# Patient Record
Sex: MALE | Race: WHITE | NOT HISPANIC OR LATINO | URBAN - METROPOLITAN AREA
[De-identification: names, ages, dates, MRNs, and addresses within clinical notes are randomized per-mention and may not be internally consistent; named-entity substitution may affect disease eponyms.]

---

## 2017-02-17 ENCOUNTER — OUTPATIENT (OUTPATIENT)
Dept: OUTPATIENT SERVICES | Facility: HOSPITAL | Age: 62
LOS: 1 days | End: 2017-02-17
Payer: MEDICARE

## 2017-02-17 VITALS
RESPIRATION RATE: 20 BRPM | SYSTOLIC BLOOD PRESSURE: 124 MMHG | TEMPERATURE: 98 F | HEART RATE: 98 BPM | WEIGHT: 218.26 LBS | DIASTOLIC BLOOD PRESSURE: 80 MMHG | HEIGHT: 66 IN

## 2017-02-17 DIAGNOSIS — Z95.810 PRESENCE OF AUTOMATIC (IMPLANTABLE) CARDIAC DEFIBRILLATOR: Chronic | ICD-10-CM

## 2017-02-17 DIAGNOSIS — Z01.818 ENCOUNTER FOR OTHER PREPROCEDURAL EXAMINATION: ICD-10-CM

## 2017-02-17 DIAGNOSIS — E11.9 TYPE 2 DIABETES MELLITUS WITHOUT COMPLICATIONS: ICD-10-CM

## 2017-02-17 DIAGNOSIS — I25.10 ATHEROSCLEROTIC HEART DISEASE OF NATIVE CORONARY ARTERY WITHOUT ANGINA PECTORIS: ICD-10-CM

## 2017-02-17 DIAGNOSIS — Z98.89 OTHER SPECIFIED POSTPROCEDURAL STATES: Chronic | ICD-10-CM

## 2017-02-17 DIAGNOSIS — Z12.11 ENCOUNTER FOR SCREENING FOR MALIGNANT NEOPLASM OF COLON: ICD-10-CM

## 2017-02-17 DIAGNOSIS — Z95.1 PRESENCE OF AORTOCORONARY BYPASS GRAFT: Chronic | ICD-10-CM

## 2017-02-17 LAB
ANION GAP SERPL CALC-SCNC: 11 MMOL/L — SIGNIFICANT CHANGE UP (ref 5–17)
BUN SERPL-MCNC: 31 MG/DL — HIGH (ref 8–20)
CALCIUM SERPL-MCNC: 9.3 MG/DL — SIGNIFICANT CHANGE UP (ref 8.6–10.2)
CHLORIDE SERPL-SCNC: 102 MMOL/L — SIGNIFICANT CHANGE UP (ref 98–107)
CO2 SERPL-SCNC: 31 MMOL/L — HIGH (ref 22–29)
CREAT SERPL-MCNC: 1.86 MG/DL — HIGH (ref 0.5–1.3)
GLUCOSE SERPL-MCNC: 85 MG/DL — SIGNIFICANT CHANGE UP (ref 70–115)
HCT VFR BLD CALC: 43.2 % — SIGNIFICANT CHANGE UP (ref 42–52)
HGB BLD-MCNC: 14 G/DL — SIGNIFICANT CHANGE UP (ref 14–18)
MCHC RBC-ENTMCNC: 28.7 PG — SIGNIFICANT CHANGE UP (ref 27–31)
MCHC RBC-ENTMCNC: 32.4 G/DL — SIGNIFICANT CHANGE UP (ref 32–36)
MCV RBC AUTO: 88.7 FL — SIGNIFICANT CHANGE UP (ref 80–94)
PLATELET # BLD AUTO: 270 K/UL — SIGNIFICANT CHANGE UP (ref 150–400)
POTASSIUM SERPL-MCNC: 4.7 MMOL/L — SIGNIFICANT CHANGE UP (ref 3.5–5.3)
POTASSIUM SERPL-SCNC: 4.7 MMOL/L — SIGNIFICANT CHANGE UP (ref 3.5–5.3)
RBC # BLD: 4.87 M/UL — SIGNIFICANT CHANGE UP (ref 4.6–6.2)
RBC # FLD: 15.1 % — SIGNIFICANT CHANGE UP (ref 11–15.6)
SODIUM SERPL-SCNC: 144 MMOL/L — SIGNIFICANT CHANGE UP (ref 135–145)
WBC # BLD: 9.2 K/UL — SIGNIFICANT CHANGE UP (ref 4.8–10.8)
WBC # FLD AUTO: 9.2 K/UL — SIGNIFICANT CHANGE UP (ref 4.8–10.8)

## 2017-02-17 PROCEDURE — 85027 COMPLETE CBC AUTOMATED: CPT

## 2017-02-17 PROCEDURE — G0463: CPT

## 2017-02-17 PROCEDURE — 80048 BASIC METABOLIC PNL TOTAL CA: CPT

## 2017-02-17 NOTE — H&P PST ADULT - PMH
CAD (coronary artery disease)    Diabetes  Peripheral vascular disease  CHF- EF-19% AICD ([employer])  MI  December 2013   HTN  Pulmonary edema  High Cholesterol  Chronic renal insufficency  DVT of leg (deep venous thrombosis), right    Dyslipidemia    Hypertension    Renal insufficiency

## 2017-02-17 NOTE — ASU PATIENT PROFILE, ADULT - PMH
CAD (coronary artery disease)    Diabetes  Peripheral vascular disease  CHF- EF-19% AICD ([employer])  MI  December 2013   HTN  Pulmonary edema  High Cholesterol  Chronic renal insufficency  DVT of leg (deep venous thrombosis), right    Dyslipidemia    Hypertension    Renal insufficiency

## 2017-02-17 NOTE — H&P PST ADULT - HISTORY OF PRESENT ILLNESS
62 yo male planning screening colonoscopy.  Pt currently denies change in bowels, abdominal pain or bleeding.

## 2017-02-17 NOTE — H&P PST ADULT - PSH
AICD (automatic cardioverter/defibrillator) present    S/P angioplasty with stent  right leg 08/14, L femoral 7/2014  S/P coronary artery bypass graft x 3  2014    AICD ([employer]) 2014

## 2017-02-17 NOTE — H&P PST ADULT - NSANTHOSAYNRD_GEN_A_CORE
No. IKE screening performed.  STOP BANG Legend: 0-2 = LOW Risk; 3-4 = INTERMEDIATE Risk; 5-8 = HIGH Risk

## 2017-02-20 DIAGNOSIS — Z12.11 ENCOUNTER FOR SCREENING FOR MALIGNANT NEOPLASM OF COLON: ICD-10-CM

## 2017-02-20 DIAGNOSIS — Z01.818 ENCOUNTER FOR OTHER PREPROCEDURAL EXAMINATION: ICD-10-CM

## 2017-02-22 ENCOUNTER — RESULT REVIEW (OUTPATIENT)
Age: 62
End: 2017-02-22

## 2017-02-23 ENCOUNTER — OUTPATIENT (OUTPATIENT)
Dept: OUTPATIENT SERVICES | Facility: HOSPITAL | Age: 62
LOS: 1 days | End: 2017-02-23
Payer: MEDICARE

## 2017-02-23 DIAGNOSIS — Z95.1 PRESENCE OF AORTOCORONARY BYPASS GRAFT: Chronic | ICD-10-CM

## 2017-02-23 DIAGNOSIS — Z98.89 OTHER SPECIFIED POSTPROCEDURAL STATES: Chronic | ICD-10-CM

## 2017-02-23 DIAGNOSIS — Z12.11 ENCOUNTER FOR SCREENING FOR MALIGNANT NEOPLASM OF COLON: ICD-10-CM

## 2017-02-23 DIAGNOSIS — Z95.810 PRESENCE OF AUTOMATIC (IMPLANTABLE) CARDIAC DEFIBRILLATOR: Chronic | ICD-10-CM

## 2017-02-23 PROCEDURE — 88305 TISSUE EXAM BY PATHOLOGIST: CPT | Mod: 26

## 2017-02-23 PROCEDURE — 45385 COLONOSCOPY W/LESION REMOVAL: CPT | Mod: PT

## 2017-02-23 PROCEDURE — 88305 TISSUE EXAM BY PATHOLOGIST: CPT

## 2017-02-23 PROCEDURE — 45380 COLONOSCOPY AND BIOPSY: CPT | Mod: PT,XS

## 2017-02-24 ENCOUNTER — TRANSCRIPTION ENCOUNTER (OUTPATIENT)
Age: 62
End: 2017-02-24

## 2017-02-27 LAB — SURGICAL PATHOLOGY FINAL REPORT - CH: SIGNIFICANT CHANGE UP

## 2017-03-16 ENCOUNTER — RESULT REVIEW (OUTPATIENT)
Age: 62
End: 2017-03-16

## 2017-03-28 ENCOUNTER — APPOINTMENT (OUTPATIENT)
Dept: PULMONOLOGY | Facility: CLINIC | Age: 62
End: 2017-03-28

## 2017-03-28 VITALS
DIASTOLIC BLOOD PRESSURE: 70 MMHG | HEIGHT: 67 IN | WEIGHT: 220 LBS | BODY MASS INDEX: 34.53 KG/M2 | SYSTOLIC BLOOD PRESSURE: 130 MMHG | HEART RATE: 78 BPM | OXYGEN SATURATION: 98 %

## 2017-03-28 DIAGNOSIS — E66.01 MORBID (SEVERE) OBESITY DUE TO EXCESS CALORIES: ICD-10-CM

## 2017-03-28 DIAGNOSIS — R06.83 SNORING: ICD-10-CM

## 2017-03-28 DIAGNOSIS — R91.8 OTHER NONSPECIFIC ABNORMAL FINDING OF LUNG FIELD: ICD-10-CM

## 2017-03-28 DIAGNOSIS — Z87.891 PERSONAL HISTORY OF NICOTINE DEPENDENCE: ICD-10-CM

## 2017-03-28 RX ORDER — LIRAGLUTIDE 6 MG/ML
18 INJECTION SUBCUTANEOUS
Refills: 0 | Status: ACTIVE | COMMUNITY

## 2017-03-28 RX ORDER — INSULIN DEGLUDEC INJECTION 100 U/ML
100 INJECTION, SOLUTION SUBCUTANEOUS
Refills: 0 | Status: ACTIVE | COMMUNITY

## 2017-03-28 RX ORDER — SACUBITRIL AND VALSARTAN 49; 51 MG/1; MG/1
49-51 TABLET, FILM COATED ORAL
Refills: 0 | Status: ACTIVE | COMMUNITY

## 2017-04-07 ENCOUNTER — OUTPATIENT (OUTPATIENT)
Dept: OUTPATIENT SERVICES | Facility: HOSPITAL | Age: 62
LOS: 1 days | End: 2017-04-07
Payer: MEDICARE

## 2017-04-07 DIAGNOSIS — Z95.810 PRESENCE OF AUTOMATIC (IMPLANTABLE) CARDIAC DEFIBRILLATOR: Chronic | ICD-10-CM

## 2017-04-07 DIAGNOSIS — Z95.1 PRESENCE OF AORTOCORONARY BYPASS GRAFT: Chronic | ICD-10-CM

## 2017-04-07 DIAGNOSIS — G47.33 OBSTRUCTIVE SLEEP APNEA (ADULT) (PEDIATRIC): ICD-10-CM

## 2017-04-07 DIAGNOSIS — Z98.89 OTHER SPECIFIED POSTPROCEDURAL STATES: Chronic | ICD-10-CM

## 2017-04-07 PROCEDURE — 95806 SLEEP STUDY UNATT&RESP EFFT: CPT

## 2017-04-07 PROCEDURE — 95806 SLEEP STUDY UNATT&RESP EFFT: CPT | Mod: 26

## 2017-05-11 ENCOUNTER — APPOINTMENT (OUTPATIENT)
Dept: PULMONOLOGY | Facility: CLINIC | Age: 62
End: 2017-05-11

## 2017-05-11 VITALS
OXYGEN SATURATION: 95 % | SYSTOLIC BLOOD PRESSURE: 110 MMHG | HEART RATE: 104 BPM | BODY MASS INDEX: 34.14 KG/M2 | WEIGHT: 218 LBS | DIASTOLIC BLOOD PRESSURE: 66 MMHG

## 2017-05-11 DIAGNOSIS — Z87.891 PERSONAL HISTORY OF NICOTINE DEPENDENCE: ICD-10-CM

## 2017-05-11 DIAGNOSIS — E66.01 MORBID (SEVERE) OBESITY DUE TO EXCESS CALORIES: ICD-10-CM

## 2017-05-11 DIAGNOSIS — G47.33 OBSTRUCTIVE SLEEP APNEA (ADULT) (PEDIATRIC): ICD-10-CM

## 2017-05-29 ENCOUNTER — OUTPATIENT (OUTPATIENT)
Dept: OUTPATIENT SERVICES | Facility: HOSPITAL | Age: 62
LOS: 1 days | End: 2017-05-29
Payer: MEDICARE

## 2017-05-29 DIAGNOSIS — Z98.89 OTHER SPECIFIED POSTPROCEDURAL STATES: Chronic | ICD-10-CM

## 2017-05-29 DIAGNOSIS — Z95.810 PRESENCE OF AUTOMATIC (IMPLANTABLE) CARDIAC DEFIBRILLATOR: Chronic | ICD-10-CM

## 2017-05-29 DIAGNOSIS — G47.33 OBSTRUCTIVE SLEEP APNEA (ADULT) (PEDIATRIC): ICD-10-CM

## 2017-05-29 DIAGNOSIS — Z95.1 PRESENCE OF AORTOCORONARY BYPASS GRAFT: Chronic | ICD-10-CM

## 2017-05-29 PROCEDURE — 95811 POLYSOM 6/>YRS CPAP 4/> PARM: CPT | Mod: 26

## 2017-05-29 PROCEDURE — 95811 POLYSOM 6/>YRS CPAP 4/> PARM: CPT

## 2017-09-12 ENCOUNTER — RX RENEWAL (OUTPATIENT)
Age: 62
End: 2017-09-12

## 2017-12-04 ENCOUNTER — RX RENEWAL (OUTPATIENT)
Age: 62
End: 2017-12-04

## 2018-02-09 ENCOUNTER — RX RENEWAL (OUTPATIENT)
Age: 63
End: 2018-02-09

## 2018-10-08 ENCOUNTER — RX RENEWAL (OUTPATIENT)
Age: 63
End: 2018-10-08

## 2018-10-11 ENCOUNTER — RESULT REVIEW (OUTPATIENT)
Age: 63
End: 2018-10-11

## 2018-12-13 PROBLEM — I10 ESSENTIAL (PRIMARY) HYPERTENSION: Chronic | Status: ACTIVE | Noted: 2017-02-17

## 2018-12-13 PROBLEM — E78.5 HYPERLIPIDEMIA, UNSPECIFIED: Chronic | Status: ACTIVE | Noted: 2017-02-17

## 2019-03-08 ENCOUNTER — OUTPATIENT (OUTPATIENT)
Dept: OUTPATIENT SERVICES | Facility: HOSPITAL | Age: 64
LOS: 1 days | End: 2019-03-08
Payer: MEDICARE

## 2019-03-08 VITALS
RESPIRATION RATE: 16 BRPM | HEART RATE: 99 BPM | SYSTOLIC BLOOD PRESSURE: 143 MMHG | WEIGHT: 196.21 LBS | DIASTOLIC BLOOD PRESSURE: 83 MMHG | HEIGHT: 67 IN | TEMPERATURE: 98 F

## 2019-03-08 DIAGNOSIS — N18.9 CHRONIC KIDNEY DISEASE, UNSPECIFIED: ICD-10-CM

## 2019-03-08 DIAGNOSIS — Z98.890 OTHER SPECIFIED POSTPROCEDURAL STATES: Chronic | ICD-10-CM

## 2019-03-08 DIAGNOSIS — Z98.89 OTHER SPECIFIED POSTPROCEDURAL STATES: Chronic | ICD-10-CM

## 2019-03-08 DIAGNOSIS — Z95.810 PRESENCE OF AUTOMATIC (IMPLANTABLE) CARDIAC DEFIBRILLATOR: Chronic | ICD-10-CM

## 2019-03-08 DIAGNOSIS — I10 ESSENTIAL (PRIMARY) HYPERTENSION: ICD-10-CM

## 2019-03-08 DIAGNOSIS — E11.9 TYPE 2 DIABETES MELLITUS WITHOUT COMPLICATIONS: ICD-10-CM

## 2019-03-08 DIAGNOSIS — K80.50 CALCULUS OF BILE DUCT WITHOUT CHOLANGITIS OR CHOLECYSTITIS WITHOUT OBSTRUCTION: ICD-10-CM

## 2019-03-08 DIAGNOSIS — Z01.818 ENCOUNTER FOR OTHER PREPROCEDURAL EXAMINATION: ICD-10-CM

## 2019-03-08 DIAGNOSIS — Z95.1 PRESENCE OF AORTOCORONARY BYPASS GRAFT: Chronic | ICD-10-CM

## 2019-03-08 DIAGNOSIS — Z29.9 ENCOUNTER FOR PROPHYLACTIC MEASURES, UNSPECIFIED: ICD-10-CM

## 2019-03-08 LAB
AMYLASE P1 CFR SERPL: 285 U/L — HIGH (ref 36–128)
ANION GAP SERPL CALC-SCNC: 13 MMOL/L — SIGNIFICANT CHANGE UP (ref 5–17)
APTT BLD: 32.8 SEC — SIGNIFICANT CHANGE UP (ref 27.5–36.3)
BASOPHILS # BLD AUTO: 0 K/UL — SIGNIFICANT CHANGE UP (ref 0–0.2)
BASOPHILS NFR BLD AUTO: 0.2 % — SIGNIFICANT CHANGE UP (ref 0–2)
BUN SERPL-MCNC: 30 MG/DL — HIGH (ref 8–20)
CALCIUM SERPL-MCNC: 9.6 MG/DL — SIGNIFICANT CHANGE UP (ref 8.6–10.2)
CHLORIDE SERPL-SCNC: 104 MMOL/L — SIGNIFICANT CHANGE UP (ref 98–107)
CO2 SERPL-SCNC: 23 MMOL/L — SIGNIFICANT CHANGE UP (ref 22–29)
CREAT SERPL-MCNC: 1.38 MG/DL — HIGH (ref 0.5–1.3)
EOSINOPHIL # BLD AUTO: 0.2 K/UL — SIGNIFICANT CHANGE UP (ref 0–0.5)
EOSINOPHIL NFR BLD AUTO: 2.4 % — SIGNIFICANT CHANGE UP (ref 0–5)
GLUCOSE SERPL-MCNC: 229 MG/DL — HIGH (ref 70–115)
HBA1C BLD-MCNC: 10.1 % — HIGH (ref 4–5.6)
HCT VFR BLD CALC: 47.1 % — SIGNIFICANT CHANGE UP (ref 42–52)
HGB BLD-MCNC: 16 G/DL — SIGNIFICANT CHANGE UP (ref 14–18)
INR BLD: 1.08 RATIO — SIGNIFICANT CHANGE UP (ref 0.88–1.16)
LIDOCAIN IGE QN: 117 U/L — HIGH (ref 22–51)
LYMPHOCYTES # BLD AUTO: 1.8 K/UL — SIGNIFICANT CHANGE UP (ref 1–4.8)
LYMPHOCYTES # BLD AUTO: 20.6 % — SIGNIFICANT CHANGE UP (ref 20–55)
MCHC RBC-ENTMCNC: 30.3 PG — SIGNIFICANT CHANGE UP (ref 27–31)
MCHC RBC-ENTMCNC: 34 G/DL — SIGNIFICANT CHANGE UP (ref 32–36)
MCV RBC AUTO: 89.2 FL — SIGNIFICANT CHANGE UP (ref 80–94)
MONOCYTES # BLD AUTO: 0.5 K/UL — SIGNIFICANT CHANGE UP (ref 0–0.8)
MONOCYTES NFR BLD AUTO: 6.3 % — SIGNIFICANT CHANGE UP (ref 3–10)
NEUTROPHILS # BLD AUTO: 6.1 K/UL — SIGNIFICANT CHANGE UP (ref 1.8–8)
NEUTROPHILS NFR BLD AUTO: 70.3 % — SIGNIFICANT CHANGE UP (ref 37–73)
PLATELET # BLD AUTO: 253 K/UL — SIGNIFICANT CHANGE UP (ref 150–400)
POTASSIUM SERPL-MCNC: 4.8 MMOL/L — SIGNIFICANT CHANGE UP (ref 3.5–5.3)
POTASSIUM SERPL-SCNC: 4.8 MMOL/L — SIGNIFICANT CHANGE UP (ref 3.5–5.3)
PROTHROM AB SERPL-ACNC: 12.5 SEC — SIGNIFICANT CHANGE UP (ref 10–12.9)
RBC # BLD: 5.28 M/UL — SIGNIFICANT CHANGE UP (ref 4.6–6.2)
RBC # FLD: 14.9 % — SIGNIFICANT CHANGE UP (ref 11–15.6)
SODIUM SERPL-SCNC: 140 MMOL/L — SIGNIFICANT CHANGE UP (ref 135–145)
WBC # BLD: 8.6 K/UL — SIGNIFICANT CHANGE UP (ref 4.8–10.8)
WBC # FLD AUTO: 8.6 K/UL — SIGNIFICANT CHANGE UP (ref 4.8–10.8)

## 2019-03-08 PROCEDURE — 85027 COMPLETE CBC AUTOMATED: CPT

## 2019-03-08 PROCEDURE — 83036 HEMOGLOBIN GLYCOSYLATED A1C: CPT

## 2019-03-08 PROCEDURE — 36415 COLL VENOUS BLD VENIPUNCTURE: CPT

## 2019-03-08 PROCEDURE — 82150 ASSAY OF AMYLASE: CPT

## 2019-03-08 PROCEDURE — 80048 BASIC METABOLIC PNL TOTAL CA: CPT

## 2019-03-08 PROCEDURE — 85610 PROTHROMBIN TIME: CPT

## 2019-03-08 PROCEDURE — 83690 ASSAY OF LIPASE: CPT

## 2019-03-08 PROCEDURE — 85730 THROMBOPLASTIN TIME PARTIAL: CPT

## 2019-03-08 NOTE — ASU PATIENT PROFILE, ADULT - PMH
CAD (coronary artery disease)    Diabetes  Peripheral vascular disease  CHF- EF-19% AICD ([employer])  MI  December 2013   HTN  Pulmonary edema  High Cholesterol  Chronic renal insufficency  DVT of leg (deep venous thrombosis), right    Dyslipidemia    Hypertension    Renal insufficiency

## 2019-03-08 NOTE — H&P PST ADULT - NSICDXPASTMEDICALHX_GEN_ALL_CORE_FT
PAST MEDICAL HISTORY:  BPH (benign prostatic hyperplasia)     CAD (coronary artery disease)     CKD (chronic kidney disease)     Diabetes Peripheral vascular disease  CHF- EF-19% AICD ([employer])  MI  December 2013   HTN  Pulmonary edema  High Cholesterol  Chronic renal insufficency    DVT of leg (deep venous thrombosis), right     Dyslipidemia     Fatty liver     Hypertension

## 2019-03-08 NOTE — H&P PST ADULT - NSICDXPASTSURGICALHX_GEN_ALL_CORE_FT
PAST SURGICAL HISTORY:  AICD (automatic cardioverter/defibrillator) present     S/P angioplasty with stent right leg 08/14, L femoral 7/2014    S/P colonoscopy with polypectomy     S/P coronary artery bypass graft x 3 2014    AICD ([employer]) 2014    S/P thyroid biopsy

## 2019-03-08 NOTE — H&P PST ADULT - PSH
AICD (automatic cardioverter/defibrillator) present    S/P angioplasty with stent  right leg 08/14, L femoral 7/2014  S/P colonoscopy with polypectomy    S/P coronary artery bypass graft x 3  2014    AICD ([employer]) 2014  S/P thyroid biopsy

## 2019-03-08 NOTE — H&P PST ADULT - NSICDXFAMILYHX_GEN_ALL_CORE_FT
FAMILY HISTORY:  Father  Still living? Yes, Estimated age: Age Unknown  Family history of heart disease, Age at diagnosis: Age Unknown

## 2019-03-08 NOTE — H&P PST ADULT - HISTORY OF PRESENT ILLNESS
63 year old male with h/o multiple comorbidities present with epigastric pain, worse after eating.  Work up revealed gall stones and H pylori. Patient state he has completed his treat me for H. Pylori and is going this week for a repeat breath test.  He is also schedule for lap Cholecystectomy possible open.

## 2019-03-08 NOTE — H&P PST ADULT - ASSESSMENT
63 year old male with h/o multiple comorbidities present with epigastric pain, worse after eating.  Work up revealed gall stones and H pylori. P. He is also schedule for lap Cholecystectomy  possible open.     FARIDEHI VTE 2.0 SCORE [CLOT updated 2019]    AGE RELATED RISK FACTORS                                                       MOBILITY RELATED FACTORS  [ ] Age 41-60 years                                            (1 Point)                    [ ] Bed rest                                                        (1 Point)  [ x] Age: 61-74 years                                           (2 Points)                  [ ] Plaster cast                                                   (2 Points)  [ ] Age= 75 years                                              (3 Points)                    [ ] Bed bound for more than 72 hours                 (2 Points)    DISEASE RELATED RISK FACTORS                                               GENDER SPECIFIC FACTORS  [ x] Edema in the lower extremities                       (1 Point)              [ ] Pregnancy                                                     (1 Point)  [x ] Varicose veins                                               (1 Point)                     [ ] Post-partum < 6 weeks                                   (1 Point)             [x ] BMI > 25 Kg/m2                                            (1 Point)                     [ ] Hormonal therapy  or oral contraception          (1 Point)                 [ ] Sepsis (in the previous month)                        (1 Point)               [ ] History of pregnancy complications                 (1 point)  [ ] Pneumonia or serious lung disease                                               [ ] Unexplained or recurrent                     (1 Point)           (in the previous month)                               (1 Point)  [ ] Abnormal pulmonary function test                     (1 Point)                 SURGERY RELATED RISK FACTORS  [ ] Acute myocardial infarction                              (1 Point)               [ ]  Section                                             (1 Point)  [ ] Congestive heart failure (in the previous month)  (1 Point)      [ ] Minor surgery                                                  (1 Point)   [ ] Inflammatory bowel disease                             (1 Point)               [ ] Arthroscopic surgery                                        (2 Points)  [ ] Central venous access                                      (2 Points)                [x ] General surgery lasting more than 45 minutes (2 points)  [ ] Malignancy- Present or previous                   (2 Points)                [ ] Elective arthroplasty                                         (5 points)    [ ] Stroke (in the previous month)                          (5 Points)                                                                                                                                                           HEMATOLOGY RELATED FACTORS                                                 TRAUMA RELATED RISK FACTORS  [x ] Prior episodes of VTE                                     (3 Points)                [ ] Fracture of the hip, pelvis, or leg                       (5 Points)  [ ] Positive family history for VTE                         (3 Points)             [ ] Acute spinal cord injury (in the previous month)  (5 Points)  [ ] Prothrombin 44928 A                                     (3 Points)               [ ] Paralysis  (less than 1 month)                             (5 Points)  [ ] Factor V Leiden                                             (3 Points)                  [ ] Multiple Trauma within 1 month                        (5 Points)  [ ] Lupus anticoagulants                                     (3 Points)                                                           [ ] Anticardiolipin antibodies                               (3 Points)                                                       [ ] High homocysteine in the blood                      (3 Points)                                             [ ] Other congenital or acquired thrombophilia      (3 Points)                                                [ ] Heparin induced thrombocytopenia                  (3 Points)                                     Total Score [     10  ]  OPIOID RISK TOOL    CELESTINE EACH BOX THAT APPLIES AND ADD TOTALS AT THE END    FAMILY HISTORY OF SUBSTANCE ABUSE                 FEMALE         MALE                                                Alcohol                             [  ]1 pt          [  ]3pts                                               Illegal Durgs                     [  ]2 pts        [  ]3pts                                               Rx Drugs                           [  ]4 pts        [  ]4 pts    PERSONAL HISTORY OF SUBSTANCE ABUSE                                                                                          Alcohol                             [  ]3 pts       [  ]3 pts                                               Illegal Drugs                     [  ]4 pts        [  ]4 pts                                               Rx Drugs                           [  ]5 pts        [  ]5 pts    AGE BETWEEN 16-45 YEARS                                      [  ]1 pt         [  ]1 pt    HISTORY OF PREADOLESCENT   SEXUAL ABUSE                                                             [  ]3 pts        [  ]0pts    PSYCHOLOGICAL DISEASE                     ADD, OCD, Bipolar, Schizophrenia        [  ]2 pts         [  ]2 pts                      Depression                                               [  ]1 pt           [  ]1 pt           SCORING TOTAL   (add numbers and type here)              (***)                                     A score of 3 or lower indicated LOW risk for future opioid abuse  A score of 4 to 7 indicated moderate risk for future opioid abuse  A score of 8 or higher indicates a high risk for opioid abuse

## 2019-03-08 NOTE — H&P PST ADULT - PMH
CAD (coronary artery disease)    Diabetes  Peripheral vascular disease  CHF- EF-19% AICD ([employer])  MI  December 2013   HTN  Pulmonary edema  High Cholesterol  Chronic renal insufficency  DVT of leg (deep venous thrombosis), right    Dyslipidemia    Hypertension    Renal insufficiency

## 2019-03-09 PROBLEM — N28.9 DISORDER OF KIDNEY AND URETER, UNSPECIFIED: Chronic | Status: INACTIVE | Noted: 2017-02-17 | Resolved: 2019-03-08

## 2019-03-14 ENCOUNTER — OUTPATIENT (OUTPATIENT)
Dept: OUTPATIENT SERVICES | Facility: HOSPITAL | Age: 64
LOS: 1 days | End: 2019-03-14
Payer: MEDICARE

## 2019-03-14 DIAGNOSIS — Z98.890 OTHER SPECIFIED POSTPROCEDURAL STATES: Chronic | ICD-10-CM

## 2019-03-14 DIAGNOSIS — Z95.810 PRESENCE OF AUTOMATIC (IMPLANTABLE) CARDIAC DEFIBRILLATOR: Chronic | ICD-10-CM

## 2019-03-14 DIAGNOSIS — Z01.812 ENCOUNTER FOR PREPROCEDURAL LABORATORY EXAMINATION: ICD-10-CM

## 2019-03-14 DIAGNOSIS — Z95.1 PRESENCE OF AORTOCORONARY BYPASS GRAFT: Chronic | ICD-10-CM

## 2019-03-14 DIAGNOSIS — Z98.89 OTHER SPECIFIED POSTPROCEDURAL STATES: Chronic | ICD-10-CM

## 2019-03-14 PROBLEM — N18.9 CHRONIC KIDNEY DISEASE, UNSPECIFIED: Chronic | Status: ACTIVE | Noted: 2019-03-08

## 2019-03-14 PROBLEM — N40.0 BENIGN PROSTATIC HYPERPLASIA WITHOUT LOWER URINARY TRACT SYMPTOMS: Chronic | Status: ACTIVE | Noted: 2019-03-08

## 2019-03-14 LAB
ALBUMIN SERPL ELPH-MCNC: 3.9 G/DL — SIGNIFICANT CHANGE UP (ref 3.3–5.2)
ALP SERPL-CCNC: 111 U/L — SIGNIFICANT CHANGE UP (ref 40–120)
ALT FLD-CCNC: 27 U/L — SIGNIFICANT CHANGE UP
AMYLASE P1 CFR SERPL: 193 U/L — HIGH (ref 36–128)
AST SERPL-CCNC: 21 U/L — SIGNIFICANT CHANGE UP
BILIRUB DIRECT SERPL-MCNC: 0.2 MG/DL — SIGNIFICANT CHANGE UP (ref 0–0.3)
BILIRUB INDIRECT FLD-MCNC: 0.6 MG/DL — SIGNIFICANT CHANGE UP (ref 0.2–1)
BILIRUB SERPL-MCNC: 0.8 MG/DL — SIGNIFICANT CHANGE UP (ref 0.4–2)
LIDOCAIN IGE QN: 103 U/L — HIGH (ref 22–51)
PROT SERPL-MCNC: 6.9 G/DL — SIGNIFICANT CHANGE UP (ref 6.6–8.7)

## 2019-03-14 PROCEDURE — 83690 ASSAY OF LIPASE: CPT

## 2019-03-14 PROCEDURE — 80076 HEPATIC FUNCTION PANEL: CPT

## 2019-03-14 PROCEDURE — 36415 COLL VENOUS BLD VENIPUNCTURE: CPT

## 2019-03-14 PROCEDURE — 82150 ASSAY OF AMYLASE: CPT

## 2019-03-22 PROBLEM — K76.0 FATTY (CHANGE OF) LIVER, NOT ELSEWHERE CLASSIFIED: Chronic | Status: ACTIVE | Noted: 2019-03-14

## 2019-03-26 ENCOUNTER — APPOINTMENT (OUTPATIENT)
Dept: CT IMAGING | Facility: CLINIC | Age: 64
End: 2019-03-26
Payer: MEDICARE

## 2019-03-26 ENCOUNTER — OUTPATIENT (OUTPATIENT)
Dept: OUTPATIENT SERVICES | Facility: HOSPITAL | Age: 64
LOS: 1 days | End: 2019-03-26
Payer: MEDICARE

## 2019-03-26 DIAGNOSIS — Z98.890 OTHER SPECIFIED POSTPROCEDURAL STATES: Chronic | ICD-10-CM

## 2019-03-26 DIAGNOSIS — Z98.89 OTHER SPECIFIED POSTPROCEDURAL STATES: Chronic | ICD-10-CM

## 2019-03-26 DIAGNOSIS — Z95.1 PRESENCE OF AORTOCORONARY BYPASS GRAFT: Chronic | ICD-10-CM

## 2019-03-26 DIAGNOSIS — Z95.810 PRESENCE OF AUTOMATIC (IMPLANTABLE) CARDIAC DEFIBRILLATOR: Chronic | ICD-10-CM

## 2019-03-26 DIAGNOSIS — Z00.8 ENCOUNTER FOR OTHER GENERAL EXAMINATION: ICD-10-CM

## 2019-03-26 PROCEDURE — 74160 CT ABDOMEN W/CONTRAST: CPT | Mod: 26

## 2019-03-26 PROCEDURE — 74160 CT ABDOMEN W/CONTRAST: CPT

## 2019-04-15 ENCOUNTER — RX RENEWAL (OUTPATIENT)
Age: 64
End: 2019-04-15

## 2019-11-05 ENCOUNTER — RX RENEWAL (OUTPATIENT)
Age: 64
End: 2019-11-05

## 2020-03-05 ENCOUNTER — OUTPATIENT (OUTPATIENT)
Dept: OUTPATIENT SERVICES | Facility: HOSPITAL | Age: 65
LOS: 1 days | End: 2020-03-05
Payer: MEDICARE

## 2020-03-05 VITALS
WEIGHT: 201.94 LBS | HEIGHT: 67 IN | OXYGEN SATURATION: 98 % | DIASTOLIC BLOOD PRESSURE: 78 MMHG | TEMPERATURE: 98 F | SYSTOLIC BLOOD PRESSURE: 155 MMHG | RESPIRATION RATE: 20 BRPM | HEART RATE: 100 BPM

## 2020-03-05 VITALS
OXYGEN SATURATION: 98 % | SYSTOLIC BLOOD PRESSURE: 155 MMHG | TEMPERATURE: 98 F | RESPIRATION RATE: 18 BRPM | WEIGHT: 201.94 LBS | DIASTOLIC BLOOD PRESSURE: 78 MMHG | HEIGHT: 67 IN | HEART RATE: 100 BPM

## 2020-03-05 DIAGNOSIS — Z95.1 PRESENCE OF AORTOCORONARY BYPASS GRAFT: Chronic | ICD-10-CM

## 2020-03-05 DIAGNOSIS — Z01.818 ENCOUNTER FOR OTHER PREPROCEDURAL EXAMINATION: ICD-10-CM

## 2020-03-05 DIAGNOSIS — Z95.810 PRESENCE OF AUTOMATIC (IMPLANTABLE) CARDIAC DEFIBRILLATOR: Chronic | ICD-10-CM

## 2020-03-05 DIAGNOSIS — Z98.890 OTHER SPECIFIED POSTPROCEDURAL STATES: Chronic | ICD-10-CM

## 2020-03-05 DIAGNOSIS — Z98.89 OTHER SPECIFIED POSTPROCEDURAL STATES: Chronic | ICD-10-CM

## 2020-03-05 LAB
ANION GAP SERPL CALC-SCNC: 17 MMOL/L — SIGNIFICANT CHANGE UP (ref 5–17)
APTT BLD: 32.2 SEC — SIGNIFICANT CHANGE UP (ref 27.5–36.3)
BLD GP AB SCN SERPL QL: SIGNIFICANT CHANGE UP
BUN SERPL-MCNC: 39 MG/DL — HIGH (ref 8–20)
CALCIUM SERPL-MCNC: 10 MG/DL — SIGNIFICANT CHANGE UP (ref 8.6–10.2)
CHLORIDE SERPL-SCNC: 104 MMOL/L — SIGNIFICANT CHANGE UP (ref 98–107)
CHOLEST SERPL-MCNC: 164 MG/DL — SIGNIFICANT CHANGE UP (ref 110–199)
CO2 SERPL-SCNC: 22 MMOL/L — SIGNIFICANT CHANGE UP (ref 22–29)
CREAT SERPL-MCNC: 1.76 MG/DL — HIGH (ref 0.5–1.3)
GLUCOSE SERPL-MCNC: 66 MG/DL — LOW (ref 70–99)
HCT VFR BLD CALC: 47.5 % — SIGNIFICANT CHANGE UP (ref 39–50)
HDLC SERPL-MCNC: 32 MG/DL — LOW
HGB BLD-MCNC: 15.8 G/DL — SIGNIFICANT CHANGE UP (ref 13–17)
HIV 1 & 2 AB SERPL IA.RAPID: SIGNIFICANT CHANGE UP
INR BLD: 1.05 RATIO — SIGNIFICANT CHANGE UP (ref 0.88–1.16)
LIPID PNL WITH DIRECT LDL SERPL: 84 MG/DL — SIGNIFICANT CHANGE UP
MAGNESIUM SERPL-MCNC: 1.7 MG/DL — SIGNIFICANT CHANGE UP (ref 1.6–2.6)
MCHC RBC-ENTMCNC: 29.8 PG — SIGNIFICANT CHANGE UP (ref 27–34)
MCHC RBC-ENTMCNC: 33.3 GM/DL — SIGNIFICANT CHANGE UP (ref 32–36)
MCV RBC AUTO: 89.6 FL — SIGNIFICANT CHANGE UP (ref 80–100)
PLATELET # BLD AUTO: 306 K/UL — SIGNIFICANT CHANGE UP (ref 150–400)
POTASSIUM SERPL-MCNC: 4.7 MMOL/L — SIGNIFICANT CHANGE UP (ref 3.5–5.3)
POTASSIUM SERPL-SCNC: 4.7 MMOL/L — SIGNIFICANT CHANGE UP (ref 3.5–5.3)
PROTHROM AB SERPL-ACNC: 11.9 SEC — SIGNIFICANT CHANGE UP (ref 10–12.9)
RBC # BLD: 5.3 M/UL — SIGNIFICANT CHANGE UP (ref 4.2–5.8)
RBC # FLD: 14.3 % — SIGNIFICANT CHANGE UP (ref 10.3–14.5)
SODIUM SERPL-SCNC: 143 MMOL/L — SIGNIFICANT CHANGE UP (ref 135–145)
TOTAL CHOLESTEROL/HDL RATIO MEASUREMENT: 5 RATIO — SIGNIFICANT CHANGE UP (ref 3.4–9.6)
TRIGL SERPL-MCNC: 238 MG/DL — HIGH (ref 10–200)
WBC # BLD: 10.19 K/UL — SIGNIFICANT CHANGE UP (ref 3.8–10.5)
WBC # FLD AUTO: 10.19 K/UL — SIGNIFICANT CHANGE UP (ref 3.8–10.5)

## 2020-03-05 PROCEDURE — 93010 ELECTROCARDIOGRAM REPORT: CPT

## 2020-03-05 PROCEDURE — 93005 ELECTROCARDIOGRAM TRACING: CPT

## 2020-03-05 RX ORDER — INSULIN DEGLUDEC 100 U/ML
120 INJECTION, SOLUTION SUBCUTANEOUS
Qty: 0 | Refills: 0 | DISCHARGE

## 2020-03-05 RX ORDER — INSULIN ASPART 100 [IU]/ML
0 INJECTION, SOLUTION SUBCUTANEOUS
Qty: 0 | Refills: 0 | DISCHARGE

## 2020-03-05 NOTE — H&P PST ADULT - ASSESSMENT
This is a 64 year old male with a PMHx of HTN, HLD, DM with insulin resistance, CRI, severe ICM, sp CABG 3V in 2014, AICD ([employer]) in 2014, PVD sp right iliac artery and left SFA stent in the past, moderate L carotid stenosis, sleep apnea, and VT. He has be c/o dyspnea and bilateral claudication.  He was sent for a PET/CT sestamibi 2/26/2020 which showed a moderate area of moderate ischemia involving the anterior and anterolateral wall.  Patient for cardiac catheterization 3/10/2020.    LHC on Tuesday 3/10/2020  NPO 6 hours prior to procedure  Hold Victoza 3 hours prior to arrival  [employer]: if arrival time before 11 am take 1/2 normal dose  Humalog: No changes

## 2020-03-05 NOTE — H&P PST ADULT - HISTORY OF PRESENT ILLNESS
This is a 64 year old male with a PMHx of HTN, HLD, DM with insulin resistance, CRI, severe ICM, sp CABG 3V in 2014, AICD ([employer]) in 2014, PVD sp right iliac artery and left SFA stent in the past, moderate L carotid stenosis, sleep apnea, and VT. He has be c/o dyspnea and bilateral claudication.  He was sent for a PET/CT sestamibi 2/26/2020 which showed a moderate area of moderate ischemia involving the anterior and anterolateral wall.  Patient for cardiac catheterization 3/10/2020.    ASA 2  Mallampati II  GFR 40  Creat  1.76  Bleeding Risk 1.6%

## 2020-03-05 NOTE — H&P PST ADULT - NSICDXPASTSURGICALHX_GEN_ALL_CORE_FT
PAST SURGICAL HISTORY:  AICD (automatic cardioverter/defibrillator) present     S/P angioplasty with stent right leg 08/14, L femoral 7/2014    S/P colonoscopy with polypectomy     S/P coronary artery bypass graft x 3 2014    AICD ([employer]) 2014    S/P thyroid biopsy

## 2020-03-05 NOTE — ASU PATIENT PROFILE, ADULT - PMH
CAD (coronary artery disease)    Diabetes  Peripheral vascular disease  CHF- EF-19% AICD ([employer])  MI  December 2013   HTN  Pulmonary edema  High Cholesterol  Chronic renal insufficency  DVT of leg (deep venous thrombosis), right    Dyslipidemia    Hypertension    Renal insufficiency BPH (benign prostatic hyperplasia)    CAD (coronary artery disease)    CKD (chronic kidney disease)    Diabetes  Peripheral vascular disease  CHF- EF-19% AICD ([employer])  MI  December 2013   HTN  Pulmonary edema  High Cholesterol  Chronic renal insufficency  DVT of leg (deep venous thrombosis), right    Dyslipidemia    Fatty liver    Hypertension    Insulin resistance

## 2020-03-05 NOTE — H&P PST ADULT - NSICDXPASTMEDICALHX_GEN_ALL_CORE_FT
PAST MEDICAL HISTORY:  BPH (benign prostatic hyperplasia)     CAD (coronary artery disease)     CKD (chronic kidney disease)     Diabetes Peripheral vascular disease  CHF- EF-19% AICD ([employer])  MI  December 2013   HTN  Pulmonary edema  High Cholesterol  Chronic renal insufficency    DVT of leg (deep venous thrombosis), right     Dyslipidemia     Fatty liver     Hypertension     Insulin resistance

## 2020-03-06 LAB
HCV AB S/CO SERPL IA: 0.16 S/CO — SIGNIFICANT CHANGE UP (ref 0–0.99)
HCV AB SERPL-IMP: SIGNIFICANT CHANGE UP

## 2020-03-10 ENCOUNTER — INPATIENT (INPATIENT)
Facility: HOSPITAL | Age: 65
LOS: 0 days | Discharge: HOME | DRG: 247 | End: 2020-03-11
Attending: INTERNAL MEDICINE | Admitting: INTERNAL MEDICINE
Payer: MEDICARE

## 2020-03-10 ENCOUNTER — TRANSCRIPTION ENCOUNTER (OUTPATIENT)
Age: 65
End: 2020-03-10

## 2020-03-10 VITALS
SYSTOLIC BLOOD PRESSURE: 176 MMHG | TEMPERATURE: 98 F | DIASTOLIC BLOOD PRESSURE: 81 MMHG | RESPIRATION RATE: 13 BRPM | HEART RATE: 97 BPM | OXYGEN SATURATION: 98 %

## 2020-03-10 DIAGNOSIS — Z95.1 PRESENCE OF AORTOCORONARY BYPASS GRAFT: Chronic | ICD-10-CM

## 2020-03-10 DIAGNOSIS — Z95.810 PRESENCE OF AUTOMATIC (IMPLANTABLE) CARDIAC DEFIBRILLATOR: Chronic | ICD-10-CM

## 2020-03-10 DIAGNOSIS — Z98.890 OTHER SPECIFIED POSTPROCEDURAL STATES: Chronic | ICD-10-CM

## 2020-03-10 DIAGNOSIS — Z98.89 OTHER SPECIFIED POSTPROCEDURAL STATES: Chronic | ICD-10-CM

## 2020-03-10 DIAGNOSIS — I25.10 ATHEROSCLEROTIC HEART DISEASE OF NATIVE CORONARY ARTERY WITHOUT ANGINA PECTORIS: ICD-10-CM

## 2020-03-10 LAB
GLUCOSE BLDC GLUCOMTR-MCNC: 181 MG/DL — HIGH (ref 70–99)
GLUCOSE BLDC GLUCOMTR-MCNC: 200 MG/DL — HIGH (ref 70–99)

## 2020-03-10 RX ORDER — DIGOXIN 250 MCG
0.12 TABLET ORAL DAILY
Refills: 0 | Status: DISCONTINUED | OUTPATIENT
Start: 2020-03-10 | End: 2020-03-11

## 2020-03-10 RX ORDER — FINASTERIDE 5 MG/1
5 TABLET, FILM COATED ORAL DAILY
Refills: 0 | Status: DISCONTINUED | OUTPATIENT
Start: 2020-03-10 | End: 2020-03-11

## 2020-03-10 RX ORDER — ALPRAZOLAM 0.25 MG
0.25 TABLET ORAL EVERY 6 HOURS
Refills: 0 | Status: DISCONTINUED | OUTPATIENT
Start: 2020-03-10 | End: 2020-03-11

## 2020-03-10 RX ORDER — SACUBITRIL AND VALSARTAN 24; 26 MG/1; MG/1
1 TABLET, FILM COATED ORAL
Refills: 0 | Status: DISCONTINUED | OUTPATIENT
Start: 2020-03-10 | End: 2020-03-11

## 2020-03-10 RX ORDER — ACETAMINOPHEN 500 MG
650 TABLET ORAL EVERY 6 HOURS
Refills: 0 | Status: DISCONTINUED | OUTPATIENT
Start: 2020-03-10 | End: 2020-03-11

## 2020-03-10 RX ORDER — GLUCAGON INJECTION, SOLUTION 0.5 MG/.1ML
1 INJECTION, SOLUTION SUBCUTANEOUS ONCE
Refills: 0 | Status: DISCONTINUED | OUTPATIENT
Start: 2020-03-10 | End: 2020-03-11

## 2020-03-10 RX ORDER — INSULIN LISPRO 100/ML
VIAL (ML) SUBCUTANEOUS
Refills: 0 | Status: DISCONTINUED | OUTPATIENT
Start: 2020-03-10 | End: 2020-03-11

## 2020-03-10 RX ORDER — CLOPIDOGREL BISULFATE 75 MG/1
75 TABLET, FILM COATED ORAL DAILY
Refills: 0 | Status: DISCONTINUED | OUTPATIENT
Start: 2020-03-11 | End: 2020-03-11

## 2020-03-10 RX ORDER — CARVEDILOL PHOSPHATE 80 MG/1
25 CAPSULE, EXTENDED RELEASE ORAL EVERY 12 HOURS
Refills: 0 | Status: DISCONTINUED | OUTPATIENT
Start: 2020-03-10 | End: 2020-03-11

## 2020-03-10 RX ORDER — DEXTROSE 50 % IN WATER 50 %
12.5 SYRINGE (ML) INTRAVENOUS ONCE
Refills: 0 | Status: DISCONTINUED | OUTPATIENT
Start: 2020-03-10 | End: 2020-03-11

## 2020-03-10 RX ORDER — CARVEDILOL PHOSPHATE 80 MG/1
3.12 CAPSULE, EXTENDED RELEASE ORAL EVERY 12 HOURS
Refills: 0 | Status: DISCONTINUED | OUTPATIENT
Start: 2020-03-10 | End: 2020-03-11

## 2020-03-10 RX ORDER — ATORVASTATIN CALCIUM 80 MG/1
80 TABLET, FILM COATED ORAL AT BEDTIME
Refills: 0 | Status: DISCONTINUED | OUTPATIENT
Start: 2020-03-10 | End: 2020-03-11

## 2020-03-10 RX ORDER — DEXTROSE 50 % IN WATER 50 %
25 SYRINGE (ML) INTRAVENOUS ONCE
Refills: 0 | Status: DISCONTINUED | OUTPATIENT
Start: 2020-03-10 | End: 2020-03-11

## 2020-03-10 RX ORDER — SODIUM CHLORIDE 9 MG/ML
1000 INJECTION, SOLUTION INTRAVENOUS
Refills: 0 | Status: DISCONTINUED | OUTPATIENT
Start: 2020-03-10 | End: 2020-03-11

## 2020-03-10 RX ORDER — ONDANSETRON 8 MG/1
4 TABLET, FILM COATED ORAL EVERY 6 HOURS
Refills: 0 | Status: DISCONTINUED | OUTPATIENT
Start: 2020-03-10 | End: 2020-03-11

## 2020-03-10 RX ORDER — SODIUM CHLORIDE 9 MG/ML
100 INJECTION INTRAMUSCULAR; INTRAVENOUS; SUBCUTANEOUS
Refills: 0 | Status: DISCONTINUED | OUTPATIENT
Start: 2020-03-10 | End: 2020-03-10

## 2020-03-10 RX ORDER — DEXTROSE 50 % IN WATER 50 %
15 SYRINGE (ML) INTRAVENOUS ONCE
Refills: 0 | Status: DISCONTINUED | OUTPATIENT
Start: 2020-03-10 | End: 2020-03-11

## 2020-03-10 RX ORDER — FUROSEMIDE 40 MG
20 TABLET ORAL DAILY
Refills: 0 | Status: DISCONTINUED | OUTPATIENT
Start: 2020-03-10 | End: 2020-03-11

## 2020-03-10 RX ORDER — ACETYLCYSTEINE 200 MG/ML
1200 VIAL (ML) MISCELLANEOUS
Refills: 0 | Status: DISCONTINUED | OUTPATIENT
Start: 2020-03-10 | End: 2020-03-11

## 2020-03-10 RX ORDER — HYDRALAZINE HCL 50 MG
5 TABLET ORAL ONCE
Refills: 0 | Status: COMPLETED | OUTPATIENT
Start: 2020-03-10 | End: 2020-03-10

## 2020-03-10 RX ORDER — ASPIRIN/CALCIUM CARB/MAGNESIUM 324 MG
81 TABLET ORAL DAILY
Refills: 0 | Status: DISCONTINUED | OUTPATIENT
Start: 2020-03-10 | End: 2020-03-11

## 2020-03-10 RX ADMIN — Medication 5 MILLIGRAM(S): at 20:37

## 2020-03-10 RX ADMIN — ATORVASTATIN CALCIUM 80 MILLIGRAM(S): 80 TABLET, FILM COATED ORAL at 21:59

## 2020-03-10 RX ADMIN — SACUBITRIL AND VALSARTAN 1 TABLET(S): 24; 26 TABLET, FILM COATED ORAL at 20:38

## 2020-03-10 RX ADMIN — Medication 1200 MILLIGRAM(S): at 21:59

## 2020-03-10 RX ADMIN — CARVEDILOL PHOSPHATE 25 MILLIGRAM(S): 80 CAPSULE, EXTENDED RELEASE ORAL at 20:51

## 2020-03-10 RX ADMIN — SODIUM CHLORIDE 100 MILLILITER(S): 9 INJECTION INTRAMUSCULAR; INTRAVENOUS; SUBCUTANEOUS at 14:59

## 2020-03-10 RX ADMIN — FINASTERIDE 5 MILLIGRAM(S): 5 TABLET, FILM COATED ORAL at 22:01

## 2020-03-10 RX ADMIN — Medication 30 MILLILITER(S): at 18:08

## 2020-03-10 RX ADMIN — CARVEDILOL PHOSPHATE 3.12 MILLIGRAM(S): 80 CAPSULE, EXTENDED RELEASE ORAL at 20:37

## 2020-03-10 NOTE — DISCHARGE NOTE PROVIDER - NSDCMRMEDTOKEN_GEN_ALL_CORE_FT
aspirin 81 mg oral tablet: 1 tab(s) orally once a day  atorvastatin 80 mg oral tablet: 1 tab(s) orally once a day  carvedilol 25 mg oral tablet: 1 tab(s) orally 2 times a day  carvedilol 3.125 mg oral tablet: 1 tab(s) orally 2 times a day in addition to 25 mg bid  digoxin 125 mcg (0.125 mg) oral tablet: 1 tab(s) orally once a day  Entresto 49 mg-51 mg oral tablet: 1 tab(s) orally 2 times a day  ergocalciferol 50,000 intl units (1.25 mg) oral capsule: 1 cap(s) orally once a week  finasteride 5 mg oral tablet: 1 tab(s) orally once a day  furosemide 20 mg oral tablet: 1 tab(s) orally once a day  HumaLOG 100 units/mL injectable solution: 30 unit(s) injectable 3 times a day (with meals)  icosapent 1 g oral capsule: 2 cap(s) orally 2 times a day  Plavix 75 mg oral tablet: 1 tab(s) orally once a day  Tresiba FlexTouch 200 units/mL subcutaneous solution: 50 unit(s) subcutaneous 2 times a day  Victoza 18 mg/3 mL subcutaneous solution: 1.8 milliliter(s) subcutaneous once a day ALPRAZolam 0.25 mg oral tablet: 1 tab(s) orally every 6 hours, As needed, anxiety/sleep  aspirin 81 mg oral tablet: 1 tab(s) orally once a day  atorvastatin 80 mg oral tablet: 1 tab(s) orally once a day  carvedilol 25 mg oral tablet: 1 tab(s) orally 2 times a day  carvedilol 3.125 mg oral tablet: 1 tab(s) orally 2 times a day in addition to 25 mg bid  digoxin 125 mcg (0.125 mg) oral tablet: 1 tab(s) orally once a day  Entresto 49 mg-51 mg oral tablet: 1 tab(s) orally 2 times a day  ergocalciferol 50,000 intl units (1.25 mg) oral capsule: 1 cap(s) orally once a week  finasteride 5 mg oral tablet: 1 tab(s) orally once a day  furosemide 20 mg oral tablet: 1 tab(s) orally once a day  HumaLOG 100 units/mL injectable solution: 30 unit(s) injectable 3 times a day (with meals)  icosapent 1 g oral capsule: 2 cap(s) orally 2 times a day  Plavix 75 mg oral tablet: 1 tab(s) orally once a day  Tresiba FlexTouch 200 units/mL subcutaneous solution: 50 unit(s) subcutaneous 2 times a day  Victoza 18 mg/3 mL subcutaneous solution: 1.8 milliliter(s) subcutaneous once a day

## 2020-03-10 NOTE — DISCHARGE NOTE PROVIDER - NSDCACTIVITY_GEN_ALL_CORE
Do not drive or operate machinery/Showering allowed/Do not make important decisions/No heavy lifting/straining Stairs allowed/Walking - Indoors allowed/Do not drive or operate machinery/Do not make important decisions/No heavy lifting/straining/Walking - Outdoors allowed/Showering allowed

## 2020-03-10 NOTE — DISCHARGE NOTE PROVIDER - NSDCCPGOAL_GEN_ALL_CORE_FT
To get better and follow your care plan as instructed. To get better and follow your care plan as instructed.Optimize QOL and be symptom free

## 2020-03-10 NOTE — DISCHARGE NOTE PROVIDER - HOSPITAL COURSE
Patient admitted post [facility] via RFA # [id number]    tolerated procedure well/ VSS    patient ambulating well    ekg, tele and labs reviewed, VSS    patient seen and assessed and is cleared for discharge home    f/u with Dr Pedro and Dr Blanco in 1-2 weeks    continue all procedure medications including ASA/ Plavix (DAPT)    post procedure instructions and importance of DAPT reinforced        - Bruising at the groin, sometimes extending down the leg, and/or a small lump under the skin at the groin access site is normal and will resolve within 2 – 3 weeks.     - Occasional skipped beats or palpitations that last for a few beats are common and generally resolve within 1-2 months.     - You may walk and take stairs at a regular pace.     - Do not perform any exercise more strenuous than walking for 1 week.     - Do not strain or lift heavy objects for 1 week.    - You may shower the day after the procedure.    - Do not soak in water (such as tub baths, hot tubs, swimming, etc.) for 1 week.     - You may resume all other activities the day after the procedure.    Call your doctor if:     - you notice bleeding, redness, drainage, swelling, increased tenderness or a hot sensation around the catheter insertion site.     - your temperature is greater than 100 degrees F for more than 24 hours.    - your rapid heart rhythm returns.    - you have any questions or concerns regarding the procedure.    If significant bleeding and/or a large lump (the size of a golf ball or bigger) occurs:    - Lie flat and apply continuous direct pressure just above the puncture site for at least 10 minutes    - If the issue resolves, notify your physician immediately.      - If the bleeding cannot be controlled, please seek immediate medical attention.    If you experience increased difficulty breathing or chest pain, or if you faint or have dizzy spells, please seek immediate medical attention.

## 2020-03-10 NOTE — DISCHARGE NOTE PROVIDER - PROVIDER TOKENS
PROVIDER:[TOKEN:[[id number]]] PROVIDER:[TOKEN:[[id number]]],PROVIDER:[TOKEN:[[id number]]]

## 2020-03-10 NOTE — PROGRESS NOTE ADULT - SUBJECTIVE AND OBJECTIVE BOX
Admit for renal management  [facility] rescheduled to 3/11/2020  Needs IV hydration judiously due to CRI as well as mucomyst pre procedure  Renal consult called  D/W  Dr Blanco

## 2020-03-11 ENCOUNTER — TRANSCRIPTION ENCOUNTER (OUTPATIENT)
Age: 65
End: 2020-03-11

## 2020-03-11 VITALS
OXYGEN SATURATION: 98 % | HEART RATE: 99 BPM | RESPIRATION RATE: 17 BRPM | DIASTOLIC BLOOD PRESSURE: 68 MMHG | SYSTOLIC BLOOD PRESSURE: 153 MMHG

## 2020-03-11 LAB
ANION GAP SERPL CALC-SCNC: 14 MMOL/L — SIGNIFICANT CHANGE UP (ref 5–17)
BUN SERPL-MCNC: 27 MG/DL — HIGH (ref 8–20)
CALCIUM SERPL-MCNC: 8.5 MG/DL — LOW (ref 8.6–10.2)
CHLORIDE SERPL-SCNC: 103 MMOL/L — SIGNIFICANT CHANGE UP (ref 98–107)
CO2 SERPL-SCNC: 21 MMOL/L — LOW (ref 22–29)
CREAT SERPL-MCNC: 1.32 MG/DL — HIGH (ref 0.5–1.3)
GLUCOSE BLDC GLUCOMTR-MCNC: 289 MG/DL — HIGH (ref 70–99)
GLUCOSE BLDC GLUCOMTR-MCNC: 363 MG/DL — HIGH (ref 70–99)
GLUCOSE SERPL-MCNC: 250 MG/DL — HIGH (ref 70–99)
HBA1C BLD-MCNC: 10.6 % — HIGH (ref 4–5.6)
MAGNESIUM SERPL-MCNC: 1.5 MG/DL — LOW (ref 1.6–2.6)
POTASSIUM SERPL-MCNC: 4.4 MMOL/L — SIGNIFICANT CHANGE UP (ref 3.5–5.3)
POTASSIUM SERPL-SCNC: 4.4 MMOL/L — SIGNIFICANT CHANGE UP (ref 3.5–5.3)
SODIUM SERPL-SCNC: 138 MMOL/L — SIGNIFICANT CHANGE UP (ref 135–145)

## 2020-03-11 PROCEDURE — 99152 MOD SED SAME PHYS/QHP 5/>YRS: CPT

## 2020-03-11 PROCEDURE — C1894: CPT

## 2020-03-11 PROCEDURE — 83735 ASSAY OF MAGNESIUM: CPT

## 2020-03-11 PROCEDURE — 82962 GLUCOSE BLOOD TEST: CPT

## 2020-03-11 PROCEDURE — 93459 L HRT ART/GRFT ANGIO: CPT

## 2020-03-11 PROCEDURE — 80048 BASIC METABOLIC PNL TOTAL CA: CPT

## 2020-03-11 PROCEDURE — 36415 COLL VENOUS BLD VENIPUNCTURE: CPT

## 2020-03-11 PROCEDURE — 83036 HEMOGLOBIN GLYCOSYLATED A1C: CPT

## 2020-03-11 PROCEDURE — C1887: CPT

## 2020-03-11 PROCEDURE — C1769: CPT

## 2020-03-11 RX ORDER — SODIUM CHLORIDE 9 MG/ML
400 INJECTION INTRAMUSCULAR; INTRAVENOUS; SUBCUTANEOUS
Refills: 0 | Status: DISCONTINUED | OUTPATIENT
Start: 2020-03-11 | End: 2020-03-11

## 2020-03-11 RX ORDER — ALPRAZOLAM 0.25 MG
1 TABLET ORAL
Qty: 0 | Refills: 0 | DISCHARGE
Start: 2020-03-11

## 2020-03-11 RX ORDER — MAGNESIUM SULFATE 500 MG/ML
2 VIAL (ML) INJECTION ONCE
Refills: 0 | Status: COMPLETED | OUTPATIENT
Start: 2020-03-11 | End: 2020-03-11

## 2020-03-11 RX ORDER — INSULIN LISPRO 100/ML
10 VIAL (ML) SUBCUTANEOUS ONCE
Refills: 0 | Status: COMPLETED | OUTPATIENT
Start: 2020-03-11 | End: 2020-03-11

## 2020-03-11 RX ADMIN — SODIUM CHLORIDE 90 MILLILITER(S): 9 INJECTION INTRAMUSCULAR; INTRAVENOUS; SUBCUTANEOUS at 14:04

## 2020-03-11 RX ADMIN — CLOPIDOGREL BISULFATE 75 MILLIGRAM(S): 75 TABLET, FILM COATED ORAL at 10:46

## 2020-03-11 RX ADMIN — Medication 5: at 14:16

## 2020-03-11 RX ADMIN — SACUBITRIL AND VALSARTAN 1 TABLET(S): 24; 26 TABLET, FILM COATED ORAL at 06:11

## 2020-03-11 RX ADMIN — Medication 10 UNIT(S): at 14:23

## 2020-03-11 RX ADMIN — Medication 3: at 08:15

## 2020-03-11 RX ADMIN — Medication 20 MILLIGRAM(S): at 06:11

## 2020-03-11 RX ADMIN — Medication 81 MILLIGRAM(S): at 10:46

## 2020-03-11 RX ADMIN — CARVEDILOL PHOSPHATE 25 MILLIGRAM(S): 80 CAPSULE, EXTENDED RELEASE ORAL at 06:11

## 2020-03-11 RX ADMIN — Medication 1200 MILLIGRAM(S): at 09:28

## 2020-03-11 RX ADMIN — CARVEDILOL PHOSPHATE 3.12 MILLIGRAM(S): 80 CAPSULE, EXTENDED RELEASE ORAL at 06:11

## 2020-03-11 RX ADMIN — Medication 0.12 MILLIGRAM(S): at 06:11

## 2020-03-11 RX ADMIN — Medication 50 GRAM(S): at 08:15

## 2020-03-11 NOTE — DISCHARGE NOTE NURSING/CASE MANAGEMENT/SOCIAL WORK - PATIENT PORTAL LINK FT
You can access the FollowMyHealth Patient Portal offered by [facility] by registering at the following website: [email or web address]. By joining [facility]’s FollowMyHealth portal, you will also be able to view your health information using other applications (apps) compatible with our system.

## 2020-03-11 NOTE — PROGRESS NOTE ADULT - SUBJECTIVE AND OBJECTIVE BOX
This is a 64 year old male with a PMHx of HTN, HLD, DM with insulin resistance, CRI, severe ICM, sp CABG 3V in 2014, AICD ([employer]) in 2014, PVD sp right iliac artery and left SFA stent in the past, moderate L carotid stenosis, sleep apnea, and VT. He has be c/o dyspnea and bilateral claudication.  He was sent for a PET/CT sestamibi 2/26/2020 which showed a moderate area of moderate ischemia involving the anterior and anterolateral wall.  Patient for cardiac catheterization 3/11/2020.      PRE-PROCEDURE ASSESSMENT    -pt took his Baby ASA, entresto, lasix, and carvedilol this am  -Patient seen and examined-denies any c/o / VSS/ Tele-NSR   -Labs reviewed/ POCT glucose ordered/ Mg-1.5-Supplementation with 2 Gms IV ordered  -Pre-procedure teaching completed with patient   -Informed consent w/ Dr Blanco  -Questions answered  ASA 2  Mallampati II  eGFR this am 57  Creat  1.76  Bleeding Risk 1.6% This is a 64 year old male with a PMHx of HTN, HLD, DM with insulin resistance, CRI, severe ICM, sp CABG 3V in 2014, AICD ([employer]) in 2014, PVD sp right iliac artery and left SFA stent in the past, moderate L carotid stenosis, sleep apnea, and VT. He has be c/o dyspnea and bilateral claudication.  He was sent for a PET/CT sestamibi 2/26/2020 which showed a moderate area of moderate ischemia involving the anterior and anterolateral wall.  Patient for cardiac catheterization 3/11/2020.      PRE-PROCEDURE ASSESSMENT  -NPO after breakfast this am-ate a full breakfast @ 8:30 am  -pt took his Baby ASA, entresto, lasix, and carvedilol this am  -Patient seen and examined-denies any c/o / VSS/ Tele-NSR   -Labs reviewed/ POCT glucose ordered/ Mg-1.5-Supplementation with 2 Gms IV ordered  -Pre-procedure teaching completed with patient   -Informed consent w/ Dr Blanco  -Questions answered  ASA 2  Mallampati II  eGFR this am 57  Creat  1.76  Bleeding Risk 1.6% This is a 64 year old male with a PMHx of HTN, HLD, DM with insulin resistance, CRI, severe ICM, sp CABG 3V in 2014, AICD ([employer]) in 2014, PVD sp right iliac artery and left SFA stent in the past, moderate L carotid stenosis, sleep apnea, and VT. He has be c/o dyspnea and bilateral claudication.  He was sent for a PET/CT sestamibi 2/26/2020 which showed a moderate area of moderate ischemia involving the anterior and anterolateral wall.  Patient for cardiac catheterization 3/11/2020.      PRE-PROCEDURE ASSESSMENT  -NPO after breakfast this am-ate a full breakfast @ 8:30 am  -IV hydration with 100cc/hr of normal saline 1 hour prior to procedure  -pt took his Baby ASA, entresto, lasix, and carvedilol this am  -Patient seen and examined-denies any c/o / VSS/ Tele-NSR   -Labs reviewed/ POCT glucose ordered/ Mg-1.5-Supplementation with 2 Gms IV ordered  -Pre-procedure teaching completed with patient   -Informed consent w/ Dr Blanco  -Questions answered  ASA 2  Mallampati II  eGFR this am 57  Creat  1.76  Bleeding Risk 1.6% This is a 64 year old male with a PMHx of HTN, HLD, DM with insulin resistance, CRI, severe ICM, sp CABG 3V in 2014, AICD ([employer]) in 2014, PVD sp right iliac artery and left SFA stent in the past, moderate L carotid stenosis, sleep apnea, and VT. He has be c/o dyspnea and bilateral claudication.  He was sent for a PET/CT sestamibi 2/26/2020 which showed a moderate area of moderate ischemia involving the anterior and anterolateral wall.  Patient for cardiac catheterization 3/11/2020.      PRE-PROCEDURE ASSESSMENT  -NPO after breakfast this am-ate a full breakfast @ 8:30 am  -IV hydration with 100cc/hr of normal saline 1 hour prior to procedure  -pt took his Baby ASA, entresto, lasix, and carvedilol this am  -Patient seen and examined-denies any c/o / VSS/ Tele-NSR   -Labs reviewed/ POCT glucose -286 -3 units Humolog coverage given per sliding scale/ Mg-1.5-Supplementation with 2 Gms IV ordered  -Pre-procedure teaching completed with patient   -Informed consent w/ Dr Blanco  -Questions answered  ASA 2  Mallampati II  eGFR this am 57  Creat  1.76  Bleeding Risk 1.6%

## 2020-03-11 NOTE — PROGRESS NOTE ADULT - SUBJECTIVE AND OBJECTIVE BOX
Cardiology Nurse Practitioner Progress note:   s/p LHC via RFA (#5Fr) with Dr Blanco-(full report to follow) will need to fix Diag in 2 weeks  Received 40 ml. Visapaque  IV hydration @ 90cc/hr with Normal saline x 4 hours  Patient feels well.  Denies chest pain, shortness of breath, dizziness or palpitations at this time.    Right groin site with small amount  bleeding/ hematoma formation upon sheath removal-manual compression continued and  pressed out-groin now without bleeding or hematoma formation/site with tegaderm soft /nontender      TELE:no ectopy, NSR   GENERAL: appears  comfortable  CV: RRR, S1 S2, no murmur, rub, clicks or gallop noted  RESP: LCTA bilaterally, no wheeze, no rhonchi or crackles noted  GI/: soft, NT/ND  NEURO: AOx4, no focal deficits  EXTREMITIES: no edema, clubbing or cyanosis noted  PROCEDURE SITE:  Right groin dressing removed, site CDI with no bleeding, no hematoma, area soft, non tender, positive pedal pulses bilaterally-tegaderm drsg reapplied (see above)        T(C): 36.9 (03-11-20 @ 08:30), Max: 37.1 (03-10-20 @ 20:16)  HR: 96 (03-11-20 @ 11:40) (96 - 108)  BP: 163/80 (03-11-20 @ 11:40) (147/66 - 199/94)  RR: 16 (03-11-20 @ 11:40) (12 - 18)  SpO2: 98% (03-11-20 @ 11:40) (95% - 98%)  Wt(kg): --    03-11    138  |  103  |  27.0<H>  ----------------------------<  250<H>  4.4   |  21.0<L>  |  1.32<H>    Ca    8.5<L>      11 Mar 2020 05:23  Mg     1.5     03-11              MEDICATIONS  (STANDING):  acetylcysteine  Oral Solution 1200 milliGRAM(s) Oral <User Schedule>  aspirin  chewable 81 milliGRAM(s) Oral daily  atorvastatin 80 milliGRAM(s) Oral at bedtime  carvedilol 25 milliGRAM(s) Oral every 12 hours  carvedilol 3.125 milliGRAM(s) Oral every 12 hours  clopidogrel Tablet 75 milliGRAM(s) Oral daily  dextrose 5%. 1000 milliLiter(s) (50 mL/Hr) IV Continuous <Continuous>  dextrose 50% Injectable 12.5 Gram(s) IV Push once  dextrose 50% Injectable 25 Gram(s) IV Push once  dextrose 50% Injectable 25 Gram(s) IV Push once  digoxin     Tablet 0.125 milliGRAM(s) Oral daily  finasteride 5 milliGRAM(s) Oral daily  furosemide    Tablet 20 milliGRAM(s) Oral daily  Icosapent Ethyl (VASCEPA) 1 Gram(s) 2 Capsule(s) Oral two times a day with meals  insulin lispro (HumaLOG) corrective regimen sliding scale   SubCutaneous three times a day before meals  ondansetron Injectable 4 milliGRAM(s) IV Push every 6 hours  sacubitril 49 mG/valsartan 51 mG 1 Tablet(s) Oral two times a day    MEDICATIONS  (PRN):  acetaminophen   Tablet .. 650 milliGRAM(s) Oral every 6 hours PRN Mild Pain (1 - 3)  ALPRAZolam 0.25 milliGRAM(s) Oral every 6 hours PRN anxiety/sleep  aluminum hydroxide/magnesium hydroxide/simethicone Suspension 30 milliLiter(s) Oral every 4 hours PRN Dyspepsia  dextrose 40% Gel 15 Gram(s) Oral once PRN Blood Glucose LESS THAN 70 milliGRAM(s)/deciliter  glucagon  Injectable 1 milliGRAM(s) IntraMuscular once PRN Glucose LESS THAN 70 milligrams/deciliter          now s/p LHC via RFA # 5Fr  with need to return in 2 weeks to fix diag with Dr Julio Blanco    ASSESSMENT/PLAN:    Coronary artery disease    -Bedrest until 2:00PM with leg straight  -may elevate HOB 30 degrees if groin site remains stable  -check POCT glucose before lunch  -continue aspirin, plavix, preprocedure medications  -Encourage PO fluids  -Plan of care discussed with patient, Dr Blanco  -labs, EKG and procedure site assessed  -Follow-up with Dr Pedro next week  -Discharge home @ 4:00 PM today if groin site remains stable without bleeding or hematoma formation and VSS/ pt has escort to drive him home  Follow up with Dr Julio Blanco 1-2 weeks for above  -Discussed therapeutic lifestyle changes to reduce risk factors such as following a cardiac diet, weight loss, maintaining a healthy weight, exercise, medication compliance, and regular follow-up  with MD to know your numbers (BP, cholesterol, weight, and glucose)

## 2020-03-11 NOTE — CONSULT NOTE ADULT - ASSESSMENT
CKD(III): +DM +HTN  Pre renal component due to cardiomyopathy and diuretics  Pt at elevated risk for RCN  - hold Entresto and diuretics pre procedure  - Mucomyst administrated  - isotonic IVF, at modified rate, to avoid precipitating CHF given severe cardiomyopathy   - monitor labs

## 2020-03-11 NOTE — CONSULT NOTE ADULT - SUBJECTIVE AND OBJECTIVE BOX
HPI: 65 yo W male PMH +DM +HTN +CAD s/p CABG (2014) +CHF/CM (EF 19%) s/p AICD +CKD(III) (sees Dr Pedro in our office) who is now electively admitted for cardiac cath.  We are called regarding concerns for RCN post contrast administration.  The pt is currently comfortable w/o cp, sob, n/v/d.      PAST MEDICAL & SURGICAL HISTORY:  Insulin resistance  Fatty liver  CKD (chronic kidney disease)  BPH (benign prostatic hyperplasia)  Dyslipidemia  Hypertension  CAD (coronary artery disease)  DVT of leg (deep venous thrombosis), right  Diabetes: Peripheral vascular disease  CHF- EF-19% AICD ([employer])  MI  December 2013   HTN  Pulmonary edema  High Cholesterol  Chronic renal insufficency  S/P thyroid biopsy  S/P colonoscopy with polypectomy  AICD (automatic cardioverter/defibrillator) present  S/P angioplasty with stent: right leg 08/14, L femoral 7/2014  S/P coronary artery bypass graft x 3: 2014    AICD ([employer]) 2014      FAMILY HISTORY:  Family history of heart disease      Social History:  No current tobacco; no EtOH nor drugs noted    MEDICATIONS  (STANDING):  acetylcysteine  Oral Solution 1200 milliGRAM(s) Oral <User Schedule>  aspirin  chewable 81 milliGRAM(s) Oral daily  atorvastatin 80 milliGRAM(s) Oral at bedtime  carvedilol 25 milliGRAM(s) Oral every 12 hours  carvedilol 3.125 milliGRAM(s) Oral every 12 hours  clopidogrel Tablet 75 milliGRAM(s) Oral daily  dextrose 5%. 1000 milliLiter(s) (50 mL/Hr) IV Continuous <Continuous>  dextrose 50% Injectable 12.5 Gram(s) IV Push once  dextrose 50% Injectable 25 Gram(s) IV Push once  dextrose 50% Injectable 25 Gram(s) IV Push once  digoxin     Tablet 0.125 milliGRAM(s) Oral daily  finasteride 5 milliGRAM(s) Oral daily  furosemide    Tablet 20 milliGRAM(s) Oral daily  Icosapent Ethyl (VASCEPA) 1 Gram(s) 2 Capsule(s) Oral two times a day with meals  insulin lispro (HumaLOG) corrective regimen sliding scale   SubCutaneous three times a day before meals  ondansetron Injectable 4 milliGRAM(s) IV Push every 6 hours  sacubitril 49 mG/valsartan 51 mG 1 Tablet(s) Oral two times a day    MEDICATIONS  (PRN):  acetaminophen   Tablet .. 650 milliGRAM(s) Oral every 6 hours PRN Mild Pain (1 - 3)  ALPRAZolam 0.25 milliGRAM(s) Oral every 6 hours PRN anxiety/sleep  aluminum hydroxide/magnesium hydroxide/simethicone Suspension 30 milliLiter(s) Oral every 4 hours PRN Dyspepsia  dextrose 40% Gel 15 Gram(s) Oral once PRN Blood Glucose LESS THAN 70 milliGRAM(s)/deciliter  glucagon  Injectable 1 milliGRAM(s) IntraMuscular once PRN Glucose LESS THAN 70 milligrams/deciliter      Allergies    No Known Allergies    Intolerances        REVIEW OF SYSTEMS:    CONSTITUTIONAL: No fever, weight loss, + fatigue  EYES: No eye pain, visual disturbances, or discharge  ENMT:  No difficulty hearing, tinnitus, vertigo; No sinus or throat pain  NECK: No pain or stiffness  BREASTS: No pain, masses, or nipple discharge  RESPIRATORY: No cough, wheezing, chills or hemoptysis; No shortness of breath  CARDIOVASCULAR: No chest pain, palpitations, dizziness, or leg swelling  GASTROINTESTINAL: No abdominal or epigastric pain. No nausea, vomiting, or hematemesis; No diarrhea or constipation. No melena or hematochezia.  GENITOURINARY: No dysuria, frequency, hematuria, or incontinence  NEUROLOGICAL: No headaches, memory loss, loss of strength, numbness, or tremors  SKIN: No itching, burning, rashes, or lesions   LYMPH NODES: No enlarged glands  ENDOCRINE: No heat or cold intolerance; No hair loss  MUSCULOSKELETAL: No joint pain or swelling; No muscle, back, or extremity pain        Vital Signs Last 24 Hrs  T(C): 36.9 (11 Mar 2020 08:30), Max: 37.1 (10 Mar 2020 20:16)  T(F): 98.5 (11 Mar 2020 08:30), Max: 98.7 (10 Mar 2020 20:16)  HR: 99 (11 Mar 2020 08:30) (96 - 108)  BP: 147/66 (11 Mar 2020 08:30) (147/66 - 199/94)  BP(mean): --  RR: 12 (11 Mar 2020 08:30) (12 - 18)  SpO2: 96% (11 Mar 2020 08:30) (95% - 98%)    PHYSICAL EXAM:    GENERAL: NAD  HEAD:  Atraumatic, Normocephalic  EYES: EOMI, PERRLA, conjunctiva and sclera clear  ENMT: No tonsillar erythema, exudates, or enlargement; Moist mucous membranes, Good dentition, No lesions  NECK: Supple, No JVD, Normal thyroid  NERVOUS SYSTEM:  Alert & Oriented X3, intact and symmetric  CHEST/LUNG: Clear to percussion bilaterally  HEART: Regular rate and rhythm; No rub  ABDOMEN: Soft, Nontender, Nondistended; +BS  EXTREMITIES:  2+ Peripheral Pulses, No LE edema  SKIN: No rashes nor lesions      LABS:    03-11    138  |  103  |  27.0<H>  ----------------------------<  250<H>  4.4   |  21.0<L>  |  1.32<H>    Creatinine, Serum: 1.76 mg/dL (03.05.20 @ 16:35)        Ca    8.5<L>      11 Mar 2020 05:23  Mg     1.5     03-11          Magnesium, Serum: 1.5 mg/dL (03-11 @ 05:23)      RADIOLOGY & ADDITIONAL TESTS:

## 2020-03-17 PROBLEM — E88.81 METABOLIC SYNDROME AND OTHER INSULIN RESISTANCE: Chronic | Status: ACTIVE | Noted: 2020-03-05

## 2020-04-08 ENCOUNTER — INPATIENT (INPATIENT)
Facility: HOSPITAL | Age: 65
LOS: 18 days | Discharge: HOME | DRG: 856 | End: 2020-04-27
Attending: SURGERY | Admitting: INTERNAL MEDICINE
Payer: MEDICARE

## 2020-04-08 VITALS
RESPIRATION RATE: 22 BRPM | DIASTOLIC BLOOD PRESSURE: 54 MMHG | HEART RATE: 86 BPM | TEMPERATURE: 98 F | HEIGHT: 67 IN | WEIGHT: 195.11 LBS | OXYGEN SATURATION: 98 % | SYSTOLIC BLOOD PRESSURE: 87 MMHG

## 2020-04-08 DIAGNOSIS — Z98.89 OTHER SPECIFIED POSTPROCEDURAL STATES: Chronic | ICD-10-CM

## 2020-04-08 DIAGNOSIS — Z98.890 OTHER SPECIFIED POSTPROCEDURAL STATES: Chronic | ICD-10-CM

## 2020-04-08 DIAGNOSIS — Z95.1 PRESENCE OF AORTOCORONARY BYPASS GRAFT: Chronic | ICD-10-CM

## 2020-04-08 DIAGNOSIS — Z95.810 PRESENCE OF AUTOMATIC (IMPLANTABLE) CARDIAC DEFIBRILLATOR: Chronic | ICD-10-CM

## 2020-04-08 LAB
ALBUMIN SERPL ELPH-MCNC: 2.3 G/DL — LOW (ref 3.3–5.2)
ALP SERPL-CCNC: 143 U/L — HIGH (ref 40–120)
ALT FLD-CCNC: 17 U/L — SIGNIFICANT CHANGE UP
ANION GAP SERPL CALC-SCNC: 15 MMOL/L — SIGNIFICANT CHANGE UP (ref 5–17)
APTT BLD: 25.8 SEC — LOW (ref 27.5–36.3)
AST SERPL-CCNC: 30 U/L — SIGNIFICANT CHANGE UP
BASOPHILS # BLD AUTO: 0 K/UL — SIGNIFICANT CHANGE UP (ref 0–0.2)
BASOPHILS NFR BLD AUTO: 0 % — SIGNIFICANT CHANGE UP (ref 0–2)
BILIRUB SERPL-MCNC: 0.4 MG/DL — SIGNIFICANT CHANGE UP (ref 0.4–2)
BUN SERPL-MCNC: 83 MG/DL — HIGH (ref 8–20)
BURR CELLS BLD QL SMEAR: PRESENT — SIGNIFICANT CHANGE UP
CALCIUM SERPL-MCNC: 10.6 MG/DL — HIGH (ref 8.6–10.2)
CHLORIDE SERPL-SCNC: 95 MMOL/L — LOW (ref 98–107)
CO2 SERPL-SCNC: 23 MMOL/L — SIGNIFICANT CHANGE UP (ref 22–29)
CREAT SERPL-MCNC: 2.42 MG/DL — HIGH (ref 0.5–1.3)
EOSINOPHIL # BLD AUTO: 0 K/UL — SIGNIFICANT CHANGE UP (ref 0–0.5)
EOSINOPHIL NFR BLD AUTO: 0 % — SIGNIFICANT CHANGE UP (ref 0–6)
GIANT PLATELETS BLD QL SMEAR: PRESENT — SIGNIFICANT CHANGE UP
GLUCOSE SERPL-MCNC: 234 MG/DL — HIGH (ref 70–99)
HCT VFR BLD CALC: 41.8 % — SIGNIFICANT CHANGE UP (ref 39–50)
HGB BLD-MCNC: 13.7 G/DL — SIGNIFICANT CHANGE UP (ref 13–17)
INR BLD: 1.38 RATIO — HIGH (ref 0.88–1.16)
LACTATE BLDV-MCNC: 1.9 MMOL/L — SIGNIFICANT CHANGE UP (ref 0.5–2)
LACTATE BLDV-MCNC: 2.8 MMOL/L — HIGH (ref 0.5–2)
LYMPHOCYTES # BLD AUTO: 1.52 K/UL — SIGNIFICANT CHANGE UP (ref 1–3.3)
LYMPHOCYTES # BLD AUTO: 8.8 % — LOW (ref 13–44)
MANUAL SMEAR VERIFICATION: SIGNIFICANT CHANGE UP
MCHC RBC-ENTMCNC: 29.2 PG — SIGNIFICANT CHANGE UP (ref 27–34)
MCHC RBC-ENTMCNC: 32.8 GM/DL — SIGNIFICANT CHANGE UP (ref 32–36)
MCV RBC AUTO: 89.1 FL — SIGNIFICANT CHANGE UP (ref 80–100)
MONOCYTES # BLD AUTO: 1.36 K/UL — HIGH (ref 0–0.9)
MONOCYTES NFR BLD AUTO: 7.9 % — SIGNIFICANT CHANGE UP (ref 2–14)
NEUTROPHILS # BLD AUTO: 14.19 K/UL — HIGH (ref 1.8–7.4)
NEUTROPHILS NFR BLD AUTO: 82.4 % — HIGH (ref 43–77)
NT-PROBNP SERPL-SCNC: 1646 PG/ML — HIGH (ref 0–300)
OVALOCYTES BLD QL SMEAR: SLIGHT — SIGNIFICANT CHANGE UP
PLAT MORPH BLD: NORMAL — SIGNIFICANT CHANGE UP
PLATELET # BLD AUTO: 481 K/UL — HIGH (ref 150–400)
POIKILOCYTOSIS BLD QL AUTO: SLIGHT — SIGNIFICANT CHANGE UP
POLYCHROMASIA BLD QL SMEAR: SLIGHT — SIGNIFICANT CHANGE UP
POTASSIUM SERPL-MCNC: 4.7 MMOL/L — SIGNIFICANT CHANGE UP (ref 3.5–5.3)
POTASSIUM SERPL-SCNC: 4.7 MMOL/L — SIGNIFICANT CHANGE UP (ref 3.5–5.3)
PROT SERPL-MCNC: 7 G/DL — SIGNIFICANT CHANGE UP (ref 6.6–8.7)
PROTHROM AB SERPL-ACNC: 15.8 SEC — HIGH (ref 10–12.9)
RBC # BLD: 4.69 M/UL — SIGNIFICANT CHANGE UP (ref 4.2–5.8)
RBC # FLD: 15.1 % — HIGH (ref 10.3–14.5)
RBC BLD AUTO: SIGNIFICANT CHANGE UP
SODIUM SERPL-SCNC: 133 MMOL/L — LOW (ref 135–145)
TROPONIN T SERPL-MCNC: 0.03 NG/ML — SIGNIFICANT CHANGE UP (ref 0–0.06)
VARIANT LYMPHS # BLD: 0.9 % — SIGNIFICANT CHANGE UP (ref 0–6)
WBC # BLD: 17.22 K/UL — HIGH (ref 3.8–10.5)
WBC # FLD AUTO: 17.22 K/UL — HIGH (ref 3.8–10.5)

## 2020-04-08 PROCEDURE — 71045 X-RAY EXAM CHEST 1 VIEW: CPT | Mod: 26

## 2020-04-08 PROCEDURE — 72192 CT PELVIS W/O DYE: CPT | Mod: 26

## 2020-04-08 PROCEDURE — 99285 EMERGENCY DEPT VISIT HI MDM: CPT

## 2020-04-08 PROCEDURE — 93010 ELECTROCARDIOGRAM REPORT: CPT

## 2020-04-08 PROCEDURE — 93926 LOWER EXTREMITY STUDY: CPT | Mod: 26,LT

## 2020-04-08 RX ORDER — SODIUM CHLORIDE 9 MG/ML
500 INJECTION INTRAMUSCULAR; INTRAVENOUS; SUBCUTANEOUS ONCE
Refills: 0 | Status: COMPLETED | OUTPATIENT
Start: 2020-04-08 | End: 2020-04-08

## 2020-04-08 RX ORDER — METOCLOPRAMIDE HCL 10 MG
10 TABLET ORAL ONCE
Refills: 0 | Status: COMPLETED | OUTPATIENT
Start: 2020-04-08 | End: 2020-04-08

## 2020-04-08 RX ORDER — PIPERACILLIN AND TAZOBACTAM 4; .5 G/20ML; G/20ML
3.38 INJECTION, POWDER, LYOPHILIZED, FOR SOLUTION INTRAVENOUS ONCE
Refills: 0 | Status: COMPLETED | OUTPATIENT
Start: 2020-04-08 | End: 2020-04-08

## 2020-04-08 RX ORDER — VANCOMYCIN HCL 1 G
1000 VIAL (EA) INTRAVENOUS ONCE
Refills: 0 | Status: COMPLETED | OUTPATIENT
Start: 2020-04-08 | End: 2020-04-08

## 2020-04-08 RX ADMIN — SODIUM CHLORIDE 500 MILLILITER(S): 9 INJECTION INTRAMUSCULAR; INTRAVENOUS; SUBCUTANEOUS at 19:02

## 2020-04-08 RX ADMIN — Medication 250 MILLIGRAM(S): at 22:43

## 2020-04-08 RX ADMIN — Medication 10 MILLIGRAM(S): at 19:37

## 2020-04-08 RX ADMIN — PIPERACILLIN AND TAZOBACTAM 200 GRAM(S): 4; .5 INJECTION, POWDER, LYOPHILIZED, FOR SOLUTION INTRAVENOUS at 19:03

## 2020-04-08 NOTE — ED ADULT NURSE NOTE - OBJECTIVE STATEMENT
pt s/p cath to left groin/ swelling redness/ warm to touch/open area with skin sloughing and purulent drainage noted

## 2020-04-08 NOTE — ED PROVIDER NOTE - OBJECTIVE STATEMENT
63 yo M hx HTN, DM, HDL, CHF, CAD s/p CABG, PVD s/p sent in right leg,  AICD ([employer]), and left sided carotid stenosis sent in by his pmd and cardiologist for left groin infection. He had catherization via the left groin 2 weeks prior. He report cellulitis for 1 week not improving on Levaquin. He denies fever or sob. He report persistent hippus.

## 2020-04-08 NOTE — ED ADULT NURSE REASSESSMENT NOTE - NS ED NURSE REASSESS COMMENT FT1
Pt care assumed 2000, report received from critical care RN BG. Pt is resting in bed comfortably at this time, no apparent distress noted at this time. pt safety maintained. Pt denies any complaints at this time. pt educated on plan of care, plan of care taught back to RN. plan of care education deemed proficient through successful teach back. will continue to reeducate pt regarding plan of care.

## 2020-04-08 NOTE — ED PROVIDER NOTE - DISPOSITION TYPE
ADMIT Referred To Mid-Level For Closure Text (Leave Blank If You Do Not Want): After obtaining clear surgical margins the patient was sent to a mid-level provider for surgical repair.  The patient understands they will receive post-surgical care and follow-up from the mid-level provider.

## 2020-04-08 NOTE — ED ADULT TRIAGE NOTE - CHIEF COMPLAINT QUOTE
left groin pain discomfort had left groin cardiac catheterication by md euceda swelling tender oozing swelling. sugars elevated on levaquin

## 2020-04-08 NOTE — ED PROVIDER NOTE - PHYSICAL EXAMINATION
VITAL SIGNS: I have reviewed nursing notes and confirm.  CONSTITUTIONAL: Well-developed; well-nourished; in no acute distress.  SKIN: Skin exam is warm   HEAD: Normocephalic; atraumatic.  EYES: PERRL, EOM intact; conjunctiva and sclera clear.  ENT: No nasal discharge; airway clear. Throat clear.  NECK: Supple; non tender.    CARD: S1, S2 normal; no murmurs, gallops, or rubs. Regular rate and rhythm.  RESP: No wheezes,  no rales or rhonchi. (+) hiccups   ABD:  soft; non-distended; non-tender; (+) extensive erythema and swelling of left groin with sluthing of the skin. (-) no crepitus.   EXT: Normal ROM. No clubbing,    NEURO: Alert, oriented. Grossly unremarkable. No focal deficits. no facial droop, moves all extremities,    PSYCH: Cooperative, appropriate.

## 2020-04-08 NOTE — ED PROVIDER NOTE - PMH
BPH (benign prostatic hyperplasia)    CAD (coronary artery disease)    CKD (chronic kidney disease)    Diabetes  Peripheral vascular disease  CHF- EF-19% AICD ([employer])  MI  December 2013   HTN  Pulmonary edema  High Cholesterol  Chronic renal insufficency  DVT of leg (deep venous thrombosis), right    Dyslipidemia    Fatty liver    Hypertension    Insulin resistance

## 2020-04-08 NOTE — ED PROVIDER NOTE - NS ED ROS FT
Review of Systems  •	CONSTITUTIONAL - no  fever, no diaphoresis, no weight change  •	SKIN - no rash (+) erythema to left groin   •	HEMATOLOGIC - no bleeding, no bruising  •	EYES - no eye pain, no blurred vision  •	ENT - no change in hearing, no pain  •	RESPIRATORY - no shortness of breath, no cough  •	CARDIAC - no chest pain, no palpitations  •	GI - no abd pain, no nausea, no vomiting, no diarrhea, no constipation, no bleeding  •	GENITO-URINARY - no discharge, no dysuria; no hematuria,   •	ENDO - no polydipsia, no polyuria, no heat/no cold intolerance  •	MUSCULOSKELETAL - no joint pain   •	NEUROLOGIC - no weakness, no headache, no anesthesia, no paresthesias  •	PSYCH - no anxiety, non suicidal, non homicidal, no hallucination, no depression

## 2020-04-09 ENCOUNTER — TRANSCRIPTION ENCOUNTER (OUTPATIENT)
Age: 65
End: 2020-04-09

## 2020-04-09 DIAGNOSIS — I10 ESSENTIAL (PRIMARY) HYPERTENSION: ICD-10-CM

## 2020-04-09 DIAGNOSIS — Z86.79 PERSONAL HISTORY OF OTHER DISEASES OF THE CIRCULATORY SYSTEM: ICD-10-CM

## 2020-04-09 DIAGNOSIS — L03.90 CELLULITIS, UNSPECIFIED: ICD-10-CM

## 2020-04-09 DIAGNOSIS — N18.4 CHRONIC KIDNEY DISEASE, STAGE 4 (SEVERE): ICD-10-CM

## 2020-04-09 DIAGNOSIS — I25.810 ATHEROSCLEROSIS OF CORONARY ARTERY BYPASS GRAFT(S) WITHOUT ANGINA PECTORIS: ICD-10-CM

## 2020-04-09 DIAGNOSIS — E11.65 TYPE 2 DIABETES MELLITUS WITH HYPERGLYCEMIA: ICD-10-CM

## 2020-04-09 LAB
ANION GAP SERPL CALC-SCNC: 16 MMOL/L — SIGNIFICANT CHANGE UP (ref 5–17)
APPEARANCE UR: CLEAR — SIGNIFICANT CHANGE UP
BASOPHILS # BLD AUTO: 0.07 K/UL — SIGNIFICANT CHANGE UP (ref 0–0.2)
BASOPHILS NFR BLD AUTO: 0.4 % — SIGNIFICANT CHANGE UP (ref 0–2)
BILIRUB UR-MCNC: NEGATIVE — SIGNIFICANT CHANGE UP
BLD GP AB SCN SERPL QL: SIGNIFICANT CHANGE UP
BUN SERPL-MCNC: 79 MG/DL — HIGH (ref 8–20)
CALCIUM SERPL-MCNC: 10.1 MG/DL — SIGNIFICANT CHANGE UP (ref 8.6–10.2)
CHLORIDE SERPL-SCNC: 101 MMOL/L — SIGNIFICANT CHANGE UP (ref 98–107)
CO2 SERPL-SCNC: 23 MMOL/L — SIGNIFICANT CHANGE UP (ref 22–29)
COLOR SPEC: YELLOW — SIGNIFICANT CHANGE UP
CREAT SERPL-MCNC: 1.91 MG/DL — HIGH (ref 0.5–1.3)
DIFF PNL FLD: NEGATIVE — SIGNIFICANT CHANGE UP
EOSINOPHIL # BLD AUTO: 0.09 K/UL — SIGNIFICANT CHANGE UP (ref 0–0.5)
EOSINOPHIL NFR BLD AUTO: 0.6 % — SIGNIFICANT CHANGE UP (ref 0–6)
GLUCOSE BLDC GLUCOMTR-MCNC: 175 MG/DL — HIGH (ref 70–99)
GLUCOSE BLDC GLUCOMTR-MCNC: 178 MG/DL — HIGH (ref 70–99)
GLUCOSE BLDC GLUCOMTR-MCNC: 222 MG/DL — HIGH (ref 70–99)
GLUCOSE BLDC GLUCOMTR-MCNC: 318 MG/DL — HIGH (ref 70–99)
GLUCOSE BLDC GLUCOMTR-MCNC: 364 MG/DL — HIGH (ref 70–99)
GLUCOSE BLDC GLUCOMTR-MCNC: 395 MG/DL — HIGH (ref 70–99)
GLUCOSE BLDC GLUCOMTR-MCNC: 417 MG/DL — HIGH (ref 70–99)
GLUCOSE SERPL-MCNC: 120 MG/DL — HIGH (ref 70–99)
GLUCOSE UR QL: 250 MG/DL
HCT VFR BLD CALC: 42.3 % — SIGNIFICANT CHANGE UP (ref 39–50)
HGB BLD-MCNC: 13.7 G/DL — SIGNIFICANT CHANGE UP (ref 13–17)
IMM GRANULOCYTES NFR BLD AUTO: 2.7 % — HIGH (ref 0–1.5)
KETONES UR-MCNC: NEGATIVE — SIGNIFICANT CHANGE UP
LEUKOCYTE ESTERASE UR-ACNC: NEGATIVE — SIGNIFICANT CHANGE UP
LYMPHOCYTES # BLD AUTO: 1.75 K/UL — SIGNIFICANT CHANGE UP (ref 1–3.3)
LYMPHOCYTES # BLD AUTO: 10.9 % — LOW (ref 13–44)
MCHC RBC-ENTMCNC: 29.3 PG — SIGNIFICANT CHANGE UP (ref 27–34)
MCHC RBC-ENTMCNC: 32.4 GM/DL — SIGNIFICANT CHANGE UP (ref 32–36)
MCV RBC AUTO: 90.6 FL — SIGNIFICANT CHANGE UP (ref 80–100)
MONOCYTES # BLD AUTO: 1.18 K/UL — HIGH (ref 0–0.9)
MONOCYTES NFR BLD AUTO: 7.4 % — SIGNIFICANT CHANGE UP (ref 2–14)
NEUTROPHILS # BLD AUTO: 12.53 K/UL — HIGH (ref 1.8–7.4)
NEUTROPHILS NFR BLD AUTO: 78 % — HIGH (ref 43–77)
NITRITE UR-MCNC: NEGATIVE — SIGNIFICANT CHANGE UP
PH UR: 5 — SIGNIFICANT CHANGE UP (ref 5–8)
PLATELET # BLD AUTO: 459 K/UL — HIGH (ref 150–400)
POTASSIUM SERPL-MCNC: 4.1 MMOL/L — SIGNIFICANT CHANGE UP (ref 3.5–5.3)
POTASSIUM SERPL-SCNC: 4.1 MMOL/L — SIGNIFICANT CHANGE UP (ref 3.5–5.3)
PROT UR-MCNC: NEGATIVE MG/DL — SIGNIFICANT CHANGE UP
RBC # BLD: 4.67 M/UL — SIGNIFICANT CHANGE UP (ref 4.2–5.8)
RBC # FLD: 15.3 % — HIGH (ref 10.3–14.5)
SODIUM SERPL-SCNC: 140 MMOL/L — SIGNIFICANT CHANGE UP (ref 135–145)
SP GR SPEC: 1.01 — SIGNIFICANT CHANGE UP (ref 1.01–1.02)
UROBILINOGEN FLD QL: NEGATIVE MG/DL — SIGNIFICANT CHANGE UP
WBC # BLD: 16.05 K/UL — HIGH (ref 3.8–10.5)
WBC # FLD AUTO: 16.05 K/UL — HIGH (ref 3.8–10.5)

## 2020-04-09 PROCEDURE — 76775 US EXAM ABDO BACK WALL LIM: CPT | Mod: 26

## 2020-04-09 PROCEDURE — 93926 LOWER EXTREMITY STUDY: CPT | Mod: 26,LT

## 2020-04-09 PROCEDURE — 99221 1ST HOSP IP/OBS SF/LOW 40: CPT

## 2020-04-09 PROCEDURE — 99223 1ST HOSP IP/OBS HIGH 75: CPT

## 2020-04-09 PROCEDURE — 12345: CPT | Mod: NC

## 2020-04-09 PROCEDURE — 93010 ELECTROCARDIOGRAM REPORT: CPT

## 2020-04-09 RX ORDER — FINASTERIDE 5 MG/1
5 TABLET, FILM COATED ORAL DAILY
Refills: 0 | Status: DISCONTINUED | OUTPATIENT
Start: 2020-04-09 | End: 2020-04-10

## 2020-04-09 RX ORDER — SODIUM CHLORIDE 9 MG/ML
500 INJECTION INTRAMUSCULAR; INTRAVENOUS; SUBCUTANEOUS ONCE
Refills: 0 | Status: DISCONTINUED | OUTPATIENT
Start: 2020-04-09 | End: 2020-04-09

## 2020-04-09 RX ORDER — SODIUM CHLORIDE 9 MG/ML
1000 INJECTION INTRAMUSCULAR; INTRAVENOUS; SUBCUTANEOUS
Refills: 0 | Status: DISCONTINUED | OUTPATIENT
Start: 2020-04-09 | End: 2020-04-10

## 2020-04-09 RX ORDER — HEPARIN SODIUM 5000 [USP'U]/ML
5000 INJECTION INTRAVENOUS; SUBCUTANEOUS EVERY 8 HOURS
Refills: 0 | Status: DISCONTINUED | OUTPATIENT
Start: 2020-04-09 | End: 2020-04-09

## 2020-04-09 RX ORDER — FUROSEMIDE 40 MG
20 TABLET ORAL DAILY
Refills: 0 | Status: DISCONTINUED | OUTPATIENT
Start: 2020-04-09 | End: 2020-04-09

## 2020-04-09 RX ORDER — GLUCAGON INJECTION, SOLUTION 0.5 MG/.1ML
1 INJECTION, SOLUTION SUBCUTANEOUS ONCE
Refills: 0 | Status: DISCONTINUED | OUTPATIENT
Start: 2020-04-09 | End: 2020-04-10

## 2020-04-09 RX ORDER — ACETAMINOPHEN 500 MG
650 TABLET ORAL EVERY 6 HOURS
Refills: 0 | Status: DISCONTINUED | OUTPATIENT
Start: 2020-04-09 | End: 2020-04-10

## 2020-04-09 RX ORDER — LACTOBACILLUS ACIDOPHILUS 100MM CELL
1 CAPSULE ORAL
Refills: 0 | Status: DISCONTINUED | OUTPATIENT
Start: 2020-04-09 | End: 2020-04-10

## 2020-04-09 RX ORDER — PIPERACILLIN AND TAZOBACTAM 4; .5 G/20ML; G/20ML
3.38 INJECTION, POWDER, LYOPHILIZED, FOR SOLUTION INTRAVENOUS EVERY 12 HOURS
Refills: 0 | Status: DISCONTINUED | OUTPATIENT
Start: 2020-04-09 | End: 2020-04-09

## 2020-04-09 RX ORDER — DEXTROSE 50 % IN WATER 50 %
25 SYRINGE (ML) INTRAVENOUS ONCE
Refills: 0 | Status: DISCONTINUED | OUTPATIENT
Start: 2020-04-09 | End: 2020-04-10

## 2020-04-09 RX ORDER — INSULIN LISPRO 100/ML
VIAL (ML) SUBCUTANEOUS
Refills: 0 | Status: DISCONTINUED | OUTPATIENT
Start: 2020-04-09 | End: 2020-04-10

## 2020-04-09 RX ORDER — DIGOXIN 250 MCG
0.12 TABLET ORAL DAILY
Refills: 0 | Status: DISCONTINUED | OUTPATIENT
Start: 2020-04-09 | End: 2020-04-10

## 2020-04-09 RX ORDER — INSULIN GLARGINE 100 [IU]/ML
25 INJECTION, SOLUTION SUBCUTANEOUS AT BEDTIME
Refills: 0 | Status: DISCONTINUED | OUTPATIENT
Start: 2020-04-10 | End: 2020-04-10

## 2020-04-09 RX ORDER — INSULIN LISPRO 100/ML
VIAL (ML) SUBCUTANEOUS AT BEDTIME
Refills: 0 | Status: DISCONTINUED | OUTPATIENT
Start: 2020-04-09 | End: 2020-04-10

## 2020-04-09 RX ORDER — CARVEDILOL PHOSPHATE 80 MG/1
25 CAPSULE, EXTENDED RELEASE ORAL EVERY 12 HOURS
Refills: 0 | Status: DISCONTINUED | OUTPATIENT
Start: 2020-04-09 | End: 2020-04-10

## 2020-04-09 RX ORDER — ATORVASTATIN CALCIUM 80 MG/1
80 TABLET, FILM COATED ORAL AT BEDTIME
Refills: 0 | Status: DISCONTINUED | OUTPATIENT
Start: 2020-04-09 | End: 2020-04-10

## 2020-04-09 RX ORDER — SODIUM CHLORIDE 9 MG/ML
1000 INJECTION, SOLUTION INTRAVENOUS
Refills: 0 | Status: DISCONTINUED | OUTPATIENT
Start: 2020-04-09 | End: 2020-04-10

## 2020-04-09 RX ORDER — SACUBITRIL AND VALSARTAN 24; 26 MG/1; MG/1
1 TABLET, FILM COATED ORAL
Refills: 0 | Status: DISCONTINUED | OUTPATIENT
Start: 2020-04-09 | End: 2020-04-10

## 2020-04-09 RX ORDER — INSULIN LISPRO 100/ML
5 VIAL (ML) SUBCUTANEOUS
Refills: 0 | Status: DISCONTINUED | OUTPATIENT
Start: 2020-04-09 | End: 2020-04-10

## 2020-04-09 RX ORDER — CLOPIDOGREL BISULFATE 75 MG/1
75 TABLET, FILM COATED ORAL DAILY
Refills: 0 | Status: DISCONTINUED | OUTPATIENT
Start: 2020-04-09 | End: 2020-04-10

## 2020-04-09 RX ORDER — DEXTROSE 50 % IN WATER 50 %
12.5 SYRINGE (ML) INTRAVENOUS ONCE
Refills: 0 | Status: DISCONTINUED | OUTPATIENT
Start: 2020-04-09 | End: 2020-04-10

## 2020-04-09 RX ORDER — PIPERACILLIN AND TAZOBACTAM 4; .5 G/20ML; G/20ML
3.38 INJECTION, POWDER, LYOPHILIZED, FOR SOLUTION INTRAVENOUS EVERY 8 HOURS
Refills: 0 | Status: DISCONTINUED | OUTPATIENT
Start: 2020-04-09 | End: 2020-04-10

## 2020-04-09 RX ORDER — ASPIRIN/CALCIUM CARB/MAGNESIUM 324 MG
81 TABLET ORAL DAILY
Refills: 0 | Status: DISCONTINUED | OUTPATIENT
Start: 2020-04-09 | End: 2020-04-10

## 2020-04-09 RX ORDER — DEXTROSE 50 % IN WATER 50 %
15 SYRINGE (ML) INTRAVENOUS ONCE
Refills: 0 | Status: DISCONTINUED | OUTPATIENT
Start: 2020-04-09 | End: 2020-04-10

## 2020-04-09 RX ORDER — VANCOMYCIN HCL 1 G
1000 VIAL (EA) INTRAVENOUS DAILY
Refills: 0 | Status: DISCONTINUED | OUTPATIENT
Start: 2020-04-09 | End: 2020-04-10

## 2020-04-09 RX ORDER — INSULIN GLARGINE 100 [IU]/ML
25 INJECTION, SOLUTION SUBCUTANEOUS AT BEDTIME
Refills: 0 | Status: DISCONTINUED | OUTPATIENT
Start: 2020-04-09 | End: 2020-04-09

## 2020-04-09 RX ORDER — INSULIN GLARGINE 100 [IU]/ML
12.5 INJECTION, SOLUTION SUBCUTANEOUS AT BEDTIME
Refills: 0 | Status: COMPLETED | OUTPATIENT
Start: 2020-04-09 | End: 2020-04-09

## 2020-04-09 RX ADMIN — INSULIN GLARGINE 12.5 UNIT(S): 100 INJECTION, SOLUTION SUBCUTANEOUS at 22:42

## 2020-04-09 RX ADMIN — Medication 250 MILLIGRAM(S): at 11:36

## 2020-04-09 RX ADMIN — ATORVASTATIN CALCIUM 80 MILLIGRAM(S): 80 TABLET, FILM COATED ORAL at 21:49

## 2020-04-09 RX ADMIN — CARVEDILOL PHOSPHATE 25 MILLIGRAM(S): 80 CAPSULE, EXTENDED RELEASE ORAL at 05:18

## 2020-04-09 RX ADMIN — Medication 81 MILLIGRAM(S): at 11:35

## 2020-04-09 RX ADMIN — CLOPIDOGREL BISULFATE 75 MILLIGRAM(S): 75 TABLET, FILM COATED ORAL at 11:35

## 2020-04-09 RX ADMIN — FINASTERIDE 5 MILLIGRAM(S): 5 TABLET, FILM COATED ORAL at 12:57

## 2020-04-09 RX ADMIN — SACUBITRIL AND VALSARTAN 1 TABLET(S): 24; 26 TABLET, FILM COATED ORAL at 16:53

## 2020-04-09 RX ADMIN — Medication 5 UNIT(S): at 16:53

## 2020-04-09 RX ADMIN — SODIUM CHLORIDE 75 MILLILITER(S): 9 INJECTION INTRAMUSCULAR; INTRAVENOUS; SUBCUTANEOUS at 12:57

## 2020-04-09 RX ADMIN — SACUBITRIL AND VALSARTAN 1 TABLET(S): 24; 26 TABLET, FILM COATED ORAL at 05:18

## 2020-04-09 RX ADMIN — PIPERACILLIN AND TAZOBACTAM 25 GRAM(S): 4; .5 INJECTION, POWDER, LYOPHILIZED, FOR SOLUTION INTRAVENOUS at 21:57

## 2020-04-09 RX ADMIN — Medication 10: at 16:53

## 2020-04-09 RX ADMIN — HEPARIN SODIUM 5000 UNIT(S): 5000 INJECTION INTRAVENOUS; SUBCUTANEOUS at 12:57

## 2020-04-09 RX ADMIN — PIPERACILLIN AND TAZOBACTAM 25 GRAM(S): 4; .5 INJECTION, POWDER, LYOPHILIZED, FOR SOLUTION INTRAVENOUS at 05:18

## 2020-04-09 RX ADMIN — Medication 0.12 MILLIGRAM(S): at 05:18

## 2020-04-09 RX ADMIN — PIPERACILLIN AND TAZOBACTAM 25 GRAM(S): 4; .5 INJECTION, POWDER, LYOPHILIZED, FOR SOLUTION INTRAVENOUS at 16:42

## 2020-04-09 RX ADMIN — Medication 100 MILLIGRAM(S): at 18:49

## 2020-04-09 RX ADMIN — Medication 2: at 08:53

## 2020-04-09 RX ADMIN — Medication 1 TABLET(S): at 11:35

## 2020-04-09 RX ADMIN — Medication 8: at 11:38

## 2020-04-09 RX ADMIN — Medication 100 MILLIGRAM(S): at 21:48

## 2020-04-09 RX ADMIN — CARVEDILOL PHOSPHATE 25 MILLIGRAM(S): 80 CAPSULE, EXTENDED RELEASE ORAL at 16:53

## 2020-04-09 RX ADMIN — HEPARIN SODIUM 5000 UNIT(S): 5000 INJECTION INTRAVENOUS; SUBCUTANEOUS at 05:18

## 2020-04-09 RX ADMIN — Medication 1 TABLET(S): at 16:53

## 2020-04-09 NOTE — H&P ADULT - NSICDXPASTMEDICALHX_GEN_ALL_CORE_FT
PAST MEDICAL HISTORY:  BPH (benign prostatic hyperplasia)     CAD (coronary artery disease)     CKD (chronic kidney disease)     Diabetes Peripheral vascular disease  CHF- EF-19% AICD ([employer])  MI  December 2013   HTN  Pulmonary edema  High Cholesterol  Chronic renal insufficency    DVT of leg (deep venous thrombosis), right     Dyslipidemia     Fatty liver     Hypertension     Insulin resistance

## 2020-04-09 NOTE — H&P ADULT - NSHPPHYSICALEXAM_GEN_ALL_CORE
General: Well developed  male lying in bed not in distress.   HEENT: AT, NC. PERRL. intact EOM. no throat erythema or exudate.   Neck: supple. no JVD.   Chest: air entry is fair bilaterally.   Heart: S1,S2. RRR. no heart murmur. no edema.  Abdomen: soft. non-tender. obese, + BS.   Ext: no calf tenderness. ROM of all ext. intact.  Neuro: AAO x3. no focal weakness. no speech disorder. CNs intact.  Skin: left groin area with large area of erythema , induration , warmth about 10 cm x 7 cm, no sig pain on exam of the involved area, appears to have few areas of skin peeling possible had blister that burst, no drain noted.   Psych : normal affect, co-operative. no si/hi.

## 2020-04-09 NOTE — PROGRESS NOTE ADULT - PROBLEM SELECTOR PLAN 1
will keep on vanco , zosyn , cellulitis and very likely abscess, infected hematoma ? .  Leukocytosis 16.9, downtrending from yesterday.  US showed Left groin hematoma measures 8.1 x 2 x 5.9 cm.  CT pelvis showed Large left groin fluid collection extending to proximal thigh inferiorly and left lower anterior abdominal wall superiorly. Likely represent hematoma provided history of recent catheterization.  Still pending ID and surgery consults    Spoke to Patient's wife Raven ([phone]) and explained the current situation. left groin hematoma/cellulitis  f/u blood cultures  continue vanco/zosyn  CT pelvis and US reviewed  CT pelvis showed Large left groin fluid collection extending to proximal thigh inferiorly and left lower anterior abdominal wall superiorly. Likely represent hematoma provided history of recent catheterization.  Vascular surgery consult requested; recommendations appreciated  ID consulted; f/u further recommendations  Monitor leukocytosis     Spoke to Patient's wife Raven ([phone]) and explained the current situation.

## 2020-04-09 NOTE — CONSULT NOTE ADULT - ATTENDING COMMENTS
Pt with necrotic abscess of left groin x 1 week; no evidence of PSA or bleeding at this time    Rec  IV abx  Cardiology and medicine clearance/ optimization  gentle IV hydration for MARSHALL  Avoid nephrotoxic medication  OR tomorrow for debridement, washout, possible bypass, possible angiogram, possible muscle flap  Type and screen and 2 units of PRBCS on hold for OR

## 2020-04-09 NOTE — H&P ADULT - NSICDXPASTSURGICALHX_GEN_ALL_CORE_FT
PAST SURGICAL HISTORY:  AICD (automatic cardioverter/defibrillator) present     S/P angioplasty with stent right leg 08/14, L femoral 7/2014    S/P colonoscopy with polypectomy     S/P coronary artery bypass graft x 3 2014    AICD ([employer]) 2014    S/P thyroid biopsy

## 2020-04-09 NOTE — H&P ADULT - HISTORY OF PRESENT ILLNESS
65 y/o male with extensive cardiac history, htn, DM came to the ER for left groin area infection which started 1 week ago after he had angiography through left groin area 2 weeks ago, got 3 stents   ( done at [facility] by ok [facility] ). Pt stated that he has been on levaquin by his doctor but getting worse. some pain in the left groin area, denies fever but had chills. no cp, no sob. no dizziness, no abd. pain. no n/v/d. 65 y/o male with extensive cardiac history, htn, DM came to the ER for left groin area infection which started 1 week ago after he had angiography through left groin area 2 weeks ago, got 3 stents   ( done at [facility] by ok marsh ). Pt stated that he has been on levaquin by his doctor but getting worse. some pain in the left groin area, denies fever but had chills. no cp, no sob. no dizziness, no abd. pain. no n/v/d. As per pt. his cardiologist is aware that his groin area is not healing, was contacted via phone.

## 2020-04-09 NOTE — CONSULT NOTE ADULT - SUBJECTIVE AND OBJECTIVE BOX
Vascular Attending:  Dr Emeka Quispe      HPI:  63 y/o male with extensive cardiac history, htn, DM came to the ER for left groin area infection which started 1 week ago after he had angiography through left groin area 2 weeks ago, got 3 stents   ( done at [facility] by ok [facility] ). Pt stated that he has been on levaquin by his doctor but getting worse. some pain in the left groin area, denies fever but had chills. no cp, no sob. no dizziness, no abd. pain. no n/v/d. As per pt. his cardiologist is aware that his groin area is not healing, was contacted via phone. (09 Apr 2020 01:25)    Pt reports that he did not have pain immediately post cath but developed pain and swelling after going home and being ambulatory. He reports increased swelling, redness and pain in Left groin approx 1 week after cath. He denies any fever or chills, but despite oral antibiotics the swelling pain and redness worsened. He has no complaints of claudication in LLE with h/o stents but ambulation has been limited by CONNOLLY which is improved post stenting.    PAST MEDICAL & SURGICAL HISTORY:  Insulin resistance  Fatty liver  CKD (chronic kidney disease)  BPH (benign prostatic hyperplasia)  Dyslipidemia  Hypertension  CAD (coronary artery disease)  DVT of leg (deep venous thrombosis), right  Diabetes: Peripheral vascular disease  CHF- EF-19% AICD (boston scientific)  MI  December 2013   HTN  Pulmonary edema  High Cholesterol  Chronic renal insufficency  S/P thyroid biopsy  S/P colonoscopy with polypectomy  AICD (automatic cardioverter/defibrillator) present  S/P angioplasty with stent: right leg 08/14, L femoral 7/2014  S/P coronary artery bypass graft x 3: 2014  AICD (b[employer] scientific) 2014      REVIEW OF SYSTEMS- as above. All other ROS negative  General:	    Skin/Breast:  	  Ophthalmologic:  	  ENMT:	    Respiratory and Thorax:  	  Cardiovascular:	    Gastrointestinal:	    Genitourinary:	    Musculoskeletal:	    Neurological:	    Psychiatric:	    Hematology/Lymphatics:	    Endocrine:	    Allergic/Immunologic:	    MEDICATIONS  (STANDING):  aspirin enteric coated 81 milliGRAM(s) Oral daily  atorvastatin 80 milliGRAM(s) Oral at bedtime  carvedilol 25 milliGRAM(s) Oral every 12 hours  clopidogrel Tablet 75 milliGRAM(s) Oral daily  dextrose 5%. 1000 milliLiter(s) (50 mL/Hr) IV Continuous <Continuous>  dextrose 50% Injectable 12.5 Gram(s) IV Push once  dextrose 50% Injectable 25 Gram(s) IV Push once  dextrose 50% Injectable 25 Gram(s) IV Push once  digoxin     Tablet 0.125 milliGRAM(s) Oral daily  finasteride 5 milliGRAM(s) Oral daily  heparin  Injectable 5000 Unit(s) SubCutaneous every 8 hours  insulin glargine Injectable (LANTUS) 25 Unit(s) SubCutaneous at bedtime  insulin lispro (HumaLOG) corrective regimen sliding scale   SubCutaneous three times a day before meals  insulin lispro (HumaLOG) corrective regimen sliding scale   SubCutaneous at bedtime  lactobacillus acidophilus 1 Tablet(s) Oral three times a day with meals  piperacillin/tazobactam IVPB.. 3.375 Gram(s) IV Intermittent every 12 hours  sacubitril 24 mG/valsartan 26 mG 1 Tablet(s) Oral two times a day  sodium chloride 0.9%. 1000 milliLiter(s) (75 mL/Hr) IV Continuous <Continuous>  vancomycin  IVPB 1000 milliGRAM(s) IV Intermittent daily    MEDICATIONS  (PRN):  acetaminophen   Tablet .. 650 milliGRAM(s) Oral every 6 hours PRN Temp greater or equal to 38C (100.4F)  dextrose 40% Gel 15 Gram(s) Oral once PRN Blood Glucose LESS THAN 70 milliGRAM(s)/deciliter  glucagon  Injectable 1 milliGRAM(s) IntraMuscular once PRN Glucose LESS THAN 70 milligrams/deciliter      Allergies  No Known Allergies    SOCIAL HISTORY: Former smoker quit 7 years ago. No ETOH      Vital Signs Last 24 Hrs  T(C): 36.5 (09 Apr 2020 09:22), Max: 36.9 (09 Apr 2020 04:45)  T(F): 97.7 (09 Apr 2020 09:22), Max: 98.4 (09 Apr 2020 04:45)  HR: 68 (09 Apr 2020 09:22) (68 - 86)  BP: 114/64 (09 Apr 2020 09:22) (87/54 - 143/76)  BP(mean): --  RR: 18 (09 Apr 2020 09:22) (18 - 22)  SpO2: 99% (09 Apr 2020 09:22) (92% - 99%)    PHYSICAL EXAM:  Constitutional: Ill appearing M in NAD  Eyes: PERRL  ENMT: WNL  Neck: No JVD or carotid bruits  Respiratory: Essentially CTA  Cardiovascular: normal S1, S2  Gastrointestinal: Obese, softly distended, NT, + BS, no pulsatile masses  Extremities: L groin with large hematoma tracking over pubis and to L flank along rectus sheath with evidence of necrotic skin and surrounding erythema over ant groin/thigh and medial groin area. Periwound edema noted. Unable to palpate L fem pulse. No appreciable pop or DP. Left leg/foot warm. Motor and sensory intact.  Neurological: A&O X 3      LABS:                        13.7   16.05 )-----------( 459      ( 09 Apr 2020 06:08 )             42.3     04-09    140  |  101  |  79.0<H>  ----------------------------<  120<H>  4.1   |  23.0  |  1.91<H>    Ca    10.1      09 Apr 2020 06:08    TPro  7.0  /  Alb  2.3<L>  /  TBili  0.4  /  DBili  x   /  AST  30  /  ALT  17  /  AlkPhos  143<H>  04-08    PT/INR - ( 08 Apr 2020 19:09 )   PT: 15.8 sec;   INR: 1.38 ratio         PTT - ( 08 Apr 2020 19:09 )  PTT:25.8 sec      RADIOLOGY & ADDITIONAL STUDIES  < from: CT Pelvis No Cont (04.08.20 @ 21:48) >   EXAM:  CT PELVIS ONLY                          PROCEDURE DATE:  04/08/2020          INTERPRETATION:  CT PELVIS WITHOUT CONTRAST    INDICATION: Left groin swelling and erythema. Recent left groin catheterization two weeks ago.    TECHNIQUE: Pelvic CT without intravenous contrast. Oral contrast was not administered. Postprocessed 3-D reformatted images were created and reviewed.    COMPARISON: None.    FINDINGS:    Pelvic viscera: Mildly prominent prostate, cause mass effect and protrusion at the bladder base. Bladder is otherwise within normal limits. Nonspecific sub-cm calcification identified adjacent to the rectum. No intraperitoneal organized fluid collection or adenopathy in the field of view. A small fat containing left groin hernia is noted.    Bones and soft tissues: No acute displaced fracture of the bilateral hips. Acetabula appear intact. No displaced sacral fracture. There is a left groin fluid collection measuring approximately 9.2 x 7 x 5.5 cm (craniocaudal by transverse by AP dimension) as best seen on (79:2 and 58:7). This fluid collection appears to abut left groin vessels, likely represent hematoma provided history of recent catheterization. Consider correlation with left groin ultrasound if there is concern for vascular injury or to exclude potential pseudoaneurysm. Recommend clinical correlation to assess for superimposed infection such as developing abscesses. No emphysema identified. Fluid collection extends inferiorly to medial aspect of proximal thigh with diffuse soft tissue infiltration. Mild soft tissue infiltration of the left scrotum is also seen. Superior extension of the collection extending to involve aspect of the left anterolateral abdominal wall. Component of fluid collection measures 10.4 x 2.8 cm superiorly at the level of left lower anterolateral abdominal wall as best seen on image 25 of series 7. Degenerative changes of the lumbosacral spine are noted.    Atherosclerotic disease of the aorta and its branches. Right common femoral artery stent visualized.    IMPRESSION:    No acute displaced pelvic or hip fracture. No intraperitoneal fluid collection.    Large left groin fluid collection extending to proximal thigh inferiorly and left lower anterior abdominal wall superiorly as described in detail above. This fluid collection appears to abut left groin vessels, likely represent hematoma provided history of recent catheterization. Consider correlation with left groin ultrasound if there is concern for vascular injury or to exclude potential pseudoaneurysm. Recommend clinical correlation to assess for superimposed infection such as developing abscesses though detailed evaluation is limited secondary to lack of intravenous contrast. No emphysema identified. Mild soft tissueinfiltration of the left scrotum is also identified.    Additional findings as mentioned above.    < end of copied text >  < from: US Duplex Arterial Lower Ext Ltd, Left (04.08.20 @ 23:08) >     EXAM:  US DPLX LWR EXT ARTS LTD LT                          PROCEDURE DATE:  04/08/2020          INTERPRETATION:      CLINICAL HISTORY: Status post recent cardiac catheterization the left groin vascular access with persistent left groin hematoma and erythema.  Rule out pseudoaneurysm.    TECHNIQUE: Grayscale, color Doppler and spectral ultrasound of the left groin vasculature was performed to rule out pseudoaneurysm.    COMPARISON STUDIES: None    FINDINGS:   There is an approximately 8.1 x 2 x 5.9 cm complex, avascular fluid collection in the left groin soft tissues compatible with hematoma.    Left common femoral artery: Peak systolic velocity of 83.9 cm/s with monophasic waveform.    Proximal left superficial femoral artery: Peak systolic velocity of 56.9 cm/s with monophasic waveform.    The left common femoral and femoral veins are patent and compressible.    There is no evidence of pseudoaneurysm.    IMPRESSION:  Left groin hematoma measures 8.1 x 2 x 5.9 cm.  No evidence of pseudoaneurysm.    Monophasic waveforms in the left common femoral artery and proximal left superficial femoral artery are indicative of a more proximal stenosis.    < end of copied text >

## 2020-04-09 NOTE — PROGRESS NOTE ADULT - PROBLEM SELECTOR PLAN 6
Continue Lasix 20 mg daily to every other day and entresto as lower dose 24/26 from 49/51 for now. Appears euvolemic; hold lasix  Gentle hydration for OR tomorrow  Entresto decreased  Monitor I/Os, daily weights  cardio recs appreciated

## 2020-04-09 NOTE — H&P ADULT - PROBLEM SELECTOR PLAN 1
will keep on vanco , zosyn , cellulitis and very likely abscess, infected hematoma ? will request ID and surgery consults.

## 2020-04-09 NOTE — PROGRESS NOTE ADULT - PROBLEM SELECTOR PLAN 3
no cp, continue current meds as ordered. asymptomatic  continue DAPT and statin  cardiac clearance appreciated

## 2020-04-09 NOTE — CONSULT NOTE ADULT - SUBJECTIVE AND OBJECTIVE BOX
HPI:  65 y/o male with extensive cardiac history, htn, DM came to the ER for left groin area infection which started 1 week ago after he had angiography through left groin area 2 weeks ago, got 3 stents   ( done at [facility] by ok [facility] ). Pt stated that he has been on levaquin by his doctor but getting worse. some pain in the left groin area, denies fever but had chills. no cp, no sob. no dizziness, no abd. pain. no n/v/d. As per pt. his cardiologist is aware that his groin area is not healing, was contacted via phone. (09 Apr 2020 01:25)  elevated creatinine on admission.     PAST MEDICAL & SURGICAL HISTORY:  Insulin resistance  Fatty liver  CKD (chronic kidney disease)  BPH (benign prostatic hyperplasia)  Dyslipidemia  Hypertension  CAD (coronary artery disease)  DVT of leg (deep venous thrombosis), right  Diabetes: Peripheral vascular disease  CHF- EF-19% AICD (b[employer] scientific)  MI  December 2013   HTN  Pulmonary edema  High Cholesterol  Chronic renal insufficency  S/P thyroid biopsy  S/P colonoscopy with polypectomy  AICD (automatic cardioverter/defibrillator) present  S/P angioplasty with stent: right leg 08/14, L femoral 7/2014  S/P coronary artery bypass graft x 3: 2014    AICD (b[employer] scientific) 2014      FAMILY HISTORY:  Family history of heart disease: father  NC    Social History:Non smoker    MEDICATIONS  (STANDING):  aspirin enteric coated 81 milliGRAM(s) Oral daily  atorvastatin 80 milliGRAM(s) Oral at bedtime  carvedilol 25 milliGRAM(s) Oral every 12 hours  clopidogrel Tablet 75 milliGRAM(s) Oral daily  dextrose 5%. 1000 milliLiter(s) (50 mL/Hr) IV Continuous <Continuous>  dextrose 50% Injectable 12.5 Gram(s) IV Push once  dextrose 50% Injectable 25 Gram(s) IV Push once  dextrose 50% Injectable 25 Gram(s) IV Push once  digoxin     Tablet 0.125 milliGRAM(s) Oral daily  finasteride 5 milliGRAM(s) Oral daily  heparin  Injectable 5000 Unit(s) SubCutaneous every 8 hours  insulin glargine Injectable (LANTUS) 25 Unit(s) SubCutaneous at bedtime  insulin lispro (HumaLOG) corrective regimen sliding scale   SubCutaneous three times a day before meals  insulin lispro (HumaLOG) corrective regimen sliding scale   SubCutaneous at bedtime  lactobacillus acidophilus 1 Tablet(s) Oral three times a day with meals  piperacillin/tazobactam IVPB.. 3.375 Gram(s) IV Intermittent every 12 hours  sacubitril 24 mG/valsartan 26 mG 1 Tablet(s) Oral two times a day  sodium chloride 0.9%. 1000 milliLiter(s) (75 mL/Hr) IV Continuous <Continuous>  vancomycin  IVPB 1000 milliGRAM(s) IV Intermittent daily    MEDICATIONS  (PRN):  acetaminophen   Tablet .. 650 milliGRAM(s) Oral every 6 hours PRN Temp greater or equal to 38C (100.4F)  dextrose 40% Gel 15 Gram(s) Oral once PRN Blood Glucose LESS THAN 70 milliGRAM(s)/deciliter  glucagon  Injectable 1 milliGRAM(s) IntraMuscular once PRN Glucose LESS THAN 70 milligrams/deciliter   Meds reviewed    Vital Signs Last 24 Hrs  T(C): 36.5 (09 Apr 2020 09:22), Max: 36.9 (09 Apr 2020 04:45)  T(F): 97.7 (09 Apr 2020 09:22), Max: 98.4 (09 Apr 2020 04:45)  HR: 68 (09 Apr 2020 09:22) (68 - 86)  BP: 114/64 (09 Apr 2020 09:22) (87/54 - 143/76)  BP(mean): --  RR: 18 (09 Apr 2020 09:22) (18 - 22)  SpO2: 99% (09 Apr 2020 09:22) (92% - 99%)  Daily Height in cm: 170.18 (08 Apr 2020 18:21)    Daily     PHYSICAL EXAM:    GENERAL: appears chronically ill, oriented.  HEAD:  Atraumatic, Normocephalic  EYES: EOMI  NECK: Supple, neck  veins full  NERVOUS SYSTEM:  Alert & Oriented X3  CHEST/LUNG: Clear to percussion bilaterally  HEART: Regular rate and rhythm  ABDOMEN: Soft, Nontender, Nondistended; Bowel sounds present  EXTREMITIES:  No edema    LABS:                        13.7   16.05 )-----------( 459      ( 09 Apr 2020 06:08 )             42.3     04-09    140  |  101  |  79.0<H>  ----------------------------<  120<H>  4.1   |  23.0  |  1.91<H>    Ca    10.1      09 Apr 2020 06:08    TPro  7.0  /  Alb  2.3<L>  /  TBili  0.4  /  DBili  x   /  AST  30  /  ALT  17  /  AlkPhos  143<H>  04-08    PT/INR - ( 08 Apr 2020 19:09 )   PT: 15.8 sec;   INR: 1.38 ratio         PTT - ( 08 Apr 2020 19:09 )  PTT:25.8 sec            RADIOLOGY & ADDITIONAL TESTS:

## 2020-04-09 NOTE — CONSULT NOTE ADULT - SUBJECTIVE AND OBJECTIVE BOX
[facility]                                   [address] [place] [address]                                                      PHONE: [phone]                                                         FAX: [phone]  [email or web address]  ---------------------------------------------------------------------------------------------------------------------------------    Reason for Consult: Preop CV clearance     HPI:  MARIA ROSS is an 64y Male with extensive medical and cardiac history including CAD s/p CABG ischemic CMP AICD PVD CKD recently underwent PCI with subsequent hematoma and groin infection that is not responding to antibiotic therapy planning to go to OR tomorrow, his CP had resolved after his recent PCI at [facility] , he denies any exertional symptoms at present , no SOB .    PAST MEDICAL & SURGICAL HISTORY:  Insulin resistance  Fatty liver  CKD (chronic kidney disease)  BPH (benign prostatic hyperplasia)  Dyslipidemia  Hypertension  CAD (coronary artery disease)  DVT of leg (deep venous thrombosis), right  Diabetes: Peripheral vascular disease  CHF- EF-19% AICD (boston scientific)  MI December 2013   HTN  Pulmonary edema  High Cholesterol  Chronic renal insufficency  S/P thyroid biopsy  S/P colonoscopy with polypectomy  AICD (automatic cardioverter/defibrillator) present  S/P angioplasty with stent: right leg 08/14, L femoral 7/2014  S/P coronary artery bypass graft x 3: 2014    AICD (b[employer] scientific) 2014      No Known Allergies      MEDICATIONS  (STANDING):  aspirin enteric coated 81 milliGRAM(s) Oral daily  atorvastatin 80 milliGRAM(s) Oral at bedtime  carvedilol 25 milliGRAM(s) Oral every 12 hours  clopidogrel Tablet 75 milliGRAM(s) Oral daily  dextrose 5%. 1000 milliLiter(s) (50 mL/Hr) IV Continuous <Continuous>  dextrose 50% Injectable 12.5 Gram(s) IV Push once  dextrose 50% Injectable 25 Gram(s) IV Push once  dextrose 50% Injectable 25 Gram(s) IV Push once  digoxin     Tablet 0.125 milliGRAM(s) Oral daily  finasteride 5 milliGRAM(s) Oral daily  heparin  Injectable 5000 Unit(s) SubCutaneous every 8 hours  insulin glargine Injectable (LANTUS) 25 Unit(s) SubCutaneous at bedtime  insulin lispro (HumaLOG) corrective regimen sliding scale   SubCutaneous three times a day before meals  insulin lispro (HumaLOG) corrective regimen sliding scale   SubCutaneous at bedtime  lactobacillus acidophilus 1 Tablet(s) Oral three times a day with meals  piperacillin/tazobactam IVPB.. 3.375 Gram(s) IV Intermittent every 12 hours  sacubitril 24 mG/valsartan 26 mG 1 Tablet(s) Oral two times a day  sodium chloride 0.9%. 1000 milliLiter(s) (75 mL/Hr) IV Continuous <Continuous>  vancomycin  IVPB 1000 milliGRAM(s) IV Intermittent daily    MEDICATIONS  (PRN):  acetaminophen   Tablet .. 650 milliGRAM(s) Oral every 6 hours PRN Temp greater or equal to 38C (100.4F)  dextrose 40% Gel 15 Gram(s) Oral once PRN Blood Glucose LESS THAN 70 milliGRAM(s)/deciliter  glucagon  Injectable 1 milliGRAM(s) IntraMuscular once PRN Glucose LESS THAN 70 milligrams/deciliter      Social History:  Cigarettes:                    Alchohol:                 Illicit Drug Abuse:      REVIEW OF SYSTEMS:    Constitutional: No fever, weight loss or fatigue  Eyes: No eye pain, visual disturbances, or discharge  ENMT:  No difficulty hearing, tinnitus, vertigo; No sinus or throat pain  Neck: No pain or stiffness  Respiratory: No cough, wheezing, chills or hemoptysis  Cardiovascular: No chest pain, palpitations, shortness of breath, dizziness or leg swelling  Gastrointestinal: No abdominal or epigastric pain. No nausea, vomiting or hematemesis; No diarrhea or constipation. No melena or hematochezia.  Genitourinary: No dysuria, frequency, hematuria or incontinence  Rectal: No pain, hemorrhoids or incontinence  Neurological: No headaches, memory loss, loss of strength, numbness or tremors  Skin: No itching, burning, rashes or lesions   Lymph Nodes: No enlarged glands  Endocrine: No heat or cold intolerance; No hair loss  Musculoskeletal: No joint pain or swelling; No muscle, back or extremity pain  Psychiatric: No depression, anxiety, mood swings or difficulty sleeping  Heme/Lymph: No easy bruising or bleeding gums  Allergy and Immunologic: No hives or eczema    Vital Signs Last 24 Hrs  T(C): 36.5 (09 Apr 2020 09:22), Max: 36.9 (09 Apr 2020 04:45)  T(F): 97.7 (09 Apr 2020 09:22), Max: 98.4 (09 Apr 2020 04:45)  HR: 68 (09 Apr 2020 09:22) (68 - 86)  BP: 114/64 (09 Apr 2020 09:22) (87/54 - 143/76)  BP(mean): --  RR: 18 (09 Apr 2020 09:22) (18 - 22)  SpO2: 99% (09 Apr 2020 09:22) (92% - 99%)  ICU Vital Signs Last 24 Hrs  MARIA ROSS  I&O's Detail    I&O's Summary    Drug Dosing Weight  MARIAEDILSON RIVERAAAN      PHYSICAL EXAM:  General: Appears well developed, well nourished alert and cooperative.  HEENT: Head; normocephalic, atraumatic.  Eyes: Pupils reactive, cornea wnl.  Neck: Supple, no nodes adenopathy, no NVD or carotid bruit or thyromegaly.  CARDIOVASCULAR: Normal S1 and S2, No murmur, rub, gallop or lift.   LUNGS: No rales, rhonchi or wheeze. Normal breath sounds bilaterally.  ABDOMEN: Soft, nontender without mass or organomegaly. bowel sounds normoactive.  EXTREMITIES: No clubbing, cyanosis or edema. Distal pulses wnl.   SKIN: warm and dry with normal turgor.  NEURO: Alert/oriented x 3/normal motor exam. No pathologic reflexes.    PSYCH: normal affect.        LABS:                        13.7   16.05 )-----------( 459      ( 09 Apr 2020 06:08 )             42.3     04-09    140  |  101  |  79.0<H>  ----------------------------<  120<H>  4.1   |  23.0  |  1.91<H>    Ca    10.1      09 Apr 2020 06:08    TPro  7.0  /  Alb  2.3<L>  /  TBili  0.4  /  DBili  x   /  AST  30  /  ALT  17  /  AlkPhos  143<H>  04-08    MARIA ROSS  CARDIAC MARKERS ( 08 Apr 2020 19:09 )  x     / 0.03 ng/mL / x     / x     / x          PT/INR - ( 08 Apr 2020 19:09 )   PT: 15.8 sec;   INR: 1.38 ratio         PTT - ( 08 Apr 2020 19:09 )  PTT:25.8 sec      RADIOLOGY & ADDITIONAL STUDIES:    INTERPRETATION OF TELEMETRY (personally reviewed):    ECG: NSR ANTEROSEPTAL MI old    CARDIAC CATHETERIZATION:   < from: Cardiac Cath Lab - Adult (03.11.20 @ 10:55) >  CORONARY VESSELS: The coronary circulation is right dominant.  LM:   --  LM: Normal.  LAD:   --  Mid LAD: There was a 100 % stenosis. There was good blood supply  to the distal myocardium from a graft.  --  D1: There was a 90 % stenosis.  CX:   --  Proximal circumflex: There was a 100 % stenosis. There was good  blood supply to the distal myocardium from a graft.  RCA:   -- Mid RCA: There was a 100 % stenosis. There was good blood supply  to the distal myocardium from a graft.  GRAFTS:   --  Graft to the mid LAD: The graft was a LIMA. It was normal.  --  Graft to the 1st obtuse marginal: The graft was a saphenous vein graft  from the aorta. It was normal.  --  Graft to the RPDA: The graft was a saphenous vein graft from the aorta.  It was normal.  COMPLICATIONS: There were no complications. No complications occurred  during the cath lab visit.  DIAGNOSTIC IMPRESSIONS: Prior LIMA to LAD and SVG to OM and RPDA are  patent. Severe LAD-maria e disease  DIAGNOSTIC RECOMMENDATIONS: Plan for LAD-maria e PCI  INTERVENTIONAL IMPRESSIONS: Prior LIMA to LAD and SVG to OM and RPDA are  patent. Severe LAD-maria e disease  INTERVENTIONAL RECOMMENDATIONS: Plan for LAD-maria e PCI  Prepared and signed by  Julio Blanco MD    < end of copied text >    ACTIVE PROBLEMS:  HEALTH ISSUES - PROBLEM Dx:  H/O CHF: H/O CHF  Essential hypertension: Essential hypertension  Stage 4 chronic kidney disease: Stage 4 chronic kidney disease  Coronary artery disease involving coronary bypass graft of native heart without angina pectoris: Coronary artery disease involving coronary bypass graft of native heart without angina pectoris  Type 2 diabetes mellitus with hyperglycemia, with long-term current use of insulin: Type 2 diabetes mellitus with hyperglycemia, with long-term current use of insulin  Cellulitis and abscess: Cellulitis and abscess          Assessment and Plan:    In summary, MARIA ROSS is an 64y Male with extensive medical and cardiac history including CAD s/p CABG ischemic CMP AICD PVD CKD recently underwent PCI with subsequent hematoma and groin infection that is not responding to antibiotic therapy planning to go to OR tomorrow, his CP had resolved after his recent PCI at [facility] , he denies any exertional symptoms at present , no SOB .      1) Preoperative Optimization         -No Cardiovascular Contraindication to surgery, discussed in detail with Surgeon will continue DAPT therapy during procedure in view of recent PCI       -Patient is acceptable risk for a low risk surgery       -Continue cardiac medications as scheduled       -Please recall with any questions or concerns Continue present cardiac therapy    CHETAN LOPEZ MD, FACC, AMARA

## 2020-04-09 NOTE — PROGRESS NOTE ADULT - PROBLEM SELECTOR PLAN 4
pt's RF is worsening compared to his labs about 1 month ago.   close f/u of labs.   Slight improvement in Creatinine from yesterday  Pending nephrology consult. pt's RF is worsening compared to his labs about 1 month ago.   hold lasix; gentle hydration  renal ultrasound  monitor I/Os, daily weights  Slight improvement in Creatinine from yesterday  entresto dose decreased  renal recommendations appreciated

## 2020-04-09 NOTE — H&P ADULT - PROBLEM SELECTOR PLAN 4
pt's RF is worsening compared to his labs about 1 month ago. close f/u of labs. will request nephrology dr. Estes consult.

## 2020-04-09 NOTE — CONSULT NOTE ADULT - SUBJECTIVE AND OBJECTIVE BOX
[facility]  INFECTIOUS DISEASES AND INTERNAL MEDICINE at [place]  =======================================================  Noe Navarro MD  Diplomates American Board of Internal Medicine and Infectious Diseases  Tel: [phone]      Fax: [phone]  =======================================================      MRN-[id number]  MARIA ROSS is a 64y  Male     CC: Patient is a 64y old  Male who presents with a chief complaint of left groin area infection (09 Apr 2020 09:56)    HPI:  65 y/o male with extensive cardiac history, htn, DM came to the ER for left groin area infection which started 1 week ago after he had angiography through left groin area 2 weeks ago, got 3 stents   ( done at [facility] by ok [facility] ). Pt stated that he has been on levaquin by his doctor but getting worse. some pain in the left groin area, denies fever but had chills. no cp, no sob. no dizziness, no abd. pain. no n/v/d. As per pt. his cardiologist is aware that his groin area is not healing, was contacted via phone. (09 Apr 2020 01:25)      PAST MEDICAL & SURGICAL HISTORY:  Insulin resistance  Fatty liver  CKD (chronic kidney disease)  BPH (benign prostatic hyperplasia)  Dyslipidemia  Hypertension  CAD (coronary artery disease)  DVT of leg (deep venous thrombosis), right  Diabetes: Peripheral vascular disease  CHF- EF-19% AICD (boston scientific)  MI  December 2013   HTN  Pulmonary edema  High Cholesterol  Chronic renal insufficency  S/P thyroid biopsy  S/P colonoscopy with polypectomy  AICD (automatic cardioverter/defibrillator) present  S/P angioplasty with stent: right leg 08/14, L femoral 7/2014  S/P coronary artery bypass graft x 3: 2014    AICD (boston scientific) 2014      Social Hx:    FAMILY HISTORY:  Family history of heart disease: father      Allergies    No Known Allergies    Intolerances        MEDICATIONS  (STANDING):  aspirin enteric coated 81 milliGRAM(s) Oral daily  atorvastatin 80 milliGRAM(s) Oral at bedtime  carvedilol 25 milliGRAM(s) Oral every 12 hours  clopidogrel Tablet 75 milliGRAM(s) Oral daily  dextrose 5%. 1000 milliLiter(s) (50 mL/Hr) IV Continuous <Continuous>  dextrose 50% Injectable 12.5 Gram(s) IV Push once  dextrose 50% Injectable 25 Gram(s) IV Push once  dextrose 50% Injectable 25 Gram(s) IV Push once  digoxin     Tablet 0.125 milliGRAM(s) Oral daily  finasteride 5 milliGRAM(s) Oral daily  heparin  Injectable 5000 Unit(s) SubCutaneous every 8 hours  insulin glargine Injectable (LANTUS) 25 Unit(s) SubCutaneous at bedtime  insulin lispro (HumaLOG) corrective regimen sliding scale   SubCutaneous three times a day before meals  insulin lispro (HumaLOG) corrective regimen sliding scale   SubCutaneous at bedtime  lactobacillus acidophilus 1 Tablet(s) Oral three times a day with meals  piperacillin/tazobactam IVPB.. 3.375 Gram(s) IV Intermittent every 12 hours  sacubitril 24 mG/valsartan 26 mG 1 Tablet(s) Oral two times a day  vancomycin  IVPB 1000 milliGRAM(s) IV Intermittent daily    MEDICATIONS  (PRN):  acetaminophen   Tablet .. 650 milliGRAM(s) Oral every 6 hours PRN Temp greater or equal to 38C (100.4F)  dextrose 40% Gel 15 Gram(s) Oral once PRN Blood Glucose LESS THAN 70 milliGRAM(s)/deciliter  glucagon  Injectable 1 milliGRAM(s) IntraMuscular once PRN Glucose LESS THAN 70 milligrams/deciliter      ANTIMICROBIALS:  piperacillin/tazobactam IVPB.. 3.375 every 12 hours  vancomycin  IVPB 1000 daily      OTHER MEDS: MEDICATIONS  (STANDING):  acetaminophen   Tablet .. 650 every 6 hours PRN  aspirin enteric coated 81 daily  atorvastatin 80 at bedtime  carvedilol 25 every 12 hours  clopidogrel Tablet 75 daily  dextrose 40% Gel 15 once PRN  dextrose 50% Injectable 12.5 once  dextrose 50% Injectable 25 once  dextrose 50% Injectable 25 once  digoxin     Tablet 0.125 daily  finasteride 5 daily  glucagon  Injectable 1 once PRN  heparin  Injectable 5000 every 8 hours  insulin glargine Injectable (LANTUS) 25 at bedtime  insulin lispro (HumaLOG) corrective regimen sliding scale  three times a day before meals  insulin lispro (HumaLOG) corrective regimen sliding scale  at bedtime  sacubitril 24 mG/valsartan 26 mG 1 two times a day             REVIEW OF SYSTEMS:  CONSTITUTIONAL:  No Fever or chills  HEENT:  No diplopia or blurred vision.  No earache, sore throat or runny nose.  CARDIOVASCULAR:  No pressure, squeezing, strangling, tightness, heaviness or aching about the chest, neck, axilla or epigastrium.  RESPIRATORY:  No cough, shortness of breath  GASTROINTESTINAL:  No nausea, vomiting or diarrhea.  GENITOURINARY:  No dysuria, frequency or urgency. No Blood in urine  MUSCULOSKELETAL:  no joint aches, no muscle pain  SKIN:  No change in skin, hair or nails.  NEUROLOGIC:  No Headaches, seizures or weakness.  PSYCHIATRIC:  No disorder of thought or mood.  ENDOCRINE:  No heat or cold intolerance  HEMATOLOGICAL:  No easy bruising or bleeding.           I&O's Detail        Physical Exam:  Vital Signs Last 24 Hrs  T(C): 36.5 (09 Apr 2020 09:22), Max: 36.9 (09 Apr 2020 04:45)  T(F): 97.7 (09 Apr 2020 09:22), Max: 98.4 (09 Apr 2020 04:45)  HR: 68 (09 Apr 2020 09:22) (68 - 86)  BP: 114/64 (09 Apr 2020 09:22) (87/54 - 143/76)  BP(mean): --  RR: 18 (09 Apr 2020 09:22) (18 - 22)  SpO2: 99% (09 Apr 2020 09:22) (92% - 99%)  Height (cm): 170.18 (04-08 @ 18:21)  Weight (kg): 88.5 (04-08 @ 18:21)  BMI (kg/m2): 30.6 (04-08 @ 18:21)  BSA (m2): 2 (04-08 @ 18:21)  GEN: NAD, pleasant  HEENT: normocephalic and atraumatic. EOMI. PERRL.  Anicteric  NECK: Supple.   LUNGS: Clear to auscultation.  HEART: Regular rate and rhythm without murmur.  ABDOMEN: Soft, nontender, and nondistended.  Positive bowel sounds.    : No CVA tenderness  EXTREMITIES: Without any edema.  MSK: No joint swelling  NEUROLOGIC: Cranial nerves II through XII are grossly intact. No Focal Deficits  PSYCHIATRIC: Appropriate affect .  SKIN: No Rash        Labs:      Radiology: [facility]  INFECTIOUS DISEASES AND INTERNAL MEDICINE at [place]  =======================================================  Noe Navarro MD  Diplomates American Board of Internal Medicine and Infectious Diseases  Tel: [phone]      Fax: [phone]  =======================================================      MRN-[id number]  MARIA ROSS is a 64y  Male     CC: Patient is a 64y old  Male who presents with a chief complaint of left groin area infection (09 Apr 2020 09:56)    HPI:  65 y/o male with extensive cardiac history, htn, DM came to the ER for left groin area infection which started 1 week ago after he had angiography through left groin area 2 weeks ago, got 3 stents   ( done at [facility] by ok [facility] ). Pt stated that he has been on levaquin by his doctor but getting worse. some pain in the left groin area, denies fever but had chills. no cp, no sob. no dizziness, no abd. pain. no n/v/d. As per pt. his cardiologist is aware that his groin area is not healing, was contacted via phone. (09 Apr 2020 01:25)      PAST MEDICAL & SURGICAL HISTORY:  Insulin resistance  Fatty liver  CKD (chronic kidney disease)  BPH (benign prostatic hyperplasia)  Dyslipidemia  Hypertension  CAD (coronary artery disease)  DVT of leg (deep venous thrombosis), right  Diabetes: Peripheral vascular disease  CHF- EF-19% AICD (boston scientific)  MI  December 2013   HTN  Pulmonary edema  High Cholesterol  Chronic renal insufficency  S/P thyroid biopsy  S/P colonoscopy with polypectomy  AICD (automatic cardioverter/defibrillator) present  S/P angioplasty with stent: right leg 08/14, L femoral 7/2014  S/P coronary artery bypass graft x 3: 2014    AICD (boston scientific) 2014      Social Hx: non smoker    FAMILY HISTORY:  Family history of heart disease: father      Allergies    No Known Allergies    Intolerances        MEDICATIONS  (STANDING):  aspirin enteric coated 81 milliGRAM(s) Oral daily  atorvastatin 80 milliGRAM(s) Oral at bedtime  carvedilol 25 milliGRAM(s) Oral every 12 hours  clopidogrel Tablet 75 milliGRAM(s) Oral daily  dextrose 5%. 1000 milliLiter(s) (50 mL/Hr) IV Continuous <Continuous>  dextrose 50% Injectable 12.5 Gram(s) IV Push once  dextrose 50% Injectable 25 Gram(s) IV Push once  dextrose 50% Injectable 25 Gram(s) IV Push once  digoxin     Tablet 0.125 milliGRAM(s) Oral daily  finasteride 5 milliGRAM(s) Oral daily  heparin  Injectable 5000 Unit(s) SubCutaneous every 8 hours  insulin glargine Injectable (LANTUS) 25 Unit(s) SubCutaneous at bedtime  insulin lispro (HumaLOG) corrective regimen sliding scale   SubCutaneous three times a day before meals  insulin lispro (HumaLOG) corrective regimen sliding scale   SubCutaneous at bedtime  lactobacillus acidophilus 1 Tablet(s) Oral three times a day with meals  piperacillin/tazobactam IVPB.. 3.375 Gram(s) IV Intermittent every 12 hours  sacubitril 24 mG/valsartan 26 mG 1 Tablet(s) Oral two times a day  vancomycin  IVPB 1000 milliGRAM(s) IV Intermittent daily    MEDICATIONS  (PRN):  acetaminophen   Tablet .. 650 milliGRAM(s) Oral every 6 hours PRN Temp greater or equal to 38C (100.4F)  dextrose 40% Gel 15 Gram(s) Oral once PRN Blood Glucose LESS THAN 70 milliGRAM(s)/deciliter  glucagon  Injectable 1 milliGRAM(s) IntraMuscular once PRN Glucose LESS THAN 70 milligrams/deciliter      ANTIMICROBIALS:  piperacillin/tazobactam IVPB.. 3.375 every 12 hours  vancomycin  IVPB 1000 daily      OTHER MEDS: MEDICATIONS  (STANDING):  acetaminophen   Tablet .. 650 every 6 hours PRN  aspirin enteric coated 81 daily  atorvastatin 80 at bedtime  carvedilol 25 every 12 hours  clopidogrel Tablet 75 daily  dextrose 40% Gel 15 once PRN  dextrose 50% Injectable 12.5 once  dextrose 50% Injectable 25 once  dextrose 50% Injectable 25 once  digoxin     Tablet 0.125 daily  finasteride 5 daily  glucagon  Injectable 1 once PRN  heparin  Injectable 5000 every 8 hours  insulin glargine Injectable (LANTUS) 25 at bedtime  insulin lispro (HumaLOG) corrective regimen sliding scale  three times a day before meals  insulin lispro (HumaLOG) corrective regimen sliding scale  at bedtime  sacubitril 24 mG/valsartan 26 mG 1 two times a day             REVIEW OF SYSTEMS:  CONSTITUTIONAL:  No Fever or chills  HEENT:  No diplopia or blurred vision.  No earache, sore throat or runny nose.  CARDIOVASCULAR:  No pressure, squeezing, strangling, tightness, heaviness or aching about the chest, neck, axilla or epigastrium.  RESPIRATORY:  No cough, shortness of breath  GASTROINTESTINAL:  No nausea, vomiting or diarrhea.  GENITOURINARY:  No dysuria, frequency or urgency. No Blood in urine  MUSCULOSKELETAL:  no joint aches, no muscle pain  SKIN:  No change in skin, hair or nails.  NEUROLOGIC:  No Headaches, seizures or weakness.  PSYCHIATRIC:  No disorder of thought or mood.  ENDOCRINE:  No heat or cold intolerance  HEMATOLOGICAL:  No easy bruising or bleeding.           I&O's Detail        Physical Exam:  Vital Signs Last 24 Hrs  T(C): 36.5 (09 Apr 2020 09:22), Max: 36.9 (09 Apr 2020 04:45)  T(F): 97.7 (09 Apr 2020 09:22), Max: 98.4 (09 Apr 2020 04:45)  HR: 68 (09 Apr 2020 09:22) (68 - 86)  BP: 114/64 (09 Apr 2020 09:22) (87/54 - 143/76)  BP(mean): --  RR: 18 (09 Apr 2020 09:22) (18 - 22)  SpO2: 99% (09 Apr 2020 09:22) (92% - 99%)  Height (cm): 170.18 (04-08 @ 18:21)  Weight (kg): 88.5 (04-08 @ 18:21)  BMI (kg/m2): 30.6 (04-08 @ 18:21)  BSA (m2): 2 (04-08 @ 18:21)  GEN: NAD, pleasant  HEENT: normocephalic and atraumatic. EOMI. PERRL.  Anicteric  NECK: Supple.   LUNGS: Clear to auscultation.  HEART: Regular rate and rhythm without murmur.  ABDOMEN: Soft, nontender, and nondistended.  Positive bowel sounds.    : No CVA tenderness left groin tender hematoma with   EXTREMITIES: Without any edema.  MSK: No joint swelling  NEUROLOGIC: Cranial nerves II through XII are grossly intact. No Focal Deficits  PSYCHIATRIC: Appropriate affect .  SKIN: No Rash        Labs:                        13.7   16.05 )-----------( 459      ( 09 Apr 2020 06:08 )             42.3     04-09    140  |  101  |  79.0<H>  ----------------------------<  120<H>  4.1   |  23.0  |  1.91<H>    Ca    10.1      09 Apr 2020 06:08    TPro  7.0  /  Alb  2.3<L>  /  TBili  0.4  /  DBili  x   /  AST  30  /  ALT  17  /  AlkPhos  143<H>  04-08      Radiology:  < from: CT Pelvis No Cont (04.08.20 @ 21:48) >  IMPRESSION:    No acute displaced pelvic or hip fracture. No intraperitoneal fluid collection.    Large left groin fluid collection extending to proximal thigh inferiorly and left lower anterior abdominal wall superiorly as described in detail above. This fluid collection appears to abut left groin vessels, likely represent hematoma provided history of recent catheterization. Consider correlation with left groin ultrasound if there is concern for vascular injury or to exclude potential pseudoaneurysm. Recommend clinical correlation to assess for superimposed infection such as developing abscesses though detailed evaluation is limited secondary to lack of intravenous contrast. No emphysema identified. Mild soft tissueinfiltration of the left scrotum is also identified.    Additional findings as mentioned above.    These results were discussed via telephone at 4/8/2020 10:15 PM by Dr. Pizarro of radiology with Dr. Milton, institution read-back verification policy was followed.        < end of copied text >

## 2020-04-09 NOTE — H&P ADULT - PROBLEM SELECTOR PLAN 6
pt's RF worsening will change lasix 20 mg daily to every other day and use entresto as lower dose 24/26 from 49/51 for now.

## 2020-04-09 NOTE — PROGRESS NOTE ADULT - PROBLEM SELECTOR PLAN 2
Continue lantus and humalog scale. Continue lantus and humalog scale.  Add premeal humalog given hyperglycemia

## 2020-04-09 NOTE — PROGRESS NOTE ADULT - SUBJECTIVE AND OBJECTIVE BOX
Interval history: 64 year male with CC of left groin infection     Subjective: Patient seen and evaluated at bedside, patient seen lying in bed. Patient states he has not had a BM for 2 days but he has not been eating. Patient admits to some mild pain of the left groin area.  Patient denies any chest pain, chills, N/V, abdominal pain. Voiding normally.         PHYSICAL EXAM:    Vital Signs Last 24 Hrs  T(C): 36.5 (09 Apr 2020 09:22), Max: 36.9 (09 Apr 2020 04:45)  T(F): 97.7 (09 Apr 2020 09:22), Max: 98.4 (09 Apr 2020 04:45)  HR: 68 (09 Apr 2020 09:22) (68 - 86)  BP: 114/64 (09 Apr 2020 09:22) (87/54 - 143/76)  BP(mean): --  RR: 18 (09 Apr 2020 09:22) (18 - 22)  SpO2: 99% (09 Apr 2020 09:22) (92% - 99%)    GENERAL: Pt lying in bed, NAD.  ENMT: PERRL, +EOMI.  NECK: soft, Supple, No JVD,   CHEST/LUNG: Clear to auscultation bilaterally; No wheezing.  HEART: S1S2+, Regular rate and rhythm; No murmurs.  ABDOMEN: Soft, Nontender, Nondistended; Bowel sounds present.  MUSCULOSKELETAL: Normal range of motion.  SKIN: Induration and erythema of left groin area. Warmth noted. No acute pain to palpation. No drainage.   NEURO: AAOX3, no focal deficits.  PSYCH: normal mood.    LABS:                        13.7   16.05 )-----------( 459      ( 09 Apr 2020 06:08 )             42.3     04-09    140  |  101  |  79.0<H>  ----------------------------<  120<H>  4.1   |  23.0  |  1.91<H>    Ca    10.1      09 Apr 2020 06:08    TPro  7.0  /  Alb  2.3<L>  /  TBili  0.4  /  DBili  x   /  AST  30  /  ALT  17  /  AlkPhos  143<H>  04-08    PT/INR - ( 08 Apr 2020 19:09 )   PT: 15.8 sec;   INR: 1.38 ratio         PTT - ( 08 Apr 2020 19:09 )  PTT:25.8 sec    LIVER FUNCTIONS - ( 08 Apr 2020 19:09 )  Alb: 2.3 g/dL / Pro: 7.0 g/dL / ALK PHOS: 143 U/L / ALT: 17 U/L / AST: 30 U/L / GGT: x               CARDIAC MARKERS ( 08 Apr 2020 19:09 )  x     / 0.03 ng/mL / x     / x     / x          < from: US Duplex Arterial Lower Ext Ltd, Left (04.08.20 @ 23:08) >  IMPRESSION:  Left groin hematoma measures 8.1 x 2 x 5.9 cm.  No evidence of pseudoaneurysm.    Monophasic waveforms in the left common femoral artery and proximal left superficial femoral artery are indicative of a more proximal stenosis.    < end of copied text >      < from: CT Pelvis No Cont (04.08.20 @ 21:48) >    No acute displaced pelvic or hip fracture. No intraperitoneal fluid collection.    Large left groin fluid collection extending to proximal thigh inferiorly and left lower anterior abdominal wall superiorly as described in detail above. This fluid collection appears to abut left groin vessels, likely represent hematoma provided history of recent catheterization. Consider correlation with left groin ultrasound if there is concern for vascular injury or to exclude potential pseudoaneurysm. Recommend clinical correlation to assess for superimposed infection such as developing abscesses though detailed evaluation is limited secondary to lack of intravenous contrast. No emphysema identified. Mild soft tissueinfiltration of the left scrotum is also identified.    < end of copied text >

## 2020-04-10 ENCOUNTER — RESULT REVIEW (OUTPATIENT)
Age: 65
End: 2020-04-10

## 2020-04-10 LAB
ANION GAP SERPL CALC-SCNC: 12 MMOL/L — SIGNIFICANT CHANGE UP (ref 5–17)
BUN SERPL-MCNC: 74 MG/DL — HIGH (ref 8–20)
CALCIUM SERPL-MCNC: 9 MG/DL — SIGNIFICANT CHANGE UP (ref 8.6–10.2)
CHLORIDE SERPL-SCNC: 103 MMOL/L — SIGNIFICANT CHANGE UP (ref 98–107)
CO2 SERPL-SCNC: 22 MMOL/L — SIGNIFICANT CHANGE UP (ref 22–29)
CREAT SERPL-MCNC: 2.24 MG/DL — HIGH (ref 0.5–1.3)
DIGOXIN SERPL-MCNC: 0.7 NG/ML — LOW (ref 0.8–2)
GLUCOSE BLDC GLUCOMTR-MCNC: 260 MG/DL — HIGH (ref 70–99)
GLUCOSE BLDC GLUCOMTR-MCNC: 283 MG/DL — HIGH (ref 70–99)
GLUCOSE BLDC GLUCOMTR-MCNC: 294 MG/DL — HIGH (ref 70–99)
GLUCOSE BLDC GLUCOMTR-MCNC: 423 MG/DL — HIGH (ref 70–99)
GLUCOSE BLDC GLUCOMTR-MCNC: 479 MG/DL — CRITICAL HIGH (ref 70–99)
GLUCOSE BLDC GLUCOMTR-MCNC: 519 MG/DL — CRITICAL HIGH (ref 70–99)
GLUCOSE SERPL-MCNC: 276 MG/DL — HIGH (ref 70–99)
POTASSIUM SERPL-MCNC: 4.3 MMOL/L — SIGNIFICANT CHANGE UP (ref 3.5–5.3)
POTASSIUM SERPL-SCNC: 4.3 MMOL/L — SIGNIFICANT CHANGE UP (ref 3.5–5.3)
SODIUM SERPL-SCNC: 137 MMOL/L — SIGNIFICANT CHANGE UP (ref 135–145)
VANCOMYCIN TROUGH SERPL-MCNC: 7.8 UG/ML — LOW (ref 10–20)

## 2020-04-10 PROCEDURE — 99233 SBSQ HOSP IP/OBS HIGH 50: CPT

## 2020-04-10 PROCEDURE — 11045 DBRDMT SUBQ TISS EACH ADDL: CPT

## 2020-04-10 PROCEDURE — 11043 DBRDMT MUSC&/FSCA 1ST 20/<: CPT

## 2020-04-10 PROCEDURE — 11042 DBRDMT SUBQ TIS 1ST 20SQCM/<: CPT | Mod: 59

## 2020-04-10 PROCEDURE — 11046 DBRDMT MUSC&/FSCA EA ADDL: CPT

## 2020-04-10 RX ORDER — OXYCODONE HYDROCHLORIDE 5 MG/1
5 TABLET ORAL EVERY 4 HOURS
Refills: 0 | Status: DISCONTINUED | OUTPATIENT
Start: 2020-04-10 | End: 2020-04-16

## 2020-04-10 RX ORDER — LACTOBACILLUS ACIDOPHILUS 100MM CELL
1 CAPSULE ORAL
Refills: 0 | Status: DISCONTINUED | OUTPATIENT
Start: 2020-04-10 | End: 2020-04-27

## 2020-04-10 RX ORDER — SODIUM CHLORIDE 9 MG/ML
1000 INJECTION INTRAMUSCULAR; INTRAVENOUS; SUBCUTANEOUS
Refills: 0 | Status: DISCONTINUED | OUTPATIENT
Start: 2020-04-10 | End: 2020-04-11

## 2020-04-10 RX ORDER — INSULIN LISPRO 100/ML
VIAL (ML) SUBCUTANEOUS
Refills: 0 | Status: DISCONTINUED | OUTPATIENT
Start: 2020-04-10 | End: 2020-04-10

## 2020-04-10 RX ORDER — PIPERACILLIN AND TAZOBACTAM 4; .5 G/20ML; G/20ML
3.38 INJECTION, POWDER, LYOPHILIZED, FOR SOLUTION INTRAVENOUS EVERY 8 HOURS
Refills: 0 | Status: DISCONTINUED | OUTPATIENT
Start: 2020-04-10 | End: 2020-04-11

## 2020-04-10 RX ORDER — DEXTROSE 50 % IN WATER 50 %
15 SYRINGE (ML) INTRAVENOUS ONCE
Refills: 0 | Status: DISCONTINUED | OUTPATIENT
Start: 2020-04-10 | End: 2020-04-27

## 2020-04-10 RX ORDER — DIGOXIN 250 MCG
0.12 TABLET ORAL DAILY
Refills: 0 | Status: DISCONTINUED | OUTPATIENT
Start: 2020-04-10 | End: 2020-04-27

## 2020-04-10 RX ORDER — CLOPIDOGREL BISULFATE 75 MG/1
75 TABLET, FILM COATED ORAL DAILY
Refills: 0 | Status: DISCONTINUED | OUTPATIENT
Start: 2020-04-11 | End: 2020-04-27

## 2020-04-10 RX ORDER — ATORVASTATIN CALCIUM 80 MG/1
80 TABLET, FILM COATED ORAL AT BEDTIME
Refills: 0 | Status: DISCONTINUED | OUTPATIENT
Start: 2020-04-10 | End: 2020-04-27

## 2020-04-10 RX ORDER — PIPERACILLIN AND TAZOBACTAM 4; .5 G/20ML; G/20ML
2.25 INJECTION, POWDER, LYOPHILIZED, FOR SOLUTION INTRAVENOUS EVERY 6 HOURS
Refills: 0 | Status: DISCONTINUED | OUTPATIENT
Start: 2020-04-10 | End: 2020-04-10

## 2020-04-10 RX ORDER — INSULIN LISPRO 100/ML
VIAL (ML) SUBCUTANEOUS AT BEDTIME
Refills: 0 | Status: DISCONTINUED | OUTPATIENT
Start: 2020-04-10 | End: 2020-04-10

## 2020-04-10 RX ORDER — INSULIN LISPRO 100/ML
VIAL (ML) SUBCUTANEOUS
Refills: 0 | Status: DISCONTINUED | OUTPATIENT
Start: 2020-04-10 | End: 2020-04-11

## 2020-04-10 RX ORDER — ASPIRIN/CALCIUM CARB/MAGNESIUM 324 MG
81 TABLET ORAL DAILY
Refills: 0 | Status: DISCONTINUED | OUTPATIENT
Start: 2020-04-10 | End: 2020-04-27

## 2020-04-10 RX ORDER — FENTANYL CITRATE 50 UG/ML
50 INJECTION INTRAVENOUS
Refills: 0 | Status: DISCONTINUED | OUTPATIENT
Start: 2020-04-10 | End: 2020-04-10

## 2020-04-10 RX ORDER — VANCOMYCIN HCL 1 G
1000 VIAL (EA) INTRAVENOUS EVERY 24 HOURS
Refills: 0 | Status: DISCONTINUED | OUTPATIENT
Start: 2020-04-10 | End: 2020-04-10

## 2020-04-10 RX ORDER — DEXTROSE 50 % IN WATER 50 %
25 SYRINGE (ML) INTRAVENOUS ONCE
Refills: 0 | Status: DISCONTINUED | OUTPATIENT
Start: 2020-04-10 | End: 2020-04-11

## 2020-04-10 RX ORDER — INSULIN LISPRO 100/ML
5 VIAL (ML) SUBCUTANEOUS
Refills: 0 | Status: DISCONTINUED | OUTPATIENT
Start: 2020-04-10 | End: 2020-04-11

## 2020-04-10 RX ORDER — CARVEDILOL PHOSPHATE 80 MG/1
25 CAPSULE, EXTENDED RELEASE ORAL EVERY 12 HOURS
Refills: 0 | Status: DISCONTINUED | OUTPATIENT
Start: 2020-04-10 | End: 2020-04-27

## 2020-04-10 RX ORDER — DEXTROSE 50 % IN WATER 50 %
12.5 SYRINGE (ML) INTRAVENOUS ONCE
Refills: 0 | Status: DISCONTINUED | OUTPATIENT
Start: 2020-04-10 | End: 2020-04-11

## 2020-04-10 RX ORDER — ACETAMINOPHEN 500 MG
650 TABLET ORAL EVERY 6 HOURS
Refills: 0 | Status: DISCONTINUED | OUTPATIENT
Start: 2020-04-10 | End: 2020-04-27

## 2020-04-10 RX ORDER — SODIUM CHLORIDE 9 MG/ML
1000 INJECTION, SOLUTION INTRAVENOUS
Refills: 0 | Status: DISCONTINUED | OUTPATIENT
Start: 2020-04-10 | End: 2020-04-11

## 2020-04-10 RX ORDER — DEXTROSE 50 % IN WATER 50 %
15 SYRINGE (ML) INTRAVENOUS ONCE
Refills: 0 | Status: DISCONTINUED | OUTPATIENT
Start: 2020-04-10 | End: 2020-04-11

## 2020-04-10 RX ORDER — HYDROMORPHONE HYDROCHLORIDE 2 MG/ML
0.5 INJECTION INTRAMUSCULAR; INTRAVENOUS; SUBCUTANEOUS EVERY 6 HOURS
Refills: 0 | Status: DISCONTINUED | OUTPATIENT
Start: 2020-04-10 | End: 2020-04-17

## 2020-04-10 RX ORDER — FINASTERIDE 5 MG/1
5 TABLET, FILM COATED ORAL DAILY
Refills: 0 | Status: DISCONTINUED | OUTPATIENT
Start: 2020-04-10 | End: 2020-04-27

## 2020-04-10 RX ORDER — OXYCODONE HYDROCHLORIDE 5 MG/1
10 TABLET ORAL EVERY 4 HOURS
Refills: 0 | Status: DISCONTINUED | OUTPATIENT
Start: 2020-04-10 | End: 2020-04-10

## 2020-04-10 RX ORDER — FENTANYL CITRATE 50 UG/ML
25 INJECTION INTRAVENOUS
Refills: 0 | Status: DISCONTINUED | OUTPATIENT
Start: 2020-04-10 | End: 2020-04-10

## 2020-04-10 RX ORDER — SODIUM CHLORIDE 9 MG/ML
1000 INJECTION INTRAMUSCULAR; INTRAVENOUS; SUBCUTANEOUS
Refills: 0 | Status: DISCONTINUED | OUTPATIENT
Start: 2020-04-10 | End: 2020-04-10

## 2020-04-10 RX ORDER — GLUCAGON INJECTION, SOLUTION 0.5 MG/.1ML
1 INJECTION, SOLUTION SUBCUTANEOUS ONCE
Refills: 0 | Status: DISCONTINUED | OUTPATIENT
Start: 2020-04-10 | End: 2020-04-11

## 2020-04-10 RX ORDER — INSULIN GLARGINE 100 [IU]/ML
25 INJECTION, SOLUTION SUBCUTANEOUS AT BEDTIME
Refills: 0 | Status: DISCONTINUED | OUTPATIENT
Start: 2020-04-10 | End: 2020-04-11

## 2020-04-10 RX ADMIN — Medication 81 MILLIGRAM(S): at 18:36

## 2020-04-10 RX ADMIN — Medication 100 MILLIGRAM(S): at 05:31

## 2020-04-10 RX ADMIN — CARVEDILOL PHOSPHATE 25 MILLIGRAM(S): 80 CAPSULE, EXTENDED RELEASE ORAL at 05:31

## 2020-04-10 RX ADMIN — Medication 6: at 15:22

## 2020-04-10 RX ADMIN — Medication 8: at 21:18

## 2020-04-10 RX ADMIN — PIPERACILLIN AND TAZOBACTAM 25 GRAM(S): 4; .5 INJECTION, POWDER, LYOPHILIZED, FOR SOLUTION INTRAVENOUS at 21:20

## 2020-04-10 RX ADMIN — Medication 0.12 MILLIGRAM(S): at 05:31

## 2020-04-10 RX ADMIN — Medication 5 UNIT(S): at 18:22

## 2020-04-10 RX ADMIN — Medication 100 MILLIGRAM(S): at 21:18

## 2020-04-10 RX ADMIN — ATORVASTATIN CALCIUM 80 MILLIGRAM(S): 80 TABLET, FILM COATED ORAL at 21:17

## 2020-04-10 RX ADMIN — CARVEDILOL PHOSPHATE 25 MILLIGRAM(S): 80 CAPSULE, EXTENDED RELEASE ORAL at 18:22

## 2020-04-10 RX ADMIN — PIPERACILLIN AND TAZOBACTAM 25 GRAM(S): 4; .5 INJECTION, POWDER, LYOPHILIZED, FOR SOLUTION INTRAVENOUS at 05:33

## 2020-04-10 RX ADMIN — INSULIN GLARGINE 25 UNIT(S): 100 INJECTION, SOLUTION SUBCUTANEOUS at 21:18

## 2020-04-10 RX ADMIN — Medication 1 TABLET(S): at 18:22

## 2020-04-10 RX ADMIN — SACUBITRIL AND VALSARTAN 1 TABLET(S): 24; 26 TABLET, FILM COATED ORAL at 05:33

## 2020-04-10 RX ADMIN — SODIUM CHLORIDE 60 MILLILITER(S): 9 INJECTION INTRAMUSCULAR; INTRAVENOUS; SUBCUTANEOUS at 18:23

## 2020-04-10 NOTE — PROGRESS NOTE ADULT - ASSESSMENT
Assessment and Plan:   63 y/o male with extensive cardiac history, htn, DM came to the ER for left groin area infection which started 1 week ago after he had angiography through left groin area 2 weeks ago, got 3 stents   ( done at [facility] by [facility] ). Pt stated that he has been on levaquin by his doctor but getting worse. some pain in the left groin area, denies fever but had chills.    Problem/Plan - 1:  ·  Problem: Cellulitis and abscess.  Plan: left groin hematoma/cellulitis  On vanco/zosyn  Seen by Vascular surgery today--necrotizing left groin infection requiring extensive sharp surgical debridement; Keep NPO for Surgical intervention at 12p today  Monitor leukocytosis     Problem/Plan - 2:  ·  Problem: Type 2 diabetes mellitus with hyperglycemia, with long-term current use of insulin.  Plan: Continue lantus and humalog scale.  premeal humalog added for hyperglycemia  1/2 dose lantus last night secondary NPO for OR today    Problem/Plan - 3:  ·  Problem: Coronary artery disease involving coronary bypass graft of native heart without angina pectoris.  Plan: asymptomatic  continue DAPT and statin  DVT prophylaxis on hold for OR today     Problem/Plan - 4:  ·  Problem: Stage 3 chronic kidney disease.  Plan: pt's RF is worsening compared to his labs about 1 month ago.   Follows w Dr Pedro as outpatient  hold lasix; gentle hydration  renal ultrasound normal  Bump in Creatinine from yesterday  renal following    Problem/Plan - 5:  ·  Problem: Essential hypertension.  Plan: BP stable; continue current medications.    Problem/Plan - 6:  Problem: H/O CHF. Plan: Appears euvolemic; hold lasix  Gentle hydration for OR and renal function  Entresto decreased  Monitor I/Os, daily weights    Discussed with Dr. De La Rosa 63 y/o male with extensive cardiac history, htn, DM came to the ER for left groin area infection which started 1 week ago after he had angiography through left groin area 2 weeks ago, got 3 stents ( done at [facility] by ok [facility] ). Pt stated that he has been on levaquin by his doctor but getting worse. some pain in the left groin area, denies fever but had chills.    Problem/Plan - 1:  ·  Problem: Cellulitis and abscess.  Plan: left groin hematoma/cellulitis  On vanco/zosyn  Seen by Vascular surgery today--necrotizing left groin infection requiring extensive sharp surgical debridement; Keep NPO for Surgical intervention at 12p today  Monitor leukocytosis     Problem/Plan - 2:  ·  Problem: Type 2 diabetes mellitus with hyperglycemia, with long-term current use of insulin.  Plan: Continue lantus and humalog scale.  premeal humalog added for hyperglycemia  1/2 dose lantus last night secondary NPO for OR today    Problem/Plan - 3:  ·  Problem: Coronary artery disease involving coronary bypass graft of native heart without angina pectoris.  Plan: asymptomatic  continue DAPT and statin  DVT prophylaxis on hold for OR today     Problem/Plan - 4:  ·  Problem: Stage 3 chronic kidney disease.  Plan: pt's RF is worsening compared to his labs about 1 month ago.   Follows w Dr Pedro as outpatient  hold lasix; gentle hydration  renal ultrasound normal  Bump in Creatinine from yesterday  renal following    Problem/Plan - 5:  ·  Problem: Essential hypertension.  Plan: BP stable; continue current medications.    Problem/Plan - 6:  Problem: H/O CHF. Plan: Appears euvolemic; hold lasix  Gentle hydration for OR and renal function  Entresto decreased  Monitor I/Os, daily weights    Discussed with Dr. De La Rosa

## 2020-04-10 NOTE — PROGRESS NOTE ADULT - SUBJECTIVE AND OBJECTIVE BOX
MARIA ROSS    [id number]    History:  The patient is awaiting call for surgery today for left groin debridement. No reported acute events overnight.  Patient reports having some epigastric burning and hiccups.  Denies nausea, vomiting, chest pain, shortness of breath, abdominal pain or fever. No new complaints. No acute motor or sensory changes are reported.    Vital Signs Last 24 Hrs  T(C): 36.6 (10 Apr 2020 07:18), Max: 36.7 (09 Apr 2020 21:27)  T(F): 97.9 (10 Apr 2020 07:18), Max: 98 (09 Apr 2020 21:27)  HR: 74 (10 Apr 2020 07:18) (68 - 94)  BP: 104/53 (10 Apr 2020 07:18) (104/53 - 137/74)  BP(mean): --  RR: 18 (10 Apr 2020 06:00) (18 - 18)  SpO2: 95% (10 Apr 2020 07:18) (94% - 99%)  I&O's Summary    09 Apr 2020 07:01  -  10 Apr 2020 07:00  --------------------------------------------------------  IN: 1465 mL / OUT: 0 mL / NET: 1465 mL                              13.7   16.05 )-----------( 459      ( 09 Apr 2020 06:08 )             42.3     04-10    137  |  103  |  74.0<H>  ----------------------------<  276<H>  4.3   |  22.0  |  2.24<H>    Ca    9.0      10 Apr 2020 06:11    TPro  7.0  /  Alb  2.3<L>  /  TBili  0.4  /  DBili  x   /  AST  30  /  ALT  17  /  AlkPhos  143<H>  04-08      MEDICATIONS  (STANDING):  aspirin enteric coated 81 milliGRAM(s) Oral daily  atorvastatin 80 milliGRAM(s) Oral at bedtime  carvedilol 25 milliGRAM(s) Oral every 12 hours  clindamycin IVPB      clindamycin IVPB 900 milliGRAM(s) IV Intermittent every 8 hours  clopidogrel Tablet 75 milliGRAM(s) Oral daily  dextrose 5%. 1000 milliLiter(s) (50 mL/Hr) IV Continuous <Continuous>  dextrose 50% Injectable 12.5 Gram(s) IV Push once  dextrose 50% Injectable 25 Gram(s) IV Push once  dextrose 50% Injectable 25 Gram(s) IV Push once  digoxin     Tablet 0.125 milliGRAM(s) Oral daily  finasteride 5 milliGRAM(s) Oral daily  insulin glargine Injectable (LANTUS) 25 Unit(s) SubCutaneous at bedtime  insulin lispro (HumaLOG) corrective regimen sliding scale   SubCutaneous three times a day before meals  insulin lispro (HumaLOG) corrective regimen sliding scale   SubCutaneous at bedtime  insulin lispro Injectable (HumaLOG) 5 Unit(s) SubCutaneous three times a day with meals  lactobacillus acidophilus 1 Tablet(s) Oral three times a day with meals  piperacillin/tazobactam IVPB.. 3.375 Gram(s) IV Intermittent every 8 hours  sacubitril 24 mG/valsartan 26 mG 1 Tablet(s) Oral two times a day  sodium chloride 0.9%. 1000 milliLiter(s) (75 mL/Hr) IV Continuous <Continuous>  vancomycin  IVPB 1000 milliGRAM(s) IV Intermittent daily    MEDICATIONS  (PRN):  acetaminophen   Tablet .. 650 milliGRAM(s) Oral every 6 hours PRN Temp greater or equal to 38C (100.4F)  dextrose 40% Gel 15 Gram(s) Oral once PRN Blood Glucose LESS THAN 70 milliGRAM(s)/deciliter  glucagon  Injectable 1 milliGRAM(s) IntraMuscular once PRN Glucose LESS THAN 70 milligrams/deciliter      Physical exam:     No distress  Non labored breathing  Abdomen is soft, non tender   Ext with non palpable pedals,, warm  Left groin with extensive necrotic area affecting panis , lateral pubic boarder and upper thigh.  ecchymosis/hematoma to the medial distal thigh   No calf tenderness      Primary Assessment:  • necrotizing left groin infection requiring extensive sharp surgical debridement    Plan:   • Keep NPO  - Surgical intervention at 12p  - may require versaflow vac placement post debridement

## 2020-04-10 NOTE — PROGRESS NOTE ADULT - SUBJECTIVE AND OBJECTIVE BOX
Adult NP/Hospitalist progress note:  To be seen by Dr. De La Rosa as well    Interval history: 64 year male with CC of left groin infection     TELE: NSR 60-70    MEDICATIONS  (STANDING):  aspirin enteric coated 81 milliGRAM(s) Oral daily  atorvastatin 80 milliGRAM(s) Oral at bedtime  carvedilol 25 milliGRAM(s) Oral every 12 hours  clindamycin IVPB      clindamycin IVPB 900 milliGRAM(s) IV Intermittent every 8 hours  clopidogrel Tablet 75 milliGRAM(s) Oral daily  dextrose 5%. 1000 milliLiter(s) (50 mL/Hr) IV Continuous <Continuous>  dextrose 50% Injectable 12.5 Gram(s) IV Push once  dextrose 50% Injectable 25 Gram(s) IV Push once  dextrose 50% Injectable 25 Gram(s) IV Push once  digoxin     Tablet 0.125 milliGRAM(s) Oral daily  finasteride 5 milliGRAM(s) Oral daily  insulin glargine Injectable (LANTUS) 25 Unit(s) SubCutaneous at bedtime  insulin lispro (HumaLOG) corrective regimen sliding scale   SubCutaneous three times a day before meals  insulin lispro (HumaLOG) corrective regimen sliding scale   SubCutaneous at bedtime  insulin lispro Injectable (HumaLOG) 5 Unit(s) SubCutaneous three times a day with meals  lactobacillus acidophilus 1 Tablet(s) Oral three times a day with meals  piperacillin/tazobactam IVPB.. 3.375 Gram(s) IV Intermittent every 8 hours  sodium chloride 0.9%. 1000 milliLiter(s) (75 mL/Hr) IV Continuous <Continuous>  vancomycin  IVPB 1000 milliGRAM(s) IV Intermittent daily    MEDICATIONS  (PRN):  acetaminophen   Tablet .. 650 milliGRAM(s) Oral every 6 hours PRN Temp greater or equal to 38C (100.4F)  dextrose 40% Gel 15 Gram(s) Oral once PRN Blood Glucose LESS THAN 70 milliGRAM(s)/deciliter  glucagon  Injectable 1 milliGRAM(s) IntraMuscular once PRN Glucose LESS THAN 70 milligrams/deciliter      Allergies:  No Known Allergies      PAST MEDICAL & SURGICAL HISTORY:  Insulin resistance  Fatty liver  CKD (chronic kidney disease)  BPH (benign prostatic hyperplasia)  Dyslipidemia  Hypertension  CAD (coronary artery disease)  DVT of leg (deep venous thrombosis), right  Diabetes: Peripheral vascular disease  CHF- EF-19% AICD ([employer])  MI  2013   HTN  Pulmonary edema  High Cholesterol  Chronic renal insufficency  S/P thyroid biopsy  S/P colonoscopy with polypectomy  AICD (automatic cardioverter/defibrillator) present  S/P angioplasty with stent: right leg , L femoral 2014  S/P coronary artery bypass graft x 3:     AICD ([employer])       Vital Signs Last 24 Hrs  T(C): 36.6 (10 Apr 2020 07:18), Max: 36.7 (2020 21:27)  T(F): 97.9 (10 Apr 2020 07:18), Max: 98 (2020 21:27)  HR: 74 (10 Apr 2020 07:18) (68 - 94)  BP: 104/53 (10 Apr 2020 07:18) (104/53 - 137/74)  BP(mean): --  RR: 18 (10 Apr 2020 06:00) (18 - 18)  SpO2: 95% (10 Apr 2020 07:18) (94% - 99%)    Physical Exam:  Constitutional: Found patient lying supine in bed; NAD; c/o some hiccups; denies nausea, vomiting, chest pain, shortness of breath, abdominal pain or fever  Cardiovascular: +S1S2 RRR  Pulmonary: CTA b/l, unlabored  GI: soft NTND +BS  Extremities: no pedal edema; left groin area ecchymotic/hematoma to the medial distal thigh; extensive necrotic area affecting panis, lateral pubic boarder and upper thigh  Neuro: non focal, JACOME x4; AAO x 3    LABS:                        13.7   16.05 )-----------( 459      ( 2020 06:08 )             42.3     04-10    137  |  103  |  74.0<H>  ----------------------------<  276<H>  4.3   |  22.0  |  2.24<H>    Ca    9.0      10 Apr 2020 06:11    TPro  7.0  /  Alb  2.3<L>  /  TBili  0.4  /  DBili  x   /  AST  30  /  ALT  17  /  AlkPhos  143<H>  04-08    PT/INR - ( 2020 19:09 )   PT: 15.8 sec;   INR: 1.38 ratio         PTT - ( 2020 19:09 )  PTT:25.8 sec  Urinalysis Basic - ( 2020 16:43 )    Color: Yellow / Appearance: Clear / S.015 / pH: x  Gluc: x / Ketone: Negative  / Bili: Negative / Urobili: Negative mg/dL   Blood: x / Protein: Negative mg/dL / Nitrite: Negative   Leuk Esterase: Negative / RBC: x / WBC x   Sq Epi: x / Non Sq Epi: x / Bacteria: x        RADIOLOGY & ADDITIONAL TESTS:  < from: US Renal (20 @ 18:07) >  EXAM:  US KIDNEY(S)                          PROCEDURE DATE:  2020          INTERPRETATION:  CLINICAL INFORMATION: Chronic kidney disease, acute kidney injury    COMPARISON: None available.    TECHNIQUE: Sonography of the kidneys.     FINDINGS:    Right kidney:  10.8 cm. No hydronephrosis.    Left kidney:  11.4 cm. No hydronephrosis.    IMPRESSION:     Normal renal ultrasound.    < end of copied text >

## 2020-04-10 NOTE — BRIEF OPERATIVE NOTE - OPERATION/FINDINGS
Left groin exploration, washout, and wide local excisional debridement of necrotizing soft tissue infection performed. Patient w/ necrotic skin and soft tissue w/ copious volume of pus in left groin. 15 x 20 x 5 cm area debrided down to healthy tissue. Pulse lavage washout then performed w/ 5L of NS.  Wound packed with 3 x kerlix rolls and covered w/ dry gauze. Cultures sent.

## 2020-04-10 NOTE — PROGRESS NOTE ADULT - ASSESSMENT
MARSHALL on CKD stage III  Follows w Dr Pedro in our office  Has h/o CAD, S/P MI, CABG, ICM with AICD  Will check renal sono r/o obstructive etiology  Will give gentle IVF  Has h/o of HF on Entresto  Went to OR today  L groin hematoma/ cellulitis on vanco / zosyn   ID following    Will follow

## 2020-04-10 NOTE — PROGRESS NOTE ADULT - SUBJECTIVE AND OBJECTIVE BOX
NEPHROLOGY INTERVAL HPI/OVERNIGHT EVENTS:    Examined earlier  No distress    MEDICATIONS  (STANDING):  aspirin enteric coated 81 milliGRAM(s) Oral daily  atorvastatin 80 milliGRAM(s) Oral at bedtime  carvedilol 25 milliGRAM(s) Oral every 12 hours  clindamycin IVPB 900 milliGRAM(s) IV Intermittent every 8 hours  dextrose 5%. 1000 milliLiter(s) (50 mL/Hr) IV Continuous <Continuous>  dextrose 50% Injectable 12.5 Gram(s) IV Push once  dextrose 50% Injectable 25 Gram(s) IV Push once  dextrose 50% Injectable 25 Gram(s) IV Push once  digoxin     Tablet 0.125 milliGRAM(s) Oral daily  finasteride 5 milliGRAM(s) Oral daily  insulin glargine Injectable (LANTUS) 25 Unit(s) SubCutaneous at bedtime  insulin lispro (HumaLOG) corrective regimen sliding scale   SubCutaneous Before meals and at bedtime  insulin lispro (HumaLOG) corrective regimen sliding scale   SubCutaneous three times a day before meals  insulin lispro (HumaLOG) corrective regimen sliding scale   SubCutaneous at bedtime  insulin lispro Injectable (HumaLOG) 5 Unit(s) SubCutaneous three times a day with meals  lactobacillus acidophilus 1 Tablet(s) Oral three times a day with meals  piperacillin/tazobactam IVPB.. 3.375 Gram(s) IV Intermittent every 8 hours    MEDICATIONS  (PRN):  acetaminophen   Tablet .. 650 milliGRAM(s) Oral every 6 hours PRN Temp greater or equal to 38C (100.4F)  dextrose 40% Gel 15 Gram(s) Oral once PRN Blood Glucose LESS THAN 70 milliGRAM(s)/deciliter  dextrose 40% Gel 15 Gram(s) Oral once PRN Blood Glucose LESS THAN 70 milliGRAM(s)/deciLiter  glucagon  Injectable 1 milliGRAM(s) IntraMuscular once PRN Glucose LESS THAN 70 milligrams/deciliter  HYDROmorphone  Injectable 0.5 milliGRAM(s) IV Push every 6 hours PRN Breakthrough pain and dressing changes  oxyCODONE    IR 5 milliGRAM(s) Oral every 4 hours PRN Moderate Pain (4 - 6)  oxyCODONE    IR 10 milliGRAM(s) Oral every 4 hours PRN Severe Pain (7 - 10)      Allergies    No Known Allergies    Intolerances        Vital Signs Last 24 Hrs  T(C): 36.2 (10 Apr 2020 16:00), Max: 36.7 (2020 21:27)  T(F): 97.1 (10 Apr 2020 16:00), Max: 98.1 (10 Apr 2020 15:00)  HR: 67 (10 Apr 2020 17:45) (65 - 78)  BP: 123/56 (10 Apr 2020 17:45) (103/58 - 133/60)  BP(mean): --  RR: 16 (10 Apr 2020 17:45) (15 - 23)  SpO2: 94% (10 Apr 2020 17:45) (94% - 100%)  Daily Height in cm: 170.18 (10 Apr 2020 11:45)    Daily     PHYSICAL EXAM:  GENERAL: appears chronically ill, oriented.  HEAD:  Atraumatic, Normocephalic  EYES: EOMI  NECK: Supple, neck  veins full  NERVOUS SYSTEM:  Alert & Oriented X3  CHEST/LUNG: Clear to percussion bilaterally  HEART: Regular rate and rhythm  ABDOMEN: Soft, Nontender, Nondistended; Bowel sounds present  EXTREMITIES:  No edema    LABS:                        13.7   16.05 )-----------( 459      ( 2020 06:08 )             42.3     04-10    137  |  103  |  74.0<H>  ----------------------------<  276<H>  4.3   |  22.0  |  2.24<H>    Ca    9.0      10 Apr 2020 06:11    TPro  7.0  /  Alb  2.3<L>  /  TBili  0.4  /  DBili  x   /  AST  30  /  ALT  17  /  AlkPhos  143<H>  04-08    PT/INR - ( 2020 19:09 )   PT: 15.8 sec;   INR: 1.38 ratio         PTT - ( 2020 19:09 )  PTT:25.8 sec  Urinalysis Basic - ( 2020 16:43 )    Color: Yellow / Appearance: Clear / S.015 / pH: x  Gluc: x / Ketone: Negative  / Bili: Negative / Urobili: Negative mg/dL   Blood: x / Protein: Negative mg/dL / Nitrite: Negative   Leuk Esterase: Negative / RBC: x / WBC x   Sq Epi: x / Non Sq Epi: x / Bacteria: x              RADIOLOGY & ADDITIONAL TESTS:

## 2020-04-11 LAB
ANION GAP SERPL CALC-SCNC: 16 MMOL/L — SIGNIFICANT CHANGE UP (ref 5–17)
BUN SERPL-MCNC: 85 MG/DL — HIGH (ref 8–20)
CALCIUM SERPL-MCNC: 8.1 MG/DL — LOW (ref 8.6–10.2)
CHLORIDE SERPL-SCNC: 100 MMOL/L — SIGNIFICANT CHANGE UP (ref 98–107)
CO2 SERPL-SCNC: 19 MMOL/L — LOW (ref 22–29)
CREAT SERPL-MCNC: 3.12 MG/DL — HIGH (ref 0.5–1.3)
CULTURE RESULTS: SIGNIFICANT CHANGE UP
GLUCOSE BLDC GLUCOMTR-MCNC: 131 MG/DL — HIGH (ref 70–99)
GLUCOSE BLDC GLUCOMTR-MCNC: 147 MG/DL — HIGH (ref 70–99)
GLUCOSE BLDC GLUCOMTR-MCNC: 154 MG/DL — HIGH (ref 70–99)
GLUCOSE BLDC GLUCOMTR-MCNC: 156 MG/DL — HIGH (ref 70–99)
GLUCOSE BLDC GLUCOMTR-MCNC: 202 MG/DL — HIGH (ref 70–99)
GLUCOSE BLDC GLUCOMTR-MCNC: 207 MG/DL — HIGH (ref 70–99)
GLUCOSE BLDC GLUCOMTR-MCNC: 220 MG/DL — HIGH (ref 70–99)
GLUCOSE BLDC GLUCOMTR-MCNC: 226 MG/DL — HIGH (ref 70–99)
GLUCOSE BLDC GLUCOMTR-MCNC: 255 MG/DL — HIGH (ref 70–99)
GLUCOSE BLDC GLUCOMTR-MCNC: 301 MG/DL — HIGH (ref 70–99)
GLUCOSE BLDC GLUCOMTR-MCNC: 380 MG/DL — HIGH (ref 70–99)
GLUCOSE BLDC GLUCOMTR-MCNC: 424 MG/DL — HIGH (ref 70–99)
GLUCOSE BLDC GLUCOMTR-MCNC: 438 MG/DL — HIGH (ref 70–99)
GLUCOSE BLDC GLUCOMTR-MCNC: 477 MG/DL — CRITICAL HIGH (ref 70–99)
GLUCOSE SERPL-MCNC: 332 MG/DL — HIGH (ref 70–99)
HBA1C BLD-MCNC: 11.5 % — HIGH (ref 4–5.6)
HCT VFR BLD CALC: 33.7 % — LOW (ref 39–50)
HGB BLD-MCNC: 10.7 G/DL — LOW (ref 13–17)
MAGNESIUM SERPL-MCNC: 2.3 MG/DL — SIGNIFICANT CHANGE UP (ref 1.6–2.6)
MCHC RBC-ENTMCNC: 28.8 PG — SIGNIFICANT CHANGE UP (ref 27–34)
MCHC RBC-ENTMCNC: 31.8 GM/DL — LOW (ref 32–36)
MCV RBC AUTO: 90.8 FL — SIGNIFICANT CHANGE UP (ref 80–100)
PHOSPHATE SERPL-MCNC: 4 MG/DL — SIGNIFICANT CHANGE UP (ref 2.4–4.7)
PLATELET # BLD AUTO: 481 K/UL — HIGH (ref 150–400)
POTASSIUM SERPL-MCNC: 4.3 MMOL/L — SIGNIFICANT CHANGE UP (ref 3.5–5.3)
POTASSIUM SERPL-SCNC: 4.3 MMOL/L — SIGNIFICANT CHANGE UP (ref 3.5–5.3)
RBC # BLD: 3.71 M/UL — LOW (ref 4.2–5.8)
RBC # FLD: 15.4 % — HIGH (ref 10.3–14.5)
SODIUM SERPL-SCNC: 135 MMOL/L — SIGNIFICANT CHANGE UP (ref 135–145)
SPECIMEN SOURCE: SIGNIFICANT CHANGE UP
WBC # BLD: 20.42 K/UL — HIGH (ref 3.8–10.5)
WBC # FLD AUTO: 20.42 K/UL — HIGH (ref 3.8–10.5)

## 2020-04-11 PROCEDURE — 99451 NTRPROF PH1/NTRNET/EHR 5/>: CPT

## 2020-04-11 PROCEDURE — 74018 RADEX ABDOMEN 1 VIEW: CPT | Mod: 26

## 2020-04-11 PROCEDURE — 99232 SBSQ HOSP IP/OBS MODERATE 35: CPT

## 2020-04-11 RX ORDER — PIPERACILLIN AND TAZOBACTAM 4; .5 G/20ML; G/20ML
3.38 INJECTION, POWDER, LYOPHILIZED, FOR SOLUTION INTRAVENOUS EVERY 12 HOURS
Refills: 0 | Status: DISCONTINUED | OUTPATIENT
Start: 2020-04-11 | End: 2020-04-16

## 2020-04-11 RX ORDER — HEPARIN SODIUM 5000 [USP'U]/ML
5000 INJECTION INTRAVENOUS; SUBCUTANEOUS EVERY 8 HOURS
Refills: 0 | Status: DISCONTINUED | OUTPATIENT
Start: 2020-04-11 | End: 2020-04-24

## 2020-04-11 RX ORDER — ONDANSETRON 8 MG/1
4 TABLET, FILM COATED ORAL EVERY 6 HOURS
Refills: 0 | Status: DISCONTINUED | OUTPATIENT
Start: 2020-04-11 | End: 2020-04-27

## 2020-04-11 RX ORDER — INSULIN LISPRO 100/ML
VIAL (ML) SUBCUTANEOUS
Refills: 0 | Status: DISCONTINUED | OUTPATIENT
Start: 2020-04-11 | End: 2020-04-11

## 2020-04-11 RX ORDER — INSULIN GLARGINE 100 [IU]/ML
15 INJECTION, SOLUTION SUBCUTANEOUS AT BEDTIME
Refills: 0 | Status: DISCONTINUED | OUTPATIENT
Start: 2020-04-11 | End: 2020-04-11

## 2020-04-11 RX ORDER — INSULIN LISPRO 100/ML
5 VIAL (ML) SUBCUTANEOUS
Refills: 0 | Status: DISCONTINUED | OUTPATIENT
Start: 2020-04-11 | End: 2020-04-12

## 2020-04-11 RX ORDER — SODIUM CHLORIDE 9 MG/ML
1000 INJECTION INTRAMUSCULAR; INTRAVENOUS; SUBCUTANEOUS
Refills: 0 | Status: DISCONTINUED | OUTPATIENT
Start: 2020-04-11 | End: 2020-04-11

## 2020-04-11 RX ORDER — PIPERACILLIN AND TAZOBACTAM 4; .5 G/20ML; G/20ML
3.38 INJECTION, POWDER, LYOPHILIZED, FOR SOLUTION INTRAVENOUS EVERY 12 HOURS
Refills: 0 | Status: DISCONTINUED | OUTPATIENT
Start: 2020-04-11 | End: 2020-04-11

## 2020-04-11 RX ORDER — SENNA PLUS 8.6 MG/1
2 TABLET ORAL AT BEDTIME
Refills: 0 | Status: DISCONTINUED | OUTPATIENT
Start: 2020-04-11 | End: 2020-04-27

## 2020-04-11 RX ORDER — SIMETHICONE 80 MG/1
80 TABLET, CHEWABLE ORAL EVERY 6 HOURS
Refills: 0 | Status: DISCONTINUED | OUTPATIENT
Start: 2020-04-11 | End: 2020-04-27

## 2020-04-11 RX ORDER — INSULIN GLARGINE 100 [IU]/ML
25 INJECTION, SOLUTION SUBCUTANEOUS AT BEDTIME
Refills: 0 | Status: DISCONTINUED | OUTPATIENT
Start: 2020-04-11 | End: 2020-04-11

## 2020-04-11 RX ORDER — INSULIN LISPRO 100/ML
VIAL (ML) SUBCUTANEOUS
Refills: 0 | Status: DISCONTINUED | OUTPATIENT
Start: 2020-04-11 | End: 2020-04-19

## 2020-04-11 RX ORDER — INSULIN LISPRO 100/ML
3 VIAL (ML) SUBCUTANEOUS
Refills: 0 | Status: DISCONTINUED | OUTPATIENT
Start: 2020-04-11 | End: 2020-04-11

## 2020-04-11 RX ORDER — INSULIN GLARGINE 100 [IU]/ML
15 INJECTION, SOLUTION SUBCUTANEOUS AT BEDTIME
Refills: 0 | Status: DISCONTINUED | OUTPATIENT
Start: 2020-04-11 | End: 2020-04-12

## 2020-04-11 RX ORDER — SODIUM CHLORIDE 9 MG/ML
1000 INJECTION INTRAMUSCULAR; INTRAVENOUS; SUBCUTANEOUS
Refills: 0 | Status: DISCONTINUED | OUTPATIENT
Start: 2020-04-11 | End: 2020-04-12

## 2020-04-11 RX ORDER — VANCOMYCIN HCL 1 G
1000 VIAL (EA) INTRAVENOUS EVERY 24 HOURS
Refills: 0 | Status: DISCONTINUED | OUTPATIENT
Start: 2020-04-11 | End: 2020-04-11

## 2020-04-11 RX ORDER — INSULIN HUMAN 100 [IU]/ML
10 INJECTION, SOLUTION SUBCUTANEOUS
Qty: 100 | Refills: 0 | Status: DISCONTINUED | OUTPATIENT
Start: 2020-04-11 | End: 2020-04-11

## 2020-04-11 RX ADMIN — Medication 81 MILLIGRAM(S): at 14:45

## 2020-04-11 RX ADMIN — PIPERACILLIN AND TAZOBACTAM 25 GRAM(S): 4; .5 INJECTION, POWDER, LYOPHILIZED, FOR SOLUTION INTRAVENOUS at 20:56

## 2020-04-11 RX ADMIN — CARVEDILOL PHOSPHATE 25 MILLIGRAM(S): 80 CAPSULE, EXTENDED RELEASE ORAL at 06:08

## 2020-04-11 RX ADMIN — SIMETHICONE 80 MILLIGRAM(S): 80 TABLET, CHEWABLE ORAL at 18:16

## 2020-04-11 RX ADMIN — Medication 1 TABLET(S): at 18:16

## 2020-04-11 RX ADMIN — Medication 100 MILLIGRAM(S): at 22:13

## 2020-04-11 RX ADMIN — SENNA PLUS 2 TABLET(S): 8.6 TABLET ORAL at 20:54

## 2020-04-11 RX ADMIN — INSULIN GLARGINE 15 UNIT(S): 100 INJECTION, SOLUTION SUBCUTANEOUS at 20:58

## 2020-04-11 RX ADMIN — HEPARIN SODIUM 5000 UNIT(S): 5000 INJECTION INTRAVENOUS; SUBCUTANEOUS at 20:52

## 2020-04-11 RX ADMIN — SODIUM CHLORIDE 100 MILLILITER(S): 9 INJECTION INTRAMUSCULAR; INTRAVENOUS; SUBCUTANEOUS at 17:49

## 2020-04-11 RX ADMIN — ONDANSETRON 4 MILLIGRAM(S): 8 TABLET, FILM COATED ORAL at 07:11

## 2020-04-11 RX ADMIN — FINASTERIDE 5 MILLIGRAM(S): 5 TABLET, FILM COATED ORAL at 14:46

## 2020-04-11 RX ADMIN — SODIUM CHLORIDE 125 MILLILITER(S): 9 INJECTION INTRAMUSCULAR; INTRAVENOUS; SUBCUTANEOUS at 10:45

## 2020-04-11 RX ADMIN — SIMETHICONE 80 MILLIGRAM(S): 80 TABLET, CHEWABLE ORAL at 15:42

## 2020-04-11 RX ADMIN — Medication 5 UNIT(S): at 18:16

## 2020-04-11 RX ADMIN — Medication 0.12 MILLIGRAM(S): at 06:09

## 2020-04-11 RX ADMIN — SODIUM CHLORIDE 75 MILLILITER(S): 9 INJECTION INTRAMUSCULAR; INTRAVENOUS; SUBCUTANEOUS at 22:23

## 2020-04-11 RX ADMIN — Medication 1 TABLET(S): at 14:46

## 2020-04-11 RX ADMIN — SODIUM CHLORIDE 80 MILLILITER(S): 9 INJECTION INTRAMUSCULAR; INTRAVENOUS; SUBCUTANEOUS at 14:24

## 2020-04-11 RX ADMIN — ATORVASTATIN CALCIUM 80 MILLIGRAM(S): 80 TABLET, FILM COATED ORAL at 20:54

## 2020-04-11 RX ADMIN — Medication 250 MILLIGRAM(S): at 08:44

## 2020-04-11 RX ADMIN — Medication 100 MILLIGRAM(S): at 14:45

## 2020-04-11 RX ADMIN — HYDROMORPHONE HYDROCHLORIDE 0.5 MILLIGRAM(S): 2 INJECTION INTRAMUSCULAR; INTRAVENOUS; SUBCUTANEOUS at 17:25

## 2020-04-11 RX ADMIN — PIPERACILLIN AND TAZOBACTAM 25 GRAM(S): 4; .5 INJECTION, POWDER, LYOPHILIZED, FOR SOLUTION INTRAVENOUS at 06:10

## 2020-04-11 RX ADMIN — Medication 100 MILLIGRAM(S): at 06:09

## 2020-04-11 RX ADMIN — Medication 4: at 20:53

## 2020-04-11 RX ADMIN — CLOPIDOGREL BISULFATE 75 MILLIGRAM(S): 75 TABLET, FILM COATED ORAL at 14:46

## 2020-04-11 NOTE — PROGRESS NOTE ADULT - SUBJECTIVE AND OBJECTIVE BOX
INTERVAL HPI/OVERNIGHT EVENTS:    .Patient started on insulin drip and titration as indicated with goal glucoses. Otherwise, patient is seen in the post-operative setting. Patient is s/p excisional debridement of left groin infection with Dr. Hendrickson. Surgery was uncomplicated and patient tolerated procedure well without issues. In the post-operative care unit patient doing well and transferred to the floors. Patient is having no acute issues at this time. Pain is well controlled and patient has been able to tolerate diet without n/v. Ambulating without issues and has been able to void on own. Patient without chest pain or shortness of breath.       MEDICATIONS  (STANDING):  aspirin enteric coated 81 milliGRAM(s) Oral daily  atorvastatin 80 milliGRAM(s) Oral at bedtime  carvedilol 25 milliGRAM(s) Oral every 12 hours  clindamycin IVPB 900 milliGRAM(s) IV Intermittent every 8 hours  clopidogrel Tablet 75 milliGRAM(s) Oral daily  digoxin     Tablet 0.125 milliGRAM(s) Oral daily  finasteride 5 milliGRAM(s) Oral daily  insulin regular Infusion 3 Unit(s)/Hr (3 mL/Hr) IV Continuous <Continuous>  lactobacillus acidophilus 1 Tablet(s) Oral three times a day with meals  piperacillin/tazobactam IVPB.. 3.375 Gram(s) IV Intermittent every 8 hours  sodium chloride 0.9%. 1000 milliLiter(s) (60 mL/Hr) IV Continuous <Continuous>    MEDICATIONS  (PRN):  acetaminophen   Tablet .. 650 milliGRAM(s) Oral every 6 hours PRN Temp greater or equal to 38C (100.4F)  aluminum hydroxide/magnesium hydroxide/simethicone Suspension 30 milliLiter(s) Oral every 4 hours PRN Dyspepsia  dextrose 40% Gel 15 Gram(s) Oral once PRN Blood Glucose LESS THAN 70 milliGRAM(s)/deciLiter  HYDROmorphone  Injectable 0.5 milliGRAM(s) IV Push every 6 hours PRN Breakthrough pain and dressing changes  oxyCODONE    IR 5 milliGRAM(s) Oral every 4 hours PRN Moderate Pain (4 - 6)  oxyCODONE    IR 10 milliGRAM(s) Oral every 4 hours PRN Severe Pain (7 - 10)      Vital Signs Last 24 Hrs  T(C): 36.2 (10 Apr 2020 16:00), Max: 36.7 (10 Apr 2020 15:00)  T(F): 97.1 (10 Apr 2020 16:00), Max: 98.1 (10 Apr 2020 15:00)  HR: 72 (10 Apr 2020 20:15) (65 - 77)  BP: 93/48 (10 Apr 2020 20:15) (93/48 - 133/60)  BP(mean): --  RR: 16 (10 Apr 2020 20:15) (15 - 23)  SpO2: 97% (10 Apr 2020 20:15) (94% - 100%)    Physical Exam:  GEN: NAD, laying in bed, appears stated age  HEENT: NCAT, clear oral mucosa, normal conjunctiva  Chest: non-tender  CV:  non-tachycardic, 2+ radial pulse  Pulm: no increased work of breathing, no conversational dyspnea  GI: soft, NTND  MSK: moving all extremities   Groin: left with large wound, wet to dry dressing in place      I&O's Detail    2020 07:01  -  10 Apr 2020 07:00  --------------------------------------------------------  IN:    Oral Fluid: 240 mL    sodium chloride 0.9%: 975 mL    Solution: 150 mL    Solution: 100 mL  Total IN: 1465 mL    OUT:  Total OUT: 0 mL    Total NET: 1465 mL      10 Apr 2020 07:01  -  2020 01:35  --------------------------------------------------------  IN:  Total IN: 0 mL    OUT:    Voided: 750 mL  Total OUT: 750 mL    Total NET: -750 mL          LABS:                        13.7   16.05 )-----------( 459      ( 2020 06:08 )             42.3     04-10    137  |  103  |  74.0<H>  ----------------------------<  276<H>  4.3   |  22.0  |  2.24<H>    Ca    9.0      10 Apr 2020 06:11        Urinalysis Basic - ( 2020 16:43 )    Color: Yellow / Appearance: Clear / S.015 / pH: x  Gluc: x / Ketone: Negative  / Bili: Negative / Urobili: Negative mg/dL   Blood: x / Protein: Negative mg/dL / Nitrite: Negative   Leuk Esterase: Negative / RBC: x / WBC x   Sq Epi: x / Non Sq Epi: x / Bacteria: x        RADIOLOGY & ADDITIONAL STUDIES:

## 2020-04-11 NOTE — PROGRESS NOTE ADULT - SUBJECTIVE AND OBJECTIVE BOX
NEPHROLOGY INTERVAL HPI/OVERNIGHT EVENTS:    Examined earlier  Events noted    MEDICATIONS  (STANDING):  aspirin enteric coated 81 milliGRAM(s) Oral daily  atorvastatin 80 milliGRAM(s) Oral at bedtime  carvedilol 25 milliGRAM(s) Oral every 12 hours  clindamycin IVPB 900 milliGRAM(s) IV Intermittent every 8 hours  clopidogrel Tablet 75 milliGRAM(s) Oral daily  digoxin     Tablet 0.125 milliGRAM(s) Oral daily  finasteride 5 milliGRAM(s) Oral daily  insulin glargine Injectable (LANTUS) 15 Unit(s) SubCutaneous at bedtime  insulin lispro (HumaLOG) corrective regimen sliding scale   SubCutaneous three times a day before meals  insulin lispro Injectable (HumaLOG) 5 Unit(s) SubCutaneous three times a day before meals  lactobacillus acidophilus 1 Tablet(s) Oral three times a day with meals  senna 2 Tablet(s) Oral at bedtime  simethicone 80 milliGRAM(s) Chew every 6 hours  sodium chloride 0.9%. 1000 milliLiter(s) (80 mL/Hr) IV Continuous <Continuous>    MEDICATIONS  (PRN):  acetaminophen   Tablet .. 650 milliGRAM(s) Oral every 6 hours PRN Temp greater or equal to 38C (100.4F)  dextrose 40% Gel 15 Gram(s) Oral once PRN Blood Glucose LESS THAN 70 milliGRAM(s)/deciLiter  HYDROmorphone  Injectable 0.5 milliGRAM(s) IV Push every 6 hours PRN Breakthrough pain and dressing changes  ondansetron Injectable 4 milliGRAM(s) IV Push every 6 hours PRN Nausea and/or Vomiting  oxyCODONE    IR 5 milliGRAM(s) Oral every 4 hours PRN Moderate Pain (4 - 6)  oxyCODONE    IR 10 milliGRAM(s) Oral every 4 hours PRN Severe Pain (7 - 10)      Allergies    No Known Allergies    Intolerances        Vital Signs Last 24 Hrs  T(C): 36.6 (11 Apr 2020 09:50), Max: 36.6 (11 Apr 2020 09:50)  T(F): 97.8 (11 Apr 2020 09:50), Max: 97.8 (11 Apr 2020 09:50)  HR: 75 (11 Apr 2020 15:38) (63 - 75)  BP: 123/57 (11 Apr 2020 15:38) (81/49 - 128/61)  BP(mean): --  RR: 97 (11 Apr 2020 15:38) (16 - 98)  SpO2: 97% (11 Apr 2020 06:48) (94% - 97%)  Daily     Daily     PHYSICAL EXAM:  GENERAL: appears chronically ill  HEAD:  Atraumatic, Normocephalic  EYES: EOMI  NECK: Supple, neck  veins full  NERVOUS SYSTEM:  Alert & Oriented X3  CHEST/LUNG: Clear to percussion bilaterally  HEART: Regular rate and rhythm  ABDOMEN: Soft, Nontender, Nondistended; Bowel sounds present  EXTREMITIES:  No edema    LABS:                        10.7   20.42 )-----------( 481      ( 11 Apr 2020 06:51 )             33.7     04-11    135  |  100  |  85.0<H>  ----------------------------<  332<H>  4.3   |  19.0<L>  |  3.12<H>    Ca    8.1<L>      11 Apr 2020 06:51  Phos  4.0     04-11  Mg     2.3     04-11          Magnesium, Serum: 2.3 mg/dL (04-11 @ 06:51)  Phosphorus Level, Serum: 4.0 mg/dL (04-11 @ 06:51)          RADIOLOGY & ADDITIONAL TESTS:

## 2020-04-11 NOTE — CONSULT NOTE ADULT - ASSESSMENT
64M with extensive cardiac history, htn, DM2 cb ckd came to the ER for left groin area infection which started 1 week ago after he had angiography through left groin area 2 weeks ago, s/p 3 stents (done at [facility] by ok [facility] ). Pt stated that he has been on levaquin by his doctor but getting worse. some pain in the left groin area, denies fever but had chills. no cp, no sob. no dizziness, no abd. pain. no n/v/d. As per pt. his cardiologist is aware that his groin area is not healing, was contacted via phone.     Uncontrolled T2DM- hyperglycemic  -A1c 11.5  -check sugars AC and bedtime  -ensure diabetic diet  -continue lantus 15 units QHS  -change lispro 5 units TID  -change insulin sliding scale  -possibly uncontrolled due to diet as well as not rotating sites  -educated wife on rotating sites    HLD- continue statin    Hypocalcemia- mild. corrected probably normal.
65 y/o male with extensive cardiac history CAD, S/P MI, CABG, ICM with AICD S/P recent cardiac cath via Left femoral with 3 stents at that time who developed left groin swelling and infection. He was treated with oral abx without improvement  Left groin hematoma and cellulitis   No evidence of acute limb threatening ischemia  Needs close monitoring for possible necrotizing fasciitis and  concern for Forneirs  Abx as per ID  OR tomorrow after medical optimization for evac of hematoma and left femoral artery exploration/possible need for angiogram to establish adequate perfusion for optimal wound healing potential/possible wound vac placement/possible need for sartorious muscle flap  Gentle hydration and monitoring of BUN/Cr  Cardiac clearance requested.....spoke to Dr Cooney  Discussed with Dr De La Rosa and Dr Emeka Quispe
MARSHALL on CKD stage III  Follows w Dr Pedro in our office  Has h/o CAD, S/P MI, CABG, ICM with AICD  Will check renal sono r/o obstructive etiology  Will give gentle IVF  L groin hematoma/ cellulitis on vanco / zosyn   ID following    Will follow
65 y/o male with extensive cardiac history, htn, DM came to the ER for left groin area infection which started 1 week ago after he had angiography through left groin area 2 weeks ago, got 3 stents ( done at [facility] by ok marsh ). Pt stated that he has been on levaquin by his doctor but getting worse.       Left groin cellulitis with hematoma/abscess ? nec fasc  Leukocytosis  Josep on CKD    - Blood cultures   - Wound Cx  - Continue Zosyn 3.375 g IV q8h + vancomycin adjusted for renal function  - Clindamycin 900 mg IV q8h  - Monitor vanco trough and renal function, adjust vanco per pharmacy protocol  - Plan for Or tomorrow for hematoma evacuation and left femoral artery exploration- please send OR cultures  - Trend Fever  - Trend Leukocytosis      Will Follow

## 2020-04-11 NOTE — CONSULT NOTE ADULT - SUBJECTIVE AND OBJECTIVE BOX
Patient is a 64y old  Male who presents with a chief complaint of left groin area infection (2020 12:12)    HPI:  64M with extensive cardiac history, htn, DM2 cb ckd came to the ER for left groin area infection which started 1 week ago after he had angiography through left groin area 2 weeks ago, s/p 3 stents (done at [facility] by ok [facility] ). Pt stated that he has been on levaquin by his doctor but getting worse. some pain in the left groin area, denies fever but had chills. no cp, no sob. no dizziness, no abd. pain. no n/v/d. As per pt. his cardiologist is aware that his groin area is not healing, was contacted via phone.   spoke to wife over  diabetes for 15 years  meds: toujeo 30 units qhs (started 1 month ago), tresiba 50 am, humalog 40 BID before breakfast and dinner, victoza 1.8mg weekly  sees dr young  no smoking/etoh    PAST MEDICAL & SURGICAL HISTORY:  Insulin resistance  Fatty liver  CKD (chronic kidney disease)  BPH (benign prostatic hyperplasia)  Dyslipidemia  Hypertension  CAD (coronary artery disease)  DVT of leg (deep venous thrombosis), right  Diabetes: Peripheral vascular disease  CHF- EF-19% AICD ([employer])  MI  2013   HTN  Pulmonary edema  High Cholesterol  Chronic renal insufficency  S/P thyroid biopsy  S/P colonoscopy with polypectomy  AICD (automatic cardioverter/defibrillator) present  S/P angioplasty with stent: right leg , L femoral 2014  S/P coronary artery bypass graft x 3: 2014    AICD ([employer])       FAMILY HISTORY:  Family history of heart disease: father      Social History:  stopped smoking 7 years ago, prior to that smoked 1 ppd for 30 years. no etoh. (2020 01:25)      REVIEW OF SYSTEMS:  ros neg unless stated by hpi    MEDICATIONS  (STANDING):  aspirin enteric coated 81 milliGRAM(s) Oral daily  atorvastatin 80 milliGRAM(s) Oral at bedtime  carvedilol 25 milliGRAM(s) Oral every 12 hours  clindamycin IVPB 900 milliGRAM(s) IV Intermittent every 8 hours  clopidogrel Tablet 75 milliGRAM(s) Oral daily  digoxin     Tablet 0.125 milliGRAM(s) Oral daily  finasteride 5 milliGRAM(s) Oral daily  insulin glargine Injectable (LANTUS) 15 Unit(s) SubCutaneous at bedtime  insulin lispro (HumaLOG) corrective regimen sliding scale   SubCutaneous three times a day before meals  insulin lispro Injectable (HumaLOG) 5 Unit(s) SubCutaneous three times a day before meals  lactobacillus acidophilus 1 Tablet(s) Oral three times a day with meals  senna 2 Tablet(s) Oral at bedtime  simethicone 80 milliGRAM(s) Chew every 6 hours  sodium chloride 0.9%. 1000 milliLiter(s) (80 mL/Hr) IV Continuous <Continuous>    MEDICATIONS  (PRN):  acetaminophen   Tablet .. 650 milliGRAM(s) Oral every 6 hours PRN Temp greater or equal to 38C (100.4F)  dextrose 40% Gel 15 Gram(s) Oral once PRN Blood Glucose LESS THAN 70 milliGRAM(s)/deciLiter  HYDROmorphone  Injectable 0.5 milliGRAM(s) IV Push every 6 hours PRN Breakthrough pain and dressing changes  ondansetron Injectable 4 milliGRAM(s) IV Push every 6 hours PRN Nausea and/or Vomiting  oxyCODONE    IR 5 milliGRAM(s) Oral every 4 hours PRN Moderate Pain (4 - 6)  oxyCODONE    IR 10 milliGRAM(s) Oral every 4 hours PRN Severe Pain (7 - 10)      Allergies    No Known Allergies    Intolerances        PHYSICAL EXAM:  Vital Signs Last 24 Hrs  T(C): 36.6 (2020 09:50), Max: 36.6 (2020 09:50)  T(F): 97.8 (2020 09:50), Max: 97.8 (2020 09:50)  HR: 75 (2020 15:38) (63 - 75)  BP: 123/57 (2020 15:38) (81/49 - 133/60)  BP(mean): --  RR: 97 (2020 15:38) (16 - 98)  SpO2: 97% (2020 06:48) (94% - 99%)    deferred due to COVID        LABS:                        10.7   20.42 )-----------( 481      ( 2020 06:51 )             33.7     04-11    135  |  100  |  85.0<H>  ----------------------------<  332<H>  4.3   |  19.0<L>  |  3.12<H>    Ca    8.1<L>      2020 06:51  Phos  4.0       Mg     2.3           Urinalysis Basic - ( 2020 16:43 )    Color: Yellow / Appearance: Clear / S.015 / pH: x  Gluc: x / Ketone: Negative  / Bili: Negative / Urobili: Negative mg/dL   Blood: x / Protein: Negative mg/dL / Nitrite: Negative   Leuk Esterase: Negative / RBC: x / WBC x   Sq Epi: x / Non Sq Epi: x / Bacteria: x        Hemoglobin A1C, Whole Blood: 11.5 % ( @ 06:52)                RADIOLOGY & ADDITIONAL STUDIES:

## 2020-04-11 NOTE — PROGRESS NOTE ADULT - ASSESSMENT
Patient is a 64 year old male with history of diabetes requiring insulin s/p excisional debridement of soft tissue infection of the left groin on 4/10.  -insulin drip, titrate to blood glucose  Continue home medications  Pain control  IS,OOB  Diet dash  Bowel Regimen  Replete lytes PRN/No longer requires daily labs  Plavix and aspirin   Clinda and zosyn  Dressing changes BID

## 2020-04-11 NOTE — PROGRESS NOTE ADULT - SUBJECTIVE AND OBJECTIVE BOX
[place] [facility]                                   [address] [place] [address]                                                      PHONE: [phone]                                                         FAX: [phone]  [email or web address]  ---------------------------------------------------------------------------------------------------------------------------------      Patient resting. No c/o SOB or CP    Overnight events/patient complaints:    MEDICATIONS  (STANDING):  aspirin enteric coated 81 milliGRAM(s) Oral daily  atorvastatin 80 milliGRAM(s) Oral at bedtime  carvedilol 25 milliGRAM(s) Oral every 12 hours  clindamycin IVPB 900 milliGRAM(s) IV Intermittent every 8 hours  clopidogrel Tablet 75 milliGRAM(s) Oral daily  digoxin     Tablet 0.125 milliGRAM(s) Oral daily  finasteride 5 milliGRAM(s) Oral daily  lactobacillus acidophilus 1 Tablet(s) Oral three times a day with meals  piperacillin/tazobactam IVPB.. 3.375 Gram(s) IV Intermittent every 12 hours  senna 2 Tablet(s) Oral at bedtime  simethicone 80 milliGRAM(s) Chew every 6 hours  sodium chloride 0.9%. 1000 milliLiter(s) (125 mL/Hr) IV Continuous <Continuous>    MEDICATIONS  (PRN):  acetaminophen   Tablet .. 650 milliGRAM(s) Oral every 6 hours PRN Temp greater or equal to 38C (100.4F)  dextrose 40% Gel 15 Gram(s) Oral once PRN Blood Glucose LESS THAN 70 milliGRAM(s)/deciLiter  HYDROmorphone  Injectable 0.5 milliGRAM(s) IV Push every 6 hours PRN Breakthrough pain and dressing changes  ondansetron Injectable 4 milliGRAM(s) IV Push every 6 hours PRN Nausea and/or Vomiting  oxyCODONE    IR 5 milliGRAM(s) Oral every 4 hours PRN Moderate Pain (4 - 6)  oxyCODONE    IR 10 milliGRAM(s) Oral every 4 hours PRN Severe Pain (7 - 10)      Vital Signs Last 24 Hrs  T(C): 36.6 (2020 09:50), Max: 36.7 (10 Apr 2020 15:00)  T(F): 97.8 (2020 09:50), Max: 98.1 (10 Apr 2020 15:00)  HR: 66 (2020 09:50) (63 - 72)  BP: 89/52 (2020 09:50) (81/49 - 133/60)  BP(mean): --  RR: 94 (2020 09:50) (15 - 98)  SpO2: 97% (2020 06:48) (94% - 100%)  I&O's Summary    10 Apr 2020 07:01  -  2020 07:00  --------------------------------------------------------  IN: 0 mL / OUT: 1350 mL / NET: -1350 mL        PHYSICAL EXAM:  Constitutional: Oriented to time, place and person. Appears well developed, well nourished; alert and co-operative  HEENT:     HeadNormal oral mucosa, PERRL, EOMI	  Cardiovascular: Normal S1 S2, No JVD, No murmurs  Respiratory: Lungs clear to auscultation; no crepitations, no wheeze  Gastrointestinal:  Soft, Non-tender, + BS	  Skin: Warm and dry  Neurologic: Alert oriented to time place and person  Psychiatric: affect was normal        LABS:                        10.7   20.42 )-----------( 481      ( 2020 06:51 )             33.7     04-11    135  |  100  |  85.0<H>  ----------------------------<  332<H>  4.3   |  19.0<L>  |  3.12<H>    Ca    8.1<L>      2020 06:51  Phos  4.0     -  Mg     2.3     -      URBANO SEGAL        Urinalysis Basic - ( 2020 16:43 )    Color: Yellow / Appearance: Clear / S.015 / pH: x  Gluc: x / Ketone: Negative  / Bili: Negative / Urobili: Negative mg/dL   Blood: x / Protein: Negative mg/dL / Nitrite: Negative   Leuk Esterase: Negative / RBC: x / WBC x   Sq Epi: x / Non Sq Epi: x / Bacteria: x      Urbano Segal is a 64-year-old male with  hypertension, hypercholesterolemia, severe ischemic cardiomyopathy, status post coronary bypass surgery x3 (LIMA to the LAD, SVG to OM and PDA) in , status post AICD ([employer]), peripheral vascular disease status post right iliac artery and left SFA stent in the past  with recent cardiac catheterization that showed prior LIMA to the LAD and SVG to circumflex and PDA to be patent severe disease involving LAD to the diagonal. This was treated with drug-eluting Xience stent in a staged manner due to his renal insufficiency. Now with infected groin s/p excisional debridement of left groin infection.  No CHF or ischemia.      Please continue pre op meds and supportive care.

## 2020-04-11 NOTE — PROGRESS NOTE ADULT - ASSESSMENT
MARSHALL on CKD stage III  Follows w Dr Pedro in our office  Has h/o CAD, S/P MI, CABG, ICM with AICD  Renal sono noted no hydronephrosis  Will give gentle IVF inc rate  Has h/o of HF on Entresto  s/p OR on 4/10- Debridement, external genitalia, perineum  I&D abscess   L groin hematoma/ cellulitis on abx  ID following    Will follow

## 2020-04-11 NOTE — PROGRESS NOTE ADULT - SUBJECTIVE AND OBJECTIVE BOX
[facility]  INFECTIOUS DISEASES AND INTERNAL MEDICINE at [place]  =======================================================  Noe Navarro MD  Diplomates American Board of Internal Medicine and Infectious Diseases  Tel: [phone]      Fax: [phone]  =======================================================    MARIA ROSS [id number]    Follow up:    Allergies:  No Known Allergies      Medications:  acetaminophen   Tablet .. 650 milliGRAM(s) Oral every 6 hours PRN  aspirin enteric coated 81 milliGRAM(s) Oral daily  atorvastatin 80 milliGRAM(s) Oral at bedtime  carvedilol 25 milliGRAM(s) Oral every 12 hours  clindamycin IVPB 900 milliGRAM(s) IV Intermittent every 8 hours  clopidogrel Tablet 75 milliGRAM(s) Oral daily  dextrose 40% Gel 15 Gram(s) Oral once PRN  digoxin     Tablet 0.125 milliGRAM(s) Oral daily  finasteride 5 milliGRAM(s) Oral daily  HYDROmorphone  Injectable 0.5 milliGRAM(s) IV Push every 6 hours PRN  lactobacillus acidophilus 1 Tablet(s) Oral three times a day with meals  ondansetron Injectable 4 milliGRAM(s) IV Push every 6 hours PRN  oxyCODONE    IR 5 milliGRAM(s) Oral every 4 hours PRN  oxyCODONE    IR 10 milliGRAM(s) Oral every 4 hours PRN  piperacillin/tazobactam IVPB.. 3.375 Gram(s) IV Intermittent every 12 hours  senna 2 Tablet(s) Oral at bedtime  simethicone 80 milliGRAM(s) Chew every 6 hours  sodium chloride 0.9%. 1000 milliLiter(s) IV Continuous <Continuous>            REVIEW OF SYSTEMS:  CONSTITUTIONAL:  No Fever or chills  HEENT:   No diplopia or blurred vision.  No earache, sore throat or runny nose.  CARDIOVASCULAR:  No pressure, squeezing, strangling, tightness, heaviness or aching about the chest, neck, axilla or epigastrium.  RESPIRATORY:  No cough, shortness of breath  GASTROINTESTINAL:  No nausea, vomiting or diarrhea.  GENITOURINARY:  No dysuria, frequency or urgency. No Blood in urine  MUSCULOSKELETAL:  no joint aches, no muscle pain  SKIN:  No change in skin, hair or nails.  NEUROLOGIC:  No Headaches, seizures or weakness.  PSYCHIATRIC:  No disorder of thought or mood.  ENDOCRINE:  No heat or cold intolerance  HEMATOLOGICAL:  No easy bruising or bleeding.            Physical Exam:  ICU Vital Signs Last 24 Hrs  T(C): 36.6 (11 Apr 2020 09:50), Max: 36.7 (10 Apr 2020 15:00)  T(F): 97.8 (11 Apr 2020 09:50), Max: 98.1 (10 Apr 2020 15:00)  HR: 66 (11 Apr 2020 09:50) (63 - 77)  BP: 89/52 (11 Apr 2020 09:50) (81/49 - 133/60)  BP(mean): --  ABP: 125/41 (10 Apr 2020 15:30) (111/40 - 125/41)  ABP(mean): --  RR: 94 (11 Apr 2020 09:50) (15 - 98)  SpO2: 97% (11 Apr 2020 06:48) (94% - 100%)      GEN: NAD, pleasant  HEENT: normocephalic and atraumatic. EOMI. PERRL.  Anicteric   NECK: Supple.   LUNGS: Clear to auscultation.  HEART: Regular rate and rhythm without murmur.  ABDOMEN: Soft, nontender, and nondistended.  Positive bowel sounds.    : No CVA tenderness dressing intact  EXTREMITIES: Without any edema.  MSK: no joint swelling  NEUROLOGIC: Cranial nerves II through XII are grossly intact. No focal deficits  PSYCHIATRIC: Appropriate affect .  SKIN: No Rash      Labs:                          10.7   20.42 )-----------( 481      ( 11 Apr 2020 06:51 )             33.7   04-11    135  |  100  |  85.0<H>  ----------------------------<  332<H>  4.3   |  19.0<L>  |  3.12<H>    Ca    8.1<L>      11 Apr 2020 06:51  Phos  4.0     04-11  Mg     2.3     04-11

## 2020-04-11 NOTE — PROGRESS NOTE ADULT - ASSESSMENT
63 y/o male with extensive cardiac history, htn, DM came to the ER for left groin area infection which started 1 week ago after he had angiography through left groin area 2 weeks ago, got 3 stents ( done at [facility] by [facility] ). Pt stated that he has been on levaquin by his doctor but getting worse.       Left groin necrotizing soft tissue infectoin  Leukocytosis  Josep on CKD    - s/p left groin exploration washout and wide excisional debridement of necrotizing soft tissue infection on 4/10  - hypotensive, images of wound reviewed appears clean  - WBC uptrending likely reactive  - Blood cultures NGTD  - f/u OR cultures  - preop Wound CX likely skin contaminants  - Continue Zosyn 3.375 g IV q12h + vancomycin adjusted for renal function  - Clindamycin 900 mg IV q8h  - Monitor vanco trough and renal function, adjust vanco per pharmacy protocol  - Trend Fever  - Trend Leukocytosis      Will Follow

## 2020-04-12 LAB
ANION GAP SERPL CALC-SCNC: 13 MMOL/L — SIGNIFICANT CHANGE UP (ref 5–17)
BUN SERPL-MCNC: 84 MG/DL — HIGH (ref 8–20)
CALCIUM SERPL-MCNC: 8 MG/DL — LOW (ref 8.6–10.2)
CHLORIDE SERPL-SCNC: 104 MMOL/L — SIGNIFICANT CHANGE UP (ref 98–107)
CO2 SERPL-SCNC: 19 MMOL/L — LOW (ref 22–29)
CREAT SERPL-MCNC: 3.43 MG/DL — HIGH (ref 0.5–1.3)
GLUCOSE BLDC GLUCOMTR-MCNC: 235 MG/DL — HIGH (ref 70–99)
GLUCOSE BLDC GLUCOMTR-MCNC: 275 MG/DL — HIGH (ref 70–99)
GLUCOSE BLDC GLUCOMTR-MCNC: 344 MG/DL — HIGH (ref 70–99)
GLUCOSE BLDC GLUCOMTR-MCNC: 387 MG/DL — HIGH (ref 70–99)
GLUCOSE SERPL-MCNC: 212 MG/DL — HIGH (ref 70–99)
HCT VFR BLD CALC: 31.9 % — LOW (ref 39–50)
HGB BLD-MCNC: 9.9 G/DL — LOW (ref 13–17)
MAGNESIUM SERPL-MCNC: 2.3 MG/DL — SIGNIFICANT CHANGE UP (ref 1.8–2.6)
MCHC RBC-ENTMCNC: 28.9 PG — SIGNIFICANT CHANGE UP (ref 27–34)
MCHC RBC-ENTMCNC: 31 GM/DL — LOW (ref 32–36)
MCV RBC AUTO: 93.3 FL — SIGNIFICANT CHANGE UP (ref 80–100)
PHOSPHATE SERPL-MCNC: 4.4 MG/DL — SIGNIFICANT CHANGE UP (ref 2.4–4.7)
PLATELET # BLD AUTO: 401 K/UL — HIGH (ref 150–400)
POTASSIUM SERPL-MCNC: 4.8 MMOL/L — SIGNIFICANT CHANGE UP (ref 3.5–5.3)
POTASSIUM SERPL-SCNC: 4.8 MMOL/L — SIGNIFICANT CHANGE UP (ref 3.5–5.3)
RBC # BLD: 3.42 M/UL — LOW (ref 4.2–5.8)
RBC # FLD: 15.6 % — HIGH (ref 10.3–14.5)
SODIUM SERPL-SCNC: 136 MMOL/L — SIGNIFICANT CHANGE UP (ref 135–145)
VANCOMYCIN TROUGH SERPL-MCNC: 4.3 UG/ML — LOW (ref 10–20)
WBC # BLD: 16.59 K/UL — HIGH (ref 3.8–10.5)
WBC # FLD AUTO: 16.59 K/UL — HIGH (ref 3.8–10.5)

## 2020-04-12 RX ORDER — CALCIUM GLUCONATE 100 MG/ML
2 VIAL (ML) INTRAVENOUS ONCE
Refills: 0 | Status: COMPLETED | OUTPATIENT
Start: 2020-04-12 | End: 2020-04-12

## 2020-04-12 RX ORDER — PANTOPRAZOLE SODIUM 20 MG/1
40 TABLET, DELAYED RELEASE ORAL
Refills: 0 | Status: DISCONTINUED | OUTPATIENT
Start: 2020-04-12 | End: 2020-04-27

## 2020-04-12 RX ORDER — SODIUM CHLORIDE 9 MG/ML
1000 INJECTION, SOLUTION INTRAVENOUS
Refills: 0 | Status: COMPLETED | OUTPATIENT
Start: 2020-04-12 | End: 2020-04-14

## 2020-04-12 RX ORDER — INSULIN GLARGINE 100 [IU]/ML
21 INJECTION, SOLUTION SUBCUTANEOUS AT BEDTIME
Refills: 0 | Status: DISCONTINUED | OUTPATIENT
Start: 2020-04-12 | End: 2020-04-13

## 2020-04-12 RX ORDER — HYDROMORPHONE HYDROCHLORIDE 2 MG/ML
1 INJECTION INTRAMUSCULAR; INTRAVENOUS; SUBCUTANEOUS ONCE
Refills: 0 | Status: DISCONTINUED | OUTPATIENT
Start: 2020-04-12 | End: 2020-04-12

## 2020-04-12 RX ORDER — INSULIN LISPRO 100/ML
7 VIAL (ML) SUBCUTANEOUS
Refills: 0 | Status: DISCONTINUED | OUTPATIENT
Start: 2020-04-12 | End: 2020-04-13

## 2020-04-12 RX ADMIN — SIMETHICONE 80 MILLIGRAM(S): 80 TABLET, CHEWABLE ORAL at 02:04

## 2020-04-12 RX ADMIN — Medication 100 MILLIGRAM(S): at 05:29

## 2020-04-12 RX ADMIN — PIPERACILLIN AND TAZOBACTAM 25 GRAM(S): 4; .5 INJECTION, POWDER, LYOPHILIZED, FOR SOLUTION INTRAVENOUS at 13:03

## 2020-04-12 RX ADMIN — INSULIN GLARGINE 21 UNIT(S): 100 INJECTION, SOLUTION SUBCUTANEOUS at 21:33

## 2020-04-12 RX ADMIN — Medication 81 MILLIGRAM(S): at 13:04

## 2020-04-12 RX ADMIN — Medication 6: at 13:19

## 2020-04-12 RX ADMIN — ATORVASTATIN CALCIUM 80 MILLIGRAM(S): 80 TABLET, FILM COATED ORAL at 20:47

## 2020-04-12 RX ADMIN — Medication 7 UNIT(S): at 16:42

## 2020-04-12 RX ADMIN — CARVEDILOL PHOSPHATE 25 MILLIGRAM(S): 80 CAPSULE, EXTENDED RELEASE ORAL at 05:29

## 2020-04-12 RX ADMIN — Medication 100 MILLIGRAM(S): at 21:33

## 2020-04-12 RX ADMIN — SENNA PLUS 2 TABLET(S): 8.6 TABLET ORAL at 20:47

## 2020-04-12 RX ADMIN — Medication 4: at 09:41

## 2020-04-12 RX ADMIN — HYDROMORPHONE HYDROCHLORIDE 1 MILLIGRAM(S): 2 INJECTION INTRAMUSCULAR; INTRAVENOUS; SUBCUTANEOUS at 08:30

## 2020-04-12 RX ADMIN — FINASTERIDE 5 MILLIGRAM(S): 5 TABLET, FILM COATED ORAL at 16:30

## 2020-04-12 RX ADMIN — SIMETHICONE 80 MILLIGRAM(S): 80 TABLET, CHEWABLE ORAL at 10:25

## 2020-04-12 RX ADMIN — HEPARIN SODIUM 5000 UNIT(S): 5000 INJECTION INTRAVENOUS; SUBCUTANEOUS at 05:29

## 2020-04-12 RX ADMIN — SIMETHICONE 80 MILLIGRAM(S): 80 TABLET, CHEWABLE ORAL at 16:30

## 2020-04-12 RX ADMIN — CLOPIDOGREL BISULFATE 75 MILLIGRAM(S): 75 TABLET, FILM COATED ORAL at 13:04

## 2020-04-12 RX ADMIN — Medication 8: at 16:44

## 2020-04-12 RX ADMIN — SIMETHICONE 80 MILLIGRAM(S): 80 TABLET, CHEWABLE ORAL at 13:05

## 2020-04-12 RX ADMIN — Medication 10: at 21:34

## 2020-04-12 RX ADMIN — CARVEDILOL PHOSPHATE 25 MILLIGRAM(S): 80 CAPSULE, EXTENDED RELEASE ORAL at 16:30

## 2020-04-12 RX ADMIN — Medication 100 GRAM(S): at 10:20

## 2020-04-12 RX ADMIN — Medication 1 TABLET(S): at 15:45

## 2020-04-12 RX ADMIN — Medication 0.12 MILLIGRAM(S): at 05:29

## 2020-04-12 RX ADMIN — SODIUM CHLORIDE 100 MILLILITER(S): 9 INJECTION, SOLUTION INTRAVENOUS at 10:10

## 2020-04-12 RX ADMIN — SIMETHICONE 80 MILLIGRAM(S): 80 TABLET, CHEWABLE ORAL at 20:47

## 2020-04-12 RX ADMIN — HYDROMORPHONE HYDROCHLORIDE 0.5 MILLIGRAM(S): 2 INJECTION INTRAMUSCULAR; INTRAVENOUS; SUBCUTANEOUS at 20:47

## 2020-04-12 RX ADMIN — HEPARIN SODIUM 5000 UNIT(S): 5000 INJECTION INTRAVENOUS; SUBCUTANEOUS at 16:30

## 2020-04-12 RX ADMIN — Medication 7 UNIT(S): at 13:35

## 2020-04-12 RX ADMIN — Medication 1 TABLET(S): at 09:55

## 2020-04-12 RX ADMIN — Medication 100 MILLIGRAM(S): at 15:47

## 2020-04-12 RX ADMIN — PIPERACILLIN AND TAZOBACTAM 25 GRAM(S): 4; .5 INJECTION, POWDER, LYOPHILIZED, FOR SOLUTION INTRAVENOUS at 16:31

## 2020-04-12 NOTE — PROGRESS NOTE ADULT - SUBJECTIVE AND OBJECTIVE BOX
HPI/OVERNIGHT EVENTS:  Pain well controlled. No acute events overnight. No new complaints offered. Continues to ambulate without difficulty. Denies CP, SOB, fever.     MEDICATIONS  (STANDING):  aspirin enteric coated 81 milliGRAM(s) Oral daily  atorvastatin 80 milliGRAM(s) Oral at bedtime  carvedilol 25 milliGRAM(s) Oral every 12 hours  clindamycin IVPB 900 milliGRAM(s) IV Intermittent every 8 hours  clopidogrel Tablet 75 milliGRAM(s) Oral daily  digoxin     Tablet 0.125 milliGRAM(s) Oral daily  finasteride 5 milliGRAM(s) Oral daily  heparin  Injectable 5000 Unit(s) SubCutaneous every 8 hours  insulin glargine Injectable (LANTUS) 15 Unit(s) SubCutaneous at bedtime  insulin lispro (HumaLOG) corrective regimen sliding scale   SubCutaneous three times a day before meals  insulin lispro Injectable (HumaLOG) 5 Unit(s) SubCutaneous three times a day before meals  lactobacillus acidophilus 1 Tablet(s) Oral three times a day with meals  piperacillin/tazobactam IVPB.. 3.375 Gram(s) IV Intermittent every 12 hours  senna 2 Tablet(s) Oral at bedtime  simethicone 80 milliGRAM(s) Chew every 6 hours  sodium chloride 0.9%. 1000 milliLiter(s) (75 mL/Hr) IV Continuous <Continuous>    MEDICATIONS  (PRN):  acetaminophen   Tablet .. 650 milliGRAM(s) Oral every 6 hours PRN Temp greater or equal to 38C (100.4F)  dextrose 40% Gel 15 Gram(s) Oral once PRN Blood Glucose LESS THAN 70 milliGRAM(s)/deciLiter  HYDROmorphone  Injectable 0.5 milliGRAM(s) IV Push every 6 hours PRN Breakthrough pain and dressing changes  ondansetron Injectable 4 milliGRAM(s) IV Push every 6 hours PRN Nausea and/or Vomiting  oxyCODONE    IR 5 milliGRAM(s) Oral every 4 hours PRN Moderate Pain (4 - 6)  oxyCODONE    IR 10 milliGRAM(s) Oral every 4 hours PRN Severe Pain (7 - 10)      Vital Signs Last 24 Hrs  T(C): 36.5 (11 Apr 2020 20:30), Max: 36.6 (11 Apr 2020 09:50)  T(F): 97.7 (11 Apr 2020 20:30), Max: 97.8 (11 Apr 2020 09:50)  HR: 75 (12 Apr 2020 02:05) (63 - 75)  BP: 129/62 (12 Apr 2020 02:05) (81/49 - 129/62)  BP(mean): --  RR: 18 (12 Apr 2020 02:05) (16 - 98)  SpO2: 98% (12 Apr 2020 02:05) (93% - 99%)    Physical Exam:  GEN: NAD  HEENT: NCAT, clear oral mucosa, normal conjunctiva  Chest: non-tender  CV:  non-tachycardic, 2+ radial pulse  Pulm: no increased work of breathing, no conversational dyspnea  GI: soft, NTND  MSK: moving all extremities   Groin: left with large wound, wet to dry dressing in place    I&O's Detail    10 Apr 2020 07:01  -  11 Apr 2020 07:00  --------------------------------------------------------  IN:  Total IN: 0 mL    OUT:    Voided: 1350 mL  Total OUT: 1350 mL    Total NET: -1350 mL      11 Apr 2020 07:01  -  12 Apr 2020 02:07  --------------------------------------------------------  IN:    Oral Fluid: 60 mL    sodium chloride 0.9%: 100 mL    sodium chloride 0.9%.: 225 mL    Solution: 75 mL    Solution: 100 mL  Total IN: 560 mL    OUT:    Indwelling Catheter - Urethral: 350 mL  Total OUT: 350 mL    Total NET: 210 mL          LABS:                        10.7   20.42 )-----------( 481      ( 11 Apr 2020 06:51 )             33.7     04-11    135  |  100  |  85.0<H>  ----------------------------<  332<H>  4.3   |  19.0<L>  |  3.12<H>    Ca    8.1<L>      11 Apr 2020 06:51  Phos  4.0     04-11  Mg     2.3     04-11 no wheezes/breath sounds equal/clear to auscultation bilaterally

## 2020-04-12 NOTE — PROGRESS NOTE ADULT - ASSESSMENT
Patient is a 64 year old male with history of diabetes s/p excisional debridement of soft tissue infection of the left groin on 4/10.    -Endo:  lantus 15 units QHS, lispro 5 units TID, insulin sliding scale mod, FS AC and bedtime   -Pain control  -IS,OOB  -Diet: CC Renal   -Bowel Regimen  -Replete lytes PRN  -Plavix and aspirin   -Clinda and zosyn  -Dressing changes BID, Veraflo vac likely 4/12 vs 4/13

## 2020-04-12 NOTE — PROGRESS NOTE ADULT - ASSESSMENT
64M with extensive cardiac history, htn, DM2 cb ckd came to the ER for left groin area infection which started 1 week ago after he had angiography through left groin area 2 weeks ago, s/p 3 stents (done at [facility] by [facility] ). Pt stated that he has been on levaquin by his doctor but getting worse. some pain in the left groin area, denies fever but had chills. no cp, no sob. no dizziness, no abd. pain. no n/v/d. As per pt. his cardiologist is aware that his groin area is not healing, was contacted via phone.     Uncontrolled T2DM- hyperglycemic  -A1c 11.5  -check sugars AC and bedtime  -ensure diabetic diet  -change lantus to 21 units QHS  -change lispro to 7 units TID  -continue insulin sliding scale    HLD- continue statin    Hypocalcemia- mild. corrected probably normal. but has been given iv calcium. check vit d and pth

## 2020-04-12 NOTE — PROGRESS NOTE ADULT - SUBJECTIVE AND OBJECTIVE BOX
INTERVAL HPI/OVERNIGHT EVENTS:  FS reviewed, hyperglycemic to 200s      Review of systems:  unable to obtain    MEDICATIONS  (STANDING):  aspirin enteric coated 81 milliGRAM(s) Oral daily  atorvastatin 80 milliGRAM(s) Oral at bedtime  calcium gluconate IVPB 2 Gram(s) IV Intermittent once  carvedilol 25 milliGRAM(s) Oral every 12 hours  clindamycin IVPB 900 milliGRAM(s) IV Intermittent every 8 hours  clopidogrel Tablet 75 milliGRAM(s) Oral daily  digoxin     Tablet 0.125 milliGRAM(s) Oral daily  finasteride 5 milliGRAM(s) Oral daily  heparin  Injectable 5000 Unit(s) SubCutaneous every 8 hours  insulin glargine Injectable (LANTUS) 21 Unit(s) SubCutaneous at bedtime  insulin lispro (HumaLOG) corrective regimen sliding scale   SubCutaneous three times a day before meals  insulin lispro Injectable (HumaLOG) 7 Unit(s) SubCutaneous three times a day before meals  lactobacillus acidophilus 1 Tablet(s) Oral three times a day with meals  piperacillin/tazobactam IVPB.. 3.375 Gram(s) IV Intermittent every 12 hours  senna 2 Tablet(s) Oral at bedtime  simethicone 80 milliGRAM(s) Chew every 6 hours  sodium chloride 0.45% 1000 milliLiter(s) (100 mL/Hr) IV Continuous <Continuous>    MEDICATIONS  (PRN):  acetaminophen   Tablet .. 650 milliGRAM(s) Oral every 6 hours PRN Temp greater or equal to 38C (100.4F)  dextrose 40% Gel 15 Gram(s) Oral once PRN Blood Glucose LESS THAN 70 milliGRAM(s)/deciLiter  HYDROmorphone  Injectable 1 milliGRAM(s) IV Push once PRN dressing changes  HYDROmorphone  Injectable 0.5 milliGRAM(s) IV Push every 6 hours PRN Breakthrough pain and dressing changes  ondansetron Injectable 4 milliGRAM(s) IV Push every 6 hours PRN Nausea and/or Vomiting  oxyCODONE    IR 5 milliGRAM(s) Oral every 4 hours PRN Moderate Pain (4 - 6)  oxyCODONE    IR 10 milliGRAM(s) Oral every 4 hours PRN Severe Pain (7 - 10)      Allergies    No Known Allergies    Intolerances        Vital Signs Last 24 Hrs  T(C): 36.5 (11 Apr 2020 20:30), Max: 36.6 (11 Apr 2020 09:50)  T(F): 97.7 (11 Apr 2020 20:30), Max: 97.8 (11 Apr 2020 09:50)  HR: 71 (12 Apr 2020 05:34) (66 - 75)  BP: 138/65 (12 Apr 2020 05:34) (89/52 - 138/65)  BP(mean): --  RR: 18 (12 Apr 2020 05:34) (16 - 96)  SpO2: 96% (12 Apr 2020 05:34) (93% - 99%)    deferred due to covid    LABS:                        9.9    16.59 )-----------( 401      ( 12 Apr 2020 06:14 )             31.9     04-12    136  |  104  |  84.0<H>  ----------------------------<  212<H>  4.8   |  19.0<L>  |  3.43<H>    Ca    8.0<L>      12 Apr 2020 06:14  Phos  4.4     04-12  Mg     2.3     04-12              CAPILLARY BLOOD GLUCOSE      POCT Blood Glucose.: 235 mg/dL (12 Apr 2020 09:32)  POCT Blood Glucose.: 226 mg/dL (11 Apr 2020 20:36)  POCT Blood Glucose.: 220 mg/dL (11 Apr 2020 18:12)  POCT Blood Glucose.: 207 mg/dL (11 Apr 2020 15:22)  POCT Blood Glucose.: 156 mg/dL (11 Apr 2020 12:56)  POCT Blood Glucose.: 147 mg/dL (11 Apr 2020 11:46)  POCT Blood Glucose.: 131 mg/dL (11 Apr 2020 09:50)          RADIOLOGY & ADDITIONAL TESTS:  none

## 2020-04-12 NOTE — PROGRESS NOTE ADULT - SUBJECTIVE AND OBJECTIVE BOX
NEPHROLOGY INTERVAL HPI/OVERNIGHT EVENTS:    Examined earlier  Lethargic, NAD    MEDICATIONS  (STANDING):  aspirin enteric coated 81 milliGRAM(s) Oral daily  atorvastatin 80 milliGRAM(s) Oral at bedtime  carvedilol 25 milliGRAM(s) Oral every 12 hours  clindamycin IVPB 900 milliGRAM(s) IV Intermittent every 8 hours  clopidogrel Tablet 75 milliGRAM(s) Oral daily  digoxin     Tablet 0.125 milliGRAM(s) Oral daily  finasteride 5 milliGRAM(s) Oral daily  heparin  Injectable 5000 Unit(s) SubCutaneous every 8 hours  insulin glargine Injectable (LANTUS) 15 Unit(s) SubCutaneous at bedtime  insulin lispro (HumaLOG) corrective regimen sliding scale   SubCutaneous three times a day before meals  insulin lispro Injectable (HumaLOG) 5 Unit(s) SubCutaneous three times a day before meals  lactobacillus acidophilus 1 Tablet(s) Oral three times a day with meals  piperacillin/tazobactam IVPB.. 3.375 Gram(s) IV Intermittent every 12 hours  senna 2 Tablet(s) Oral at bedtime  simethicone 80 milliGRAM(s) Chew every 6 hours  sodium chloride 0.9%. 1000 milliLiter(s) (75 mL/Hr) IV Continuous <Continuous>    MEDICATIONS  (PRN):  acetaminophen   Tablet .. 650 milliGRAM(s) Oral every 6 hours PRN Temp greater or equal to 38C (100.4F)  dextrose 40% Gel 15 Gram(s) Oral once PRN Blood Glucose LESS THAN 70 milliGRAM(s)/deciLiter  HYDROmorphone  Injectable 1 milliGRAM(s) IV Push once PRN dressing changes  HYDROmorphone  Injectable 0.5 milliGRAM(s) IV Push every 6 hours PRN Breakthrough pain and dressing changes  ondansetron Injectable 4 milliGRAM(s) IV Push every 6 hours PRN Nausea and/or Vomiting  oxyCODONE    IR 5 milliGRAM(s) Oral every 4 hours PRN Moderate Pain (4 - 6)  oxyCODONE    IR 10 milliGRAM(s) Oral every 4 hours PRN Severe Pain (7 - 10)      Allergies    No Known Allergies    Intolerances        Vital Signs Last 24 Hrs  T(C): 36.5 (11 Apr 2020 20:30), Max: 36.6 (11 Apr 2020 09:50)  T(F): 97.7 (11 Apr 2020 20:30), Max: 97.8 (11 Apr 2020 09:50)  HR: 71 (12 Apr 2020 05:34) (63 - 75)  BP: 138/65 (12 Apr 2020 05:34) (81/49 - 138/65)  BP(mean): --  RR: 18 (12 Apr 2020 05:34) (16 - 98)  SpO2: 96% (12 Apr 2020 05:34) (93% - 99%)  Daily     Daily     PHYSICAL EXAM:  GENERAL: appears chronically ill  HEAD:  Atraumatic, Normocephalic  EYES: EOMI  NECK: Supple, neck  veins full  NERVOUS SYSTEM:  Alert & Oriented X3  CHEST/LUNG: Clear to percussion bilaterally  HEART: Regular rate and rhythm  ABDOMEN: Soft, Nontender, Nondistended; Bowel sounds present  EXTREMITIES:  No edema      LABS:                        9.9    16.59 )-----------( 401      ( 12 Apr 2020 06:14 )             31.9     04-12    136  |  104  |  84.0<H>  ----------------------------<  212<H>  4.8   |  19.0<L>  |  3.43<H>    Ca    8.0<L>      12 Apr 2020 06:14  Phos  4.4     04-12  Mg     2.3     04-12          Magnesium, Serum: 2.3 mg/dL (04-12 @ 06:14)  Phosphorus Level, Serum: 4.4 mg/dL (04-12 @ 06:14)          RADIOLOGY & ADDITIONAL TESTS:

## 2020-04-12 NOTE — PROGRESS NOTE ADULT - SUBJECTIVE AND OBJECTIVE BOX
[place] [facility], THE HEART CENTER                                   [address] [place] [address]                                                      PHONE: [phone]                                                         FAX: [phone]  [email or web address]  ---------------------------------------------------------------------------------------------------------------------------------    Pt resting.  No C/O    MEDICATIONS  (STANDING):  aspirin enteric coated 81 milliGRAM(s) Oral daily  atorvastatin 80 milliGRAM(s) Oral at bedtime  carvedilol 25 milliGRAM(s) Oral every 12 hours  clindamycin IVPB 900 milliGRAM(s) IV Intermittent every 8 hours  clopidogrel Tablet 75 milliGRAM(s) Oral daily  digoxin     Tablet 0.125 milliGRAM(s) Oral daily  finasteride 5 milliGRAM(s) Oral daily  heparin  Injectable 5000 Unit(s) SubCutaneous every 8 hours  insulin glargine Injectable (LANTUS) 21 Unit(s) SubCutaneous at bedtime  insulin lispro (HumaLOG) corrective regimen sliding scale   SubCutaneous three times a day before meals  insulin lispro Injectable (HumaLOG) 7 Unit(s) SubCutaneous three times a day before meals  lactobacillus acidophilus 1 Tablet(s) Oral three times a day with meals  piperacillin/tazobactam IVPB.. 3.375 Gram(s) IV Intermittent every 12 hours  senna 2 Tablet(s) Oral at bedtime  simethicone 80 milliGRAM(s) Chew every 6 hours  sodium chloride 0.45% 1000 milliLiter(s) (100 mL/Hr) IV Continuous <Continuous>    MEDICATIONS  (PRN):  acetaminophen   Tablet .. 650 milliGRAM(s) Oral every 6 hours PRN Temp greater or equal to 38C (100.4F)  dextrose 40% Gel 15 Gram(s) Oral once PRN Blood Glucose LESS THAN 70 milliGRAM(s)/deciLiter  HYDROmorphone  Injectable 1 milliGRAM(s) IV Push once PRN dressing changes  HYDROmorphone  Injectable 0.5 milliGRAM(s) IV Push every 6 hours PRN Breakthrough pain and dressing changes  ondansetron Injectable 4 milliGRAM(s) IV Push every 6 hours PRN Nausea and/or Vomiting  oxyCODONE    IR 5 milliGRAM(s) Oral every 4 hours PRN Moderate Pain (4 - 6)  oxyCODONE    IR 10 milliGRAM(s) Oral every 4 hours PRN Severe Pain (7 - 10)      Vital Signs Last 24 Hrs  T(C): 36.7 (12 Apr 2020 10:30), Max: 36.7 (12 Apr 2020 10:30)  T(F): 98.1 (12 Apr 2020 10:30), Max: 98.1 (12 Apr 2020 10:30)  HR: 79 (12 Apr 2020 10:30) (67 - 79)  BP: 120/57 (12 Apr 2020 10:30) (96/50 - 138/65)  BP(mean): --  RR: 18 (12 Apr 2020 10:30) (16 - 95)  SpO2: 92% (12 Apr 2020 10:30) (92% - 99%)  I&O's Summary    11 Apr 2020 07:01  -  12 Apr 2020 07:00  --------------------------------------------------------  IN: 1325 mL / OUT: 950 mL / NET: 375 mL        PHYSICAL EXAM:  Constitutional: Oriented to time, place and person. Appears well developed, well nourished; alert and co-operative  HEENT:     HeadNormal oral mucosa, PERRL, EOMI	  Cardiovascular: Normal S1 S2, No JVD, No murmurs  Respiratory: Lungs clear to auscultation; no crepitations, no wheeze  Gastrointestinal:  Soft, Non-tender, + BS	  Skin: Warm and dry  Neurologic: Alert oriented to time place and person  Psychiatric: affect was normal        LABS:                        9.9    16.59 )-----------( 401      ( 12 Apr 2020 06:14 )             31.9     04-12    136  |  104  |  84.0<H>  ----------------------------<  212<H>  4.8   |  19.0<L>  |  3.43<H>    Ca    8.0<L>      12 Apr 2020 06:14  Phos  4.4     04-12  Mg     2.3     04-12        Urbano Segal is a 64-year-old male with  hypertension, hypercholesterolemia, severe ischemic cardiomyopathy, status post coronary bypass surgery x3 (LIMA to the LAD, SVG to OM and PDA) in 2014, status post AICD ([employer]), peripheral vascular disease status post right iliac artery and left SFA stent in the past  with recent cardiac catheterization that showed prior LIMA to the LAD and SVG to circumflex and PDA to be patent severe disease involving LAD to the diagonal. This was treated with drug-eluting Xience stent in a staged manner due to his renal insufficiency. Now with infected groin s/p excisional debridement of left groin infection.  No CHF or ischemia.      Please continue pre op meds and supportive care.

## 2020-04-12 NOTE — PROGRESS NOTE ADULT - ASSESSMENT
MARSHALL on CKD stage III  Follows w Dr Pedro in our office  Has h/o CAD, S/P MI, CABG, ICM with AICD  Renal sono noted no hydronephrosis  Will cont IVF w inc rate   Has h/o of HF   s/p OR on 4/10- Debridement, external genitalia, perineum  I&D abscess   L groin hematoma/ cellulitis on abx  ID following    Will follow MARSHALL on CKD stage III  Follows w Dr Pedro in our office  Has h/o CAD, S/P MI, CABG, ICM with AICD  Renal sono noted no hydronephrosis  Will cont IVF w inc rate added bicarb for MA  Hypocalcemia supplemented IV check iPTH  Has h/o of HF   s/p OR on 4/10- Debridement, external genitalia, perineum  I&D abscess   L groin hematoma/ cellulitis on abx  ID following    Will follow

## 2020-04-13 LAB
24R-OH-CALCIDIOL SERPL-MCNC: 33.5 NG/ML — SIGNIFICANT CHANGE UP (ref 30–80)
ALBUMIN SERPL ELPH-MCNC: 1.8 G/DL — LOW (ref 3.3–5.2)
ANION GAP SERPL CALC-SCNC: 14 MMOL/L — SIGNIFICANT CHANGE UP (ref 5–17)
BASOPHILS # BLD AUTO: 0.05 K/UL — SIGNIFICANT CHANGE UP (ref 0–0.2)
BASOPHILS NFR BLD AUTO: 0.3 % — SIGNIFICANT CHANGE UP (ref 0–2)
BUN SERPL-MCNC: 75 MG/DL — HIGH (ref 8–20)
CALCIUM SERPL-MCNC: 7.7 MG/DL — LOW (ref 8.4–10.5)
CALCIUM SERPL-MCNC: 8 MG/DL — LOW (ref 8.6–10.2)
CHLORIDE SERPL-SCNC: 104 MMOL/L — SIGNIFICANT CHANGE UP (ref 98–107)
CO2 SERPL-SCNC: 19 MMOL/L — LOW (ref 22–29)
CREAT SERPL-MCNC: 2.85 MG/DL — HIGH (ref 0.5–1.3)
CULTURE RESULTS: SIGNIFICANT CHANGE UP
EOSINOPHIL # BLD AUTO: 0.18 K/UL — SIGNIFICANT CHANGE UP (ref 0–0.5)
EOSINOPHIL NFR BLD AUTO: 1.2 % — SIGNIFICANT CHANGE UP (ref 0–6)
ERYTHROCYTE [SEDIMENTATION RATE] IN BLOOD: 64 MM/HR — HIGH (ref 0–20)
GLUCOSE BLDC GLUCOMTR-MCNC: 367 MG/DL — HIGH (ref 70–99)
GLUCOSE BLDC GLUCOMTR-MCNC: 367 MG/DL — HIGH (ref 70–99)
GLUCOSE BLDC GLUCOMTR-MCNC: 391 MG/DL — HIGH (ref 70–99)
GLUCOSE SERPL-MCNC: 273 MG/DL — HIGH (ref 70–99)
HCT VFR BLD CALC: 29.3 % — LOW (ref 39–50)
HGB BLD-MCNC: 9.3 G/DL — LOW (ref 13–17)
IMM GRANULOCYTES NFR BLD AUTO: 4.5 % — HIGH (ref 0–1.5)
LYMPHOCYTES # BLD AUTO: 1.75 K/UL — SIGNIFICANT CHANGE UP (ref 1–3.3)
LYMPHOCYTES # BLD AUTO: 11.4 % — LOW (ref 13–44)
MAGNESIUM SERPL-MCNC: 2.4 MG/DL — SIGNIFICANT CHANGE UP (ref 1.6–2.6)
MCHC RBC-ENTMCNC: 29.4 PG — SIGNIFICANT CHANGE UP (ref 27–34)
MCHC RBC-ENTMCNC: 31.7 GM/DL — LOW (ref 32–36)
MCV RBC AUTO: 92.7 FL — SIGNIFICANT CHANGE UP (ref 80–100)
MONOCYTES # BLD AUTO: 1.04 K/UL — HIGH (ref 0–0.9)
MONOCYTES NFR BLD AUTO: 6.8 % — SIGNIFICANT CHANGE UP (ref 2–14)
NEUTROPHILS # BLD AUTO: 11.66 K/UL — HIGH (ref 1.8–7.4)
NEUTROPHILS NFR BLD AUTO: 75.8 % — SIGNIFICANT CHANGE UP (ref 43–77)
PHOSPHATE SERPL-MCNC: 4 MG/DL — SIGNIFICANT CHANGE UP (ref 2.4–4.7)
PLATELET # BLD AUTO: 376 K/UL — SIGNIFICANT CHANGE UP (ref 150–400)
POTASSIUM SERPL-MCNC: 4.5 MMOL/L — SIGNIFICANT CHANGE UP (ref 3.5–5.3)
POTASSIUM SERPL-SCNC: 4.5 MMOL/L — SIGNIFICANT CHANGE UP (ref 3.5–5.3)
PTH-INTACT FLD-MCNC: 42 PG/ML — SIGNIFICANT CHANGE UP (ref 15–65)
RBC # BLD: 3.16 M/UL — LOW (ref 4.2–5.8)
RBC # FLD: 15.6 % — HIGH (ref 10.3–14.5)
SODIUM SERPL-SCNC: 137 MMOL/L — SIGNIFICANT CHANGE UP (ref 135–145)
SPECIMEN SOURCE: SIGNIFICANT CHANGE UP
URATE SERPL-MCNC: 6.8 MG/DL — SIGNIFICANT CHANGE UP (ref 3.4–7)
VANCOMYCIN TROUGH SERPL-MCNC: <4 UG/ML — LOW (ref 10–20)
WBC # BLD: 15.37 K/UL — HIGH (ref 3.8–10.5)
WBC # FLD AUTO: 15.37 K/UL — HIGH (ref 3.8–10.5)

## 2020-04-13 PROCEDURE — 93971 EXTREMITY STUDY: CPT | Mod: 26,RT

## 2020-04-13 PROCEDURE — 99232 SBSQ HOSP IP/OBS MODERATE 35: CPT

## 2020-04-13 RX ORDER — INSULIN LISPRO 100/ML
12 VIAL (ML) SUBCUTANEOUS
Refills: 0 | Status: DISCONTINUED | OUTPATIENT
Start: 2020-04-13 | End: 2020-04-14

## 2020-04-13 RX ORDER — INSULIN GLARGINE 100 [IU]/ML
34 INJECTION, SOLUTION SUBCUTANEOUS AT BEDTIME
Refills: 0 | Status: DISCONTINUED | OUTPATIENT
Start: 2020-04-13 | End: 2020-04-14

## 2020-04-13 RX ORDER — CALCIUM GLUCONATE 100 MG/ML
2 VIAL (ML) INTRAVENOUS ONCE
Refills: 0 | Status: COMPLETED | OUTPATIENT
Start: 2020-04-13 | End: 2020-04-13

## 2020-04-13 RX ADMIN — Medication 1 TABLET(S): at 11:42

## 2020-04-13 RX ADMIN — SENNA PLUS 2 TABLET(S): 8.6 TABLET ORAL at 21:53

## 2020-04-13 RX ADMIN — Medication 7 UNIT(S): at 08:01

## 2020-04-13 RX ADMIN — HEPARIN SODIUM 5000 UNIT(S): 5000 INJECTION INTRAVENOUS; SUBCUTANEOUS at 21:53

## 2020-04-13 RX ADMIN — SODIUM CHLORIDE 100 MILLILITER(S): 9 INJECTION, SOLUTION INTRAVENOUS at 21:53

## 2020-04-13 RX ADMIN — Medication 81 MILLIGRAM(S): at 11:41

## 2020-04-13 RX ADMIN — CARVEDILOL PHOSPHATE 25 MILLIGRAM(S): 80 CAPSULE, EXTENDED RELEASE ORAL at 05:18

## 2020-04-13 RX ADMIN — CLOPIDOGREL BISULFATE 75 MILLIGRAM(S): 75 TABLET, FILM COATED ORAL at 11:41

## 2020-04-13 RX ADMIN — HEPARIN SODIUM 5000 UNIT(S): 5000 INJECTION INTRAVENOUS; SUBCUTANEOUS at 00:15

## 2020-04-13 RX ADMIN — SIMETHICONE 80 MILLIGRAM(S): 80 TABLET, CHEWABLE ORAL at 05:18

## 2020-04-13 RX ADMIN — Medication 1 TABLET(S): at 08:00

## 2020-04-13 RX ADMIN — PIPERACILLIN AND TAZOBACTAM 25 GRAM(S): 4; .5 INJECTION, POWDER, LYOPHILIZED, FOR SOLUTION INTRAVENOUS at 17:06

## 2020-04-13 RX ADMIN — Medication 10: at 16:24

## 2020-04-13 RX ADMIN — HYDROMORPHONE HYDROCHLORIDE 0.5 MILLIGRAM(S): 2 INJECTION INTRAMUSCULAR; INTRAVENOUS; SUBCUTANEOUS at 10:58

## 2020-04-13 RX ADMIN — CARVEDILOL PHOSPHATE 25 MILLIGRAM(S): 80 CAPSULE, EXTENDED RELEASE ORAL at 17:06

## 2020-04-13 RX ADMIN — Medication 100 MILLIGRAM(S): at 13:49

## 2020-04-13 RX ADMIN — Medication 200 GRAM(S): at 13:13

## 2020-04-13 RX ADMIN — SIMETHICONE 80 MILLIGRAM(S): 80 TABLET, CHEWABLE ORAL at 11:42

## 2020-04-13 RX ADMIN — ONDANSETRON 4 MILLIGRAM(S): 8 TABLET, FILM COATED ORAL at 20:29

## 2020-04-13 RX ADMIN — Medication 1 TABLET(S): at 17:06

## 2020-04-13 RX ADMIN — Medication 7 UNIT(S): at 11:50

## 2020-04-13 RX ADMIN — Medication 10: at 11:49

## 2020-04-13 RX ADMIN — ATORVASTATIN CALCIUM 80 MILLIGRAM(S): 80 TABLET, FILM COATED ORAL at 21:54

## 2020-04-13 RX ADMIN — INSULIN GLARGINE 34 UNIT(S): 100 INJECTION, SOLUTION SUBCUTANEOUS at 22:15

## 2020-04-13 RX ADMIN — HEPARIN SODIUM 5000 UNIT(S): 5000 INJECTION INTRAVENOUS; SUBCUTANEOUS at 05:18

## 2020-04-13 RX ADMIN — PANTOPRAZOLE SODIUM 40 MILLIGRAM(S): 20 TABLET, DELAYED RELEASE ORAL at 05:19

## 2020-04-13 RX ADMIN — Medication 0.12 MILLIGRAM(S): at 05:18

## 2020-04-13 RX ADMIN — FINASTERIDE 5 MILLIGRAM(S): 5 TABLET, FILM COATED ORAL at 11:41

## 2020-04-13 RX ADMIN — Medication 12 UNIT(S): at 16:24

## 2020-04-13 RX ADMIN — Medication 100 MILLIGRAM(S): at 05:17

## 2020-04-13 RX ADMIN — PIPERACILLIN AND TAZOBACTAM 25 GRAM(S): 4; .5 INJECTION, POWDER, LYOPHILIZED, FOR SOLUTION INTRAVENOUS at 05:20

## 2020-04-13 RX ADMIN — SIMETHICONE 80 MILLIGRAM(S): 80 TABLET, CHEWABLE ORAL at 17:07

## 2020-04-13 RX ADMIN — HEPARIN SODIUM 5000 UNIT(S): 5000 INJECTION INTRAVENOUS; SUBCUTANEOUS at 13:48

## 2020-04-13 NOTE — PROGRESS NOTE ADULT - SUBJECTIVE AND OBJECTIVE BOX
[facility]  INFECTIOUS DISEASES AND INTERNAL MEDICINE at [place]  =======================================================  Noe Navarro MD  Diplomates American Board of Internal Medicine and Infectious Diseases  Tel: [phone]      Fax: [phone]  =======================================================    MARIA ROSS [id number]    Follow up: left groin necrotising skin infection  vac applied  feeling better today    Allergies:  No Known Allergies      Medications:  acetaminophen   Tablet .. 650 milliGRAM(s) Oral every 6 hours PRN  aspirin enteric coated 81 milliGRAM(s) Oral daily  atorvastatin 80 milliGRAM(s) Oral at bedtime  calcium gluconate IVPB 2 Gram(s) IV Intermittent once  carvedilol 25 milliGRAM(s) Oral every 12 hours  clindamycin IVPB 900 milliGRAM(s) IV Intermittent every 8 hours  clopidogrel Tablet 75 milliGRAM(s) Oral daily  dextrose 40% Gel 15 Gram(s) Oral once PRN  digoxin     Tablet 0.125 milliGRAM(s) Oral daily  finasteride 5 milliGRAM(s) Oral daily  heparin  Injectable 5000 Unit(s) SubCutaneous every 8 hours  HYDROmorphone  Injectable 0.5 milliGRAM(s) IV Push every 6 hours PRN  insulin glargine Injectable (LANTUS) 21 Unit(s) SubCutaneous at bedtime  insulin lispro (HumaLOG) corrective regimen sliding scale   SubCutaneous three times a day before meals  insulin lispro Injectable (HumaLOG) 7 Unit(s) SubCutaneous three times a day before meals  lactobacillus acidophilus 1 Tablet(s) Oral three times a day with meals  ondansetron Injectable 4 milliGRAM(s) IV Push every 6 hours PRN  oxyCODONE    IR 5 milliGRAM(s) Oral every 4 hours PRN  oxyCODONE    IR 10 milliGRAM(s) Oral every 4 hours PRN  pantoprazole    Tablet 40 milliGRAM(s) Oral before breakfast  piperacillin/tazobactam IVPB.. 3.375 Gram(s) IV Intermittent every 12 hours  senna 2 Tablet(s) Oral at bedtime  simethicone 80 milliGRAM(s) Chew every 6 hours  sodium chloride 0.45% 1000 milliLiter(s) IV Continuous <Continuous>            REVIEW OF SYSTEMS:  CONSTITUTIONAL:  No Fever or chills  HEENT:   No diplopia or blurred vision.  No earache, sore throat or runny nose.  CARDIOVASCULAR:  No pressure, squeezing, strangling, tightness, heaviness or aching about the chest, neck, axilla or epigastrium.  RESPIRATORY:  No cough, shortness of breath  GASTROINTESTINAL:  No nausea, vomiting or diarrhea.  GENITOURINARY:  No dysuria, frequency or urgency. No Blood in urine  MUSCULOSKELETAL:  no joint aches, no muscle pain  SKIN:  No change in skin, hair or nails.  NEUROLOGIC:  No Headaches, seizures or weakness.  PSYCHIATRIC:  No disorder of thought or mood.  ENDOCRINE:  No heat or cold intolerance  HEMATOLOGICAL:  No easy bruising or bleeding.            Physical Exam:  ICU Vital Signs Last 24 Hrs  T(C): 36.6 (13 Apr 2020 08:25), Max: 36.8 (13 Apr 2020 05:13)  T(F): 97.9 (13 Apr 2020 08:25), Max: 98.3 (13 Apr 2020 05:13)  HR: 64 (13 Apr 2020 08:25) (64 - 79)  BP: 128/60 (13 Apr 2020 08:25) (128/60 - 153/67)  BP(mean): --  ABP: --  ABP(mean): --  RR: 16 (13 Apr 2020 08:25) (16 - 18)  SpO2: 91% (13 Apr 2020 08:25) (91% - 97%)      GEN: NAD, pleasant  HEENT: normocephalic and atraumatic. EOMI. PERRL.  Anicteric   NECK: Supple.   LUNGS: Clear to auscultation.  HEART: Regular rate and rhythm without murmur.  ABDOMEN: Soft, nontender, and nondistended.  Positive bowel sounds.    : No CVA tenderness left groin vac intact  EXTREMITIES: Without any edema.  MSK: no joint swelling  NEUROLOGIC: Cranial nerves II through XII are grossly intact. No focal deficits  PSYCHIATRIC: Appropriate affect .  SKIN: No Rash      Labs:                          9.3    15.37 )-----------( 376      ( 13 Apr 2020 07:05 )             29.3   04-13    137  |  104  |  75.0<H>  ----------------------------<  273<H>  4.5   |  19.0<L>  |  2.85<H>    Ca    8.0<L>      13 Apr 2020 07:06  Phos  4.0     04-13  Mg     2.4     04-13    TPro  x   /  Alb  1.8<L>  /  TBili  x   /  DBili  x   /  AST  x   /  ALT  x   /  AlkPhos  x   04-13      < from: US Duplex Venous Upper Ext Ltd, Right (04.13.20 @ 10:42) >    FINDINGS:    The right internal jugular, subclavian, axillary, brachial, ulnar and radial veins are patent and compressible where applicable.     The basilic vein (superficial vein) is patent and without thrombus.   The visualized right cephalic vein is limited due to its small caliber and tortuosity and secondary to patient's pain and limited mobility of the right arm/hand.    Doppler examination shows normal spontaneous and phasic flow.    IMPRESSION:     No evidence of right upper extremity deep venous thrombosis.      < end of copied text >

## 2020-04-13 NOTE — PROGRESS NOTE ADULT - SUBJECTIVE AND OBJECTIVE BOX
NEPHROLOGY INTERVAL HPI/OVERNIGHT EVENTS:    Examined  sitting up in bed   Awake alert no distress  has good UOP    MEDICATIONS  (STANDING):  aspirin enteric coated 81 milliGRAM(s) Oral daily  atorvastatin 80 milliGRAM(s) Oral at bedtime  carvedilol 25 milliGRAM(s) Oral every 12 hours  clindamycin IVPB 900 milliGRAM(s) IV Intermittent every 8 hours  clopidogrel Tablet 75 milliGRAM(s) Oral daily  digoxin     Tablet 0.125 milliGRAM(s) Oral daily  finasteride 5 milliGRAM(s) Oral daily  heparin  Injectable 5000 Unit(s) SubCutaneous every 8 hours  insulin glargine Injectable (LANTUS) 21 Unit(s) SubCutaneous at bedtime  insulin lispro (HumaLOG) corrective regimen sliding scale   SubCutaneous three times a day before meals  insulin lispro Injectable (HumaLOG) 7 Unit(s) SubCutaneous three times a day before meals  lactobacillus acidophilus 1 Tablet(s) Oral three times a day with meals  pantoprazole    Tablet 40 milliGRAM(s) Oral before breakfast  piperacillin/tazobactam IVPB.. 3.375 Gram(s) IV Intermittent every 12 hours  senna 2 Tablet(s) Oral at bedtime  simethicone 80 milliGRAM(s) Chew every 6 hours  sodium chloride 0.45% 1000 milliLiter(s) (100 mL/Hr) IV Continuous <Continuous>    MEDICATIONS  (PRN):  acetaminophen   Tablet .. 650 milliGRAM(s) Oral every 6 hours PRN Temp greater or equal to 38C (100.4F)  dextrose 40% Gel 15 Gram(s) Oral once PRN Blood Glucose LESS THAN 70 milliGRAM(s)/deciLiter  HYDROmorphone  Injectable 0.5 milliGRAM(s) IV Push every 6 hours PRN Breakthrough pain and dressing changes  ondansetron Injectable 4 milliGRAM(s) IV Push every 6 hours PRN Nausea and/or Vomiting  oxyCODONE    IR 5 milliGRAM(s) Oral every 4 hours PRN Moderate Pain (4 - 6)  oxyCODONE    IR 10 milliGRAM(s) Oral every 4 hours PRN Severe Pain (7 - 10)      Allergies    No Known Allergies    Intolerances        Vital Signs Last 24 Hrs  T(C): 36.6 (13 Apr 2020 08:25), Max: 36.8 (13 Apr 2020 05:13)  T(F): 97.9 (13 Apr 2020 08:25), Max: 98.3 (13 Apr 2020 05:13)  HR: 64 (13 Apr 2020 08:25) (64 - 79)  BP: 128/60 (13 Apr 2020 08:25) (128/60 - 153/67)  BP(mean): --  RR: 16 (13 Apr 2020 08:25) (16 - 18)  SpO2: 91% (13 Apr 2020 08:25) (91% - 97%)  Daily     Daily     PHYSICAL EXAM:  GENERAL: appears chronically ill  HEAD:  Atraumatic, Normocephalic  EYES: EOMI  NECK: Supple, neck  veins full  NERVOUS SYSTEM:  Alert & Oriented X3  CHEST/LUNG: Clear to percussion bilaterally  HEART: Regular rate and rhythm  ABDOMEN: Soft, Nontender, Nondistended; Bowel sounds present  EXTREMITIES:  No edema    LABS:                        9.3    15.37 )-----------( 376      ( 13 Apr 2020 07:05 )             29.3     04-13    137  |  104  |  75.0<H>  ----------------------------<  273<H>  4.5   |  19.0<L>  |  2.85<H>    Ca    8.0<L>      13 Apr 2020 07:06  Phos  4.0     04-13  Mg     2.4     04-13    TPro  x   /  Alb  1.8<L>  /  TBili  x   /  DBili  x   /  AST  x   /  ALT  x   /  AlkPhos  x   04-13        Magnesium, Serum: 2.4 mg/dL (04-13 @ 07:05)  Phosphorus Level, Serum: 4.0 mg/dL (04-13 @ 07:05)          RADIOLOGY & ADDITIONAL TESTS:

## 2020-04-13 NOTE — PROGRESS NOTE ADULT - ASSESSMENT
Patient is a 64 year old male with history of diabetes s/p excisional debridement of soft tissue infection of the left groin on 4/10.    -Endo:  lantus and premeal, insulin sliding scale mod, FS AC and bedtime   -Pain control  -IS,OOB  -Diet: CC Renal   -Bowel Regimen  -Replete lytes PRN  -Plavix and aspirin   -Clinda and zosyn  -Dressing change to Veraflo vac today

## 2020-04-13 NOTE — PROGRESS NOTE ADULT - ASSESSMENT
MARSHALL on CKD stage III  Renal function cr improved today  Follows w Dr Pedro in our office  Has h/o CAD, S/P MI, CABG, ICM with AICD  Renal sono noted no hydronephrosis  Will cont IVF w  bicarb for MA ( Has h/o of HF )  Hypocalcemia supplemented IV again today   iPTH pending  Very low albumin  will check 24 hr urine for protein, NS?  s/p OR on 4/10- Debridement, external genitalia, perineum  I&D abscess   L groin hematoma/ cellulitis on abx  ID following    Will follow

## 2020-04-13 NOTE — PROGRESS NOTE ADULT - ASSESSMENT
65 y/o male with extensive cardiac history, htn, DM came to the ER for left groin area infection which started 1 week ago after he had angiography through left groin area 2 weeks ago, got 3 stents ( done at [facility] by ok marsh ). Pt stated that he has been on levaquin by his doctor but getting worse.       Left groin necrotizing soft tissue infectoin  Leukocytosis  Josep on CKD    - s/p left groin exploration washout and wide excisional debridement of necrotizing soft tissue infection on 4/10  - vac applied 4/13  - WBC downtrending  - Blood cultures NGTD  - f/u final OR cultures  - preop Wound CX likely skin contaminants  - Continue Zosyn 3.375 g IV q12h + Clindamycin 900 mg IV q8h  - Vanco discontinued by primary team due to concern for JOSEP on CKD  - Trend Fever  - Trend Leukocytosis      Will Follow

## 2020-04-13 NOTE — PROGRESS NOTE ADULT - SUBJECTIVE AND OBJECTIVE BOX
HPI/OVERNIGHT EVENTS:  Pain well controlled. No acute events overnight. No new complaints offered. Continues to ambulate without difficulty. Denies CP, SOB, fever.     Vital Signs Last 24 Hrs  T(C): 36.6 (13 Apr 2020 08:25), Max: 36.8 (13 Apr 2020 05:13)  T(F): 97.9 (13 Apr 2020 08:25), Max: 98.3 (13 Apr 2020 05:13)  HR: 64 (13 Apr 2020 08:25) (64 - 79)  BP: 128/60 (13 Apr 2020 08:25) (120/57 - 153/67)  BP(mean): --  RR: 16 (13 Apr 2020 08:25) (16 - 18)  SpO2: 91% (13 Apr 2020 08:25) (91% - 97%)       Physical Exam:  GEN: NAD  CV:  normal S1, S2  Pulm: CTA diminished at bases  GI: soft, NTND  MSK: moving all extremities   Groin: left groin with wet to dry dressing in place    LABS:                            9.3    15.37 )-----------( 376      ( 13 Apr 2020 07:05 )             29.3   04-13    137  |  104  |  75.0<H>  ----------------------------<  273<H>  4.5   |  19.0<L>  |  2.85<H>    Ca    8.0<L>      13 Apr 2020 07:06  Phos  4.0     04-13  Mg     2.4     04-13    TPro  x   /  Alb  1.8<L>  /  TBili  x   /  DBili  x   /  AST  x   /  ALT  x   /  AlkPhos  x   04-13    CAPILLARY BLOOD GLUCOSE  POCT Blood Glucose.: 387 mg/dL (12 Apr 2020 20:56)  POCT Blood Glucose.: 344 mg/dL (12 Apr 2020 15:58)  POCT Blood Glucose.: 275 mg/dL (12 Apr 2020 13:12)  POCT Blood Glucose.: 235 mg/dL (12 Apr 2020 09:32)      RECENT CULTURES:  04-11 .Abscess left groin abscess (swabs) XXXX XXXX   Insufficient growth-culture reincubated    04-09 .Abscess XXXX XXXX   Rare Bacillus species not anthracis "Susceptibilities not performed"  Few Coag Negative Staphylococcus "Susceptibilities not performed"  Moderate Finegoldia magna "Susceptibilities not performed"  Rare Parabacteroides distasonis "Susceptibilities not performed"    04-08 .Blood XXXX XXXX   No growth at 48 hours

## 2020-04-13 NOTE — PROGRESS NOTE ADULT - SUBJECTIVE AND OBJECTIVE BOX
[place] [facility] - [facility], THE HEART CENTER                                   [address] [place] [address]                                                      PHONE: [phone]                                                         FAX: [phone]  [email or web address]  ---------------------------------------------------------------------------------------------------------------------------------    Overnight events/patient complaints: groin pain improving, but c/o right arm and elbow pain and swelling.  IV site patent      No Known Allergies    MEDICATIONS  (STANDING):  aspirin enteric coated 81 milliGRAM(s) Oral daily  atorvastatin 80 milliGRAM(s) Oral at bedtime  carvedilol 25 milliGRAM(s) Oral every 12 hours  clindamycin IVPB 900 milliGRAM(s) IV Intermittent every 8 hours  clopidogrel Tablet 75 milliGRAM(s) Oral daily  digoxin     Tablet 0.125 milliGRAM(s) Oral daily  finasteride 5 milliGRAM(s) Oral daily  heparin  Injectable 5000 Unit(s) SubCutaneous every 8 hours  insulin glargine Injectable (LANTUS) 21 Unit(s) SubCutaneous at bedtime  insulin lispro (HumaLOG) corrective regimen sliding scale   SubCutaneous three times a day before meals  insulin lispro Injectable (HumaLOG) 7 Unit(s) SubCutaneous three times a day before meals  lactobacillus acidophilus 1 Tablet(s) Oral three times a day with meals  pantoprazole    Tablet 40 milliGRAM(s) Oral before breakfast  piperacillin/tazobactam IVPB.. 3.375 Gram(s) IV Intermittent every 12 hours  senna 2 Tablet(s) Oral at bedtime  simethicone 80 milliGRAM(s) Chew every 6 hours  sodium chloride 0.45% 1000 milliLiter(s) (100 mL/Hr) IV Continuous <Continuous>    MEDICATIONS  (PRN):  acetaminophen   Tablet .. 650 milliGRAM(s) Oral every 6 hours PRN Temp greater or equal to 38C (100.4F)  dextrose 40% Gel 15 Gram(s) Oral once PRN Blood Glucose LESS THAN 70 milliGRAM(s)/deciLiter  HYDROmorphone  Injectable 0.5 milliGRAM(s) IV Push every 6 hours PRN Breakthrough pain and dressing changes  ondansetron Injectable 4 milliGRAM(s) IV Push every 6 hours PRN Nausea and/or Vomiting  oxyCODONE    IR 5 milliGRAM(s) Oral every 4 hours PRN Moderate Pain (4 - 6)  oxyCODONE    IR 10 milliGRAM(s) Oral every 4 hours PRN Severe Pain (7 - 10)      Vital Signs Last 24 Hrs  T(C): 36.6 (13 Apr 2020 08:25), Max: 36.8 (13 Apr 2020 05:13)  T(F): 97.9 (13 Apr 2020 08:25), Max: 98.3 (13 Apr 2020 05:13)  HR: 64 (13 Apr 2020 08:25) (64 - 79)  BP: 128/60 (13 Apr 2020 08:25) (120/57 - 153/67)  BP(mean): --  RR: 16 (13 Apr 2020 08:25) (16 - 18)  SpO2: 91% (13 Apr 2020 08:25) (91% - 97%)  Daily     Daily   ICU Vital Signs Last 24 Hrs  MARIA ROSS  I&O's Detail    12 Apr 2020 07:01  -  13 Apr 2020 07:00  --------------------------------------------------------  IN:    sodium chloride 0.45%: 1200 mL    Solution: 50 mL    Solution: 200 mL  Total IN: 1450 mL    OUT:    Indwelling Catheter - Urethral: 1600 mL  Total OUT: 1600 mL    Total NET: -150 mL        I&O's Summary    12 Apr 2020 07:01  -  13 Apr 2020 07:00  --------------------------------------------------------  IN: 1450 mL / OUT: 1600 mL / NET: -150 mL      Drug Dosing Weight  MARIA ROMANON      PHYSICAL EXAM:  General: Appears well developed, well nourished alert and cooperative.  HEENT: Head; normocephalic, atraumatic.  Eyes: Pupils reactive, cornea wnl.  Neck: Supple, no nodes adenopathy, no NVD or carotid bruit or thyromegaly.  CARDIOVASCULAR: Normal S1 and S2, No murmur, rub, gallop or lift.   LUNGS: No rales, rhonchi or wheeze. Normal breath sounds bilaterally.  ABDOMEN: Soft, nontender without mass or organomegaly. bowel sounds normoactive.  EXTREMITIES:RUE swelling and point tenderness R elbow/effusion  SKIN: warm and dry with normal turgor.  NEURO: Alert/oriented x 3/normal motor exam. No pathologic reflexes.    PSYCH: normal affect.        LABS:                        9.3    15.37 )-----------( 376      ( 13 Apr 2020 07:05 )             29.3     04-13    137  |  104  |  75.0<H>  ----------------------------<  273<H>  4.5   |  19.0<L>  |  2.85<H>    Ca    8.0<L>      13 Apr 2020 07:06  Phos  4.0     04-13  Mg     2.4     04-13    TPro  x   /  Alb  1.8<L>  /  TBili  x   /  DBili  x   /  AST  x   /  ALT  x   /  AlkPhos  x   04-13    MARIA ROSS            RADIOLOGY & ADDITIONAL STUDIES:    INTERPRETATION OF TELEMETRY (personally reviewed):    ECG:< from: 12 Lead ECG (04.09.20 @ 12:52) >  Diagnosis Line Normal sinus rhythm  Possible Left atrial enlargement  Septal infarct , age undetermined  T wave abnormality, consider inferior ischemia    Confirmed by NICHOLAS REYNA ([id number]) on 4/10/2020 9:57:29 AM    < end of copied text >

## 2020-04-13 NOTE — PROGRESS NOTE ADULT - SUBJECTIVE AND OBJECTIVE BOX
INTERVAL HPI/OVERNIGHT EVENTS:  FS reviewed, hyperglycemic      Review of systems:  unable to obtain    MEDICATIONS  (STANDING):  aspirin enteric coated 81 milliGRAM(s) Oral daily  atorvastatin 80 milliGRAM(s) Oral at bedtime  carvedilol 25 milliGRAM(s) Oral every 12 hours  clopidogrel Tablet 75 milliGRAM(s) Oral daily  digoxin     Tablet 0.125 milliGRAM(s) Oral daily  finasteride 5 milliGRAM(s) Oral daily  heparin  Injectable 5000 Unit(s) SubCutaneous every 8 hours  insulin glargine Injectable (LANTUS) 34 Unit(s) SubCutaneous at bedtime  insulin lispro (HumaLOG) corrective regimen sliding scale   SubCutaneous three times a day before meals  insulin lispro Injectable (HumaLOG) 12 Unit(s) SubCutaneous three times a day before meals  lactobacillus acidophilus 1 Tablet(s) Oral three times a day with meals  pantoprazole    Tablet 40 milliGRAM(s) Oral before breakfast  piperacillin/tazobactam IVPB.. 3.375 Gram(s) IV Intermittent every 12 hours  senna 2 Tablet(s) Oral at bedtime  simethicone 80 milliGRAM(s) Chew every 6 hours  sodium chloride 0.45% 1000 milliLiter(s) (100 mL/Hr) IV Continuous <Continuous>    MEDICATIONS  (PRN):  acetaminophen   Tablet .. 650 milliGRAM(s) Oral every 6 hours PRN Temp greater or equal to 38C (100.4F)  dextrose 40% Gel 15 Gram(s) Oral once PRN Blood Glucose LESS THAN 70 milliGRAM(s)/deciLiter  HYDROmorphone  Injectable 0.5 milliGRAM(s) IV Push every 6 hours PRN Breakthrough pain and dressing changes  ondansetron Injectable 4 milliGRAM(s) IV Push every 6 hours PRN Nausea and/or Vomiting  oxyCODONE    IR 5 milliGRAM(s) Oral every 4 hours PRN Moderate Pain (4 - 6)  oxyCODONE    IR 10 milliGRAM(s) Oral every 4 hours PRN Severe Pain (7 - 10)      Allergies    No Known Allergies    Intolerances        Vital Signs Last 24 Hrs  T(C): 36.6 (13 Apr 2020 08:25), Max: 36.8 (13 Apr 2020 05:13)  T(F): 97.9 (13 Apr 2020 08:25), Max: 98.3 (13 Apr 2020 05:13)  HR: 64 (13 Apr 2020 08:25) (64 - 79)  BP: 128/60 (13 Apr 2020 08:25) (128/60 - 153/67)  BP(mean): --  RR: 16 (13 Apr 2020 08:25) (16 - 18)  SpO2: 91% (13 Apr 2020 08:25) (91% - 97%)    deferred due to covid    LABS:                        9.3    15.37 )-----------( 376      ( 13 Apr 2020 07:05 )             29.3     04-13    137  |  104  |  75.0<H>  ----------------------------<  273<H>  4.5   |  19.0<L>  |  2.85<H>    Ca    8.0<L>      13 Apr 2020 07:06  Phos  4.0     04-13  Mg     2.4     04-13    TPro  x   /  Alb  1.8<L>  /  TBili  x   /  DBili  x   /  AST  x   /  ALT  x   /  AlkPhos  x   04-13            CAPILLARY BLOOD GLUCOSE      POCT Blood Glucose.: 367 mg/dL (13 Apr 2020 11:34)  POCT Blood Glucose.: 387 mg/dL (12 Apr 2020 20:56)  POCT Blood Glucose.: 344 mg/dL (12 Apr 2020 15:58)          RADIOLOGY & ADDITIONAL TESTS:  none

## 2020-04-13 NOTE — PROGRESS NOTE ADULT - ASSESSMENT
Assessment  left groin abcess s/p cath s/p debridement  ischemic CM s/p recent PCI 2 weeks prior  R elbow pain/forearm swelling ? gout ?DVT  IDDm  s/p CABG low EF - compensated w/o evidence of CHF or angina  ICD  CRI      Rec  Cont IV AB as per ID  serum uric acid  doppler RUE/xray right elbow  cont current meds incl DAPt given recent PCI

## 2020-04-13 NOTE — PROGRESS NOTE ADULT - ASSESSMENT
64M with extensive cardiac history, htn, DM2 cb ckd came to the ER for left groin area infection which started 1 week ago after he had angiography through left groin area 2 weeks ago, s/p 3 stents (done at [facility] [place] by [facility] ). Pt stated that he has been on levaquin by his doctor but getting worse. some pain in the left groin area, denies fever but had chills. no cp, no sob. no dizziness, no abd. pain. no n/v/d. As per pt. his cardiologist is aware that his groin area is not healing, was contacted via phone.     Uncontrolled T2DM- hyperglycemic  -A1c 11.5  -check sugars AC and bedtime  -ensure diabetic diet  -change lantus to 34 units QHS  -change lispro to 12 units TID  -continue insulin sliding scale    HLD- continue statin    Hypocalcemia- mild. corrected probably normal. but has been given iv calcium. check vit d and pth

## 2020-04-13 NOTE — PROGRESS NOTE ADULT - SUBJECTIVE AND OBJECTIVE BOX
Wound bed pink with lat and medial areas of slough and necrosis. Lateral upper pole of wound with necrotic edge and surrounding erythema with notable blistering and superficial skin necrosis noted. Veraflo sponge measured 20cm x 8cm x 4cm and placed into wound. 2  additional pieces placed in wound at area of tunneling medially and proximally. Covered with occlusive dressing. Seal check revealed good seal. Standard veraflo settings: Instillation of NS; soak time: 10 mins; Cycle 3 1/2 hours; Neg pressure 125mmHg.     Plan:  Maintained wound vac at current settings.  Next wound vac change 4/15 or 4/16.  Additional dressings and supplies ordered.

## 2020-04-14 ENCOUNTER — TRANSCRIPTION ENCOUNTER (OUTPATIENT)
Age: 65
End: 2020-04-14

## 2020-04-14 LAB
-  AMPICILLIN/SULBACTAM: SIGNIFICANT CHANGE UP
-  CEFAZOLIN: SIGNIFICANT CHANGE UP
-  CLINDAMYCIN: SIGNIFICANT CHANGE UP
-  ERYTHROMYCIN: SIGNIFICANT CHANGE UP
-  GENTAMICIN: SIGNIFICANT CHANGE UP
-  OXACILLIN: SIGNIFICANT CHANGE UP
-  RIFAMPIN: SIGNIFICANT CHANGE UP
-  TETRACYCLINE: SIGNIFICANT CHANGE UP
-  TRIMETHOPRIM/SULFAMETHOXAZOLE: SIGNIFICANT CHANGE UP
-  VANCOMYCIN: SIGNIFICANT CHANGE UP
ALBUMIN SERPL ELPH-MCNC: 1.6 G/DL — LOW (ref 3.3–5.2)
ANION GAP SERPL CALC-SCNC: 11 MMOL/L — SIGNIFICANT CHANGE UP (ref 5–17)
BLD GP AB SCN SERPL QL: SIGNIFICANT CHANGE UP
BUN SERPL-MCNC: 53 MG/DL — HIGH (ref 8–20)
CALCIUM SERPL-MCNC: 7 MG/DL — LOW (ref 8.6–10.2)
CHLORIDE SERPL-SCNC: 99 MMOL/L — SIGNIFICANT CHANGE UP (ref 98–107)
CO2 SERPL-SCNC: 24 MMOL/L — SIGNIFICANT CHANGE UP (ref 22–29)
COLLECT DURATION TIME UR: 24 HR — SIGNIFICANT CHANGE UP
CREAT SERPL-MCNC: 2.49 MG/DL — HIGH (ref 0.5–1.3)
CULTURE RESULTS: SIGNIFICANT CHANGE UP
GLUCOSE BLDC GLUCOMTR-MCNC: 157 MG/DL — HIGH (ref 70–99)
GLUCOSE BLDC GLUCOMTR-MCNC: 235 MG/DL — HIGH (ref 70–99)
GLUCOSE BLDC GLUCOMTR-MCNC: 265 MG/DL — HIGH (ref 70–99)
GLUCOSE BLDC GLUCOMTR-MCNC: 302 MG/DL — HIGH (ref 70–99)
GLUCOSE SERPL-MCNC: 322 MG/DL — HIGH (ref 70–99)
HCT VFR BLD CALC: 24.1 % — LOW (ref 39–50)
HGB BLD-MCNC: 7.9 G/DL — LOW (ref 13–17)
MCHC RBC-ENTMCNC: 29 PG — SIGNIFICANT CHANGE UP (ref 27–34)
MCHC RBC-ENTMCNC: 32.8 GM/DL — SIGNIFICANT CHANGE UP (ref 32–36)
MCV RBC AUTO: 88.6 FL — SIGNIFICANT CHANGE UP (ref 80–100)
METHOD TYPE: SIGNIFICANT CHANGE UP
ORGANISM # SPEC MICROSCOPIC CNT: SIGNIFICANT CHANGE UP
ORGANISM # SPEC MICROSCOPIC CNT: SIGNIFICANT CHANGE UP
PLATELET # BLD AUTO: 323 K/UL — SIGNIFICANT CHANGE UP (ref 150–400)
POTASSIUM SERPL-MCNC: 4.2 MMOL/L — SIGNIFICANT CHANGE UP (ref 3.5–5.3)
POTASSIUM SERPL-SCNC: 4.2 MMOL/L — SIGNIFICANT CHANGE UP (ref 3.5–5.3)
PROT 24H UR-MRATE: 554 MG/24HR — HIGH (ref 50–100)
RBC # BLD: 2.72 M/UL — LOW (ref 4.2–5.8)
RBC # FLD: 15.4 % — HIGH (ref 10.3–14.5)
SODIUM SERPL-SCNC: 134 MMOL/L — LOW (ref 135–145)
SPECIMEN SOURCE: SIGNIFICANT CHANGE UP
SURGICAL PATHOLOGY STUDY: SIGNIFICANT CHANGE UP
TOTAL VOLUME - 24 HOUR: 2050 ML — SIGNIFICANT CHANGE UP
URINE CREATININE CALCULATION: 1.7 G/24 HR — SIGNIFICANT CHANGE UP (ref 1–2)
WBC # BLD: 17.07 K/UL — HIGH (ref 3.8–10.5)
WBC # FLD AUTO: 17.07 K/UL — HIGH (ref 3.8–10.5)

## 2020-04-14 PROCEDURE — 99221 1ST HOSP IP/OBS SF/LOW 40: CPT | Mod: 57

## 2020-04-14 PROCEDURE — 99232 SBSQ HOSP IP/OBS MODERATE 35: CPT

## 2020-04-14 PROCEDURE — 73070 X-RAY EXAM OF ELBOW: CPT | Mod: 26,RT

## 2020-04-14 RX ORDER — CALCIUM GLUCONATE 100 MG/ML
2 VIAL (ML) INTRAVENOUS ONCE
Refills: 0 | Status: COMPLETED | OUTPATIENT
Start: 2020-04-14 | End: 2020-04-14

## 2020-04-14 RX ORDER — INSULIN LISPRO 100/ML
22 VIAL (ML) SUBCUTANEOUS
Refills: 0 | Status: DISCONTINUED | OUTPATIENT
Start: 2020-04-14 | End: 2020-04-16

## 2020-04-14 RX ORDER — INSULIN GLARGINE 100 [IU]/ML
45 INJECTION, SOLUTION SUBCUTANEOUS AT BEDTIME
Refills: 0 | Status: DISCONTINUED | OUTPATIENT
Start: 2020-04-14 | End: 2020-04-16

## 2020-04-14 RX ORDER — INSULIN LISPRO 100/ML
18 VIAL (ML) SUBCUTANEOUS
Refills: 0 | Status: DISCONTINUED | OUTPATIENT
Start: 2020-04-14 | End: 2020-04-14

## 2020-04-14 RX ORDER — POLYETHYLENE GLYCOL 3350 17 G/17G
17 POWDER, FOR SOLUTION ORAL
Refills: 0 | Status: COMPLETED | OUTPATIENT
Start: 2020-04-14 | End: 2020-04-16

## 2020-04-14 RX ORDER — SODIUM CHLORIDE 9 MG/ML
1000 INJECTION INTRAMUSCULAR; INTRAVENOUS; SUBCUTANEOUS
Refills: 0 | Status: DISCONTINUED | OUTPATIENT
Start: 2020-04-14 | End: 2020-04-16

## 2020-04-14 RX ADMIN — Medication 1 TABLET(S): at 09:09

## 2020-04-14 RX ADMIN — Medication 81 MILLIGRAM(S): at 11:20

## 2020-04-14 RX ADMIN — SIMETHICONE 80 MILLIGRAM(S): 80 TABLET, CHEWABLE ORAL at 00:03

## 2020-04-14 RX ADMIN — Medication 18 UNIT(S): at 11:47

## 2020-04-14 RX ADMIN — FINASTERIDE 5 MILLIGRAM(S): 5 TABLET, FILM COATED ORAL at 11:20

## 2020-04-14 RX ADMIN — HEPARIN SODIUM 5000 UNIT(S): 5000 INJECTION INTRAVENOUS; SUBCUTANEOUS at 05:55

## 2020-04-14 RX ADMIN — Medication 0.12 MILLIGRAM(S): at 05:55

## 2020-04-14 RX ADMIN — PIPERACILLIN AND TAZOBACTAM 25 GRAM(S): 4; .5 INJECTION, POWDER, LYOPHILIZED, FOR SOLUTION INTRAVENOUS at 05:54

## 2020-04-14 RX ADMIN — Medication 100 GRAM(S): at 16:21

## 2020-04-14 RX ADMIN — HEPARIN SODIUM 5000 UNIT(S): 5000 INJECTION INTRAVENOUS; SUBCUTANEOUS at 13:25

## 2020-04-14 RX ADMIN — Medication 4: at 16:53

## 2020-04-14 RX ADMIN — Medication 12 UNIT(S): at 07:44

## 2020-04-14 RX ADMIN — Medication 22 UNIT(S): at 17:14

## 2020-04-14 RX ADMIN — CARVEDILOL PHOSPHATE 25 MILLIGRAM(S): 80 CAPSULE, EXTENDED RELEASE ORAL at 17:13

## 2020-04-14 RX ADMIN — SIMETHICONE 80 MILLIGRAM(S): 80 TABLET, CHEWABLE ORAL at 17:15

## 2020-04-14 RX ADMIN — SODIUM CHLORIDE 125 MILLILITER(S): 9 INJECTION INTRAMUSCULAR; INTRAVENOUS; SUBCUTANEOUS at 12:11

## 2020-04-14 RX ADMIN — SIMETHICONE 80 MILLIGRAM(S): 80 TABLET, CHEWABLE ORAL at 11:21

## 2020-04-14 RX ADMIN — SODIUM CHLORIDE 100 MILLILITER(S): 9 INJECTION, SOLUTION INTRAVENOUS at 10:00

## 2020-04-14 RX ADMIN — CARVEDILOL PHOSPHATE 25 MILLIGRAM(S): 80 CAPSULE, EXTENDED RELEASE ORAL at 05:55

## 2020-04-14 RX ADMIN — Medication 650 MILLIGRAM(S): at 22:43

## 2020-04-14 RX ADMIN — Medication 6: at 11:46

## 2020-04-14 RX ADMIN — POLYETHYLENE GLYCOL 3350 17 GRAM(S): 17 POWDER, FOR SOLUTION ORAL at 17:20

## 2020-04-14 RX ADMIN — ATORVASTATIN CALCIUM 80 MILLIGRAM(S): 80 TABLET, FILM COATED ORAL at 22:42

## 2020-04-14 RX ADMIN — PANTOPRAZOLE SODIUM 40 MILLIGRAM(S): 20 TABLET, DELAYED RELEASE ORAL at 06:14

## 2020-04-14 RX ADMIN — Medication 1 TABLET(S): at 11:21

## 2020-04-14 RX ADMIN — INSULIN GLARGINE 45 UNIT(S): 100 INJECTION, SOLUTION SUBCUTANEOUS at 22:41

## 2020-04-14 RX ADMIN — HEPARIN SODIUM 5000 UNIT(S): 5000 INJECTION INTRAVENOUS; SUBCUTANEOUS at 22:42

## 2020-04-14 RX ADMIN — CLOPIDOGREL BISULFATE 75 MILLIGRAM(S): 75 TABLET, FILM COATED ORAL at 11:20

## 2020-04-14 RX ADMIN — SIMETHICONE 80 MILLIGRAM(S): 80 TABLET, CHEWABLE ORAL at 05:55

## 2020-04-14 RX ADMIN — Medication 8: at 07:43

## 2020-04-14 RX ADMIN — Medication 1 TABLET(S): at 17:14

## 2020-04-14 RX ADMIN — PIPERACILLIN AND TAZOBACTAM 25 GRAM(S): 4; .5 INJECTION, POWDER, LYOPHILIZED, FOR SOLUTION INTRAVENOUS at 17:15

## 2020-04-14 RX ADMIN — ONDANSETRON 4 MILLIGRAM(S): 8 TABLET, FILM COATED ORAL at 19:11

## 2020-04-14 NOTE — DIETITIAN INITIAL EVALUATION ADULT. - PERTINENT LABORATORY DATA
04-14 Na134 mmol/L<L> Glu 322 mg/dL<H> K+ 4.2 mmol/L Cr  2.49 mg/dL<H> BUN 53.0 mg/dL<H> Phos n/a   Alb 1.6 g/dL<L> PAB n/a

## 2020-04-14 NOTE — PROGRESS NOTE ADULT - SUBJECTIVE AND OBJECTIVE BOX
Pt Name: MARIA ROSS    MRN: [id number]      Patient is a 64y Male admitted for left groin infection now c/o right elbow pain x 2 days. Pt says he noticed pain in right elbow two days ago that felt like he had banged it. He denies any trauma or previous injury. Pain has continued to worsen. He complains of pain with movement of his elbow. No wrist or shoulder pain. No numbness or tingling in extremity. RHD. No history of elbow pain prior.   .    HEALTH ISSUES - PROBLEM Dx:  H/O CHF: H/O CHF  Essential hypertension: Essential hypertension  Stage 4 chronic kidney disease: Stage 4 chronic kidney disease  Coronary artery disease involving coronary bypass graft of native heart without angina pectoris: Coronary artery disease involving coronary bypass graft of native heart without angina pectoris  Type 2 diabetes mellitus with hyperglycemia, with long-term current use of insulin: Type 2 diabetes mellitus with hyperglycemia, with long-term current use of insulin  Cellulitis and abscess: Cellulitis and abscess    REVIEW OF SYSTEMS    General:	+fevers    Skin/Breast: No rash  	  Respiratory and Thorax: No SOB  	  Cardiovascular:	No CP    Gastrointestinal:	 No nausea    Musculoskeletal:	 See HPI    Neurological:	See HPI    ROS is otherwise negative.    PAST MEDICAL & SURGICAL HISTORY:  PAST MEDICAL & SURGICAL HISTORY:  Insulin resistance  Fatty liver  CKD (chronic kidney disease)  BPH (benign prostatic hyperplasia)  Dyslipidemia  Hypertension  CAD (coronary artery disease)  DVT of leg (deep venous thrombosis), right  Diabetes: Peripheral vascular disease  CHF- EF-19% AICD ([employer] scientific)  MI  2013   HTN  Pulmonary edema  High Cholesterol  Chronic renal insufficency  S/P thyroid biopsy  S/P colonoscopy with polypectomy  AICD (automatic cardioverter/defibrillator) present  S/P angioplasty with stent: right leg , L femoral 2014  S/P coronary artery bypass graft x 3:     AICD ([employer])       Allergies: No Known Allergies      Medications: acetaminophen   Tablet .. 650 milliGRAM(s) Oral every 6 hours PRN  aspirin enteric coated 81 milliGRAM(s) Oral daily  atorvastatin 80 milliGRAM(s) Oral at bedtime  calcium gluconate IVPB 2 Gram(s) IV Intermittent once  carvedilol 25 milliGRAM(s) Oral every 12 hours  clopidogrel Tablet 75 milliGRAM(s) Oral daily  dextrose 40% Gel 15 Gram(s) Oral once PRN  digoxin     Tablet 0.125 milliGRAM(s) Oral daily  finasteride 5 milliGRAM(s) Oral daily  heparin  Injectable 5000 Unit(s) SubCutaneous every 8 hours  HYDROmorphone  Injectable 0.5 milliGRAM(s) IV Push every 6 hours PRN  insulin glargine Injectable (LANTUS) 45 Unit(s) SubCutaneous at bedtime  insulin lispro (HumaLOG) corrective regimen sliding scale   SubCutaneous three times a day before meals  insulin lispro Injectable (HumaLOG) 22 Unit(s) SubCutaneous Before meals and at bedtime  lactobacillus acidophilus 1 Tablet(s) Oral three times a day with meals  ondansetron Injectable 4 milliGRAM(s) IV Push every 6 hours PRN  oxyCODONE    IR 5 milliGRAM(s) Oral every 4 hours PRN  oxyCODONE    IR 10 milliGRAM(s) Oral every 4 hours PRN  pantoprazole    Tablet 40 milliGRAM(s) Oral before breakfast  piperacillin/tazobactam IVPB.. 3.375 Gram(s) IV Intermittent every 12 hours  polyethylene glycol 3350 17 Gram(s) Oral two times a day  senna 2 Tablet(s) Oral at bedtime  simethicone 80 milliGRAM(s) Chew every 6 hours  sodium chloride 0.9%. 1000 milliLiter(s) IV Continuous <Continuous>      FAMILY HISTORY:  Family history of heart disease: father  : non-contributory    Social History:     Ambulation: Walking independently [X ] With Cane [ ] With Walker [ ]  Bedbound [ ]                           7.9    17.07 )-----------( 323      ( 2020 08:39 )             24.1     04-14    134<L>  |  99  |  53.0<H>  ----------------------------<  322<H>  4.2   |  24.0  |  2.49<H>    Ca    7.0<L>      2020 08:39  Phos  4.0     04-13  Mg     2.4     04-13    TPro  x   /  Alb  1.6<L>  /  TBili  x   /  DBili  x   /  AST  x   /  ALT  x   /  AlkPhos  x   -14      PHYSICAL EXAM:    Vital Signs Last 24 Hrs  T(C): 37.1 (2020 05:45), Max: 37.1 (2020 05:45)  T(F): 98.7 (2020 05:45), Max: 98.7 (2020 05:45)  HR: 72 (2020 05:45) (71 - 74)  BP: 154/67 (2020 05:45) (154/67 - 180/77)  BP(mean): --  RR: 18 (2020 05:45) (18 - 18)  SpO2: 92% (2020 05:45) (92% - 94%)  Daily     Daily Weight in k.1 (2020 10:18)    Appearance: Alert, responsive, in no acute distress.    Neurological: Sensation is grossly intact to light touch. 5/5 motor function of all extremities. No focal deficits or weaknesses found.    Skin: no rash on visible skin. Skin is clean, dry and intact. No bleeding. No abrasions. No ulcerations.    Vascular: 2+ distal pulses. Cap refill < 2 sec.  No extremity ulcerations. No cyanosis.    Musculoskeletal:       Right Upper Extremity: +swelling elbow and hand/fingers. No erythema. Skin intact. +TTP lat and medial elbow. No fluctuance. Limited AROM elbow secondary to pain. PROM  with pain. < from: US Duplex Venous [facility], Right (20 @ 10:42) >     EXAM:  US DPLX [facility] EXT VEINS LTD RT                          PROCEDURE DATE:  2020          INTERPRETATION:  CLINICAL INFORMATION: Right hand and elbow pain and swelling. Post open-heart surgery.    COMPARISON: None available.    TECHNIQUE: Duplex sonography of the RIGHT UPPER extremity veins with color and spectral Doppler, with and without compression.      FINDINGS:    The right internal jugular, subclavian, axillary, brachial, ulnar and radial veins are patent and compressible where applicable.     The basilic vein (superficial vein) is patent and without thrombus.   The visualized right cephalic vein is limited due to its small caliber and tortuosity and secondary to patient's pain and limited mobility of the right arm/hand.    Doppler examination shows normal spontaneous and phasic flow.    IMPRESSION:     No evidence of right upper extremity deep venous thrombosis.    < end of copied text >  +sup/pronation. +WE/WF. +ROM fingers. +ROM shoulder. Sensation intact. Radial pulse 2+. Brisk cap refill.     Imaging Studies:  < from: Xray Elbow AP + Lateral, Right (20 @ 10:13) >     EXAM:  ELBOW 2VIEWS RT                          PROCEDURE DATE:  2020          INTERPRETATION:  Right elbow. 3 views. Patient has local swelling. There is no elbow effusion.    There is slight degeneration of the articular ulna.    No bone destruction or fracture.    IMPRESSION: Slight degeneration.                BRANDEN CAMARA   This document has been electronically signed. 2020 10:23AM    < end of copied text >    A/P:  Pt is a  64y Male with left groin infection now with right elbow pain, r/o infected elbow    PLAN:   -pt going to OR tomorrow with surgery for groin debridement, will coordinate with them and do aspiration of the elbow and consider I&D depending on appearance of fluid.  -pain control  -pt already on IV abx  -d/w surgery to coordinate plan  -D/W Dr Booker Pt Name: MARIA ROSS    MRN: [id number]      Patient is a 64y Male admitted for left groin infection now c/o right elbow pain x 2 days. Pt says he noticed pain in right elbow two days ago that felt like he had banged it. He denies any trauma or previous injury. Pain has continued to worsen. He complains of pain with movement of his elbow. No wrist or shoulder pain. No numbness or tingling in extremity. RHD. No history of elbow pain prior.   .    HEALTH ISSUES - PROBLEM Dx:  H/O CHF: H/O CHF  Essential hypertension: Essential hypertension  Stage 4 chronic kidney disease: Stage 4 chronic kidney disease  Coronary artery disease involving coronary bypass graft of native heart without angina pectoris: Coronary artery disease involving coronary bypass graft of native heart without angina pectoris  Type 2 diabetes mellitus with hyperglycemia, with long-term current use of insulin: Type 2 diabetes mellitus with hyperglycemia, with long-term current use of insulin  Cellulitis and abscess: Cellulitis and abscess    REVIEW OF SYSTEMS    General:	+fevers    Skin/Breast: No rash  	  Respiratory and Thorax: No SOB  	  Cardiovascular:	No CP    Gastrointestinal:	 No nausea    Musculoskeletal:	 See HPI    Neurological:	See HPI    ROS is otherwise negative.    PAST MEDICAL & SURGICAL HISTORY:  PAST MEDICAL & SURGICAL HISTORY:  Insulin resistance  Fatty liver  CKD (chronic kidney disease)  BPH (benign prostatic hyperplasia)  Dyslipidemia  Hypertension  CAD (coronary artery disease)  DVT of leg (deep venous thrombosis), right  Diabetes: Peripheral vascular disease  CHF- EF-19% AICD ([employer] scientific)  MI  2013   HTN  Pulmonary edema  High Cholesterol  Chronic renal insufficency  S/P thyroid biopsy  S/P colonoscopy with polypectomy  AICD (automatic cardioverter/defibrillator) present  S/P angioplasty with stent: right leg , L femoral 2014  S/P coronary artery bypass graft x 3:     AICD ([employer] scientific)       Allergies: No Known Allergies      Medications: acetaminophen   Tablet .. 650 milliGRAM(s) Oral every 6 hours PRN  aspirin enteric coated 81 milliGRAM(s) Oral daily  atorvastatin 80 milliGRAM(s) Oral at bedtime  calcium gluconate IVPB 2 Gram(s) IV Intermittent once  carvedilol 25 milliGRAM(s) Oral every 12 hours  clopidogrel Tablet 75 milliGRAM(s) Oral daily  dextrose 40% Gel 15 Gram(s) Oral once PRN  digoxin     Tablet 0.125 milliGRAM(s) Oral daily  finasteride 5 milliGRAM(s) Oral daily  heparin  Injectable 5000 Unit(s) SubCutaneous every 8 hours  HYDROmorphone  Injectable 0.5 milliGRAM(s) IV Push every 6 hours PRN  insulin glargine Injectable (LANTUS) 45 Unit(s) SubCutaneous at bedtime  insulin lispro (HumaLOG) corrective regimen sliding scale   SubCutaneous three times a day before meals  insulin lispro Injectable (HumaLOG) 22 Unit(s) SubCutaneous Before meals and at bedtime  lactobacillus acidophilus 1 Tablet(s) Oral three times a day with meals  ondansetron Injectable 4 milliGRAM(s) IV Push every 6 hours PRN  oxyCODONE    IR 5 milliGRAM(s) Oral every 4 hours PRN  oxyCODONE    IR 10 milliGRAM(s) Oral every 4 hours PRN  pantoprazole    Tablet 40 milliGRAM(s) Oral before breakfast  piperacillin/tazobactam IVPB.. 3.375 Gram(s) IV Intermittent every 12 hours  polyethylene glycol 3350 17 Gram(s) Oral two times a day  senna 2 Tablet(s) Oral at bedtime  simethicone 80 milliGRAM(s) Chew every 6 hours  sodium chloride 0.9%. 1000 milliLiter(s) IV Continuous <Continuous>      FAMILY HISTORY:  Family history of heart disease: father  : non-contributory    Social History:     Ambulation: Walking independently [X ] With Cane [ ] With Walker [ ]  Bedbound [ ]                           7.9    17.07 )-----------( 323      ( 2020 08:39 )             24.1     04-14    134<L>  |  99  |  53.0<H>  ----------------------------<  322<H>  4.2   |  24.0  |  2.49<H>    Ca    7.0<L>      2020 08:39  Phos  4.0     04-13  Mg     2.4     04-13    TPro  x   /  Alb  1.6<L>  /  TBili  x   /  DBili  x   /  AST  x   /  ALT  x   /  AlkPhos  x   -14      PHYSICAL EXAM:    Vital Signs Last 24 Hrs  T(C): 37.1 (2020 05:45), Max: 37.1 (2020 05:45)  T(F): 98.7 (2020 05:45), Max: 98.7 (2020 05:45)  HR: 72 (2020 05:45) (71 - 74)  BP: 154/67 (2020 05:45) (154/67 - 180/77)  BP(mean): --  RR: 18 (2020 05:45) (18 - 18)  SpO2: 92% (2020 05:45) (92% - 94%)  Daily     Daily Weight in k.1 (2020 10:18)    Appearance: Alert, responsive, in no acute distress.    Neurological: Sensation is grossly intact to light touch. 5/5 motor function of all extremities. No focal deficits or weaknesses found.    Skin: no rash on visible skin. Skin is clean, dry and intact. No bleeding. No abrasions. No ulcerations.    Vascular: 2+ distal pulses. Cap refill < 2 sec.  No extremity ulcerations. No cyanosis.    Musculoskeletal:       Right Upper Extremity: +swelling elbow and hand/fingers. No erythema. Skin intact. +TTP lat and medial elbow. No fluctuance. Limited AROM elbow secondary to pain. PROM  with pain. < from: US Duplex Venous [facility], Right (20 @ 10:42) >     EXAM:  US DPLX [facility] EXT VEINS LTD RT                          PROCEDURE DATE:  2020          INTERPRETATION:  CLINICAL INFORMATION: Right hand and elbow pain and swelling. Post open-heart surgery.    COMPARISON: None available.    TECHNIQUE: Duplex sonography of the RIGHT UPPER extremity veins with color and spectral Doppler, with and without compression.      FINDINGS:    The right internal jugular, subclavian, axillary, brachial, ulnar and radial veins are patent and compressible where applicable.     The basilic vein (superficial vein) is patent and without thrombus.   The visualized right cephalic vein is limited due to its small caliber and tortuosity and secondary to patient's pain and limited mobility of the right arm/hand.    Doppler examination shows normal spontaneous and phasic flow.    IMPRESSION:     No evidence of right upper extremity deep venous thrombosis.    < end of copied text >  +sup/pronation. +WE/WF. +ROM fingers. +ROM shoulder. Sensation intact. Radial pulse 2+. Brisk cap refill.     Imaging Studies:  < from: Xray Elbow AP + Lateral, Right (20 @ 10:13) >     EXAM:  ELBOW 2VIEWS RT                          PROCEDURE DATE:  2020          INTERPRETATION:  Right elbow. 3 views. Patient has local swelling. There is no elbow effusion.    There is slight degeneration of the articular ulna.    No bone destruction or fracture.    IMPRESSION: Slight degeneration.                BRANDEN CAMARA   This document has been electronically signed. 2020 10:23AM    < end of copied text >    A/P:  Pt is a  64y Male with left groin infection now with right elbow pain, r/o infected elbow    PLAN:   -pt going to OR tomorrow with surgery for groin debridement, will coordinate with them for possible aspiration of the elbow and consider I&D depending on appearance of fluid.  -pain control  -pt already on IV abx  -d/w surgery to coordinate plan  -D/W Dr Booker

## 2020-04-14 NOTE — PROGRESS NOTE ADULT - SUBJECTIVE AND OBJECTIVE BOX
[facility]  INFECTIOUS DISEASES AND INTERNAL MEDICINE at [place]  =======================================================  Noe Navarro MD  Diplomates American Board of Internal Medicine and Infectious Diseases  Tel: [phone]      Fax: [phone]  =======================================================    CODY MARIA [id number]    Follow up: left groin necrotizing infection  pain controlled    Allergies:  No Known Allergies      Medications:  acetaminophen   Tablet .. 650 milliGRAM(s) Oral every 6 hours PRN  aspirin enteric coated 81 milliGRAM(s) Oral daily  atorvastatin 80 milliGRAM(s) Oral at bedtime  calcium gluconate IVPB 2 Gram(s) IV Intermittent once  carvedilol 25 milliGRAM(s) Oral every 12 hours  clopidogrel Tablet 75 milliGRAM(s) Oral daily  dextrose 40% Gel 15 Gram(s) Oral once PRN  digoxin     Tablet 0.125 milliGRAM(s) Oral daily  finasteride 5 milliGRAM(s) Oral daily  heparin  Injectable 5000 Unit(s) SubCutaneous every 8 hours  HYDROmorphone  Injectable 0.5 milliGRAM(s) IV Push every 6 hours PRN  insulin glargine Injectable (LANTUS) 45 Unit(s) SubCutaneous at bedtime  insulin lispro (HumaLOG) corrective regimen sliding scale   SubCutaneous three times a day before meals  insulin lispro Injectable (HumaLOG) 22 Unit(s) SubCutaneous Before meals and at bedtime  lactobacillus acidophilus 1 Tablet(s) Oral three times a day with meals  ondansetron Injectable 4 milliGRAM(s) IV Push every 6 hours PRN  oxyCODONE    IR 5 milliGRAM(s) Oral every 4 hours PRN  oxyCODONE    IR 10 milliGRAM(s) Oral every 4 hours PRN  pantoprazole    Tablet 40 milliGRAM(s) Oral before breakfast  piperacillin/tazobactam IVPB.. 3.375 Gram(s) IV Intermittent every 12 hours  polyethylene glycol 3350 17 Gram(s) Oral two times a day  senna 2 Tablet(s) Oral at bedtime  simethicone 80 milliGRAM(s) Chew every 6 hours  sodium chloride 0.9%. 1000 milliLiter(s) IV Continuous <Continuous>            REVIEW OF SYSTEMS:  CONSTITUTIONAL:  No Fever or chills  HEENT:   No diplopia or blurred vision.  No earache, sore throat or runny nose.  CARDIOVASCULAR:  No pressure, squeezing, strangling, tightness, heaviness or aching about the chest, neck, axilla or epigastrium.  RESPIRATORY:  No cough, shortness of breath  GASTROINTESTINAL:  No nausea, vomiting or diarrhea.  GENITOURINARY:  No dysuria, frequency or urgency. No Blood in urine  MUSCULOSKELETAL:  no joint aches, no muscle pain  SKIN:  No change in skin, hair or nails.  NEUROLOGIC:  No Headaches, seizures or weakness.  PSYCHIATRIC:  No disorder of thought or mood.  ENDOCRINE:  No heat or cold intolerance  HEMATOLOGICAL:  No easy bruising or bleeding.            Physical Exam:  ICU Vital Signs Last 24 Hrs  T(C): 37.1 (14 Apr 2020 05:45), Max: 37.1 (14 Apr 2020 05:45)  T(F): 98.7 (14 Apr 2020 05:45), Max: 98.7 (14 Apr 2020 05:45)  HR: 72 (14 Apr 2020 05:45) (71 - 74)  BP: 154/67 (14 Apr 2020 05:45) (154/67 - 180/77)  BP(mean): --  ABP: --  ABP(mean): --  RR: 18 (14 Apr 2020 05:45) (18 - 18)  SpO2: 92% (14 Apr 2020 05:45) (92% - 94%)      GEN: NAD, pleasant  HEENT: normocephalic and atraumatic. EOMI. PERRL.  Anicteric   NECK: Supple.   LUNGS: Clear to auscultation.  HEART: Regular rate and rhythm without murmur.  ABDOMEN: Soft, nontender, and nondistended.  Positive bowel sounds.    : No CVA tenderness left groin vac in place  EXTREMITIES: Without any edema.  MSK: no joint swelling  NEUROLOGIC: Cranial nerves II through XII are grossly intact. No focal deficits  PSYCHIATRIC: Appropriate affect .  SKIN: No Rash      Labs:                        7.9    17.07 )-----------( 323      ( 14 Apr 2020 08:39 )             24.1   04-14    134<L>  |  99  |  53.0<H>  ----------------------------<  322<H>  4.2   |  24.0  |  2.49<H>    Ca    7.0<L>      14 Apr 2020 08:39  Phos  4.0     04-13  Mg     2.4     04-13    TPro  x   /  Alb  1.6<L>  /  TBili  x   /  DBili  x   /  AST  x   /  ALT  x   /  AlkPhos  x   04-14

## 2020-04-14 NOTE — PROGRESS NOTE ADULT - SUBJECTIVE AND OBJECTIVE BOX
INTERVAL HPI/OVERNIGHT EVENTS:  FS reviewed  still hyperglycemic to 200s    Review of systems:  unable to obtain    MEDICATIONS  (STANDING):  aspirin enteric coated 81 milliGRAM(s) Oral daily  atorvastatin 80 milliGRAM(s) Oral at bedtime  carvedilol 25 milliGRAM(s) Oral every 12 hours  clopidogrel Tablet 75 milliGRAM(s) Oral daily  digoxin     Tablet 0.125 milliGRAM(s) Oral daily  finasteride 5 milliGRAM(s) Oral daily  heparin  Injectable 5000 Unit(s) SubCutaneous every 8 hours  insulin glargine Injectable (LANTUS) 45 Unit(s) SubCutaneous at bedtime  insulin lispro (HumaLOG) corrective regimen sliding scale   SubCutaneous three times a day before meals  insulin lispro Injectable (HumaLOG) 22 Unit(s) SubCutaneous Before meals and at bedtime  lactobacillus acidophilus 1 Tablet(s) Oral three times a day with meals  pantoprazole    Tablet 40 milliGRAM(s) Oral before breakfast  piperacillin/tazobactam IVPB.. 3.375 Gram(s) IV Intermittent every 12 hours  polyethylene glycol 3350 17 Gram(s) Oral two times a day  senna 2 Tablet(s) Oral at bedtime  simethicone 80 milliGRAM(s) Chew every 6 hours  sodium chloride 0.9%. 1000 milliLiter(s) (125 mL/Hr) IV Continuous <Continuous>    MEDICATIONS  (PRN):  acetaminophen   Tablet .. 650 milliGRAM(s) Oral every 6 hours PRN Temp greater or equal to 38C (100.4F)  dextrose 40% Gel 15 Gram(s) Oral once PRN Blood Glucose LESS THAN 70 milliGRAM(s)/deciLiter  HYDROmorphone  Injectable 0.5 milliGRAM(s) IV Push every 6 hours PRN Breakthrough pain and dressing changes  ondansetron Injectable 4 milliGRAM(s) IV Push every 6 hours PRN Nausea and/or Vomiting  oxyCODONE    IR 5 milliGRAM(s) Oral every 4 hours PRN Moderate Pain (4 - 6)  oxyCODONE    IR 10 milliGRAM(s) Oral every 4 hours PRN Severe Pain (7 - 10)      Allergies    No Known Allergies    Intolerances        Vital Signs Last 24 Hrs  T(C): 37.1 (14 Apr 2020 05:45), Max: 37.1 (14 Apr 2020 05:45)  T(F): 98.7 (14 Apr 2020 05:45), Max: 98.7 (14 Apr 2020 05:45)  HR: 72 (14 Apr 2020 05:45) (71 - 74)  BP: 154/67 (14 Apr 2020 05:45) (154/67 - 180/77)  BP(mean): --  RR: 18 (14 Apr 2020 05:45) (18 - 18)  SpO2: 92% (14 Apr 2020 05:45) (92% - 94%)    deferred due to covid    LABS:                        7.9    17.07 )-----------( 323      ( 14 Apr 2020 08:39 )             24.1     04-14    134<L>  |  99  |  53.0<H>  ----------------------------<  322<H>  4.2   |  24.0  |  2.49<H>    Ca    7.0<L>      14 Apr 2020 08:39  Phos  4.0     04-13  Mg     2.4     04-13    TPro  x   /  Alb  1.6<L>  /  TBili  x   /  DBili  x   /  AST  x   /  ALT  x   /  AlkPhos  x   04-14            CAPILLARY BLOOD GLUCOSE      POCT Blood Glucose.: 265 mg/dL (14 Apr 2020 11:39)  POCT Blood Glucose.: 302 mg/dL (14 Apr 2020 06:41)  POCT Blood Glucose.: 391 mg/dL (13 Apr 2020 22:13)  POCT Blood Glucose.: 367 mg/dL (13 Apr 2020 16:21)          RADIOLOGY & ADDITIONAL TESTS:  none

## 2020-04-14 NOTE — PROGRESS NOTE ADULT - ASSESSMENT
MARSHALL on CKD stage III  Renal function cr 2.4 today improving daily  Follows w Dr Pedro in our office  Has h/o CAD, S/P MI, CABG, ICM with AICD  Renal sono noted no hydronephrosis  Will cont IVF w  bicarb for MA ( Has h/o of HF )  Hypocalcemia supplemented IV again today   iPTH pending  Very low albumin  will check 24 hr urine for protein, NS? -ongoing  s/p OR on 4/10- Debridement, external genitalia, perineum  I&D abscess   L groin hematoma/ cellulitis on abx  ID following    Will follow

## 2020-04-14 NOTE — PROGRESS NOTE ADULT - ASSESSMENT
64M with extensive cardiac history, htn, DM2 cb ckd came to the ER for left groin area infection which started 1 week ago after he had angiography through left groin area 2 weeks ago, s/p 3 stents (done at [facility] by ok [facility] ). Pt stated that he has been on levaquin by his doctor but getting worse. some pain in the left groin area, denies fever but had chills. no cp, no sob. no dizziness, no abd. pain. no n/v/d. As per pt. his cardiologist is aware that his groin area is not healing, was contacted via phone.     Uncontrolled T2DM- hyperglycemic  -A1c 11.5  -check sugars AC and bedtime  -ensure diabetic diet  -change lantus to 45 units QHS  -change lispro to 22 units TID  -continue insulin sliding scale    HLD- continue statin    Hypocalcemia- mild. corrected normal. normal vit d and pth

## 2020-04-14 NOTE — PROGRESS NOTE ADULT - ASSESSMENT
63 y/o male with extensive cardiac history, htn, DM came to the ER for left groin area infection which started 1 week ago after he had angiography through left groin area 2 weeks ago, got 3 stents ( done at [facility] by ok [facility] ). Pt stated that he has been on levaquin by his doctor but getting worse.       Left groin necrotizing soft tissue infectoin  Leukocytosis  Josep on CKD    - s/p left groin exploration washout and wide excisional debridement of necrotizing soft tissue infection on 4/10  - vac applied 4/13  - WBC elevated  - Blood cultures NGTD  - f/u final OR cultures- staph lugdunensis, finegoldia magna  - preop Wound CX likely skin contaminants  - Continue Zosyn, if renal function continues to improve will adjust dose  - Trend Fever  - Trend Leukocytosis      Will Follow 65 y/o male with extensive cardiac history, htn, DM came to the ER for left groin area infection which started 1 week ago after he had angiography through left groin area 2 weeks ago, got 3 stents ( done at [facility] by ok [facility] ). Pt stated that he has been on levaquin by his doctor but getting worse.       Left groin necrotizing soft tissue infectoin  Leukocytosis  Josep on CKD  ? septic elbow    - s/p left groin exploration washout and wide excisional debridement of necrotizing soft tissue infection on 4/10, plan for OR tomorrow  - vac applied 4/13  - WBC elevated  - Blood cultures NGTD  - f/u final OR cultures- staph lugdunensis, finegoldia magna  - preop Wound CX likely skin contaminants  - Continue Zosyn, if renal function continues to improve will adjust dose  - Trend Fever  - Trend Leukocytosis      Will Follow

## 2020-04-14 NOTE — DIETITIAN INITIAL EVALUATION ADULT. - ADD RECOMMEND
RX: Continue glucerna TID to optimize po intake and provide an additional 220kcal, 10g protein per serving. RX: MVI and vitamin C. Continue bowel regimen PRN. Monitor wts and labs.

## 2020-04-14 NOTE — DIETITIAN INITIAL EVALUATION ADULT. - OTHER INFO
Patient is a 64 year old male with history of diabetes s/p excisional debridement of soft tissue infection of the left groin on 4/10. Pt has good po intake, consumed 80% of meal per documentation. HgbA1c 11.5%. L groin 3+ edema noted. Last documented BM 4/7. Unable to contact pt, limited information obtained. Nutrition education to follow.

## 2020-04-14 NOTE — PROGRESS NOTE ADULT - ASSESSMENT
Assessment  groin abcess s/p cath with surgical repair  probable septic elbow - no evidence of gout or DVT  stable CAD s/p recent PCI  ischemic CM compensated w/o evidence of CHF  ICD  DM      Rec  cont current meds  IV Ab as per ID  ortho evaluation regarding poss septic elbow  will follow Assessment  groin abcess s/p cath with Mynx closure device with surgical repair  probable septic elbow - no evidence of gout or DVT  stable CAD s/p recent PCI  ischemic CM compensated w/o evidence of CHF  ICD  DM      Rec  cont current meds  IV Ab as per ID  ortho evaluation regarding poss septic elbow  will follow

## 2020-04-14 NOTE — PROGRESS NOTE ADULT - SUBJECTIVE AND OBJECTIVE BOX
[facility]                                   [address] [place] [address]                                                      PHONE: [phone]                                                         FAX: [phone]  [email or web address]  ---------------------------------------------------------------------------------------------------------------------------------    Overnight events/patient complaints: still c/o elbow pain, uric acid normal, no evidence of DVT, working dx septic joint      No Known Allergies    MEDICATIONS  (STANDING):  aspirin enteric coated 81 milliGRAM(s) Oral daily  atorvastatin 80 milliGRAM(s) Oral at bedtime  carvedilol 25 milliGRAM(s) Oral every 12 hours  clopidogrel Tablet 75 milliGRAM(s) Oral daily  digoxin     Tablet 0.125 milliGRAM(s) Oral daily  finasteride 5 milliGRAM(s) Oral daily  heparin  Injectable 5000 Unit(s) SubCutaneous every 8 hours  insulin glargine Injectable (LANTUS) 34 Unit(s) SubCutaneous at bedtime  insulin lispro (HumaLOG) corrective regimen sliding scale   SubCutaneous three times a day before meals  insulin lispro Injectable (HumaLOG) 12 Unit(s) SubCutaneous three times a day before meals  lactobacillus acidophilus 1 Tablet(s) Oral three times a day with meals  pantoprazole    Tablet 40 milliGRAM(s) Oral before breakfast  piperacillin/tazobactam IVPB.. 3.375 Gram(s) IV Intermittent every 12 hours  senna 2 Tablet(s) Oral at bedtime  simethicone 80 milliGRAM(s) Chew every 6 hours  sodium chloride 0.45% 1000 milliLiter(s) (100 mL/Hr) IV Continuous <Continuous>    MEDICATIONS  (PRN):  acetaminophen   Tablet .. 650 milliGRAM(s) Oral every 6 hours PRN Temp greater or equal to 38C (100.4F)  dextrose 40% Gel 15 Gram(s) Oral once PRN Blood Glucose LESS THAN 70 milliGRAM(s)/deciLiter  HYDROmorphone  Injectable 0.5 milliGRAM(s) IV Push every 6 hours PRN Breakthrough pain and dressing changes  ondansetron Injectable 4 milliGRAM(s) IV Push every 6 hours PRN Nausea and/or Vomiting  oxyCODONE    IR 5 milliGRAM(s) Oral every 4 hours PRN Moderate Pain (4 - 6)  oxyCODONE    IR 10 milliGRAM(s) Oral every 4 hours PRN Severe Pain (7 - 10)      Vital Signs Last 24 Hrs  T(C): 37.1 (14 Apr 2020 05:45), Max: 37.1 (14 Apr 2020 05:45)  T(F): 98.7 (14 Apr 2020 05:45), Max: 98.7 (14 Apr 2020 05:45)  HR: 72 (14 Apr 2020 05:45) (71 - 74)  BP: 154/67 (14 Apr 2020 05:45) (154/67 - 180/77)  BP(mean): --  RR: 18 (14 Apr 2020 05:45) (18 - 18)  SpO2: 92% (14 Apr 2020 05:45) (92% - 94%)  Daily     Daily   ICU Vital Signs Last 24 Hrs  MARIA ROSS  I&O's Detail    13 Apr 2020 07:01  -  14 Apr 2020 07:00  --------------------------------------------------------  IN:    Oral Fluid: 480 mL    sodium chloride 0.45%: 1100 mL  Total IN: 1580 mL    OUT:    Indwelling Catheter - Urethral: 1800 mL  Total OUT: 1800 mL    Total NET: -220 mL        I&O's Summary    13 Apr 2020 07:01  -  14 Apr 2020 07:00  --------------------------------------------------------  IN: 1580 mL / OUT: 1800 mL / NET: -220 mL      Drug Dosing Weight  MARIA ROMANON      PHYSICAL EXAM:  General: Appears well developed, well nourished alert and cooperative.  HEENT: Head; normocephalic, atraumatic.  Eyes: Pupils reactive, cornea wnl.  Neck: Supple, no nodes adenopathy, no NVD or carotid bruit or thyromegaly.  CARDIOVASCULAR: Normal S1 and S2, No murmur, rub, gallop or lift.   LUNGS: No rales, rhonchi or wheeze. Normal breath sounds bilaterally.  ABDOMEN: Soft, nontender without mass or organomegaly. bowel sounds normoactive.  EXTREMITIES: No clubbing, cyanosis or edema. Distal pulses wnl.   SKIN: warm and dry with normal turgor.  NEURO: Alert/oriented x 3/normal motor exam. No pathologic reflexes.    PSYCH: normal affect.        LABS:                        9.3    15.37 )-----------( 376      ( 13 Apr 2020 07:05 )             29.3     04-13    137  |  104  |  75.0<H>  ----------------------------<  273<H>  4.5   |  19.0<L>  |  2.85<H>    Ca    8.0<L>      13 Apr 2020 07:06  Phos  4.0     04-13  Mg     2.4     04-13    TPro  x   /  Alb  1.8<L>  /  TBili  x   /  DBili  x   /  AST  x   /  ALT  x   /  AlkPhos  x   04-13    MARIA ROSS            RADIOLOGY & ADDITIONAL STUDIES:    INTERPRETATION OF TELEMETRY (personally reviewed):    ECG:< from: 12 Lead ECG (04.09.20 @ 12:52) >  Diagnosis Line Normal sinus rhythm  Possible Left atrial enlargement  Septal infarct , age undetermined  T wave abnormality, consider inferior ischemia    Confirmed by NICHOLAS REYNA ([id number]) on 4/10/2020 9:57:29 AM    < end of copied text >      ECHO:

## 2020-04-14 NOTE — PROGRESS NOTE ADULT - SUBJECTIVE AND OBJECTIVE BOX
NEPHROLOGY INTERVAL HPI/OVERNIGHT EVENTS:    Examined  sitting up in bed   Awake alert no distress  has good UOP    MEDICATIONS  (STANDING):  aspirin enteric coated 81 milliGRAM(s) Oral daily  atorvastatin 80 milliGRAM(s) Oral at bedtime  carvedilol 25 milliGRAM(s) Oral every 12 hours  clopidogrel Tablet 75 milliGRAM(s) Oral daily  digoxin     Tablet 0.125 milliGRAM(s) Oral daily  finasteride 5 milliGRAM(s) Oral daily  heparin  Injectable 5000 Unit(s) SubCutaneous every 8 hours  insulin glargine Injectable (LANTUS) 45 Unit(s) SubCutaneous at bedtime  insulin lispro (HumaLOG) corrective regimen sliding scale   SubCutaneous three times a day before meals  insulin lispro Injectable (HumaLOG) 22 Unit(s) SubCutaneous Before meals and at bedtime  lactobacillus acidophilus 1 Tablet(s) Oral three times a day with meals  pantoprazole    Tablet 40 milliGRAM(s) Oral before breakfast  piperacillin/tazobactam IVPB.. 3.375 Gram(s) IV Intermittent every 12 hours  polyethylene glycol 3350 17 Gram(s) Oral two times a day  senna 2 Tablet(s) Oral at bedtime  simethicone 80 milliGRAM(s) Chew every 6 hours  sodium chloride 0.9%. 1000 milliLiter(s) (125 mL/Hr) IV Continuous <Continuous>    MEDICATIONS  (PRN):  acetaminophen   Tablet .. 650 milliGRAM(s) Oral every 6 hours PRN Temp greater or equal to 38C (100.4F)  dextrose 40% Gel 15 Gram(s) Oral once PRN Blood Glucose LESS THAN 70 milliGRAM(s)/deciLiter  HYDROmorphone  Injectable 0.5 milliGRAM(s) IV Push every 6 hours PRN Breakthrough pain and dressing changes  ondansetron Injectable 4 milliGRAM(s) IV Push every 6 hours PRN Nausea and/or Vomiting  oxyCODONE    IR 5 milliGRAM(s) Oral every 4 hours PRN Moderate Pain (4 - 6)  oxyCODONE    IR 10 milliGRAM(s) Oral every 4 hours PRN Severe Pain (7 - 10)      Allergies    No Known Allergies    Intolerances        Vital Signs Last 24 Hrs  T(C): 37.1 (2020 05:45), Max: 37.1 (2020 05:45)  T(F): 98.7 (2020 05:45), Max: 98.7 (2020 05:45)  HR: 72 (2020 05:45) (71 - 74)  BP: 154/67 (2020 05:45) (154/67 - 180/77)  BP(mean): --  RR: 18 (2020 05:45) (18 - 18)  SpO2: 92% (2020 05:45) (92% - 94%)  Daily     Daily Weight in k.1 (2020 10:18)    PHYSICAL EXAM:  GENERAL: appears chronically ill  HEAD:  Atraumatic, Normocephalic  EYES: EOMI  NECK: Supple, neck  veins full  NERVOUS SYSTEM:  Alert & Oriented X3  CHEST/LUNG: Clear to percussion bilaterally  HEART: Regular rate and rhythm  ABDOMEN: Soft, Nontender, Nondistended; Bowel sounds present  EXTREMITIES:  No edema    LABS:                        7.9    17.07 )-----------( 323      ( 2020 08:39 )             24.1     14    134<L>  |  99  |  53.0<H>  ----------------------------<  322<H>  4.2   |  24.0  |  2.49<H>    Ca    7.0<L>      2020 08:39  Phos  4.0       Mg     2.4         TPro  x   /  Alb  1.6<L>  /  TBili  x   /  DBili  x   /  AST  x   /  ALT  x   /  AlkPhos  x           Intact PTH: 42 pg/mL ( @ 16:51)  Vitamin D, 25-Hydroxy: 33.5 ng/mL ( @ 16:51)          RADIOLOGY & ADDITIONAL TESTS:

## 2020-04-14 NOTE — PROGRESS NOTE ADULT - SUBJECTIVE AND OBJECTIVE BOX
MARIA RIVERAAAN    [id number]    History:  The patient is status post wide excisional  debridement of left groin/ thigh post op day 4. Patient is reporting right elbow pain and inability to flex and extend elbow.  The patient's pain is controlled using the prescribed pain medications.  Denies nausea, vomiting, chest pain, shortness of breath, abdominal pain or fever. No new complaints. No acute motor or sensory changes are reported.    Vital Signs Last 24 Hrs  T(C): 37.1 (14 Apr 2020 05:45), Max: 37.1 (14 Apr 2020 05:45)  T(F): 98.7 (14 Apr 2020 05:45), Max: 98.7 (14 Apr 2020 05:45)  HR: 72 (14 Apr 2020 05:45) (71 - 74)  BP: 154/67 (14 Apr 2020 05:45) (154/67 - 180/77)  BP(mean): --  RR: 18 (14 Apr 2020 05:45) (18 - 18)  SpO2: 92% (14 Apr 2020 05:45) (92% - 94%)  I&O's Summary    13 Apr 2020 07:01  -  14 Apr 2020 07:00  --------------------------------------------------------  IN: 1580 mL / OUT: 1800 mL / NET: -220 mL                              7.9    17.07 )-----------( 323      ( 14 Apr 2020 08:39 )             24.1     04-14    134<L>  |  99  |  53.0<H>  ----------------------------<  322<H>  4.2   |  24.0  |  2.49<H>    Ca    7.0<L>      14 Apr 2020 08:39  Phos  4.0     04-13  Mg     2.4     04-13    TPro  x   /  Alb  1.6<L>  /  TBili  x   /  DBili  x   /  AST  x   /  ALT  x   /  AlkPhos  x   04-14      MEDICATIONS  (STANDING):  aspirin enteric coated 81 milliGRAM(s) Oral daily  atorvastatin 80 milliGRAM(s) Oral at bedtime  carvedilol 25 milliGRAM(s) Oral every 12 hours  clopidogrel Tablet 75 milliGRAM(s) Oral daily  digoxin     Tablet 0.125 milliGRAM(s) Oral daily  finasteride 5 milliGRAM(s) Oral daily  heparin  Injectable 5000 Unit(s) SubCutaneous every 8 hours  insulin glargine Injectable (LANTUS) 34 Unit(s) SubCutaneous at bedtime  insulin lispro (HumaLOG) corrective regimen sliding scale   SubCutaneous three times a day before meals  insulin lispro Injectable (HumaLOG) 12 Unit(s) SubCutaneous three times a day before meals  lactobacillus acidophilus 1 Tablet(s) Oral three times a day with meals  pantoprazole    Tablet 40 milliGRAM(s) Oral before breakfast  piperacillin/tazobactam IVPB.. 3.375 Gram(s) IV Intermittent every 12 hours  senna 2 Tablet(s) Oral at bedtime  simethicone 80 milliGRAM(s) Chew every 6 hours  sodium chloride 0.45% 1000 milliLiter(s) (100 mL/Hr) IV Continuous <Continuous>    MEDICATIONS  (PRN):  acetaminophen   Tablet .. 650 milliGRAM(s) Oral every 6 hours PRN Temp greater or equal to 38C (100.4F)  dextrose 40% Gel 15 Gram(s) Oral once PRN Blood Glucose LESS THAN 70 milliGRAM(s)/deciLiter  HYDROmorphone  Injectable 0.5 milliGRAM(s) IV Push every 6 hours PRN Breakthrough pain and dressing changes  ondansetron Injectable 4 milliGRAM(s) IV Push every 6 hours PRN Nausea and/or Vomiting  oxyCODONE    IR 5 milliGRAM(s) Oral every 4 hours PRN Moderate Pain (4 - 6)  oxyCODONE    IR 10 milliGRAM(s) Oral every 4 hours PRN Severe Pain (7 - 10)      Physical exam:     No distress  alert and oriented  non labored breathing  left groin with wound vac in place, functioning well  The lateral aspect of the wound ( medial anterior thigh) is exhibiting some necrotic changes  No tenderness to palpation, nothing expressible  medial aspect of the right elbow is ttp  Right elbow with edema and erythema, patent with pain with attempted ROM to the elbow.  No wrist drop, able to perform wrist and digital rom.    Primary Assessment:  • s/p wide debridement of left groin for necrotizing fasciitis  - tissue necrotic changes present and will require reintervention   Right elbow pain, concerns for septic artritis  Patient with no history of gout   Blood cultures are negitive so far   Wound cultures with multiple species  Blood sugars and not adequately managed      - plan for redebridement tomorrow  - needs strict glycemic management, will increase humalog to start  - cont IV antibiotics  - elbow xray ordered, Orthopedic service consulted

## 2020-04-15 ENCOUNTER — RESULT REVIEW (OUTPATIENT)
Age: 65
End: 2020-04-15

## 2020-04-15 LAB
ANION GAP SERPL CALC-SCNC: 13 MMOL/L — SIGNIFICANT CHANGE UP (ref 5–17)
B PERT IGG+IGM PNL SER: ABNORMAL
BASOPHILS # BLD AUTO: 0.04 K/UL — SIGNIFICANT CHANGE UP (ref 0–0.2)
BASOPHILS NFR BLD AUTO: 0.2 % — SIGNIFICANT CHANGE UP (ref 0–2)
BUN SERPL-MCNC: 55 MG/DL — HIGH (ref 8–20)
CALCIUM SERPL-MCNC: 7.7 MG/DL — LOW (ref 8.6–10.2)
CHLORIDE SERPL-SCNC: 106 MMOL/L — SIGNIFICANT CHANGE UP (ref 98–107)
CO2 SERPL-SCNC: 21 MMOL/L — LOW (ref 22–29)
COLOR FLD: ABNORMAL
CREAT SERPL-MCNC: 2.4 MG/DL — HIGH (ref 0.5–1.3)
EOSINOPHIL # BLD AUTO: 0.16 K/UL — SIGNIFICANT CHANGE UP (ref 0–0.5)
EOSINOPHIL NFR BLD AUTO: 0.8 % — SIGNIFICANT CHANGE UP (ref 0–6)
FLUID INTAKE SUBSTANCE CLASS: SIGNIFICANT CHANGE UP
FLUID SEGMENTED GRANULOCYTES: 90 % — SIGNIFICANT CHANGE UP
GLUCOSE BLDC GLUCOMTR-MCNC: 147 MG/DL — HIGH (ref 70–99)
GLUCOSE BLDC GLUCOMTR-MCNC: 170 MG/DL — HIGH (ref 70–99)
GLUCOSE BLDC GLUCOMTR-MCNC: 187 MG/DL — HIGH (ref 70–99)
GLUCOSE BLDC GLUCOMTR-MCNC: 225 MG/DL — HIGH (ref 70–99)
GLUCOSE SERPL-MCNC: 138 MG/DL — HIGH (ref 70–99)
GRAM STN FLD: SIGNIFICANT CHANGE UP
HCT VFR BLD CALC: 26.6 % — LOW (ref 39–50)
HGB BLD-MCNC: 8.3 G/DL — LOW (ref 13–17)
IMM GRANULOCYTES NFR BLD AUTO: 2.4 % — HIGH (ref 0–1.5)
LYMPHOCYTES # BLD AUTO: 10.2 % — LOW (ref 13–44)
LYMPHOCYTES # BLD AUTO: 2.02 K/UL — SIGNIFICANT CHANGE UP (ref 1–3.3)
LYMPHOCYTES # FLD: 4 % — SIGNIFICANT CHANGE UP
MAGNESIUM SERPL-MCNC: 2 MG/DL — SIGNIFICANT CHANGE UP (ref 1.6–2.6)
MCHC RBC-ENTMCNC: 28.7 PG — SIGNIFICANT CHANGE UP (ref 27–34)
MCHC RBC-ENTMCNC: 31.2 GM/DL — LOW (ref 32–36)
MCV RBC AUTO: 92 FL — SIGNIFICANT CHANGE UP (ref 80–100)
MONOCYTES # BLD AUTO: 1.38 K/UL — HIGH (ref 0–0.9)
MONOCYTES NFR BLD AUTO: 6.9 % — SIGNIFICANT CHANGE UP (ref 2–14)
MONOS+MACROS # FLD: 6 % — SIGNIFICANT CHANGE UP
NEUTROPHILS # BLD AUTO: 15.81 K/UL — HIGH (ref 1.8–7.4)
NEUTROPHILS NFR BLD AUTO: 79.5 % — HIGH (ref 43–77)
PHOSPHATE SERPL-MCNC: 2.5 MG/DL — SIGNIFICANT CHANGE UP (ref 2.4–4.7)
PLATELET # BLD AUTO: 347 K/UL — SIGNIFICANT CHANGE UP (ref 150–400)
POTASSIUM SERPL-MCNC: 4.5 MMOL/L — SIGNIFICANT CHANGE UP (ref 3.5–5.3)
POTASSIUM SERPL-SCNC: 4.5 MMOL/L — SIGNIFICANT CHANGE UP (ref 3.5–5.3)
RBC # BLD: 2.89 M/UL — LOW (ref 4.2–5.8)
RBC # FLD: 15.6 % — HIGH (ref 10.3–14.5)
RCV VOL RI: HIGH /UL (ref 0–0)
SODIUM SERPL-SCNC: 140 MMOL/L — SIGNIFICANT CHANGE UP (ref 135–145)
SPECIMEN SOURCE FLD: SIGNIFICANT CHANGE UP
SPECIMEN SOURCE: SIGNIFICANT CHANGE UP
SYNOVIAL CRYSTALS CLARITY: ABNORMAL
SYNOVIAL CRYSTALS COLOR: SIGNIFICANT CHANGE UP
SYNOVIAL CRYSTALS ID: ABNORMAL
SYNOVIAL CRYSTALS TUBE: SIGNIFICANT CHANGE UP
TOTAL NUCLEATED CELL COUNT, BODY FLUID: SIGNIFICANT CHANGE UP /UL
TUBE TYPE: SIGNIFICANT CHANGE UP
WBC # BLD: 19.89 K/UL — HIGH (ref 3.8–10.5)
WBC # FLD AUTO: 19.89 K/UL — HIGH (ref 3.8–10.5)

## 2020-04-15 PROCEDURE — ZZZZZ: CPT

## 2020-04-15 PROCEDURE — 24000 ARTHRT ELBW EXPL DRG/RMVL FB: CPT | Mod: RT

## 2020-04-15 PROCEDURE — 11043 DBRDMT MUSC&/FSCA 1ST 20/<: CPT | Mod: 58

## 2020-04-15 PROCEDURE — 88304 TISSUE EXAM BY PATHOLOGIST: CPT | Mod: 26

## 2020-04-15 PROCEDURE — 11042 DBRDMT SUBQ TIS 1ST 20SQCM/<: CPT | Mod: 59,58

## 2020-04-15 RX ORDER — FENTANYL CITRATE 50 UG/ML
25 INJECTION INTRAVENOUS
Refills: 0 | Status: DISCONTINUED | OUTPATIENT
Start: 2020-04-15 | End: 2020-04-18

## 2020-04-15 RX ORDER — CALCIUM GLUCONATE 100 MG/ML
2 VIAL (ML) INTRAVENOUS ONCE
Refills: 0 | Status: COMPLETED | OUTPATIENT
Start: 2020-04-15 | End: 2020-04-15

## 2020-04-15 RX ORDER — SODIUM CHLORIDE 9 MG/ML
1000 INJECTION INTRAMUSCULAR; INTRAVENOUS; SUBCUTANEOUS
Refills: 0 | Status: DISCONTINUED | OUTPATIENT
Start: 2020-04-15 | End: 2020-04-16

## 2020-04-15 RX ORDER — FENTANYL CITRATE 50 UG/ML
50 INJECTION INTRAVENOUS
Refills: 0 | Status: DISCONTINUED | OUTPATIENT
Start: 2020-04-15 | End: 2020-04-17

## 2020-04-15 RX ADMIN — PIPERACILLIN AND TAZOBACTAM 25 GRAM(S): 4; .5 INJECTION, POWDER, LYOPHILIZED, FOR SOLUTION INTRAVENOUS at 06:04

## 2020-04-15 RX ADMIN — SIMETHICONE 80 MILLIGRAM(S): 80 TABLET, CHEWABLE ORAL at 16:57

## 2020-04-15 RX ADMIN — PANTOPRAZOLE SODIUM 40 MILLIGRAM(S): 20 TABLET, DELAYED RELEASE ORAL at 06:05

## 2020-04-15 RX ADMIN — POLYETHYLENE GLYCOL 3350 17 GRAM(S): 17 POWDER, FOR SOLUTION ORAL at 16:56

## 2020-04-15 RX ADMIN — HEPARIN SODIUM 5000 UNIT(S): 5000 INJECTION INTRAVENOUS; SUBCUTANEOUS at 16:56

## 2020-04-15 RX ADMIN — CLOPIDOGREL BISULFATE 75 MILLIGRAM(S): 75 TABLET, FILM COATED ORAL at 16:55

## 2020-04-15 RX ADMIN — PIPERACILLIN AND TAZOBACTAM 25 GRAM(S): 4; .5 INJECTION, POWDER, LYOPHILIZED, FOR SOLUTION INTRAVENOUS at 16:57

## 2020-04-15 RX ADMIN — INSULIN GLARGINE 45 UNIT(S): 100 INJECTION, SOLUTION SUBCUTANEOUS at 22:00

## 2020-04-15 RX ADMIN — CARVEDILOL PHOSPHATE 25 MILLIGRAM(S): 80 CAPSULE, EXTENDED RELEASE ORAL at 16:56

## 2020-04-15 RX ADMIN — FINASTERIDE 5 MILLIGRAM(S): 5 TABLET, FILM COATED ORAL at 16:55

## 2020-04-15 RX ADMIN — Medication 1 TABLET(S): at 09:40

## 2020-04-15 RX ADMIN — Medication 0.12 MILLIGRAM(S): at 06:28

## 2020-04-15 RX ADMIN — CARVEDILOL PHOSPHATE 25 MILLIGRAM(S): 80 CAPSULE, EXTENDED RELEASE ORAL at 06:04

## 2020-04-15 RX ADMIN — Medication 81 MILLIGRAM(S): at 16:54

## 2020-04-15 RX ADMIN — Medication 200 GRAM(S): at 17:35

## 2020-04-15 RX ADMIN — Medication 1 TABLET(S): at 16:56

## 2020-04-15 RX ADMIN — Medication 2: at 17:10

## 2020-04-15 RX ADMIN — Medication 22 UNIT(S): at 22:00

## 2020-04-15 RX ADMIN — Medication 22 UNIT(S): at 17:10

## 2020-04-15 RX ADMIN — SODIUM CHLORIDE 50 MILLILITER(S): 9 INJECTION INTRAMUSCULAR; INTRAVENOUS; SUBCUTANEOUS at 17:01

## 2020-04-15 NOTE — BRIEF OPERATIVE NOTE - COMMENTS
postop plan: NWB while drain in, keep drain until <60/shift or <100/day, f/u cultures
Wound Class IV

## 2020-04-15 NOTE — PROGRESS NOTE ADULT - ASSESSMENT
64 year old male with left groin necrotizing fascitis now s/p wide debridement POD #5    -tissue necrotic changes present, scheduled for surgery today for debridement    -Right elbow pain, concerns for septic arthritis ortho to aspirate today while in OR  -Blood cultures are negative so far   -Wound cultures with multiple species  -Blood sugars and not adequately managed    - needs strict glycemic management, will increase humalog to start  - cont IV antibiotics

## 2020-04-15 NOTE — PROGRESS NOTE ADULT - ASSESSMENT
Assessment  groin acess/necrotizing fascitiis s/p Mynx closure device awaiting repeat surgery  ? septic elbow  s/p recent LANDON PCI on DAPT asx  ischemic CM remote CABG compensated, no evidence of CHF  single chamber ICD  CRI      Rec  Cont current meds  cardiac status stable for low risk surgical procedure  if electrocautery anticipated recommend turning off ICD preop to avoid inappropriate discharge  will follow

## 2020-04-15 NOTE — BRIEF OPERATIVE NOTE - OPERATION/FINDINGS
necrotic skin/subcutaneous/muscle expansion to the medial anterior left thigh.  Sharp excisional debridement performed

## 2020-04-15 NOTE — PROGRESS NOTE ADULT - SUBJECTIVE AND OBJECTIVE BOX
NEPHROLOGY INTERVAL HPI/OVERNIGHT EVENTS:    Examined  sitting up in bed   Awake alert no distress  has good UOP    MEDICATIONS  (STANDING):  aspirin enteric coated 81 milliGRAM(s) Oral daily  atorvastatin 80 milliGRAM(s) Oral at bedtime  carvedilol 25 milliGRAM(s) Oral every 12 hours  clopidogrel Tablet 75 milliGRAM(s) Oral daily  digoxin     Tablet 0.125 milliGRAM(s) Oral daily  finasteride 5 milliGRAM(s) Oral daily  heparin  Injectable 5000 Unit(s) SubCutaneous every 8 hours  insulin glargine Injectable (LANTUS) 45 Unit(s) SubCutaneous at bedtime  insulin lispro (HumaLOG) corrective regimen sliding scale   SubCutaneous three times a day before meals  insulin lispro Injectable (HumaLOG) 22 Unit(s) SubCutaneous Before meals and at bedtime  lactobacillus acidophilus 1 Tablet(s) Oral three times a day with meals  pantoprazole    Tablet 40 milliGRAM(s) Oral before breakfast  piperacillin/tazobactam IVPB.. 3.375 Gram(s) IV Intermittent every 12 hours  polyethylene glycol 3350 17 Gram(s) Oral two times a day  senna 2 Tablet(s) Oral at bedtime  simethicone 80 milliGRAM(s) Chew every 6 hours  sodium chloride 0.9%. 1000 milliLiter(s) (125 mL/Hr) IV Continuous <Continuous>    MEDICATIONS  (PRN):  acetaminophen   Tablet .. 650 milliGRAM(s) Oral every 6 hours PRN Temp greater or equal to 38C (100.4F)  dextrose 40% Gel 15 Gram(s) Oral once PRN Blood Glucose LESS THAN 70 milliGRAM(s)/deciLiter  HYDROmorphone  Injectable 0.5 milliGRAM(s) IV Push every 6 hours PRN Breakthrough pain and dressing changes  ondansetron Injectable 4 milliGRAM(s) IV Push every 6 hours PRN Nausea and/or Vomiting  oxyCODONE    IR 5 milliGRAM(s) Oral every 4 hours PRN Moderate Pain (4 - 6)  oxyCODONE    IR 10 milliGRAM(s) Oral every 4 hours PRN Severe Pain (7 - 10)      Allergies    No Known Allergies    Intolerances        Vital Signs Last 24 Hrs  T(C): 37.9 (15 Apr 2020 06:00), Max: 38.4 (14 Apr 2020 21:15)  T(F): 100.2 (15 Apr 2020 06:00), Max: 101.2 (14 Apr 2020 21:15)  HR: 77 (15 Apr 2020 06:00) (77 - 86)  BP: 158/70 (15 Apr 2020 06:00) (143/67 - 176/72)  BP(mean): --  RR: 16 (15 Apr 2020 06:00) (16 - 18)  SpO2: 93% (15 Apr 2020 06:00) (92% - 96%)  Daily     Daily     PHYSICAL EXAM:  GENERAL: appears chronically ill  HEAD:  Atraumatic, Normocephalic  EYES: EOMI  NECK: Supple, neck  veins full  NERVOUS SYSTEM:  Alert & Oriented X3  CHEST/LUNG: Clear to percussion bilaterally  HEART: Regular rate and rhythm  ABDOMEN: Soft, Nontender, Nondistended; Bowel sounds present  EXTREMITIES:  No edema    LABS:                        8.3    19.89 )-----------( 347      ( 15 Apr 2020 07:29 )             26.6     04-15    140  |  106  |  55.0<H>  ----------------------------<  138<H>  4.5   |  21.0<L>  |  2.40<H>    Ca    7.7<L>      15 Apr 2020 07:30  Phos  2.5     04-15  Mg     2.0     04-15    TPro  x   /  Alb  1.6<L>  /  TBili  x   /  DBili  x   /  AST  x   /  ALT  x   /  AlkPhos  x   04-14        Magnesium, Serum: 2.0 mg/dL (04-15 @ 07:29)  Phosphorus Level, Serum: 2.5 mg/dL (04-15 @ 07:29)          RADIOLOGY & ADDITIONAL TESTS:

## 2020-04-15 NOTE — PROGRESS NOTE ADULT - SUBJECTIVE AND OBJECTIVE BOX
Operative findings reviewed  Purulent drainage from elbow as well as purulent groin debridement  BC negative   Most recent echo in office 2/26/2020; LVEF 50%, mild MR, aortic sclerosis, dual chamber device in RA/RV    If similar culture results in 2 specimen, pt will likely need SERINA to exclude endocarditis and/or device vegetation/endovascular infection.

## 2020-04-15 NOTE — PROGRESS NOTE ADULT - SUBJECTIVE AND OBJECTIVE BOX
POST OP CHECK      MARIA ROSS    [id number]    History:  The patient is status post repeat left groin debridement and open washout of right elbow joint. Patient is currently doing well. The patient's pain is controlled using the prescribed pain medications. currently Denies nausea, vomiting, chest pain, shortness of breath, abdominal pain or fever. No new complaints. No acute motor or sensory changes are reported.    Vital Signs Last 24 Hrs  T(C): 36.1 (15 Apr 2020 15:15), Max: 38.4 (14 Apr 2020 21:15)  T(F): 97 (15 Apr 2020 15:15), Max: 101.2 (14 Apr 2020 21:15)  HR: 64 (15 Apr 2020 16:00) (63 - 86)  BP: 126/50 (15 Apr 2020 16:00) (98/44 - 158/70)  BP(mean): --  RR: 16 (15 Apr 2020 16:00) (16 - 25)  SpO2: 100% (15 Apr 2020 16:00) (92% - 100%)  I&O's Summary    14 Apr 2020 07:01  -  15 Apr 2020 07:00  --------------------------------------------------------  IN: 1245 mL / OUT: 1550 mL / NET: -305 mL    15 Apr 2020 07:01  -  15 Apr 2020 20:11  --------------------------------------------------------  IN: 0 mL / OUT: 101 mL / NET: -101 mL                              8.3    19.89 )-----------( 347      ( 15 Apr 2020 07:29 )             26.6     04-15    140  |  106  |  55.0<H>  ----------------------------<  138<H>  4.5   |  21.0<L>  |  2.40<H>    Ca    7.7<L>      15 Apr 2020 07:30  Phos  2.5     04-15  Mg     2.0     04-15    TPro  x   /  Alb  1.6<L>  /  TBili  x   /  DBili  x   /  AST  x   /  ALT  x   /  AlkPhos  x   04-14      MEDICATIONS  (STANDING):  aspirin enteric coated 81 milliGRAM(s) Oral daily  atorvastatin 80 milliGRAM(s) Oral at bedtime  carvedilol 25 milliGRAM(s) Oral every 12 hours  clopidogrel Tablet 75 milliGRAM(s) Oral daily  digoxin     Tablet 0.125 milliGRAM(s) Oral daily  finasteride 5 milliGRAM(s) Oral daily  heparin  Injectable 5000 Unit(s) SubCutaneous every 8 hours  insulin glargine Injectable (LANTUS) 45 Unit(s) SubCutaneous at bedtime  insulin lispro (HumaLOG) corrective regimen sliding scale   SubCutaneous three times a day before meals  insulin lispro Injectable (HumaLOG) 22 Unit(s) SubCutaneous Before meals and at bedtime  lactobacillus acidophilus 1 Tablet(s) Oral three times a day with meals  pantoprazole    Tablet 40 milliGRAM(s) Oral before breakfast  piperacillin/tazobactam IVPB.. 3.375 Gram(s) IV Intermittent every 12 hours  polyethylene glycol 3350 17 Gram(s) Oral two times a day  senna 2 Tablet(s) Oral at bedtime  simethicone 80 milliGRAM(s) Chew every 6 hours  sodium chloride 0.9%. 1000 milliLiter(s) (125 mL/Hr) IV Continuous <Continuous>  sodium chloride 0.9%. 1000 milliLiter(s) (50 mL/Hr) IV Continuous <Continuous>    MEDICATIONS  (PRN):  acetaminophen   Tablet .. 650 milliGRAM(s) Oral every 6 hours PRN Temp greater or equal to 38C (100.4F)  dextrose 40% Gel 15 Gram(s) Oral once PRN Blood Glucose LESS THAN 70 milliGRAM(s)/deciLiter  fentaNYL    Injectable 25 MICROGram(s) IV Push every 5 minutes PRN Moderate Pain (4 - 6)  fentaNYL    Injectable 50 MICROGram(s) IV Push every 5 minutes PRN Severe Pain (7 - 10)  HYDROmorphone  Injectable 0.5 milliGRAM(s) IV Push every 6 hours PRN Breakthrough pain and dressing changes  ondansetron Injectable 4 milliGRAM(s) IV Push every 6 hours PRN Nausea and/or Vomiting  oxyCODONE    IR 5 milliGRAM(s) Oral every 4 hours PRN Moderate Pain (4 - 6)  oxyCODONE    IR 10 milliGRAM(s) Oral every 4 hours PRN Severe Pain (7 - 10)      Physical exam:   Alert and oriented  On room air  No complaints  non labored breathing   Abdomen is soft  Left groin with dressing in place, lightly saturated  Left le warm + popiteal   Well padded sling to the right upper ext is in good position. The dressing is clean, dry and intact. hemovac present with minimal drainage.   Sensation to light touch is grossly intact without focal deficit and is symmetric bilaterally.   No calf tenderness. Sensation to light touch is grossly intact distally. Motor function distally is 5/5. No foot drop. no wrist drop. Capillary refill is less than 2 seconds. No cyanosis.  Max with tea colored urine     Primary Assessment:  • S/p repeat left groin debridement; and open washout of right elbow joint  :  Currently surgically stable     •     •   Plan:   • Dressing change in the AM  - will call renal consultation ([place] for eval and recommendations regarding possible ATN)  - continue IV antibiotic (zosyn) and IV fluids   - Ortho to follow up on righ elbow cultures, preliminary cell count suspicious for gout   - Pain control as ordered

## 2020-04-15 NOTE — PROGRESS NOTE ADULT - SUBJECTIVE AND OBJECTIVE BOX
[facility] - [facility] [place]                                   [address] [place] [address]                                                      PHONE: [phone]                                                         FAX: [phone]  [email or web address]  ---------------------------------------------------------------------------------------------------------------------------------    Overnight events/patient complaints: no chest pain, left elbow pain improved, awaiting OR today for revision of wound vac and elbow aspirate      No Known Allergies    MEDICATIONS  (STANDING):  aspirin enteric coated 81 milliGRAM(s) Oral daily  atorvastatin 80 milliGRAM(s) Oral at bedtime  carvedilol 25 milliGRAM(s) Oral every 12 hours  clopidogrel Tablet 75 milliGRAM(s) Oral daily  digoxin     Tablet 0.125 milliGRAM(s) Oral daily  finasteride 5 milliGRAM(s) Oral daily  heparin  Injectable 5000 Unit(s) SubCutaneous every 8 hours  insulin glargine Injectable (LANTUS) 45 Unit(s) SubCutaneous at bedtime  insulin lispro (HumaLOG) corrective regimen sliding scale   SubCutaneous three times a day before meals  insulin lispro Injectable (HumaLOG) 22 Unit(s) SubCutaneous Before meals and at bedtime  lactobacillus acidophilus 1 Tablet(s) Oral three times a day with meals  pantoprazole    Tablet 40 milliGRAM(s) Oral before breakfast  piperacillin/tazobactam IVPB.. 3.375 Gram(s) IV Intermittent every 12 hours  polyethylene glycol 3350 17 Gram(s) Oral two times a day  senna 2 Tablet(s) Oral at bedtime  simethicone 80 milliGRAM(s) Chew every 6 hours  sodium chloride 0.9%. 1000 milliLiter(s) (125 mL/Hr) IV Continuous <Continuous>    MEDICATIONS  (PRN):  acetaminophen   Tablet .. 650 milliGRAM(s) Oral every 6 hours PRN Temp greater or equal to 38C (100.4F)  dextrose 40% Gel 15 Gram(s) Oral once PRN Blood Glucose LESS THAN 70 milliGRAM(s)/deciLiter  HYDROmorphone  Injectable 0.5 milliGRAM(s) IV Push every 6 hours PRN Breakthrough pain and dressing changes  ondansetron Injectable 4 milliGRAM(s) IV Push every 6 hours PRN Nausea and/or Vomiting  oxyCODONE    IR 5 milliGRAM(s) Oral every 4 hours PRN Moderate Pain (4 - 6)  oxyCODONE    IR 10 milliGRAM(s) Oral every 4 hours PRN Severe Pain (7 - 10)      Vital Signs Last 24 Hrs  T(C): 37.9 (15 Apr 2020 06:00), Max: 38.4 (2020 21:15)  T(F): 100.2 (15 Apr 2020 06:00), Max: 101.2 (2020 21:15)  HR: 77 (15 Apr 2020 06:00) (77 - 86)  BP: 158/70 (15 Apr 2020 06:00) (143/67 - 176/72)  BP(mean): --  RR: 16 (15 Apr 2020 06:00) (16 - 18)  SpO2: 93% (15 Apr 2020 06:00) (92% - 96%)  Daily     Daily Weight in k.1 (2020 10:18)  ICU Vital Signs Last 24 Hrs  MARIA ROSS  I&O's Detail    2020 07:01  -  15 Apr 2020 07:00  --------------------------------------------------------  IN:    Oral Fluid: 120 mL    sodium chloride 0.9%.: 1125 mL  Total IN: 1245 mL    OUT:    Indwelling Catheter - Urethral: 1350 mL    Other: 200 mL  Total OUT: 1550 mL    Total NET: -305 mL        I&O's Summary    2020 07:01  -  15 Apr 2020 07:00  --------------------------------------------------------  IN: 1245 mL / OUT: 1550 mL / NET: -305 mL      Drug Dosing Weight  MARIA ROMANON      PHYSICAL EXAM:  General: Appears well developed, well nourished alert and cooperative.  HEENT: Head; normocephalic, atraumatic.  Eyes: Pupils reactive, cornea wnl.  Neck: Supple, no nodes adenopathy, no NVD or carotid bruit or thyromegaly.  CARDIOVASCULAR: Normal S1 and S2, No murmur, rub, gallop or lift.   LUNGS: No rales, rhonchi or wheeze. Normal breath sounds bilaterally.  ABDOMEN: Soft, nontender without mass or organomegaly. bowel sounds normoactive.  EXTREMITIES: No clubbing, cyanosis or edema. Distal pulses wnl.   SKIN: warm and dry with normal turgor.  NEURO: Alert/oriented x 3/normal motor exam. No pathologic reflexes.    PSYCH: normal affect.        LABS:                        8.3    19.89 )-----------( 347      ( 15 Apr 2020 07:29 )             26.6     04-14    134<L>  |  99  |  53.0<H>  ----------------------------<  322<H>  4.2   |  24.0  |  2.49<H>    Ca    7.0<L>      2020 08:39  Phos  2.5     04-15  Mg     2.0     04-15    TPro  x   /  Alb  1.6<L>  /  TBili  x   /  DBili  x   /  AST  x   /  ALT  x   /  AlkPhos  x   -14    MARIA ROSS            RADIOLOGY & ADDITIONAL STUDIES:    INTERPRETATION OF TELEMETRY (personally reviewed):    ECG:< from: 12 Lead ECG (20 @ 12:52) >  Diagnosis Line Normal sinus rhythm  Possible Left atrial enlargement  Septal infarct , age undetermined  T wave abnormality, consider inferior ischemia    Confirmed by NICHOLAS REYNA ([id number]) on 4/10/2020 9:57:29 AM    < end of copied text >

## 2020-04-15 NOTE — PROGRESS NOTE ADULT - ASSESSMENT
MARSHALL on CKD stage III  Renal function cr 2.4 today improving daily  Follows w Dr Pedro in our office  Has h/o CAD, S/P MI, CABG, ICM with AICD  Renal sono noted no hydronephrosis  Will cont IVF w  bicarb for MA ( Has h/o of HF )  Hypocalcemia will supplement IV    iPTH noted  Very low albumin -24 hr urine noted 554mg proteinuria no NS    s/p OR on 4/10- Debridement, external genitalia, perineum  I&D abscess   L groin hematoma/ cellulitis on abx/ Leukocytosis  ID following    Will follow

## 2020-04-15 NOTE — PROGRESS NOTE ADULT - SUBJECTIVE AND OBJECTIVE BOX
ORTHO-POST-OP PROGRESS NOTE:  [id number]  MARIA ROSS    PROCEDURE: Right elbow incision and drainage   DOS: 4/15    SUBJECTIVE: 64y Patient seen and examined. Patient reports of moderate discomfort that is controlled by pain medications. Patient denies of acute sensory or motor changes.                  8.3    19.89 )-----------( 347      ( 15 Apr 2020 07:29 )             26.6       I&O's Detail    14 Apr 2020 07:01  -  15 Apr 2020 07:00  --------------------------------------------------------  IN:    Oral Fluid: 120 mL    sodium chloride 0.9%.: 1125 mL  Total IN: 1245 mL    OUT:    Indwelling Catheter - Urethral: 1350 mL    Other: 200 mL  Total OUT: 1550 mL    Total NET: -305 mL      15 Apr 2020 07:01  -  15 Apr 2020 20:14  --------------------------------------------------------  IN:  Total IN: 0 mL    OUT:    Estimated Blood Loss: 1 mL    Indwelling Catheter - Urethral: 100 mL  Total OUT: 101 mL    Total NET: -101 mL    acetaminophen   Tablet .. 650 milliGRAM(s) Oral every 6 hours PRN  aspirin enteric coated 81 milliGRAM(s) Oral daily  atorvastatin 80 milliGRAM(s) Oral at bedtime  carvedilol 25 milliGRAM(s) Oral every 12 hours  clopidogrel Tablet 75 milliGRAM(s) Oral daily  dextrose 40% Gel 15 Gram(s) Oral once PRN  digoxin     Tablet 0.125 milliGRAM(s) Oral daily  fentaNYL    Injectable 25 MICROGram(s) IV Push every 5 minutes PRN  fentaNYL    Injectable 50 MICROGram(s) IV Push every 5 minutes PRN  finasteride 5 milliGRAM(s) Oral daily  heparin  Injectable 5000 Unit(s) SubCutaneous every 8 hours  HYDROmorphone  Injectable 0.5 milliGRAM(s) IV Push every 6 hours PRN  insulin glargine Injectable (LANTUS) 45 Unit(s) SubCutaneous at bedtime  insulin lispro (HumaLOG) corrective regimen sliding scale   SubCutaneous three times a day before meals  insulin lispro Injectable (HumaLOG) 22 Unit(s) SubCutaneous Before meals and at bedtime  lactobacillus acidophilus 1 Tablet(s) Oral three times a day with meals  ondansetron Injectable 4 milliGRAM(s) IV Push every 6 hours PRN  oxyCODONE    IR 5 milliGRAM(s) Oral every 4 hours PRN  oxyCODONE    IR 10 milliGRAM(s) Oral every 4 hours PRN  pantoprazole    Tablet 40 milliGRAM(s) Oral before breakfast  piperacillin/tazobactam IVPB.. 3.375 Gram(s) IV Intermittent every 12 hours  polyethylene glycol 3350 17 Gram(s) Oral two times a day  senna 2 Tablet(s) Oral at bedtime  simethicone 80 milliGRAM(s) Chew every 6 hours  sodium chloride 0.9%. 1000 milliLiter(s) IV Continuous <Continuous>  sodium chloride 0.9%. 1000 milliLiter(s) IV Continuous <Continuous>    T(C): 36.1 (04-15-20 @ 15:15), Max: 38.4 (04-14-20 @ 21:15)  HR: 64 (04-15-20 @ 16:00) (63 - 86)  BP: 126/50 (04-15-20 @ 16:00) (98/44 - 158/70)  RR: 16 (04-15-20 @ 16:00) (16 - 25)  SpO2: 100% (04-15-20 @ 16:00) (92% - 100%)  Wt(kg): --      PHYSICAL EXAM:     Constitutional: Alert, responsive, in no acute distress.     Injured Extremity:          Dressing: Clean/dry/intact. No active bleeding or discharge noted. HV appears to be functioning appropriately. Serosanguinous fluid in tubing.         Sensation: normal to light touch. No focal deficits noted of the lower extremities.              Motor exam: Limited secondary to pain. Able to move digits.                                                        Vascular:  + warm well perfused; capillary refill <3 seconds                                                     A/P :  64y Male S/P right elbow I&D  POD# 0    -  Pain control  -  DVT ppx: [x]SCDs   [x] Pharmacolgic    -  Weight bearing status: NWB RUE while drain in  -  F/U OR cultures  -  Monitor drain output  -  Continue care as per primary team

## 2020-04-15 NOTE — PROGRESS NOTE ADULT - SUBJECTIVE AND OBJECTIVE BOX
INTERVAL HPI/OVERNIGHT EVENTS:    No acute overnight events reported. Patient seen at bedside with no major complaints other than loose stools. Patient with diarrhea, receiving stool softeners. Wound VAC with veriflow malfunctioning due to blockage in Veriflow line, adjusted settings to pause Veriflow and continue negative pressure therapy. Patient scheduled for surgery today during which VAC will be taken down. Patient with fever Tmax 101.2, given PO Tylenol. Patient states that the pain is well controlled with the current regimen. Tolerating diet, currently NPO for procedure.       MEDICATIONS  (STANDING):  aspirin enteric coated 81 milliGRAM(s) Oral daily  atorvastatin 80 milliGRAM(s) Oral at bedtime  carvedilol 25 milliGRAM(s) Oral every 12 hours  clopidogrel Tablet 75 milliGRAM(s) Oral daily  digoxin     Tablet 0.125 milliGRAM(s) Oral daily  finasteride 5 milliGRAM(s) Oral daily  heparin  Injectable 5000 Unit(s) SubCutaneous every 8 hours  insulin glargine Injectable (LANTUS) 45 Unit(s) SubCutaneous at bedtime  insulin lispro (HumaLOG) corrective regimen sliding scale   SubCutaneous three times a day before meals  insulin lispro Injectable (HumaLOG) 22 Unit(s) SubCutaneous Before meals and at bedtime  lactobacillus acidophilus 1 Tablet(s) Oral three times a day with meals  pantoprazole    Tablet 40 milliGRAM(s) Oral before breakfast  piperacillin/tazobactam IVPB.. 3.375 Gram(s) IV Intermittent every 12 hours  polyethylene glycol 3350 17 Gram(s) Oral two times a day  senna 2 Tablet(s) Oral at bedtime  simethicone 80 milliGRAM(s) Chew every 6 hours  sodium chloride 0.9%. 1000 milliLiter(s) (125 mL/Hr) IV Continuous <Continuous>    MEDICATIONS  (PRN):  acetaminophen   Tablet .. 650 milliGRAM(s) Oral every 6 hours PRN Temp greater or equal to 38C (100.4F)  dextrose 40% Gel 15 Gram(s) Oral once PRN Blood Glucose LESS THAN 70 milliGRAM(s)/deciLiter  HYDROmorphone  Injectable 0.5 milliGRAM(s) IV Push every 6 hours PRN Breakthrough pain and dressing changes  ondansetron Injectable 4 milliGRAM(s) IV Push every 6 hours PRN Nausea and/or Vomiting  oxyCODONE    IR 5 milliGRAM(s) Oral every 4 hours PRN Moderate Pain (4 - 6)  oxyCODONE    IR 10 milliGRAM(s) Oral every 4 hours PRN Severe Pain (7 - 10)      Vital Signs Last 24 Hrs  T(C): 38.4 (14 Apr 2020 21:15), Max: 38.4 (14 Apr 2020 21:15)  T(F): 101.2 (14 Apr 2020 21:15), Max: 101.2 (14 Apr 2020 21:15)  HR: 86 (14 Apr 2020 23:00) (72 - 86)  BP: 143/67 (14 Apr 2020 23:00) (143/67 - 176/72)  BP(mean): --  RR: 18 (14 Apr 2020 23:00) (18 - 18)  SpO2: 92% (14 Apr 2020 23:00) (92% - 96%)    Physical exam:     No distress  alert and oriented  non labored breathing  left groin with wound vac in place, functioning well  The lateral aspect of the wound ( medial anterior thigh) is exhibiting some necrotic changes  No tenderness to palpation, nothing expressible  medial aspect of the right elbow is ttp  Right elbow with edema and erythema, patent with pain with attempted ROM to the elbow.  No wrist drop, able to perform wrist and digital rom.    I&O's Detail    13 Apr 2020 07:01  -  14 Apr 2020 07:00  --------------------------------------------------------  IN:    Oral Fluid: 480 mL    sodium chloride 0.45%: 1100 mL  Total IN: 1580 mL    OUT:    Indwelling Catheter - Urethral: 1800 mL  Total OUT: 1800 mL    Total NET: -220 mL      14 Apr 2020 07:01  -  15 Apr 2020 02:52  --------------------------------------------------------  IN:    Oral Fluid: 120 mL    sodium chloride 0.9%.: 500 mL  Total IN: 620 mL    OUT:    Indwelling Catheter - Urethral: 700 mL    Other: 100 mL  Total OUT: 800 mL    Total NET: -180 mL          LABS:                        7.9    17.07 )-----------( 323      ( 14 Apr 2020 08:39 )             24.1     04-14    134<L>  |  99  |  53.0<H>  ----------------------------<  322<H>  4.2   |  24.0  |  2.49<H>    Ca    7.0<L>      14 Apr 2020 08:39  Phos  4.0     04-13  Mg     2.4     04-13    TPro  x   /  Alb  1.6<L>  /  TBili  x   /  DBili  x   /  AST  x   /  ALT  x   /  AlkPhos  x   04-14          RADIOLOGY & ADDITIONAL STUDIES:

## 2020-04-16 ENCOUNTER — TRANSCRIPTION ENCOUNTER (OUTPATIENT)
Age: 65
End: 2020-04-16

## 2020-04-16 LAB
ALBUMIN SERPL ELPH-MCNC: 1.3 G/DL — LOW (ref 3.3–5.2)
ALP SERPL-CCNC: 215 U/L — HIGH (ref 40–120)
ALT FLD-CCNC: 21 U/L — SIGNIFICANT CHANGE UP
ANION GAP SERPL CALC-SCNC: 12 MMOL/L — SIGNIFICANT CHANGE UP (ref 5–17)
ANION GAP SERPL CALC-SCNC: 15 MMOL/L — SIGNIFICANT CHANGE UP (ref 5–17)
AST SERPL-CCNC: 46 U/L — HIGH
BASOPHILS # BLD AUTO: 0.08 K/UL — SIGNIFICANT CHANGE UP (ref 0–0.2)
BASOPHILS NFR BLD AUTO: 0.4 % — SIGNIFICANT CHANGE UP (ref 0–2)
BILIRUB SERPL-MCNC: 0.9 MG/DL — SIGNIFICANT CHANGE UP (ref 0.4–2)
BUN SERPL-MCNC: 56 MG/DL — HIGH (ref 8–20)
BUN SERPL-MCNC: 61 MG/DL — HIGH (ref 8–20)
CALCIUM SERPL-MCNC: 7.4 MG/DL — LOW (ref 8.6–10.2)
CALCIUM SERPL-MCNC: 7.9 MG/DL — LOW (ref 8.6–10.2)
CHLORIDE SERPL-SCNC: 101 MMOL/L — SIGNIFICANT CHANGE UP (ref 98–107)
CHLORIDE SERPL-SCNC: 106 MMOL/L — SIGNIFICANT CHANGE UP (ref 98–107)
CO2 SERPL-SCNC: 14 MMOL/L — LOW (ref 22–29)
CO2 SERPL-SCNC: 21 MMOL/L — LOW (ref 22–29)
CREAT SERPL-MCNC: 2.86 MG/DL — HIGH (ref 0.5–1.3)
CREAT SERPL-MCNC: 3.06 MG/DL — HIGH (ref 0.5–1.3)
EOSINOPHIL # BLD AUTO: 0.19 K/UL — SIGNIFICANT CHANGE UP (ref 0–0.5)
EOSINOPHIL NFR BLD AUTO: 0.9 % — SIGNIFICANT CHANGE UP (ref 0–6)
GLUCOSE BLDC GLUCOMTR-MCNC: 185 MG/DL — HIGH (ref 70–99)
GLUCOSE BLDC GLUCOMTR-MCNC: 236 MG/DL — HIGH (ref 70–99)
GLUCOSE BLDC GLUCOMTR-MCNC: 259 MG/DL — HIGH (ref 70–99)
GLUCOSE BLDC GLUCOMTR-MCNC: 310 MG/DL — HIGH (ref 70–99)
GLUCOSE BLDC GLUCOMTR-MCNC: 315 MG/DL — HIGH (ref 70–99)
GLUCOSE SERPL-MCNC: 160 MG/DL — HIGH (ref 70–99)
GLUCOSE SERPL-MCNC: 291 MG/DL — HIGH (ref 70–99)
HCT VFR BLD CALC: 35.9 % — LOW (ref 39–50)
HGB BLD-MCNC: 11 G/DL — LOW (ref 13–17)
IMM GRANULOCYTES NFR BLD AUTO: 1.9 % — HIGH (ref 0–1.5)
LYMPHOCYTES # BLD AUTO: 2.01 K/UL — SIGNIFICANT CHANGE UP (ref 1–3.3)
LYMPHOCYTES # BLD AUTO: 9.2 % — LOW (ref 13–44)
MAGNESIUM SERPL-MCNC: 2.1 MG/DL — SIGNIFICANT CHANGE UP (ref 1.6–2.6)
MCHC RBC-ENTMCNC: 28.9 PG — SIGNIFICANT CHANGE UP (ref 27–34)
MCHC RBC-ENTMCNC: 30.6 GM/DL — LOW (ref 32–36)
MCV RBC AUTO: 94.2 FL — SIGNIFICANT CHANGE UP (ref 80–100)
MONOCYTES # BLD AUTO: 1.24 K/UL — HIGH (ref 0–0.9)
MONOCYTES NFR BLD AUTO: 5.7 % — SIGNIFICANT CHANGE UP (ref 2–14)
NEUTROPHILS # BLD AUTO: 17.97 K/UL — HIGH (ref 1.8–7.4)
NEUTROPHILS NFR BLD AUTO: 81.9 % — HIGH (ref 43–77)
PHOSPHATE SERPL-MCNC: 4.2 MG/DL — SIGNIFICANT CHANGE UP (ref 2.4–4.7)
PLATELET # BLD AUTO: 290 K/UL — SIGNIFICANT CHANGE UP (ref 150–400)
POTASSIUM SERPL-MCNC: 4.6 MMOL/L — SIGNIFICANT CHANGE UP (ref 3.5–5.3)
POTASSIUM SERPL-MCNC: 5.9 MMOL/L — HIGH (ref 3.5–5.3)
POTASSIUM SERPL-SCNC: 4.6 MMOL/L — SIGNIFICANT CHANGE UP (ref 3.5–5.3)
POTASSIUM SERPL-SCNC: 5.9 MMOL/L — HIGH (ref 3.5–5.3)
PROT SERPL-MCNC: 5.5 G/DL — LOW (ref 6.6–8.7)
RBC # BLD: 3.81 M/UL — LOW (ref 4.2–5.8)
RBC # FLD: 15.9 % — HIGH (ref 10.3–14.5)
SODIUM SERPL-SCNC: 134 MMOL/L — LOW (ref 135–145)
SODIUM SERPL-SCNC: 135 MMOL/L — SIGNIFICANT CHANGE UP (ref 135–145)
WBC # BLD: 21.91 K/UL — HIGH (ref 3.8–10.5)
WBC # FLD AUTO: 21.91 K/UL — HIGH (ref 3.8–10.5)

## 2020-04-16 PROCEDURE — 99232 SBSQ HOSP IP/OBS MODERATE 35: CPT

## 2020-04-16 PROCEDURE — 99231 SBSQ HOSP IP/OBS SF/LOW 25: CPT

## 2020-04-16 RX ORDER — INSULIN GLARGINE 100 [IU]/ML
50 INJECTION, SOLUTION SUBCUTANEOUS AT BEDTIME
Refills: 0 | Status: DISCONTINUED | OUTPATIENT
Start: 2020-04-16 | End: 2020-04-16

## 2020-04-16 RX ORDER — INSULIN GLARGINE 100 [IU]/ML
60 INJECTION, SOLUTION SUBCUTANEOUS AT BEDTIME
Refills: 0 | Status: DISCONTINUED | OUTPATIENT
Start: 2020-04-16 | End: 2020-04-17

## 2020-04-16 RX ORDER — SODIUM POLYSTYRENE SULFONATE 4.1 MEQ/G
30 POWDER, FOR SUSPENSION ORAL ONCE
Refills: 0 | Status: COMPLETED | OUTPATIENT
Start: 2020-04-16 | End: 2020-04-16

## 2020-04-16 RX ORDER — SODIUM BICARBONATE 1 MEQ/ML
0.1 SYRINGE (ML) INTRAVENOUS
Qty: 150 | Refills: 0 | Status: DISCONTINUED | OUTPATIENT
Start: 2020-04-16 | End: 2020-04-17

## 2020-04-16 RX ORDER — MEROPENEM 1 G/30ML
500 INJECTION INTRAVENOUS EVERY 12 HOURS
Refills: 0 | Status: DISCONTINUED | OUTPATIENT
Start: 2020-04-16 | End: 2020-04-22

## 2020-04-16 RX ORDER — COLLAGENASE CLOSTRIDIUM HIST. 250 UNIT/G
1 OINTMENT (GRAM) TOPICAL DAILY
Refills: 0 | Status: DISCONTINUED | OUTPATIENT
Start: 2020-04-16 | End: 2020-04-16

## 2020-04-16 RX ORDER — DEXTROSE 50 % IN WATER 50 %
50 SYRINGE (ML) INTRAVENOUS ONCE
Refills: 0 | Status: COMPLETED | OUTPATIENT
Start: 2020-04-16 | End: 2020-04-16

## 2020-04-16 RX ORDER — INSULIN HUMAN 100 [IU]/ML
10 INJECTION, SOLUTION SUBCUTANEOUS ONCE
Refills: 0 | Status: COMPLETED | OUTPATIENT
Start: 2020-04-16 | End: 2020-04-16

## 2020-04-16 RX ORDER — INSULIN LISPRO 100/ML
30 VIAL (ML) SUBCUTANEOUS
Refills: 0 | Status: DISCONTINUED | OUTPATIENT
Start: 2020-04-16 | End: 2020-04-17

## 2020-04-16 RX ADMIN — Medication 81 MILLIGRAM(S): at 10:56

## 2020-04-16 RX ADMIN — CARVEDILOL PHOSPHATE 25 MILLIGRAM(S): 80 CAPSULE, EXTENDED RELEASE ORAL at 05:32

## 2020-04-16 RX ADMIN — INSULIN GLARGINE 60 UNIT(S): 100 INJECTION, SOLUTION SUBCUTANEOUS at 22:44

## 2020-04-16 RX ADMIN — Medication 8: at 12:00

## 2020-04-16 RX ADMIN — HEPARIN SODIUM 5000 UNIT(S): 5000 INJECTION INTRAVENOUS; SUBCUTANEOUS at 01:05

## 2020-04-16 RX ADMIN — Medication 22 UNIT(S): at 07:45

## 2020-04-16 RX ADMIN — Medication 22 UNIT(S): at 11:30

## 2020-04-16 RX ADMIN — Medication 4: at 16:33

## 2020-04-16 RX ADMIN — Medication 30 UNIT(S): at 17:34

## 2020-04-16 RX ADMIN — Medication 1 TABLET(S): at 10:36

## 2020-04-16 RX ADMIN — SENNA PLUS 2 TABLET(S): 8.6 TABLET ORAL at 22:42

## 2020-04-16 RX ADMIN — Medication 30 UNIT(S): at 22:45

## 2020-04-16 RX ADMIN — HEPARIN SODIUM 5000 UNIT(S): 5000 INJECTION INTRAVENOUS; SUBCUTANEOUS at 10:44

## 2020-04-16 RX ADMIN — HEPARIN SODIUM 5000 UNIT(S): 5000 INJECTION INTRAVENOUS; SUBCUTANEOUS at 15:16

## 2020-04-16 RX ADMIN — HEPARIN SODIUM 5000 UNIT(S): 5000 INJECTION INTRAVENOUS; SUBCUTANEOUS at 22:44

## 2020-04-16 RX ADMIN — Medication 0.12 MILLIGRAM(S): at 05:32

## 2020-04-16 RX ADMIN — Medication 100 MILLIGRAM(S): at 22:42

## 2020-04-16 RX ADMIN — CLOPIDOGREL BISULFATE 75 MILLIGRAM(S): 75 TABLET, FILM COATED ORAL at 10:43

## 2020-04-16 RX ADMIN — CARVEDILOL PHOSPHATE 25 MILLIGRAM(S): 80 CAPSULE, EXTENDED RELEASE ORAL at 17:35

## 2020-04-16 RX ADMIN — Medication 100 MILLIGRAM(S): at 15:15

## 2020-04-16 RX ADMIN — HYDROMORPHONE HYDROCHLORIDE 0.5 MILLIGRAM(S): 2 INJECTION INTRAMUSCULAR; INTRAVENOUS; SUBCUTANEOUS at 08:40

## 2020-04-16 RX ADMIN — Medication 1 TABLET(S): at 17:35

## 2020-04-16 RX ADMIN — OXYCODONE HYDROCHLORIDE 5 MILLIGRAM(S): 5 TABLET ORAL at 19:13

## 2020-04-16 RX ADMIN — SIMETHICONE 80 MILLIGRAM(S): 80 TABLET, CHEWABLE ORAL at 17:35

## 2020-04-16 RX ADMIN — PIPERACILLIN AND TAZOBACTAM 25 GRAM(S): 4; .5 INJECTION, POWDER, LYOPHILIZED, FOR SOLUTION INTRAVENOUS at 05:41

## 2020-04-16 RX ADMIN — Medication 60 MEQ/KG/HR: at 11:38

## 2020-04-16 RX ADMIN — INSULIN HUMAN 10 UNIT(S): 100 INJECTION, SOLUTION SUBCUTANEOUS at 11:39

## 2020-04-16 RX ADMIN — FINASTERIDE 5 MILLIGRAM(S): 5 TABLET, FILM COATED ORAL at 10:43

## 2020-04-16 RX ADMIN — ATORVASTATIN CALCIUM 80 MILLIGRAM(S): 80 TABLET, FILM COATED ORAL at 22:43

## 2020-04-16 RX ADMIN — Medication 1 APPLICATION(S): at 08:53

## 2020-04-16 RX ADMIN — Medication 2: at 07:43

## 2020-04-16 RX ADMIN — Medication 50 MILLILITER(S): at 11:34

## 2020-04-16 RX ADMIN — PANTOPRAZOLE SODIUM 40 MILLIGRAM(S): 20 TABLET, DELAYED RELEASE ORAL at 05:32

## 2020-04-16 RX ADMIN — MEROPENEM 100 MILLIGRAM(S): 1 INJECTION INTRAVENOUS at 17:35

## 2020-04-16 RX ADMIN — SIMETHICONE 80 MILLIGRAM(S): 80 TABLET, CHEWABLE ORAL at 10:48

## 2020-04-16 NOTE — DISCHARGE NOTE PROVIDER - PROVIDER TOKENS
PROVIDER:[TOKEN:[[id number]]] PROVIDER:[TOKEN:[[id number]]],PROVIDER:[TOKEN:[[id number]],FOLLOWUP:[2 weeks]]

## 2020-04-16 NOTE — PROGRESS NOTE ADULT - ASSESSMENT
64 year old male s/p excisional debridement of left groin nec fasc POD #6 now most recently s/p repeat excisional debridement and right elbow washout POD #1  Wound with continued concern for necrotic edges  Hyperkalemia  Wound vac placed  - Cont ASA and Plavix for recent LANDON  - Maintain wound vac at current settings. Next wound vac change 4/18  - Renal consultation (Dr Sotelo appreciated. D50 and Insulin for hyperkalemia and f/u labs.   - Cont IV fluids per renal. Monitor BMP  - continue IV antibiotic (zosyn). ID recalled and updated re: wound findings and possible need for additional IV abx coverage  - DM management.   - Ortho to follow up on right elbow cultures, preliminary cell count suspicious for gout   - Pain control as ordered  Seen with Dr Alexandra

## 2020-04-16 NOTE — PROGRESS NOTE ADULT - ASSESSMENT
64 year old male s/p excisional debridement of left groin nec fasc POD #6 now most recently s/p repeat excisional debridement and right elbow washout POD #1    - Daily dressing changes  - will call renal consultation (Deepak for eval and recommendations regarding possible ATN)  - continue IV antibiotic (zosyn) and IV fluids   - Ortho to follow up on right elbow cultures, preliminary cell count suspicious for gout   - Pain control as ordered

## 2020-04-16 NOTE — DISCHARGE NOTE PROVIDER - NSDCMRMEDTOKEN_GEN_ALL_CORE_FT
ALPRAZolam 0.25 mg oral tablet: 1 tab(s) orally every 6 hours, As needed, anxiety/sleep  aspirin 81 mg oral tablet: 1 tab(s) orally once a day  atorvastatin 80 mg oral tablet: 1 tab(s) orally once a day  carvedilol 25 mg oral tablet: 1 tab(s) orally 2 times a day  carvedilol 3.125 mg oral tablet: 1 tab(s) orally 2 times a day in addition to 25 mg bid  digoxin 125 mcg (0.125 mg) oral tablet: 1 tab(s) orally once a day  Entresto 49 mg-51 mg oral tablet: 1 tab(s) orally 2 times a day  ergocalciferol 50,000 intl units (1.25 mg) oral capsule: 1 cap(s) orally once a week  finasteride 5 mg oral tablet: 1 tab(s) orally once a day  furosemide 20 mg oral tablet: 1 tab(s) orally once a day  HumaLOG 100 units/mL injectable solution: 30 unit(s) injectable 3 times a day (with meals)  icosapent 1 g oral capsule: 2 cap(s) orally 2 times a day  Plavix 75 mg oral tablet: 1 tab(s) orally once a day  Tresiba FlexTouch 200 units/mL subcutaneous solution: 50 unit(s) subcutaneous 2 times a day  Victoza 18 mg/3 mL subcutaneous solution: 1.8 milliliter(s) subcutaneous once a day ALPRAZolam 0.25 mg oral tablet: 1 tab(s) orally every 6 hours, As needed, anxiety/sleep  aspirin 81 mg oral tablet: 1 tab(s) orally once a day  atorvastatin 80 mg oral tablet: 1 tab(s) orally once a day  carvedilol 25 mg oral tablet: 1 tab(s) orally 2 times a day  carvedilol 3.125 mg oral tablet: 1 tab(s) orally 2 times a day in addition to 25 mg bid  colchicine 0.6 mg oral tablet: 1 tab(s) orally once a day MDD:1 tab  digoxin 125 mcg (0.125 mg) oral tablet: 1 tab(s) orally once a day  Entresto 49 mg-51 mg oral tablet: 1 tab(s) orally 2 times a day  ergocalciferol 50,000 intl units (1.25 mg) oral capsule: 1 cap(s) orally once a week  finasteride 5 mg oral tablet: 1 tab(s) orally once a day  furosemide 20 mg oral tablet: 1 tab(s) orally once a day  HumaLOG 100 units/mL injectable solution: 30 unit(s) injectable 3 times a day (with meals)  icosapent 1 g oral capsule: 2 cap(s) orally 2 times a day  Norvasc 5 mg oral tablet: 1 tab(s) orally once a day MDD:1 tab  Plavix 75 mg oral tablet: 1 tab(s) orally once a day  tamsulosin 0.4 mg oral capsule: 1 cap(s) orally once a day (at bedtime) MDD:1 cap  Tresiba FlexTouch 200 units/mL subcutaneous solution: 50 unit(s) subcutaneous 2 times a day  Victoza 18 mg/3 mL subcutaneous solution: 1.8 milliliter(s) subcutaneous once a day ALPRAZolam 0.25 mg oral tablet: 1 tab(s) orally every 6 hours, As needed, anxiety/sleep  amLODIPine 5 mg oral tablet: 1 tab(s) orally once a day  amLODIPine 5 mg oral tablet: 1 tab(s) orally once  aspirin 81 mg oral tablet: 1 tab(s) orally once a day  atorvastatin 80 mg oral tablet: 1 tab(s) orally once a day  carvedilol 25 mg oral tablet: 1 tab(s) orally 2 times a day  carvedilol 3.125 mg oral tablet: 1 tab(s) orally 2 times a day in addition to 25 mg bid  colchicine 0.6 mg oral tablet: 1 tab(s) orally once a day MDD:1 tab  digoxin 125 mcg (0.125 mg) oral tablet: 1 tab(s) orally once a day  Entresto 49 mg-51 mg oral tablet: 1 tab(s) orally 2 times a day  ergocalciferol 50,000 intl units (1.25 mg) oral capsule: 1 cap(s) orally once a week  finasteride 5 mg oral tablet: 1 tab(s) orally once a day  furosemide 20 mg oral tablet: 1 tab(s) orally once a day  HumaLOG 100 units/mL injectable solution: 30 unit(s) injectable 3 times a day (with meals)  icosapent 1 g oral capsule: 2 cap(s) orally 2 times a day  Norvasc 5 mg oral tablet: 1 tab(s) orally once a day MDD:1 tab  Plavix 75 mg oral tablet: 1 tab(s) orally once a day  tamsulosin 0.4 mg oral capsule: 1 cap(s) orally once a day (at bedtime) MDD:1 cap  Tresiba FlexTouch 200 units/mL subcutaneous solution: 50 unit(s) subcutaneous 2 times a day  Victoza 18 mg/3 mL subcutaneous solution: 1.8 milliliter(s) subcutaneous once a day

## 2020-04-16 NOTE — PROGRESS NOTE ADULT - SUBJECTIVE AND OBJECTIVE BOX
INTERVAL HPI/OVERNIGHT EVENTS:  FS reviewed  still hyperglycemic    Review of systems:  unable to obtain    MEDICATIONS  (STANDING):  aspirin enteric coated 81 milliGRAM(s) Oral daily  atorvastatin 80 milliGRAM(s) Oral at bedtime  carvedilol 25 milliGRAM(s) Oral every 12 hours  clindamycin IVPB 600 milliGRAM(s) IV Intermittent every 8 hours  clopidogrel Tablet 75 milliGRAM(s) Oral daily  digoxin     Tablet 0.125 milliGRAM(s) Oral daily  finasteride 5 milliGRAM(s) Oral daily  heparin  Injectable 5000 Unit(s) SubCutaneous every 8 hours  insulin glargine Injectable (LANTUS) 50 Unit(s) SubCutaneous at bedtime  insulin lispro (HumaLOG) corrective regimen sliding scale   SubCutaneous three times a day before meals  insulin lispro Injectable (HumaLOG) 22 Unit(s) SubCutaneous Before meals and at bedtime  lactobacillus acidophilus 1 Tablet(s) Oral three times a day with meals  meropenem  IVPB 500 milliGRAM(s) IV Intermittent every 12 hours  pantoprazole    Tablet 40 milliGRAM(s) Oral before breakfast  senna 2 Tablet(s) Oral at bedtime  simethicone 80 milliGRAM(s) Chew every 6 hours  sodium bicarbonate  Infusion 0.104 mEq/kG/Hr (60 mL/Hr) IV Continuous <Continuous>    MEDICATIONS  (PRN):  acetaminophen   Tablet .. 650 milliGRAM(s) Oral every 6 hours PRN Temp greater or equal to 38C (100.4F)  dextrose 40% Gel 15 Gram(s) Oral once PRN Blood Glucose LESS THAN 70 milliGRAM(s)/deciLiter  fentaNYL    Injectable 25 MICROGram(s) IV Push every 5 minutes PRN Moderate Pain (4 - 6)  fentaNYL    Injectable 50 MICROGram(s) IV Push every 5 minutes PRN Severe Pain (7 - 10)  HYDROmorphone  Injectable 0.5 milliGRAM(s) IV Push every 6 hours PRN Breakthrough pain and dressing changes  ondansetron Injectable 4 milliGRAM(s) IV Push every 6 hours PRN Nausea and/or Vomiting  oxyCODONE    IR 5 milliGRAM(s) Oral every 4 hours PRN Moderate Pain (4 - 6)  oxyCODONE    IR 10 milliGRAM(s) Oral every 4 hours PRN Severe Pain (7 - 10)      Allergies    No Known Allergies    Intolerances        Vital Signs Last 24 Hrs  T(C): 37.3 (16 Apr 2020 08:00), Max: 37.3 (16 Apr 2020 08:00)  T(F): 99.2 (16 Apr 2020 08:00), Max: 99.2 (16 Apr 2020 08:00)  HR: 72 (16 Apr 2020 12:00) (63 - 74)  BP: 123/49 (16 Apr 2020 12:00) (93/54 - 150/65)  BP(mean): --  RR: 17 (16 Apr 2020 12:00) (12 - 20)  SpO2: 100% (16 Apr 2020 12:00) (100% - 100%)    deferred due to covid    LABS:                        11.0   21.91 )-----------( 290      ( 16 Apr 2020 06:55 )             35.9     04-16    134<L>  |  101  |  61.0<H>  ----------------------------<  291<H>  4.6   |  21.0<L>  |  3.06<H>    Ca    7.4<L>      16 Apr 2020 13:51  Phos  4.2     04-16  Mg     2.1     04-16    TPro  5.5<L>  /  Alb  1.3<L>  /  TBili  0.9  /  DBili  x   /  AST  46<H>  /  ALT  21  /  AlkPhos  215<H>  04-16      Hemoglobin A1C, Whole Blood: 11.5 % [4.0 - 5.6] (04-11-20 @ 06:52)        CAPILLARY BLOOD GLUCOSE      POCT Blood Glucose.: 310 mg/dL (16 Apr 2020 13:26)  POCT Blood Glucose.: 315 mg/dL (16 Apr 2020 12:08)  POCT Blood Glucose.: 259 mg/dL (16 Apr 2020 11:04)  POCT Blood Glucose.: 185 mg/dL (16 Apr 2020 06:17)  POCT Blood Glucose.: 225 mg/dL (15 Apr 2020 20:54)  POCT Blood Glucose.: 187 mg/dL (15 Apr 2020 16:50)          RADIOLOGY & ADDITIONAL TESTS:  none

## 2020-04-16 NOTE — DISCHARGE NOTE PROVIDER - CARE PROVIDERS DIRECT ADDRESSES
,[email or web address]

## 2020-04-16 NOTE — PROGRESS NOTE ADULT - SUBJECTIVE AND OBJECTIVE BOX
ORTHO-POST-OP PROGRESS NOTE:  [id number]    SUBJECTIVE: 64y s/p  Right elbow incision and drainage POD #1. Patient seen and examined. Patient reports of mild discomfort to right elbow that is controlled by pain medications. Patient denies of acute sensory or motor changes.     Vital Signs Last 24 Hrs  T(C): 36.8 (16 Apr 2020 04:30), Max: 37.2 (15 Apr 2020 23:00)  T(F): 98.3 (16 Apr 2020 04:30), Max: 99 (15 Apr 2020 23:00)  HR: 71 (16 Apr 2020 08:00) (63 - 74)  BP: 120/57 (16 Apr 2020 08:00) (93/54 - 150/65)  BP(mean): --  RR: 15 (16 Apr 2020 08:00) (12 - 25)  SpO2: 100% (16 Apr 2020 08:00) (100% - 100%)                          11.0   21.91 )-----------( 290      ( 16 Apr 2020 06:55 )             35.9         PHYSICAL EXAM:     Constitutional: alert, awake, NAD  RUE: Sling in place. Dressings c/d/i. No active drainage or discharge. HV functioning properly, tube patent. No drainage noted in canister. SILT. +ROM all digits. BCR.      Culture - Body Fluid with Gram Stain (collected 15 Apr 2020 16:55)  Source: .Body Fluid Right elbow needle aspiration  Gram Stain (15 Apr 2020 20:27):    Few polymorphonuclear leukocytes per low power field    No organisms seen per oil power field                                                 A/P :  64y Male S/P right elbow I&D  POD# 1    -  Pain control  -  DVT ppx: [x]SCDs   [x] Pharmacolgic    -  Weight bearing status: NWB RUE while drain in  -  F/U OR cultures  -  Monitor drain output  -  Continue care as per primary team ORTHO-POST-OP PROGRESS NOTE:  [id number]    SUBJECTIVE: 64y s/p  Right elbow incision and drainage POD #1. Patient seen and examined. Patient reports of mild discomfort to right elbow although pain is controlled by pain medications. Patient denies of acute sensory or motor changes.     Vital Signs Last 24 Hrs  T(C): 36.8 (16 Apr 2020 04:30), Max: 37.2 (15 Apr 2020 23:00)  T(F): 98.3 (16 Apr 2020 04:30), Max: 99 (15 Apr 2020 23:00)  HR: 71 (16 Apr 2020 08:00) (63 - 74)  BP: 120/57 (16 Apr 2020 08:00) (93/54 - 150/65)  BP(mean): --  RR: 15 (16 Apr 2020 08:00) (12 - 25)  SpO2: 100% (16 Apr 2020 08:00) (100% - 100%)                          11.0   21.91 )-----------( 290      ( 16 Apr 2020 06:55 )             35.9         PHYSICAL EXAM:     Constitutional: alert, awake, NAD  RUE: Sling in place. Dressings c/d/i. No active drainage or discharge. HV functioning properly, tube patent. No drainage noted in canister. SILT. +ROM all digits. wrist: motor intact with no pain. BCR.      Culture - Body Fluid with Gram Stain (collected 15 Apr 2020 16:55)  Source: .Body Fluid Right elbow needle aspiration  Gram Stain (15 Apr 2020 20:27):    Few polymorphonuclear leukocytes per low power field    No organisms seen per oil power field                                                 A/P :  64y Male S/P right elbow I&D  POD# 1    -  Pain control  -  DVT ppx: [x]SCDs   [x] Pharmacolgic    -  Weight bearing status: NWB RUE while drain in  -  F/U OR cultures  -  Monitor drain output  -  Continue care as per primary team

## 2020-04-16 NOTE — PROGRESS NOTE ADULT - ASSESSMENT
64M with extensive cardiac history, htn, DM2 cb ckd came to the ER for left groin area infection which started 1 week ago after he had angiography through left groin area 2 weeks ago, s/p 3 stents (done at [facility] by ok marsh ). Pt stated that he has been on levaquin by his doctor but getting worse. some pain in the left groin area, denies fever but had chills. no cp, no sob. no dizziness, no abd. pain. no n/v/d. As per pt. his cardiologist is aware that his groin area is not healing, was contacted via phone.   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 corrected normal. normal vit d and pth 64M with extensive cardiac history, htn, DM2 cb ckd came to the ER for left groin area infection which started 1 week ago after he had angiography through left groin area 2 weeks ago, s/p 3 stents (done at [facility] by ok marsh ). Pt stated that he has been on levaquin by his doctor but getting worse. some pain in the left groin area, denies fever but had chills. no cp, no sob. no dizziness, no abd. pain. no n/v/d. As per pt. his cardiologist is aware that his groin area is not healing, was contacted via phone.   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 corrected normal. normal vit d and pth

## 2020-04-16 NOTE — PROCEDURE NOTE - NSPROCDETAILS_GEN_ALL_CORE
supine position/ultrasound guidance/location identified, draped/prepped, sterile technique used/sterile technique, catheter placed/ultrasound assessment/sterile dressing applied

## 2020-04-16 NOTE — DISCHARGE NOTE PROVIDER - NSDCFUADDINST_GEN_ALL_CORE_FT
Ortho follow up instructions: Patient is weight bearing as tolerated of right upper extremity. Patient to follow up with Dr. Booker as needed. If incision becomes red, warm, painful or starts draining, call office immediately. Ortho follow up instructions: Patient is weight bearing as tolerated of right upper extremity. Patient to follow up with Dr. Booker in 3 weeks or as needed. P If incision becomes red, warm, painful or starts draining, call office immediately.

## 2020-04-16 NOTE — PROGRESS NOTE ADULT - ASSESSMENT
Improved MARSHALL on CKD stage III - baseline creat usually 2.2 - 2.5 due to DM/HTN   Follows w Dr Pedro in our office  Has h/o CAD, S/P MI, CABG, ICM with AICD  Renal sono noted no hydronephrosis , recent CT normal kidneys   Add Bicarbonate to IVF   renal fcn actually better on Zosyn   Abx as per ID       s/p OR on 4/10- Debridement, external genitalia, perineum  I&D abscess   L groin hematoma/ cellulitis on abx/ Leukocytosis  S/P R elbow drainage   Abx as per ID     Anemia - S/P transfusion   watch labs     Hyperkalemia   Pt on Digoxin ----> level in am   Add bicarb to IVF   ? Reabsorption of hematoma  Kayexalate x 1   repeat BMP at 6 pm     Will follow

## 2020-04-16 NOTE — DISCHARGE NOTE PROVIDER - NSDCCPCAREPLAN_GEN_ALL_CORE_FT
PRINCIPAL DISCHARGE DIAGNOSIS  Diagnosis: Cellulitis  Assessment and Plan of Treatment: Follow up: Please call and make an appointment with the Vascular Surgery and Ortho 1-2 weeks after discharge. Also, please call and make an appointment with your primary care physician as per your usual schedule.   Activity: May return to normal activities as tolerated  Diet: May continue regular diet.  Medications: Please take all home medications as prescribed by your primary care doctor.   Wound Care: Please, keep wound site clean and dry. You may shower, but do not bathe.  Patient is advised to RETURN TO THE EMERGENCY DEPARTMENT for any of the following - worsening pain, fever/chills, nausea/vomiting, altered mental status, chest pain, shortness of breath, or any other new / worsening symptom.        SECONDARY DISCHARGE DIAGNOSES  Diagnosis: Hematoma of groin  Assessment and Plan of Treatment:

## 2020-04-16 NOTE — DISCHARGE NOTE PROVIDER - CARE PROVIDER_API CALL
Beck Booker)  Orthopaedic Surgery  [address] [place] [address]  Phone: [phone]  Fax: [phone]  Follow Up Time: Beck Booker)  Orthopaedic Surgery  [address] [place] [address]  Phone: [phone]  Fax: [phone]  Follow Up Time:     ManvarSingh, Pallavi B (MD)  Vascular Surgery  [address] [facility], [address] [place] [address]  Phone: [phone]  Fax: [phone]  Follow Up Time: 2 weeks

## 2020-04-16 NOTE — DISCHARGE NOTE PROVIDER - HOSPITAL COURSE
Admission 4/8: HPI:    63 y/o male with extensive cardiac history, htn, DM came to the ER for left groin area infection which started 1 week ago after he had angiography through left groin area 2 weeks ago, got 3 stents     ( done at [facility] by ok marsh ). Pt stated that he has been on levaquin by his doctor but getting worse. some pain in the left groin area, denies fever but had chills. no cp, no sob. no dizziness, no abd. pain. no n/v/d. As per pt. his cardiologist is aware that his groin area is not healing, was contacted via phone.        Pt reports that he did not have pain immediately post cath but developed pain and swelling after going home and being ambulatory. He reports increased swelling, redness and pain in Left groin approx 1 week after cath. He denies any fever or chills, but despite oral antibiotics the swelling pain and redness worsened. He has no complaints of claudication in LLE with h/o stents but ambulation has been limited by CONNOLLY which is improved post stenting.        ED: Patient started on Vanc/Zosyn. CT Pelvis showed: Large left groin fluid collection extending to proximal thigh inferiorly and left lower anterior abdominal wall superiorly as described in detail above. This fluid collection appears to abut left groin vessels, likely represent hematoma provided history of recent catheterization. Consider correlation with left groin ultrasound if there is concern for vascular injury or to exclude potential pseudoaneurysm. Recommend clinical correlation to assess for superimposed infection such as developing abscesses though detailed evaluation is limited secondary to lack of intravenous contrast. No emphysema identified. Mild soft tissue infiltration of the left scrotum is also identified.        Patient taken to OR on 4/10 for wound debridement of necrotizing fasciitis, washout, and packing with wet to dry kerlix. Per ID, patient on Vanc, Zosyn, Clindamycin. Patient started on insulin drip post-operatively and had no acute issues. Endocrinology consulted for glucose control using ISS, lantus, and lispro. on 4/13, patient developed an MARSHALL. Vancomycin was discontinued. Wound vac was applied on 4/13. Orthopedics was consulted for R elbow pain. On 4/15, patient returned to OR for repeat wound washout and debridement with Vascular Surgery, as well as a R elbow incision and drainage with Ortho. OR cultures resulted on 4/16 showing staph lugdunensis, finegoldia magna, abx broadened to meropenem + clindamycin and finished on 4/22. Wound vac removed on 4/17 and replaced with wet to dry packing. R Elbow inflammation found to gout, patient started on colchicine. Patient developed post-operative anemia, in which patient received multiple pRBC transfusions. Pt returned to OR on 4/23 for a debridement, washout, and wound vac placement. The medial wound was sutured closed. Found to be COVID + on 4/23. Patient stable for discharge to rehab.

## 2020-04-16 NOTE — PROGRESS NOTE ADULT - SUBJECTIVE AND OBJECTIVE BOX
[facility]                                   [address] [place] [address]                                                      PHONE: [phone]                                                         FAX: [phone]  [email or web address]  ---------------------------------------------------------------------------------------------------------------------------------    Overnight events/patient complaints:  No overnight events    No Known Allergies    MEDICATIONS  (STANDING):  aspirin enteric coated 81 milliGRAM(s) Oral daily  atorvastatin 80 milliGRAM(s) Oral at bedtime  carvedilol 25 milliGRAM(s) Oral every 12 hours  clopidogrel Tablet 75 milliGRAM(s) Oral daily  collagenase Ointment 1 Application(s) Topical daily  digoxin     Tablet 0.125 milliGRAM(s) Oral daily  finasteride 5 milliGRAM(s) Oral daily  heparin  Injectable 5000 Unit(s) SubCutaneous every 8 hours  insulin glargine Injectable (LANTUS) 45 Unit(s) SubCutaneous at bedtime  insulin lispro (HumaLOG) corrective regimen sliding scale   SubCutaneous three times a day before meals  insulin lispro Injectable (HumaLOG) 22 Unit(s) SubCutaneous Before meals and at bedtime  lactobacillus acidophilus 1 Tablet(s) Oral three times a day with meals  pantoprazole    Tablet 40 milliGRAM(s) Oral before breakfast  piperacillin/tazobactam IVPB.. 3.375 Gram(s) IV Intermittent every 12 hours  senna 2 Tablet(s) Oral at bedtime  simethicone 80 milliGRAM(s) Chew every 6 hours  sodium chloride 0.9%. 1000 milliLiter(s) (50 mL/Hr) IV Continuous <Continuous>    MEDICATIONS  (PRN):  acetaminophen   Tablet .. 650 milliGRAM(s) Oral every 6 hours PRN Temp greater or equal to 38C (100.4F)  dextrose 40% Gel 15 Gram(s) Oral once PRN Blood Glucose LESS THAN 70 milliGRAM(s)/deciLiter  fentaNYL    Injectable 25 MICROGram(s) IV Push every 5 minutes PRN Moderate Pain (4 - 6)  fentaNYL    Injectable 50 MICROGram(s) IV Push every 5 minutes PRN Severe Pain (7 - 10)  HYDROmorphone  Injectable 0.5 milliGRAM(s) IV Push every 6 hours PRN Breakthrough pain and dressing changes  ondansetron Injectable 4 milliGRAM(s) IV Push every 6 hours PRN Nausea and/or Vomiting  oxyCODONE    IR 5 milliGRAM(s) Oral every 4 hours PRN Moderate Pain (4 - 6)  oxyCODONE    IR 10 milliGRAM(s) Oral every 4 hours PRN Severe Pain (7 - 10)      Vital Signs Last 24 Hrs  T(C): 36.8 (16 Apr 2020 04:30), Max: 37.2 (15 Apr 2020 23:00)  T(F): 98.3 (16 Apr 2020 04:30), Max: 99 (15 Apr 2020 23:00)  HR: 71 (16 Apr 2020 08:00) (63 - 74)  BP: 120/57 (16 Apr 2020 08:00) (93/54 - 150/65)  BP(mean): --  RR: 15 (16 Apr 2020 08:00) (12 - 25)  SpO2: 100% (16 Apr 2020 08:00) (100% - 100%)  ICU Vital Signs Last 24 Hrs  MARIA ROSS  I&O's Detail    15 Apr 2020 07:01  -  16 Apr 2020 07:00  --------------------------------------------------------  IN:    Packed Red Blood Cells: 600 mL    sodium chloride 0.9%.: 200 mL  Total IN: 800 mL    OUT:    Estimated Blood Loss: 1 mL    Indwelling Catheter - Urethral: 100 mL  Total OUT: 101 mL    Total NET: 699 mL        Drug Dosing Weight  MARIA ROSS      PHYSICAL EXAM:  General: Appears well developed, well nourished alert and cooperative.  HEENT: Head; normocephalic, atraumatic.  Eyes: Pupils reactive, cornea wnl.  Neck: Supple, no nodes adenopathy, no NVD or carotid bruit or thyromegaly.  CARDIOVASCULAR: Normal S1 and S2, No murmur, rub, gallop or lift.   LUNGS: No rales, rhonchi or wheeze. Normal breath sounds bilaterally.  ABDOMEN: Soft, nontender without mass or organomegaly. bowel sounds normoactive.  EXTREMITIES: No clubbing, cyanosis or edema. Distal pulses wnl. right elbow I/D, left groin debridement  SKIN: warm and dry with normal turgor.  NEURO: Alert/oriented x 3/normal motor exam. No pathologic reflexes.    PSYCH: normal affect.        LABS:                        11.0   21.91 )-----------( 290      ( 16 Apr 2020 06:55 )             35.9     04-16    135  |  106  |  56.0<H>  ----------------------------<  160<H>  5.9<H>   |  14.0<L>  |  2.86<H>    Ca    7.9<L>      16 Apr 2020 06:55  Phos  4.2     04-16  Mg     2.1     04-16    TPro  5.5<L>  /  Alb  1.3<L>  /  TBili  0.9  /  DBili  x   /  AST  46<H>  /  ALT  21  /  AlkPhos  215<H>  04-16    MARIA CODY            RADIOLOGY & ADDITIONAL STUDIES:    INTERPRETATION OF TELEMETRY (personally reviewed): no events       ECHO: Most recent echo in office 2/26/2020; LVEF 50%, mild MR, aortic sclerosis, dual chamber device in RA/RV      CARDIAC CATHETERIZATION: < from: Cardiac Cath Lab - Adult (03.11.20 @ 10:55) >  VENTRICLES: LV not done Cr 1.7 Recent YOLANDA 55%  CORONARY VESSELS: The coronary circulation is right dominant.  LM:   --  LM: Normal.  LAD:   --  Mid LAD: There was a 100 % stenosis. There was good blood supply  to the distal myocardium from a graft.  --  D1: There was a 90 % stenosis.  CX:   --  Proximal circumflex: There was a 100 % stenosis. There was good  blood supply to the distal myocardium from a graft.  RCA:   -- Mid RCA: There was a 100 % stenosis. There was good blood supply  to the distal myocardium from a graft.  GRAFTS:   --  Graft to the mid LAD: The graft was a LIMA. It was normal.  --  Graft to the 1st obtuse marginal: The graft was a saphenous vein graft  from the aorta. It was normal.  --  Graft to the RPDA: The graft was a saphenous vein graft from the aorta.  It was normal.  COMPLICATIONS: There were no complications. No complications occurred  during the cath lab visit.  DIAGNOSTIC IMPRESSIONS: Prior LIMA to LAD and SVG to OM and RPDA are  patent. Severe LAD-maria e disease  DIAGNOSTIC RECOMMENDATIONS: Plan for LAD-maria e PCI  INTERVENTIONAL IMPRESSIONS: Prior LIMA to LAD and SVG to OM and RPDA are  patent. Severe LAD-maria e disease  INTERVENTIONAL RECOMMENDATIONS: Plan for LAD-maria e PCI  Prepared and signed by  Julio Blanco MD  Signed 03/11/2020 15:24:54    < end of copied text >      ASSESSMENT AND PLAN:  In summary, MARIA ORSS is an 64y Male with past medical history significant for CAD sp PCI 3/2020, ICM currently euvolemic, ICD, DM, sp right elbow I/D and left groin debridement.    1) Will follow with ID. Cultures negative thus far.   2) CW abx

## 2020-04-16 NOTE — PROGRESS NOTE ADULT - SUBJECTIVE AND OBJECTIVE BOX
INTERVAL HPI/OVERNIGHT EVENTS:  No acute overnight events reported. Patient with copious amounts of serosang drainage necessitating dressing changes and surgicel packing overnight.  Pt seen at bedside with no major complaints. Patient states that his pain is well controlled with the current pain regimen. Currently denies nausea, vomiting, chest pain, shortness of breath, abdominal pain or fever.     MEDICATIONS  (STANDING):  aspirin enteric coated 81 milliGRAM(s) Oral daily  atorvastatin 80 milliGRAM(s) Oral at bedtime  carvedilol 25 milliGRAM(s) Oral every 12 hours  clopidogrel Tablet 75 milliGRAM(s) Oral daily  collagenase Ointment 1 Application(s) Topical daily  digoxin     Tablet 0.125 milliGRAM(s) Oral daily  finasteride 5 milliGRAM(s) Oral daily  heparin  Injectable 5000 Unit(s) SubCutaneous every 8 hours  insulin glargine Injectable (LANTUS) 45 Unit(s) SubCutaneous at bedtime  insulin lispro (HumaLOG) corrective regimen sliding scale   SubCutaneous three times a day before meals  insulin lispro Injectable (HumaLOG) 22 Unit(s) SubCutaneous Before meals and at bedtime  lactobacillus acidophilus 1 Tablet(s) Oral three times a day with meals  pantoprazole    Tablet 40 milliGRAM(s) Oral before breakfast  piperacillin/tazobactam IVPB.. 3.375 Gram(s) IV Intermittent every 12 hours  senna 2 Tablet(s) Oral at bedtime  simethicone 80 milliGRAM(s) Chew every 6 hours  sodium bicarbonate  Infusion 0.104 mEq/kG/Hr (60 mL/Hr) IV Continuous <Continuous>    MEDICATIONS  (PRN):  acetaminophen   Tablet .. 650 milliGRAM(s) Oral every 6 hours PRN Temp greater or equal to 38C (100.4F)  dextrose 40% Gel 15 Gram(s) Oral once PRN Blood Glucose LESS THAN 70 milliGRAM(s)/deciLiter  fentaNYL    Injectable 25 MICROGram(s) IV Push every 5 minutes PRN Moderate Pain (4 - 6)  fentaNYL    Injectable 50 MICROGram(s) IV Push every 5 minutes PRN Severe Pain (7 - 10)  HYDROmorphone  Injectable 0.5 milliGRAM(s) IV Push every 6 hours PRN Breakthrough pain and dressing changes  ondansetron Injectable 4 milliGRAM(s) IV Push every 6 hours PRN Nausea and/or Vomiting  oxyCODONE    IR 5 milliGRAM(s) Oral every 4 hours PRN Moderate Pain (4 - 6)  oxyCODONE    IR 10 milliGRAM(s) Oral every 4 hours PRN Severe Pain (7 - 10)    Vital Signs Last 24 Hrs  T(C): 37.3 (16 Apr 2020 08:00), Max: 37.3 (16 Apr 2020 08:00)  T(F): 99.2 (16 Apr 2020 08:00), Max: 99.2 (16 Apr 2020 08:00)  HR: 72 (16 Apr 2020 12:00) (63 - 74)  BP: 123/49 (16 Apr 2020 12:00) (93/54 - 150/65)  BP(mean): --  RR: 17 (16 Apr 2020 12:00) (12 - 25)  SpO2: 100% (16 Apr 2020 12:00) (100% - 100%)    Physical exam:   General: Ill appearing. In no acute distress  Pulm: Nonlabored breathing on room air  CV: RRR, S1, S2  Abdomen: soft, NT, ND  Vasc: Left groin with dressing saturated, Removed and wound with packing removed. Superficial skin edge bleeding noted. Patches of old hematoma noted and sm area lat wound with necrotic edging noted. Irrigated copious amts of saline and manually debrided. Wound vac dressing placed, [id number]. Occlusice dressing placed and good seal noted. Left le warm + popiteal, sling to the right upper ext. The dressing is clean, dry and intact. hemovac present with minimal drainage.   Neuro: Sensation to light touch is grossly intact without focal deficit and is symmetric bilaterally. No calf tenderness. Sensation to light touch is grossly intact distally. Motor function distally is 5/5. No foot drop. no wrist drop. Capillary refill is less than 2 seconds. No cyanosis.      LABS:                        11.0   21.91 )-----------( 290      ( 16 Apr 2020 06:55 )             35.9   04-16    135  |  106  |  56.0<H>  ----------------------------<  160<H>  5.9<H>   |  14.0<L>  |  2.86<H>    Ca    7.9<L>      16 Apr 2020 06:55  Phos  4.2     04-16  Mg     2.1     04-16    TPro  5.5<L>  /  Alb  1.3<L>  /  TBili  0.9  /  DBili  x   /  AST  46<H>  /  ALT  21  /  AlkPhos  215<H>  04-16    CAPILLARY BLOOD GLUCOSE  POCT Blood Glucose.: 315 mg/dL (16 Apr 2020 12:08)  POCT Blood Glucose.: 259 mg/dL (16 Apr 2020 11:04)  POCT Blood Glucose.: 185 mg/dL (16 Apr 2020 06:17)  POCT Blood Glucose.: 225 mg/dL (15 Apr 2020 20:54)  POCT Blood Glucose.: 187 mg/dL (15 Apr 2020 16:50)    RECENT CULTURES:  04-15 .Body Fluid Right elbow needle aspiration XXXX   Few polymorphonuclear leukocytes per low power field  No organisms seen per oil power field   No growth    04-13 .Blood XXXX XXXX   No growth at 48 hours    04-12 .Blood XXXX XXXX   No growth at 48 hours    04-11 .Abscess left groin abscess (swabs) Staphylococcus lugdunensis XXXX   Rare Staphylococcus lugdunensis  Moderate Finegoldia magna "Susceptibilities not performed"    04-09 .Abscess XXXX XXXX   Rare Bacillus species not anthracis "Susceptibilities not performed"  Few Coag Negative Staphylococcus "Susceptibilities not performed"  Moderate Finegoldia magna "Susceptibilities not performed"  Rare Parabacteroides distasonis "Susceptibilities not performed"          RADIOLOGY & ADDITIONAL STUDIES:

## 2020-04-16 NOTE — PROGRESS NOTE ADULT - SUBJECTIVE AND OBJECTIVE BOX
NEPHROLOGY INTERVAL HPI/OVERNIGHT EVENTS:    remains on NS   ID and Cardio follow up noted   + fernandez   Ortho drained R elbow   S/p Blood transfusion   Blood Cx 4-13 no growth     MEDICATIONS  (STANDING):  aspirin enteric coated 81 milliGRAM(s) Oral daily  atorvastatin 80 milliGRAM(s) Oral at bedtime  carvedilol 25 milliGRAM(s) Oral every 12 hours  clopidogrel Tablet 75 milliGRAM(s) Oral daily  collagenase Ointment 1 Application(s) Topical daily  digoxin     Tablet 0.125 milliGRAM(s) Oral daily  finasteride 5 milliGRAM(s) Oral daily  heparin  Injectable 5000 Unit(s) SubCutaneous every 8 hours  insulin glargine Injectable (LANTUS) 45 Unit(s) SubCutaneous at bedtime  insulin lispro (HumaLOG) corrective regimen sliding scale   SubCutaneous three times a day before meals  insulin lispro Injectable (HumaLOG) 22 Unit(s) SubCutaneous Before meals and at bedtime  lactobacillus acidophilus 1 Tablet(s) Oral three times a day with meals  pantoprazole    Tablet 40 milliGRAM(s) Oral before breakfast  piperacillin/tazobactam IVPB.. 3.375 Gram(s) IV Intermittent every 12 hours  senna 2 Tablet(s) Oral at bedtime  simethicone 80 milliGRAM(s) Chew every 6 hours  sodium chloride 0.9%. 1000 milliLiter(s) (50 mL/Hr) IV Continuous <Continuous>    MEDICATIONS  (PRN):  acetaminophen   Tablet .. 650 milliGRAM(s) Oral every 6 hours PRN Temp greater or equal to 38C (100.4F)  dextrose 40% Gel 15 Gram(s) Oral once PRN Blood Glucose LESS THAN 70 milliGRAM(s)/deciLiter  fentaNYL    Injectable 25 MICROGram(s) IV Push every 5 minutes PRN Moderate Pain (4 - 6)  fentaNYL    Injectable 50 MICROGram(s) IV Push every 5 minutes PRN Severe Pain (7 - 10)  HYDROmorphone  Injectable 0.5 milliGRAM(s) IV Push every 6 hours PRN Breakthrough pain and dressing changes  ondansetron Injectable 4 milliGRAM(s) IV Push every 6 hours PRN Nausea and/or Vomiting  oxyCODONE    IR 5 milliGRAM(s) Oral every 4 hours PRN Moderate Pain (4 - 6)  oxyCODONE    IR 10 milliGRAM(s) Oral every 4 hours PRN Severe Pain (7 - 10)      Allergies    No Known Allergies    Intolerances        Vital Signs Last 24 Hrs  T(C): 36.8 (16 Apr 2020 04:30), Max: 37.2 (15 Apr 2020 23:00)  T(F): 98.3 (16 Apr 2020 04:30), Max: 99 (15 Apr 2020 23:00)  HR: 71 (16 Apr 2020 08:00) (63 - 74)  BP: 120/57 (16 Apr 2020 08:00) (93/54 - 150/65)  BP(mean): --  RR: 15 (16 Apr 2020 08:00) (12 - 25)  SpO2: 100% (16 Apr 2020 08:00) (100% - 100%)  Daily     Daily   I&O's Detail    15 Apr 2020 07:01  -  16 Apr 2020 07:00  --------------------------------------------------------  IN:    Packed Red Blood Cells: 600 mL    sodium chloride 0.9%.: 200 mL  Total IN: 800 mL    OUT:    Estimated Blood Loss: 1 mL    Indwelling Catheter - Urethral: 100 mL  Total OUT: 101 mL    Total NET: 699 mL        I&O's Summary    15 Apr 2020 07:01  -  16 Apr 2020 07:00  --------------------------------------------------------  IN: 800 mL / OUT: 101 mL / NET: 699 mL        PHYSICAL EXAM:  GENERAL: appears chronically ill  NECK: Supple, neck  veins full  NERVOUS SYSTEM:  Alert & Oriented X3  CHEST/LUNG: Clear / EAE   HEART: Regular rate and rhythm  ABDOMEN: Soft, Nontender, Nondistended; Bowel sounds present  EXT - min edema ,L inguinal area dressed , R elbow dressed   LABS:                        11.0   21.91 )-----------( 290      ( 16 Apr 2020 06:55 )             35.9     04-16    135  |  106  |  56.0<H>  ----------------------------<  160<H>  5.9<H>   |  14.0<L>  |  2.86<H>    Ca    7.9<L>      16 Apr 2020 06:55  Phos  4.2     04-16  Mg     2.1     04-16    TPro  5.5<L>  /  Alb  1.3<L>  /  TBili  0.9  /  DBili  x   /  AST  46<H>  /  ALT  21  /  AlkPhos  215<H>  04-16        Magnesium, Serum: 2.1 mg/dL (04-16 @ 06:55)  Phosphorus Level, Serum: 4.2 mg/dL (04-16 @ 06:55)      EXAM:  CT ABDOMEN ONLY OC IC        PROCEDURE DATE:  03/26/2019           INTERPRETATION:  Clinical information: Elevated amylase and lipase.    Comparison: None    PROCEDURE:   CT of the Abdomen was performed with   intravenous contrast.  100 ml of Omnipaque 350 was injected intravenously. None was discarded.  Oral contrast was administered.        FINDINGS:    LOWER CHEST: There has been median sternotomy. There is a partially   imaged pacemaker wire.    ABDOMEN:  LIVER: within normal limits.  BILE DUCTS: normal caliber.  GALLBLADDER: Gallstones.  PANCREAS: within normal limits.  SPLEEN: within normal limits.  ADRENALS: within normal limits.  KIDNEYS: within normal limits.      BOWEL: There is a small hiatal hernia. No enlarged mesentericlymph nodes.  PERITONEUM: no ascites or free air, no fluid collection.  VESSELS: Atherosclerotic changes. There is high-grade stenosis at the   origin of the celiac axis and mild stenosis proximal superior mesenteric   artery.  RETROPERITONEUM: within normal limits.    ABDOMINAL WALL: Mild skin thickening and subcutaneous edema mid ventral   abdominal wall. Small fat-containing midline ventral abdominal wall   hernias in the epigastric region.  BONES: within normal limits.    IMPRESSION:     Normal CT appearance of the pancreas.    Gallstones.                     ANN NARANJO M.D., ATTENDING RADIOLOGIST   This document has been electronically signed. Mar 26 2019  4:06PM                    RADIOLOGY & ADDITIONAL TESTS:

## 2020-04-16 NOTE — PROGRESS NOTE ADULT - SUBJECTIVE AND OBJECTIVE BOX
INTERVAL HPI/OVERNIGHT EVENTS:    No acute overnight events reported. Patient seen at bedside with no major complaints no issues. Patient states that his pain is well controlled with the current pain regimen. Currently denies nausea, vomiting, chest pain, shortness of breath, abdominal pain or fever. No new complaints. No acute motor or sensory changes are reported.      MEDICATIONS  (STANDING):  aspirin enteric coated 81 milliGRAM(s) Oral daily  atorvastatin 80 milliGRAM(s) Oral at bedtime  carvedilol 25 milliGRAM(s) Oral every 12 hours  clopidogrel Tablet 75 milliGRAM(s) Oral daily  digoxin     Tablet 0.125 milliGRAM(s) Oral daily  finasteride 5 milliGRAM(s) Oral daily  heparin  Injectable 5000 Unit(s) SubCutaneous every 8 hours  insulin glargine Injectable (LANTUS) 45 Unit(s) SubCutaneous at bedtime  insulin lispro (HumaLOG) corrective regimen sliding scale   SubCutaneous three times a day before meals  insulin lispro Injectable (HumaLOG) 22 Unit(s) SubCutaneous Before meals and at bedtime  lactobacillus acidophilus 1 Tablet(s) Oral three times a day with meals  pantoprazole    Tablet 40 milliGRAM(s) Oral before breakfast  piperacillin/tazobactam IVPB.. 3.375 Gram(s) IV Intermittent every 12 hours  polyethylene glycol 3350 17 Gram(s) Oral two times a day  senna 2 Tablet(s) Oral at bedtime  simethicone 80 milliGRAM(s) Chew every 6 hours  sodium chloride 0.9%. 1000 milliLiter(s) (125 mL/Hr) IV Continuous <Continuous>  sodium chloride 0.9%. 1000 milliLiter(s) (50 mL/Hr) IV Continuous <Continuous>    MEDICATIONS  (PRN):  acetaminophen   Tablet .. 650 milliGRAM(s) Oral every 6 hours PRN Temp greater or equal to 38C (100.4F)  dextrose 40% Gel 15 Gram(s) Oral once PRN Blood Glucose LESS THAN 70 milliGRAM(s)/deciLiter  fentaNYL    Injectable 25 MICROGram(s) IV Push every 5 minutes PRN Moderate Pain (4 - 6)  fentaNYL    Injectable 50 MICROGram(s) IV Push every 5 minutes PRN Severe Pain (7 - 10)  HYDROmorphone  Injectable 0.5 milliGRAM(s) IV Push every 6 hours PRN Breakthrough pain and dressing changes  ondansetron Injectable 4 milliGRAM(s) IV Push every 6 hours PRN Nausea and/or Vomiting  oxyCODONE    IR 5 milliGRAM(s) Oral every 4 hours PRN Moderate Pain (4 - 6)  oxyCODONE    IR 10 milliGRAM(s) Oral every 4 hours PRN Severe Pain (7 - 10)      Vital Signs Last 24 Hrs  T(C): 36.1 (15 Apr 2020 15:15), Max: 37.9 (15 Apr 2020 06:00)  T(F): 97 (15 Apr 2020 15:15), Max: 100.2 (15 Apr 2020 06:00)  HR: 64 (15 Apr 2020 16:00) (63 - 77)  BP: 126/50 (15 Apr 2020 16:00) (98/44 - 158/70)  BP(mean): --  RR: 16 (15 Apr 2020 16:00) (16 - 25)  SpO2: 100% (15 Apr 2020 16:00) (93% - 100%)    Physical exam:   General: Not in acute distress  Pulm: Nonlabored breathing on room air  CV: RRR, S1, S2  Abdomen: soft, NT, ND  Vasc: Left groin with dressing in place, lightly saturated, Left le warm + popiteal, sling to the right upper ext. The dressing is clean, dry and intact. hemovac present with minimal drainage.   Neuro: Sensation to light touch is grossly intact without focal deficit and is symmetric bilaterally.No calf tenderness. Sensation to light touch is grossly intact distally. Motor function distally is 5/5. No foot drop. no wrist drop. Capillary refill is less than 2 seconds. No cyanosis.      I&O's Detail    14 Apr 2020 07:01  -  15 Apr 2020 07:00  --------------------------------------------------------  IN:    Oral Fluid: 120 mL    sodium chloride 0.9%.: 1125 mL  Total IN: 1245 mL    OUT:    Indwelling Catheter - Urethral: 1350 mL    Other: 200 mL  Total OUT: 1550 mL    Total NET: -305 mL      15 Apr 2020 07:01  -  16 Apr 2020 01:26  --------------------------------------------------------  IN:  Total IN: 0 mL    OUT:    Estimated Blood Loss: 1 mL    Indwelling Catheter - Urethral: 100 mL  Total OUT: 101 mL    Total NET: -101 mL          LABS:                        8.3    19.89 )-----------( 347      ( 15 Apr 2020 07:29 )             26.6     04-15    140  |  106  |  55.0<H>  ----------------------------<  138<H>  4.5   |  21.0<L>  |  2.40<H>    Ca    7.7<L>      15 Apr 2020 07:30  Phos  2.5     04-15  Mg     2.0     04-15    TPro  x   /  Alb  1.6<L>  /  TBili  x   /  DBili  x   /  AST  x   /  ALT  x   /  AlkPhos  x   04-14          RADIOLOGY & ADDITIONAL STUDIES:

## 2020-04-17 LAB
ALBUMIN SERPL ELPH-MCNC: 1.5 G/DL — LOW (ref 3.3–5.2)
ALP SERPL-CCNC: 141 U/L — HIGH (ref 40–120)
ALT FLD-CCNC: 17 U/L — SIGNIFICANT CHANGE UP
ANION GAP SERPL CALC-SCNC: 9 MMOL/L — SIGNIFICANT CHANGE UP (ref 5–17)
AST SERPL-CCNC: 28 U/L — SIGNIFICANT CHANGE UP
BILIRUB SERPL-MCNC: 0.3 MG/DL — LOW (ref 0.4–2)
BUN SERPL-MCNC: 55 MG/DL — HIGH (ref 8–20)
CALCIUM SERPL-MCNC: 7.6 MG/DL — LOW (ref 8.6–10.2)
CHLORIDE SERPL-SCNC: 104 MMOL/L — SIGNIFICANT CHANGE UP (ref 98–107)
CO2 SERPL-SCNC: 25 MMOL/L — SIGNIFICANT CHANGE UP (ref 22–29)
CREAT SERPL-MCNC: 2.51 MG/DL — HIGH (ref 0.5–1.3)
CULTURE RESULTS: SIGNIFICANT CHANGE UP
DIGOXIN SERPL-MCNC: 1.1 NG/ML — SIGNIFICANT CHANGE UP (ref 0.8–2)
GLUCOSE BLDC GLUCOMTR-MCNC: 241 MG/DL — HIGH (ref 70–99)
GLUCOSE BLDC GLUCOMTR-MCNC: 305 MG/DL — HIGH (ref 70–99)
GLUCOSE BLDC GLUCOMTR-MCNC: 64 MG/DL — LOW (ref 70–99)
GLUCOSE SERPL-MCNC: 51 MG/DL — LOW (ref 70–99)
HCT VFR BLD CALC: 19.8 % — CRITICAL LOW (ref 39–50)
HCT VFR BLD CALC: 24.4 % — LOW (ref 39–50)
HGB BLD-MCNC: 6.6 G/DL — CRITICAL LOW (ref 13–17)
HGB BLD-MCNC: 8.1 G/DL — LOW (ref 13–17)
MAGNESIUM SERPL-MCNC: 2.1 MG/DL — SIGNIFICANT CHANGE UP (ref 1.6–2.6)
MCHC RBC-ENTMCNC: 29.5 PG — SIGNIFICANT CHANGE UP (ref 27–34)
MCHC RBC-ENTMCNC: 29.5 PG — SIGNIFICANT CHANGE UP (ref 27–34)
MCHC RBC-ENTMCNC: 33.2 GM/DL — SIGNIFICANT CHANGE UP (ref 32–36)
MCHC RBC-ENTMCNC: 33.3 GM/DL — SIGNIFICANT CHANGE UP (ref 32–36)
MCV RBC AUTO: 88.4 FL — SIGNIFICANT CHANGE UP (ref 80–100)
MCV RBC AUTO: 88.7 FL — SIGNIFICANT CHANGE UP (ref 80–100)
PLATELET # BLD AUTO: 318 K/UL — SIGNIFICANT CHANGE UP (ref 150–400)
PLATELET # BLD AUTO: 357 K/UL — SIGNIFICANT CHANGE UP (ref 150–400)
POTASSIUM SERPL-MCNC: 4.2 MMOL/L — SIGNIFICANT CHANGE UP (ref 3.5–5.3)
POTASSIUM SERPL-SCNC: 4.2 MMOL/L — SIGNIFICANT CHANGE UP (ref 3.5–5.3)
PROT SERPL-MCNC: 4.6 G/DL — LOW (ref 6.6–8.7)
RBC # BLD: 2.24 M/UL — LOW (ref 4.2–5.8)
RBC # BLD: 2.75 M/UL — LOW (ref 4.2–5.8)
RBC # FLD: 15.2 % — HIGH (ref 10.3–14.5)
RBC # FLD: 15.9 % — HIGH (ref 10.3–14.5)
SODIUM SERPL-SCNC: 138 MMOL/L — SIGNIFICANT CHANGE UP (ref 135–145)
SPECIMEN SOURCE: SIGNIFICANT CHANGE UP
SURGICAL PATHOLOGY STUDY: SIGNIFICANT CHANGE UP
WBC # BLD: 13.63 K/UL — HIGH (ref 3.8–10.5)
WBC # BLD: 17.11 K/UL — HIGH (ref 3.8–10.5)
WBC # FLD AUTO: 13.63 K/UL — HIGH (ref 3.8–10.5)
WBC # FLD AUTO: 17.11 K/UL — HIGH (ref 3.8–10.5)

## 2020-04-17 PROCEDURE — 99232 SBSQ HOSP IP/OBS MODERATE 35: CPT

## 2020-04-17 RX ORDER — DEXTROSE 50 % IN WATER 50 %
25 SYRINGE (ML) INTRAVENOUS ONCE
Refills: 0 | Status: COMPLETED | OUTPATIENT
Start: 2020-04-17 | End: 2020-04-17

## 2020-04-17 RX ORDER — ERYTHROPOIETIN 10000 [IU]/ML
20000 INJECTION, SOLUTION INTRAVENOUS; SUBCUTANEOUS
Refills: 0 | Status: DISCONTINUED | OUTPATIENT
Start: 2020-04-17 | End: 2020-04-27

## 2020-04-17 RX ORDER — HYDROMORPHONE HYDROCHLORIDE 2 MG/ML
0.5 INJECTION INTRAMUSCULAR; INTRAVENOUS; SUBCUTANEOUS ONCE
Refills: 0 | Status: DISCONTINUED | OUTPATIENT
Start: 2020-04-17 | End: 2020-04-19

## 2020-04-17 RX ORDER — INSULIN LISPRO 100/ML
30 VIAL (ML) SUBCUTANEOUS
Refills: 0 | Status: DISCONTINUED | OUTPATIENT
Start: 2020-04-17 | End: 2020-04-20

## 2020-04-17 RX ORDER — FOLIC ACID 0.8 MG
1 TABLET ORAL DAILY
Refills: 0 | Status: DISCONTINUED | OUTPATIENT
Start: 2020-04-17 | End: 2020-04-27

## 2020-04-17 RX ORDER — INSULIN LISPRO 100/ML
22 VIAL (ML) SUBCUTANEOUS AT BEDTIME
Refills: 0 | Status: DISCONTINUED | OUTPATIENT
Start: 2020-04-17 | End: 2020-04-18

## 2020-04-17 RX ORDER — INSULIN GLARGINE 100 [IU]/ML
55 INJECTION, SOLUTION SUBCUTANEOUS AT BEDTIME
Refills: 0 | Status: DISCONTINUED | OUTPATIENT
Start: 2020-04-17 | End: 2020-04-17

## 2020-04-17 RX ORDER — INSULIN GLARGINE 100 [IU]/ML
60 INJECTION, SOLUTION SUBCUTANEOUS AT BEDTIME
Refills: 0 | Status: DISCONTINUED | OUTPATIENT
Start: 2020-04-17 | End: 2020-04-21

## 2020-04-17 RX ADMIN — ATORVASTATIN CALCIUM 80 MILLIGRAM(S): 80 TABLET, FILM COATED ORAL at 22:13

## 2020-04-17 RX ADMIN — PANTOPRAZOLE SODIUM 40 MILLIGRAM(S): 20 TABLET, DELAYED RELEASE ORAL at 05:23

## 2020-04-17 RX ADMIN — Medication 1 TABLET(S): at 16:54

## 2020-04-17 RX ADMIN — HEPARIN SODIUM 5000 UNIT(S): 5000 INJECTION INTRAVENOUS; SUBCUTANEOUS at 05:17

## 2020-04-17 RX ADMIN — Medication 1 TABLET(S): at 11:18

## 2020-04-17 RX ADMIN — SIMETHICONE 80 MILLIGRAM(S): 80 TABLET, CHEWABLE ORAL at 11:19

## 2020-04-17 RX ADMIN — CARVEDILOL PHOSPHATE 25 MILLIGRAM(S): 80 CAPSULE, EXTENDED RELEASE ORAL at 05:22

## 2020-04-17 RX ADMIN — SIMETHICONE 80 MILLIGRAM(S): 80 TABLET, CHEWABLE ORAL at 22:16

## 2020-04-17 RX ADMIN — SIMETHICONE 80 MILLIGRAM(S): 80 TABLET, CHEWABLE ORAL at 16:54

## 2020-04-17 RX ADMIN — Medication 30 UNIT(S): at 16:36

## 2020-04-17 RX ADMIN — FINASTERIDE 5 MILLIGRAM(S): 5 TABLET, FILM COATED ORAL at 11:18

## 2020-04-17 RX ADMIN — SIMETHICONE 80 MILLIGRAM(S): 80 TABLET, CHEWABLE ORAL at 00:16

## 2020-04-17 RX ADMIN — Medication 100 MILLIGRAM(S): at 23:14

## 2020-04-17 RX ADMIN — HEPARIN SODIUM 5000 UNIT(S): 5000 INJECTION INTRAVENOUS; SUBCUTANEOUS at 13:06

## 2020-04-17 RX ADMIN — Medication 30 UNIT(S): at 11:19

## 2020-04-17 RX ADMIN — Medication 100 MILLIGRAM(S): at 13:06

## 2020-04-17 RX ADMIN — SIMETHICONE 80 MILLIGRAM(S): 80 TABLET, CHEWABLE ORAL at 05:22

## 2020-04-17 RX ADMIN — CARVEDILOL PHOSPHATE 25 MILLIGRAM(S): 80 CAPSULE, EXTENDED RELEASE ORAL at 17:08

## 2020-04-17 RX ADMIN — Medication 4: at 11:05

## 2020-04-17 RX ADMIN — FENTANYL CITRATE 50 MICROGRAM(S): 50 INJECTION INTRAVENOUS at 20:45

## 2020-04-17 RX ADMIN — Medication 60 MEQ/KG/HR: at 08:17

## 2020-04-17 RX ADMIN — Medication 1 MILLIGRAM(S): at 22:13

## 2020-04-17 RX ADMIN — MEROPENEM 100 MILLIGRAM(S): 1 INJECTION INTRAVENOUS at 16:59

## 2020-04-17 RX ADMIN — Medication 60 MEQ/KG/HR: at 13:36

## 2020-04-17 RX ADMIN — HEPARIN SODIUM 5000 UNIT(S): 5000 INJECTION INTRAVENOUS; SUBCUTANEOUS at 22:14

## 2020-04-17 RX ADMIN — HYDROMORPHONE HYDROCHLORIDE 0.5 MILLIGRAM(S): 2 INJECTION INTRAMUSCULAR; INTRAVENOUS; SUBCUTANEOUS at 17:39

## 2020-04-17 RX ADMIN — SENNA PLUS 2 TABLET(S): 8.6 TABLET ORAL at 22:15

## 2020-04-17 RX ADMIN — HYDROMORPHONE HYDROCHLORIDE 0.5 MILLIGRAM(S): 2 INJECTION INTRAMUSCULAR; INTRAVENOUS; SUBCUTANEOUS at 11:06

## 2020-04-17 RX ADMIN — Medication 1 TABLET(S): at 08:52

## 2020-04-17 RX ADMIN — Medication 8: at 16:33

## 2020-04-17 RX ADMIN — MEROPENEM 100 MILLIGRAM(S): 1 INJECTION INTRAVENOUS at 04:16

## 2020-04-17 RX ADMIN — CLOPIDOGREL BISULFATE 75 MILLIGRAM(S): 75 TABLET, FILM COATED ORAL at 11:18

## 2020-04-17 RX ADMIN — Medication 60 MEQ/KG/HR: at 05:16

## 2020-04-17 RX ADMIN — Medication 60 MEQ/KG/HR: at 08:52

## 2020-04-17 RX ADMIN — Medication 0.12 MILLIGRAM(S): at 05:17

## 2020-04-17 RX ADMIN — Medication 22 UNIT(S): at 22:16

## 2020-04-17 RX ADMIN — Medication 60 MEQ/KG/HR: at 10:30

## 2020-04-17 RX ADMIN — Medication 25 MILLILITER(S): at 08:53

## 2020-04-17 RX ADMIN — Medication 100 MILLIGRAM(S): at 05:21

## 2020-04-17 RX ADMIN — Medication 81 MILLIGRAM(S): at 11:18

## 2020-04-17 RX ADMIN — INSULIN GLARGINE 60 UNIT(S): 100 INJECTION, SOLUTION SUBCUTANEOUS at 22:15

## 2020-04-17 NOTE — PROGRESS NOTE ADULT - SUBJECTIVE AND OBJECTIVE BOX
[facility] - [facility]                                   [address] [place] [address]                                                      PHONE: [phone]                                                         FAX: [phone]  [email or web address]  ---------------------------------------------------------------------------------------------------------------------------------    Overnight events/patient complaints: events noted, sig drop in H/H with bloody drainage from wound vac, BP stable, no angina or dyspnea      No Known Allergies    MEDICATIONS  (STANDING):  aspirin enteric coated 81 milliGRAM(s) Oral daily  atorvastatin 80 milliGRAM(s) Oral at bedtime  carvedilol 25 milliGRAM(s) Oral every 12 hours  clindamycin IVPB 600 milliGRAM(s) IV Intermittent every 8 hours  clopidogrel Tablet 75 milliGRAM(s) Oral daily  digoxin     Tablet 0.125 milliGRAM(s) Oral daily  finasteride 5 milliGRAM(s) Oral daily  heparin  Injectable 5000 Unit(s) SubCutaneous every 8 hours  insulin glargine Injectable (LANTUS) 60 Unit(s) SubCutaneous at bedtime  insulin lispro (HumaLOG) corrective regimen sliding scale   SubCutaneous three times a day before meals  insulin lispro Injectable (HumaLOG) 30 Unit(s) SubCutaneous Before meals and at bedtime  lactobacillus acidophilus 1 Tablet(s) Oral three times a day with meals  meropenem  IVPB 500 milliGRAM(s) IV Intermittent every 12 hours  pantoprazole    Tablet 40 milliGRAM(s) Oral before breakfast  senna 2 Tablet(s) Oral at bedtime  simethicone 80 milliGRAM(s) Chew every 6 hours  sodium bicarbonate  Infusion 0.104 mEq/kG/Hr (60 mL/Hr) IV Continuous <Continuous>    MEDICATIONS  (PRN):  acetaminophen   Tablet .. 650 milliGRAM(s) Oral every 6 hours PRN Temp greater or equal to 38C (100.4F)  dextrose 40% Gel 15 Gram(s) Oral once PRN Blood Glucose LESS THAN 70 milliGRAM(s)/deciLiter  fentaNYL    Injectable 25 MICROGram(s) IV Push every 5 minutes PRN Moderate Pain (4 - 6)  fentaNYL    Injectable 50 MICROGram(s) IV Push every 5 minutes PRN Severe Pain (7 - 10)  HYDROmorphone  Injectable 0.5 milliGRAM(s) IV Push every 6 hours PRN Breakthrough pain and dressing changes  ondansetron Injectable 4 milliGRAM(s) IV Push every 6 hours PRN Nausea and/or Vomiting  oxyCODONE    IR 5 milliGRAM(s) Oral every 4 hours PRN Moderate Pain (4 - 6)  oxyCODONE    IR 10 milliGRAM(s) Oral every 4 hours PRN Severe Pain (7 - 10)      Vital Signs Last 24 Hrs  T(C): 36.6 (17 Apr 2020 05:54), Max: 36.6 (17 Apr 2020 05:54)  T(F): 97.9 (17 Apr 2020 05:54), Max: 97.9 (17 Apr 2020 05:54)  HR: 66 (17 Apr 2020 05:54) (65 - 72)  BP: 106/51 (17 Apr 2020 05:54) (106/51 - 129/56)  BP(mean): --  RR: 16 (17 Apr 2020 05:54) (16 - 17)  SpO2: 100% (17 Apr 2020 05:54) (100% - 100%)  Daily     Daily   ICU Vital Signs Last 24 Hrs  MARIA ROSS  I&O's Detail    16 Apr 2020 07:01  -  17 Apr 2020 07:00  --------------------------------------------------------  IN:    sodium bicarbonate  Infusion: 420 mL    Solution: 100 mL    Solution: 50 mL  Total IN: 570 mL    OUT:  Total OUT: 0 mL    Total NET: 570 mL        I&O's Summary    16 Apr 2020 07:01  -  17 Apr 2020 07:00  --------------------------------------------------------  IN: 570 mL / OUT: 0 mL / NET: 570 mL      Drug Dosing Weight  MARIA RIVERAAAN      PHYSICAL EXAM:  General: Appears well developed, well nourished alert and cooperative.  HEENT: Head; normocephalic, atraumatic.  Eyes: Pupils reactive, cornea wnl.  Neck: Supple, no nodes adenopathy, no NVD or carotid bruit or thyromegaly.  CARDIOVASCULAR: Normal S1 and S2, No murmur, rub, gallop or lift.   LUNGS: No rales, rhonchi or wheeze. Normal breath sounds bilaterally.  ABDOMEN: Soft, nontender without mass or organomegaly. bowel sounds normoactive.  EXTREMITIES: No clubbing, cyanosis or edema. Distal pulses wnl.   SKIN: os edema, Left groin wound vac  NEURO: Alert/oriented x 3/normal motor exam. No pathologic reflexes.    PSYCH: normal affect.        LABS:                        6.6    17.11 )-----------( 357      ( 17 Apr 2020 06:37 )             19.8     04-17    138  |  104  |  55.0<H>  ----------------------------<  51<L>  4.2   |  25.0  |  2.51<H>    Ca    7.6<L>      17 Apr 2020 06:37  Phos  4.2     04-16  Mg     2.1     04-17    TPro  4.6<L>  /  Alb  1.5<L>  /  TBili  0.3<L>  /  DBili  x   /  AST  28  /  ALT  17  /  AlkPhos  141<H>  04-17    MARIA ROSS            RADIOLOGY & ADDITIONAL STUDIES:    INTERPRETATION OF TELEMETRY (personally reviewed):    ECG:< from: 12 Lead ECG (04.09.20 @ 12:52) >  Diagnosis Line Normal sinus rhythm  Possible Left atrial enlargement  Septal infarct , age undetermined  T wave abnormality, consider inferior ischemia    Confirmed by NICHOLAS REYNA ([id number]) on 4/10/2020 9:57:29 AM    < end of copied text >

## 2020-04-17 NOTE — PROGRESS NOTE ADULT - ASSESSMENT
64M with extensive cardiac history, htn, DM2 cb ckd came to the ER for left groin area infection which started 1 week ago after he had angiography through left groin area 2 weeks ago, s/p 3 stents (done at [facility] by ok marsh ). Pt stated that he has been on levaquin by his doctor but getting worse. some pain in the left groin area, denies fever but had chills. no cp, no sob. no dizziness, no abd. pain. no n/v/d. As per pt. his cardiologist is aware that his groin area is not healing, was contacted via phone.   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 change corrected by primary team and appreciated    Uncontrolled T2DM- hyperglycemic  -A1c 11.5  -check sugars AC and bedtime  -ensure diabetic diet  -change lantus to 60 units QHS  -continue lispro 30 units TID (change in order noted and appreciated)  -continue insulin sliding scale    HLD- continue statin    Hypocalcemia- mild. corrected normal. normal vit d and pth

## 2020-04-17 NOTE — PROGRESS NOTE ADULT - SUBJECTIVE AND OBJECTIVE BOX
INTERVAL HPI/OVERNIGHT EVENTS:  No acute overnight events reported. Patient with hypoglycemia this am after Lantus increased. Noted with good seal on wound vac and serosang drainage approx 500 cc since vac placed yesterday. Pt seen at bedside with no major complaints. Patient states that his pain is well controlled with the current pain regimen. Currently denies nausea, vomiting, chest pain, shortness of breath, abdominal pain or fever.       MEDICATIONS  (STANDING):  aspirin enteric coated 81 milliGRAM(s) Oral daily  atorvastatin 80 milliGRAM(s) Oral at bedtime  carvedilol 25 milliGRAM(s) Oral every 12 hours  clindamycin IVPB 600 milliGRAM(s) IV Intermittent every 8 hours  clopidogrel Tablet 75 milliGRAM(s) Oral daily  digoxin     Tablet 0.125 milliGRAM(s) Oral daily  finasteride 5 milliGRAM(s) Oral daily  heparin  Injectable 5000 Unit(s) SubCutaneous every 8 hours  insulin glargine Injectable (LANTUS) 60 Unit(s) SubCutaneous at bedtime  insulin lispro (HumaLOG) corrective regimen sliding scale   SubCutaneous three times a day before meals  insulin lispro Injectable (HumaLOG) 30 Unit(s) SubCutaneous Before meals and at bedtime  lactobacillus acidophilus 1 Tablet(s) Oral three times a day with meals  meropenem  IVPB 500 milliGRAM(s) IV Intermittent every 12 hours  pantoprazole    Tablet 40 milliGRAM(s) Oral before breakfast  senna 2 Tablet(s) Oral at bedtime  simethicone 80 milliGRAM(s) Chew every 6 hours  sodium bicarbonate  Infusion 0.104 mEq/kG/Hr (60 mL/Hr) IV Continuous <Continuous>    MEDICATIONS  (PRN):  acetaminophen   Tablet .. 650 milliGRAM(s) Oral every 6 hours PRN Temp greater or equal to 38C (100.4F)  dextrose 40% Gel 15 Gram(s) Oral once PRN Blood Glucose LESS THAN 70 milliGRAM(s)/deciLiter  fentaNYL    Injectable 25 MICROGram(s) IV Push every 5 minutes PRN Moderate Pain (4 - 6)  fentaNYL    Injectable 50 MICROGram(s) IV Push every 5 minutes PRN Severe Pain (7 - 10)  HYDROmorphone  Injectable 0.5 milliGRAM(s) IV Push every 6 hours PRN Breakthrough pain and dressing changes  ondansetron Injectable 4 milliGRAM(s) IV Push every 6 hours PRN Nausea and/or Vomiting  oxyCODONE    IR 5 milliGRAM(s) Oral every 4 hours PRN Moderate Pain (4 - 6)  oxyCODONE    IR 10 milliGRAM(s) Oral every 4 hours PRN Severe Pain (7 - 10)    Vital Signs Last 24 Hrs  T(C): 36.6 (17 Apr 2020 05:54), Max: 36.6 (17 Apr 2020 05:54)  T(F): 97.9 (17 Apr 2020 05:54), Max: 97.9 (17 Apr 2020 05:54)  HR: 66 (17 Apr 2020 05:54) (65 - 72)  BP: 106/51 (17 Apr 2020 05:54) (106/51 - 129/56)  BP(mean): --  RR: 16 (17 Apr 2020 05:54) (16 - 17)  SpO2: 100% (17 Apr 2020 05:54) (100% - 100%)    Physical exam:   General: Ill appearing pale M In no acute distress  Pulm: Nonlabored breathing on room air  CV: RRR, S1, S2  Abdomen: soft, NT, ND  Vasc: Left groin wound vac dressing in place with good seal noted. Draining serosang fluid. Left le warm + popiteal, sling to the right upper ext. The dressing is clean, dry and intact. hemovac present with minimal drainage.   Neuro: Sensation to light touch is grossly intact without focal deficit and is symmetric bilaterally. No calf tenderness. Sensation to light touch is grossly intact distally. Motor function distally is 5/5. No foot drop. no wrist drop. Capillary refill is less than 2 seconds. No cyanosis.      LABS:                         6.6    17.11 )-----------( 357      ( 17 Apr 2020 06:37 )             19.8   04-17    138  |  104  |  55.0<H>  ----------------------------<  51<L>  4.2   |  25.0  |  2.51<H>    Ca    7.6<L>      17 Apr 2020 06:37  Phos  4.2     04-16  Mg     2.1     04-17    TPro  4.6<L>  /  Alb  1.5<L>  /  TBili  0.3<L>  /  DBili  x   /  AST  28  /  ALT  17  /  AlkPhos  141<H>  04-17  CAPILLARY BLOOD GLUCOSE  POCT Blood Glucose.: 64 mg/dL (17 Apr 2020 08:13)  POCT Blood Glucose.: 236 mg/dL (16 Apr 2020 16:31)  POCT Blood Glucose.: 310 mg/dL (16 Apr 2020 13:26)  POCT Blood Glucose.: 315 mg/dL (16 Apr 2020 12:08)  POCT Blood Glucose.: 259 mg/dL (16 Apr 2020 11:04)     RECENT CULTURES:  04-15 .Body Fluid Right elbow needle aspiration XXXX   Few polymorphonuclear leukocytes per low power field  No organisms seen per oil power field   No growth    04-13 .Blood XXXX XXXX   No growth at 48 hours    04-12 .Blood XXXX XXXX   No growth at 48 hours    04-11 .Abscess left groin abscess (swabs) Staphylococcus lugdunensis XXXX   Rare Staphylococcus lugdunensis  Moderate Finegoldia magna "Susceptibilities not performed"    04-09 .Abscess XXXX XXXX   Rare Bacillus species not anthracis "Susceptibilities not performed"  Few Coag Negative Staphylococcus "Susceptibilities not performed"  Moderate Finegoldia magna "Susceptibilities not performed"  Rare Parabacteroides distasonis "Susceptibilities not performed"          RADIOLOGY & ADDITIONAL STUDIES:

## 2020-04-17 NOTE — PROGRESS NOTE ADULT - SUBJECTIVE AND OBJECTIVE BOX
INTERVAL HPI/OVERNIGHT EVENTS:  FS reviewed  hypoglycemic due to error in ordering lispro at bedtime    Review of systems:  unable to obtain    MEDICATIONS  (STANDING):  aspirin enteric coated 81 milliGRAM(s) Oral daily  atorvastatin 80 milliGRAM(s) Oral at bedtime  carvedilol 25 milliGRAM(s) Oral every 12 hours  clindamycin IVPB 600 milliGRAM(s) IV Intermittent every 8 hours  clopidogrel Tablet 75 milliGRAM(s) Oral daily  digoxin     Tablet 0.125 milliGRAM(s) Oral daily  finasteride 5 milliGRAM(s) Oral daily  heparin  Injectable 5000 Unit(s) SubCutaneous every 8 hours  insulin glargine Injectable (LANTUS) 55 Unit(s) SubCutaneous at bedtime  insulin lispro (HumaLOG) corrective regimen sliding scale   SubCutaneous three times a day before meals  insulin lispro Injectable (HumaLOG) 30 Unit(s) SubCutaneous three times a day before meals  insulin lispro Injectable (HumaLOG) 22 Unit(s) SubCutaneous at bedtime  lactobacillus acidophilus 1 Tablet(s) Oral three times a day with meals  meropenem  IVPB 500 milliGRAM(s) IV Intermittent every 12 hours  pantoprazole    Tablet 40 milliGRAM(s) Oral before breakfast  senna 2 Tablet(s) Oral at bedtime  simethicone 80 milliGRAM(s) Chew every 6 hours  sodium bicarbonate  Infusion 0.104 mEq/kG/Hr (60 mL/Hr) IV Continuous <Continuous>    MEDICATIONS  (PRN):  acetaminophen   Tablet .. 650 milliGRAM(s) Oral every 6 hours PRN Temp greater or equal to 38C (100.4F)  dextrose 40% Gel 15 Gram(s) Oral once PRN Blood Glucose LESS THAN 70 milliGRAM(s)/deciLiter  fentaNYL    Injectable 25 MICROGram(s) IV Push every 5 minutes PRN Moderate Pain (4 - 6)  fentaNYL    Injectable 50 MICROGram(s) IV Push every 5 minutes PRN Severe Pain (7 - 10)  HYDROmorphone  Injectable 0.5 milliGRAM(s) IV Push every 6 hours PRN Breakthrough pain and dressing changes  ondansetron Injectable 4 milliGRAM(s) IV Push every 6 hours PRN Nausea and/or Vomiting  oxyCODONE    IR 5 milliGRAM(s) Oral every 4 hours PRN Moderate Pain (4 - 6)  oxyCODONE    IR 10 milliGRAM(s) Oral every 4 hours PRN Severe Pain (7 - 10)      Allergies    No Known Allergies    Intolerances        Vital Signs Last 24 Hrs  T(C): 36.7 (17 Apr 2020 07:30), Max: 36.7 (17 Apr 2020 07:30)  T(F): 98.1 (17 Apr 2020 07:30), Max: 98.1 (17 Apr 2020 07:30)  HR: 72 (17 Apr 2020 07:30) (65 - 72)  BP: 146/67 (17 Apr 2020 07:30) (106/51 - 146/67)  BP(mean): --  RR: 18 (17 Apr 2020 07:30) (16 - 18)  SpO2: 96% (17 Apr 2020 07:30) (96% - 100%)    deferred due to covid    LABS:                        6.6    17.11 )-----------( 357      ( 17 Apr 2020 06:37 )             19.8     04-17    138  |  104  |  55.0<H>  ----------------------------<  51<L>  4.2   |  25.0  |  2.51<H>    Ca    7.6<L>      17 Apr 2020 06:37  Phos  4.2     04-16  Mg     2.1     04-17    TPro  4.6<L>  /  Alb  1.5<L>  /  TBili  0.3<L>  /  DBili  x   /  AST  28  /  ALT  17  /  AlkPhos  141<H>  04-17        Hemoglobin A1C, Whole Blood: 11.5 % [4.0 - 5.6] (04-11-20 @ 06:52)        CAPILLARY BLOOD GLUCOSE      POCT Blood Glucose.: 241 mg/dL (17 Apr 2020 10:56)  POCT Blood Glucose.: 64 mg/dL (17 Apr 2020 08:13)  POCT Blood Glucose.: 236 mg/dL (16 Apr 2020 16:31)  POCT Blood Glucose.: 310 mg/dL (16 Apr 2020 13:26)  POCT Blood Glucose.: 315 mg/dL (16 Apr 2020 12:08)          RADIOLOGY & ADDITIONAL TESTS:  none

## 2020-04-17 NOTE — PROGRESS NOTE ADULT - SUBJECTIVE AND OBJECTIVE BOX
[facility]  INFECTIOUS DISEASES AND INTERNAL MEDICINE at [place]  =======================================================  Noe Navarro MD  Diplomates American Board of Internal Medicine and Infectious Diseases  Tel: [phone]      Fax: [phone]  =======================================================    MARIA ROSS [id number]    Follow up: left groin nec fasc  s/p redebridement on 4/15 and yesterday noted with necrotic edges to wound, vac replaced. abx changed to meropenem and clindamycin  now being transfused    Allergies:  No Known Allergies      Medications:  acetaminophen   Tablet .. 650 milliGRAM(s) Oral every 6 hours PRN  aspirin enteric coated 81 milliGRAM(s) Oral daily  atorvastatin 80 milliGRAM(s) Oral at bedtime  carvedilol 25 milliGRAM(s) Oral every 12 hours  clindamycin IVPB 600 milliGRAM(s) IV Intermittent every 8 hours  clopidogrel Tablet 75 milliGRAM(s) Oral daily  dextrose 40% Gel 15 Gram(s) Oral once PRN  digoxin     Tablet 0.125 milliGRAM(s) Oral daily  epoetin-adrian-epbx (RETACRIT) Injectable 48038 Unit(s) SubCutaneous every 7 days  fentaNYL    Injectable 25 MICROGram(s) IV Push every 5 minutes PRN  fentaNYL    Injectable 50 MICROGram(s) IV Push every 5 minutes PRN  finasteride 5 milliGRAM(s) Oral daily  folic acid 1 milliGRAM(s) Oral daily  heparin  Injectable 5000 Unit(s) SubCutaneous every 8 hours  HYDROmorphone  Injectable 0.5 milliGRAM(s) IV Push every 6 hours PRN  insulin glargine Injectable (LANTUS) 60 Unit(s) SubCutaneous at bedtime  insulin lispro (HumaLOG) corrective regimen sliding scale   SubCutaneous three times a day before meals  insulin lispro Injectable (HumaLOG) 30 Unit(s) SubCutaneous three times a day before meals  insulin lispro Injectable (HumaLOG) 22 Unit(s) SubCutaneous at bedtime  lactobacillus acidophilus 1 Tablet(s) Oral three times a day with meals  meropenem  IVPB 500 milliGRAM(s) IV Intermittent every 12 hours  ondansetron Injectable 4 milliGRAM(s) IV Push every 6 hours PRN  oxyCODONE    IR 5 milliGRAM(s) Oral every 4 hours PRN  oxyCODONE    IR 10 milliGRAM(s) Oral every 4 hours PRN  pantoprazole    Tablet 40 milliGRAM(s) Oral before breakfast  senna 2 Tablet(s) Oral at bedtime  simethicone 80 milliGRAM(s) Chew every 6 hours            REVIEW OF SYSTEMS:  CONSTITUTIONAL:  No Fever or chills  HEENT:   No diplopia or blurred vision.  No earache, sore throat or runny nose.  CARDIOVASCULAR:  No pressure, squeezing, strangling, tightness, heaviness or aching about the chest, neck, axilla or epigastrium.  RESPIRATORY:  No cough, shortness of breath  GASTROINTESTINAL:  No nausea, vomiting or diarrhea.  GENITOURINARY:  No dysuria, frequency or urgency. No Blood in urine  MUSCULOSKELETAL:  no joint aches, no muscle pain  SKIN:  No change in skin, hair or nails.  NEUROLOGIC:  No Headaches, seizures or weakness.  PSYCHIATRIC:  No disorder of thought or mood.  ENDOCRINE:  No heat or cold intolerance  HEMATOLOGICAL:  No easy bruising or bleeding.            Physical Exam:  ICU Vital Signs Last 24 Hrs  T(C): 36.7 (17 Apr 2020 07:30), Max: 36.7 (17 Apr 2020 07:30)  T(F): 98.1 (17 Apr 2020 07:30), Max: 98.1 (17 Apr 2020 07:30)  HR: 72 (17 Apr 2020 07:30) (65 - 72)  BP: 146/67 (17 Apr 2020 07:30) (106/51 - 146/67)  BP(mean): --  ABP: --  ABP(mean): --  RR: 18 (17 Apr 2020 07:30) (16 - 18)  SpO2: 96% (17 Apr 2020 07:30) (96% - 100%)      GEN: NAD, pleasant  HEENT: normocephalic and atraumatic. EOMI. PERRL.  Anicteric   NECK: Supple.   LUNGS: Clear to auscultation.  HEART: Regular rate and rhythm without murmur.  ABDOMEN: Soft, nontender, and nondistended.  Positive bowel sounds.    : No CVA tenderness  EXTREMITIES: right elbow dressing intact, left groin vac in place with oozing of blood at lateral edge  MSK: no joint swelling  NEUROLOGIC: Cranial nerves II through XII are grossly intact. No focal deficits  PSYCHIATRIC: Appropriate affect .  SKIN: No Rash      Labs:

## 2020-04-17 NOTE — PROGRESS NOTE ADULT - SUBJECTIVE AND OBJECTIVE BOX
NEPHROLOGY INTERVAL HPI/OVERNIGHT EVENTS:    Interim noted   Vascular ands Cardio follow up noted   Getting transfusion today   potassium better  + IV Bicarb drip     MEDICATIONS  (STANDING):  aspirin enteric coated 81 milliGRAM(s) Oral daily  atorvastatin 80 milliGRAM(s) Oral at bedtime  carvedilol 25 milliGRAM(s) Oral every 12 hours  clindamycin IVPB 600 milliGRAM(s) IV Intermittent every 8 hours  clopidogrel Tablet 75 milliGRAM(s) Oral daily  digoxin     Tablet 0.125 milliGRAM(s) Oral daily  finasteride 5 milliGRAM(s) Oral daily  heparin  Injectable 5000 Unit(s) SubCutaneous every 8 hours  insulin glargine Injectable (LANTUS) 60 Unit(s) SubCutaneous at bedtime  insulin lispro (HumaLOG) corrective regimen sliding scale   SubCutaneous three times a day before meals  insulin lispro Injectable (HumaLOG) 30 Unit(s) SubCutaneous three times a day before meals  insulin lispro Injectable (HumaLOG) 22 Unit(s) SubCutaneous at bedtime  lactobacillus acidophilus 1 Tablet(s) Oral three times a day with meals  meropenem  IVPB 500 milliGRAM(s) IV Intermittent every 12 hours  pantoprazole    Tablet 40 milliGRAM(s) Oral before breakfast  senna 2 Tablet(s) Oral at bedtime  simethicone 80 milliGRAM(s) Chew every 6 hours  sodium bicarbonate  Infusion 0.104 mEq/kG/Hr (60 mL/Hr) IV Continuous <Continuous>    MEDICATIONS  (PRN):  acetaminophen   Tablet .. 650 milliGRAM(s) Oral every 6 hours PRN Temp greater or equal to 38C (100.4F)  dextrose 40% Gel 15 Gram(s) Oral once PRN Blood Glucose LESS THAN 70 milliGRAM(s)/deciLiter  fentaNYL    Injectable 25 MICROGram(s) IV Push every 5 minutes PRN Moderate Pain (4 - 6)  fentaNYL    Injectable 50 MICROGram(s) IV Push every 5 minutes PRN Severe Pain (7 - 10)  HYDROmorphone  Injectable 0.5 milliGRAM(s) IV Push every 6 hours PRN Breakthrough pain and dressing changes  ondansetron Injectable 4 milliGRAM(s) IV Push every 6 hours PRN Nausea and/or Vomiting  oxyCODONE    IR 5 milliGRAM(s) Oral every 4 hours PRN Moderate Pain (4 - 6)  oxyCODONE    IR 10 milliGRAM(s) Oral every 4 hours PRN Severe Pain (7 - 10)      Allergies    No Known Allergies    Intolerances        Vital Signs Last 24 Hrs  T(C): 36.7 (17 Apr 2020 07:30), Max: 36.7 (17 Apr 2020 07:30)  T(F): 98.1 (17 Apr 2020 07:30), Max: 98.1 (17 Apr 2020 07:30)  HR: 72 (17 Apr 2020 07:30) (65 - 72)  BP: 146/67 (17 Apr 2020 07:30) (106/51 - 146/67)  BP(mean): --  RR: 18 (17 Apr 2020 07:30) (16 - 18)  SpO2: 96% (17 Apr 2020 07:30) (96% - 100%)  Daily     Daily   I&O's Detail    16 Apr 2020 07:01  -  17 Apr 2020 07:00  --------------------------------------------------------  IN:    sodium bicarbonate  Infusion: 420 mL    Solution: 100 mL    Solution: 50 mL  Total IN: 570 mL    OUT:  Total OUT: 0 mL    Total NET: 570 mL      17 Apr 2020 07:01  -  17 Apr 2020 12:22  --------------------------------------------------------  IN:    Packed Red Blood Cells: 300 mL  Total IN: 300 mL    OUT:  Total OUT: 0 mL    Total NET: 300 mL        I&O's Summary    16 Apr 2020 07:01  -  17 Apr 2020 07:00  --------------------------------------------------------  IN: 570 mL / OUT: 0 mL / NET: 570 mL    17 Apr 2020 07:01  -  17 Apr 2020 12:22  --------------------------------------------------------  IN: 300 mL / OUT: 0 mL / NET: 300 mL        PHYSICAL EXAM:  GENERAL: appears chronically ill  NECK: Supple, neck  veins full  NERVOUS SYSTEM:  Alert & Oriented X3  CHEST/LUNG: Clear / EAE   HEART: Regular rate and rhythm  ABDOMEN: Soft, Nontender, Nondistended; Bowel sounds present  EXT - min edema ,L inguinal area dressed , R elbow dressed   + fernandez catheter       LABS:                        6.6    17.11 )-----------( 357      ( 17 Apr 2020 06:37 )             19.8     04-17    138  |  104  |  55.0<H>  ----------------------------<  51<L>  4.2   |  25.0  |  2.51<H>    Ca    7.6<L>      17 Apr 2020 06:37  Phos  4.2     04-16  Mg     2.1     04-17    TPro  4.6<L>  /  Alb  1.5<L>  /  TBili  0.3<L>  /  DBili  x   /  AST  28  /  ALT  17  /  AlkPhos  141<H>  04-17      Digoxin Level, Serum: 1.1 ng/mL (04.17.20 @ 06:37)      Magnesium, Serum: 2.1 mg/dL (04-17 @ 06:37)          RADIOLOGY & ADDITIONAL TESTS:

## 2020-04-17 NOTE — CHART NOTE - NSCHARTNOTEFT_GEN_A_CORE
Pt seen at bedside earlier this am with Dr Emeka Quispe  Wound vac dressing removed  Wound bed with oozing from lat and lower edge of wound  Packed with DSD and abd pad   Receiving PRBC and remains hemodynamically stable  No need for CT scan as per Dr Emeka Quispe  Will monitor closely and reeval for possible wound vac in next 24-46 hours  F/U CBC at 1800

## 2020-04-17 NOTE — PROGRESS NOTE ADULT - ASSESSMENT
65 y/o male with extensive cardiac history, htn, DM came to the ER for left groin area infection which started 1 week ago after he had angiography through left groin area 2 weeks ago, got 3 stents ( done at [facility] by ok marsh ). Pt stated that he has been on levaquin by his doctor but getting worse.       Left groin necrotizing soft tissue infectoin  Leukocytosis  MARSHALL on CKD  Right elbow gout  Anemia ? blood loss    - s/p left groin exploration washout and wide excisional debridement of necrotizing soft tissue infection on 4/10, redebridement 4/16  - s/p right elbow washout- cultures negative, MSU crystals noted  - Blood cultures NGTD  - OR cultures- staph lugdunensis, finegoldia magna  - abx broadened to meropenem + clindamycin on 4/16/20  - agree with CT  - Trend Fever  - Trend Leukocytosis    d/w vascular  Will Follow

## 2020-04-17 NOTE — PROGRESS NOTE ADULT - SUBJECTIVE AND OBJECTIVE BOX
Ortho Post Op Check    Name: MARIA ROSS    MR #: [id number]    Procedure: Right elbow I&D  Surgeon: Dr Booker    PAST MEDICAL & SURGICAL HISTORY:  Insulin resistance  Fatty liver  CKD (chronic kidney disease)  BPH (benign prostatic hyperplasia)  Dyslipidemia  Hypertension  CAD (coronary artery disease)  DVT of leg (deep venous thrombosis), right  Diabetes: Peripheral vascular disease  CHF- EF-19% AICD ([employer])  MI  December 2013   HTN  Pulmonary edema  High Cholesterol  Chronic renal insufficency  S/P thyroid biopsy  S/P colonoscopy with polypectomy  AICD (automatic cardioverter/defibrillator) present  S/P angioplasty with stent: right leg 08/14, L femoral 7/2014  S/P coronary artery bypass graft x 3: 2014    AICD ([employer]) 2014      Pt comfortable without complaints, pain controlled  Denies CP, SOB, N/V, numbness/tingling     General Exam:  Vital Signs Last 24 Hrs  T(C): 36.6 (04-17-20 @ 05:54), Max: 36.6 (04-17-20 @ 05:54)  T(F): 97.9 (04-17-20 @ 05:54), Max: 97.9 (04-17-20 @ 05:54)  HR: 66 (04-17-20 @ 05:54) (66 - 66)  BP: 106/51 (04-17-20 @ 05:54) (106/51 - 106/51)  BP(mean): --  RR: 16 (04-17-20 @ 05:54) (16 - 16)  SpO2: 100% (04-17-20 @ 05:54) (100% - 100%)    General: Pt Alert and oriented, NAD, controlled pain.  Right elbow dressings removed to reveal incision to remain C/D/I. No bleeding.  Drain removed intact. New sterile dressings applied   Pulses: 2+radial pulse . Cap refill < 2 sec. Mild swelling of right hand   Sensation: Grossly intact to light touch without deficit.  Motor: Full ROM of all digits. Elbow AROM 30-90                            6.6    17.11 )-----------( 357      ( 17 Apr 2020 06:37 )             19.8   17 Apr 2020 06:37    138    |  104    |  55.0   ----------------------------<  51     4.2     |  25.0   |  2.51     Ca    7.6        17 Apr 2020 06:37  Phos  4.2       16 Apr 2020 06:55  Mg     2.1       17 Apr 2020 06:37    TPro  4.6    /  Alb  1.5    /  TBili  0.3    /  DBili  x      /  AST  28     /  ALT  17     /  AlkPhos  141    17 Apr 2020 06:37      MEDICATIONS  (STANDING):  aspirin enteric coated 81 milliGRAM(s) Oral daily  atorvastatin 80 milliGRAM(s) Oral at bedtime  carvedilol 25 milliGRAM(s) Oral every 12 hours  clindamycin IVPB 600 milliGRAM(s) IV Intermittent every 8 hours  clopidogrel Tablet 75 milliGRAM(s) Oral daily  digoxin     Tablet 0.125 milliGRAM(s) Oral daily  finasteride 5 milliGRAM(s) Oral daily  heparin  Injectable 5000 Unit(s) SubCutaneous every 8 hours  insulin glargine Injectable (LANTUS) 55 Unit(s) SubCutaneous at bedtime  insulin lispro (HumaLOG) corrective regimen sliding scale   SubCutaneous three times a day before meals  insulin lispro Injectable (HumaLOG) 30 Unit(s) SubCutaneous three times a day before meals  insulin lispro Injectable (HumaLOG) 22 Unit(s) SubCutaneous at bedtime  lactobacillus acidophilus 1 Tablet(s) Oral three times a day with meals  meropenem  IVPB 500 milliGRAM(s) IV Intermittent every 12 hours  pantoprazole    Tablet 40 milliGRAM(s) Oral before breakfast  senna 2 Tablet(s) Oral at bedtime  simethicone 80 milliGRAM(s) Chew every 6 hours  sodium bicarbonate  Infusion 0.104 mEq/kG/Hr (60 mL/Hr) IV Continuous <Continuous>    MEDICATIONS  (PRN):  acetaminophen   Tablet .. 650 milliGRAM(s) Oral every 6 hours PRN Temp greater or equal to 38C (100.4F)  dextrose 40% Gel 15 Gram(s) Oral once PRN Blood Glucose LESS THAN 70 milliGRAM(s)/deciLiter  fentaNYL    Injectable 25 MICROGram(s) IV Push every 5 minutes PRN Moderate Pain (4 - 6)  fentaNYL    Injectable 50 MICROGram(s) IV Push every 5 minutes PRN Severe Pain (7 - 10)  HYDROmorphone  Injectable 0.5 milliGRAM(s) IV Push every 6 hours PRN Breakthrough pain and dressing changes  ondansetron Injectable 4 milliGRAM(s) IV Push every 6 hours PRN Nausea and/or Vomiting  oxyCODONE    IR 5 milliGRAM(s) Oral every 4 hours PRN Moderate Pain (4 - 6)  oxyCODONE    IR 10 milliGRAM(s) Oral every 4 hours PRN Severe Pain (7 - 10)            A/P: 64yMale POD#2 s/p I&D right elbow   - Ortho Stable  - Pain Control  - DVT ppx: Plavix ans ASA  - Post op abx: as per primary team   - PT eval pending  - Weight bearing status: WBAT Right UE, Encourage ROM of right elbow   - Patient may follow up with Dr Booker in 3 weeks as out patient or PRN   - Remove right elbow dressing in 7 days then may leave open to air. Prineo tape to be removed in office at 3 weeks Post Op Ortho Post Op Check    Name: MARIA ROSS    MR #: [id number]    Procedure: Right elbow I&D  Surgeon: Dr Booker    PAST MEDICAL & SURGICAL HISTORY:  Insulin resistance  Fatty liver  CKD (chronic kidney disease)  BPH (benign prostatic hyperplasia)  Dyslipidemia  Hypertension  CAD (coronary artery disease)  DVT of leg (deep venous thrombosis), right  Diabetes: Peripheral vascular disease  CHF- EF-19% AICD ([employer])  MI  December 2013   HTN  Pulmonary edema  High Cholesterol  Chronic renal insufficency  S/P thyroid biopsy  S/P colonoscopy with polypectomy  AICD (automatic cardioverter/defibrillator) present  S/P angioplasty with stent: right leg 08/14, L femoral 7/2014  S/P coronary artery bypass graft x 3: 2014    AICD ([employer]) 2014      Pt comfortable without complaints, pain controlled  Denies CP, SOB, N/V, numbness/tingling     General Exam:  Vital Signs Last 24 Hrs  T(C): 36.6 (04-17-20 @ 05:54), Max: 36.6 (04-17-20 @ 05:54)  T(F): 97.9 (04-17-20 @ 05:54), Max: 97.9 (04-17-20 @ 05:54)  HR: 66 (04-17-20 @ 05:54) (66 - 66)  BP: 106/51 (04-17-20 @ 05:54) (106/51 - 106/51)  BP(mean): --  RR: 16 (04-17-20 @ 05:54) (16 - 16)  SpO2: 100% (04-17-20 @ 05:54) (100% - 100%)    General: Pt Alert and oriented, NAD, controlled pain.  Right elbow dressings removed to reveal incision to remain C/D/I. No bleeding.  Drain removed intact. New sterile dressings applied   Pulses: 2+radial pulse . Cap refill < 2 sec. Mild swelling of right hand   Sensation: Grossly intact to light touch without deficit.  Motor: Full ROM of all digits. Elbow AROM 30-90                            6.6    17.11 )-----------( 357      ( 17 Apr 2020 06:37 )             19.8   17 Apr 2020 06:37    138    |  104    |  55.0   ----------------------------<  51     4.2     |  25.0   |  2.51     Ca    7.6        17 Apr 2020 06:37  Phos  4.2       16 Apr 2020 06:55  Mg     2.1       17 Apr 2020 06:37    TPro  4.6    /  Alb  1.5    /  TBili  0.3    /  DBili  x      /  AST  28     /  ALT  17     /  AlkPhos  141    17 Apr 2020 06:37      MEDICATIONS  (STANDING):  aspirin enteric coated 81 milliGRAM(s) Oral daily  atorvastatin 80 milliGRAM(s) Oral at bedtime  carvedilol 25 milliGRAM(s) Oral every 12 hours  clindamycin IVPB 600 milliGRAM(s) IV Intermittent every 8 hours  clopidogrel Tablet 75 milliGRAM(s) Oral daily  digoxin     Tablet 0.125 milliGRAM(s) Oral daily  finasteride 5 milliGRAM(s) Oral daily  heparin  Injectable 5000 Unit(s) SubCutaneous every 8 hours  insulin glargine Injectable (LANTUS) 55 Unit(s) SubCutaneous at bedtime  insulin lispro (HumaLOG) corrective regimen sliding scale   SubCutaneous three times a day before meals  insulin lispro Injectable (HumaLOG) 30 Unit(s) SubCutaneous three times a day before meals  insulin lispro Injectable (HumaLOG) 22 Unit(s) SubCutaneous at bedtime  lactobacillus acidophilus 1 Tablet(s) Oral three times a day with meals  meropenem  IVPB 500 milliGRAM(s) IV Intermittent every 12 hours  pantoprazole    Tablet 40 milliGRAM(s) Oral before breakfast  senna 2 Tablet(s) Oral at bedtime  simethicone 80 milliGRAM(s) Chew every 6 hours  sodium bicarbonate  Infusion 0.104 mEq/kG/Hr (60 mL/Hr) IV Continuous <Continuous>    MEDICATIONS  (PRN):  acetaminophen   Tablet .. 650 milliGRAM(s) Oral every 6 hours PRN Temp greater or equal to 38C (100.4F)  dextrose 40% Gel 15 Gram(s) Oral once PRN Blood Glucose LESS THAN 70 milliGRAM(s)/deciLiter  fentaNYL    Injectable 25 MICROGram(s) IV Push every 5 minutes PRN Moderate Pain (4 - 6)  fentaNYL    Injectable 50 MICROGram(s) IV Push every 5 minutes PRN Severe Pain (7 - 10)  HYDROmorphone  Injectable 0.5 milliGRAM(s) IV Push every 6 hours PRN Breakthrough pain and dressing changes  ondansetron Injectable 4 milliGRAM(s) IV Push every 6 hours PRN Nausea and/or Vomiting  oxyCODONE    IR 5 milliGRAM(s) Oral every 4 hours PRN Moderate Pain (4 - 6)  oxyCODONE    IR 10 milliGRAM(s) Oral every 4 hours PRN Severe Pain (7 - 10)            A/P: 64yMale POD#2 s/p I&D right elbow   - Ortho Stable  - Pain Control  - DVT ppx: Plavix ans ASA  - Post op abx: as per primary team   - PT eval pending  - Weight bearing status: WBAT Right UE, Encourage ROM of right elbow   - Patient may follow up with Dr Booker in 3 weeks as out patient or PRN   - Remove right elbow dressing in 7 days then may leave open to air. Ortho Post Op Check    Name: MARIA ROSS    MR #: [id number]    Procedure: Right elbow I&D  Surgeon: Dr Booker    PAST MEDICAL & SURGICAL HISTORY:  Insulin resistance  Fatty liver  CKD (chronic kidney disease)  BPH (benign prostatic hyperplasia)  Dyslipidemia  Hypertension  CAD (coronary artery disease)  DVT of leg (deep venous thrombosis), right  Diabetes: Peripheral vascular disease  CHF- EF-19% AICD ([employer])  MI  December 2013   HTN  Pulmonary edema  High Cholesterol  Chronic renal insufficency  S/P thyroid biopsy  S/P colonoscopy with polypectomy  AICD (automatic cardioverter/defibrillator) present  S/P angioplasty with stent: right leg 08/14, L femoral 7/2014  S/P coronary artery bypass graft x 3: 2014    AICD ([employer]) 2014      Pt comfortable without complaints, pain controlled  Denies CP, SOB, N/V, numbness/tingling     General Exam:  Vital Signs Last 24 Hrs  T(C): 36.6 (04-17-20 @ 05:54), Max: 36.6 (04-17-20 @ 05:54)  T(F): 97.9 (04-17-20 @ 05:54), Max: 97.9 (04-17-20 @ 05:54)  HR: 66 (04-17-20 @ 05:54) (66 - 66)  BP: 106/51 (04-17-20 @ 05:54) (106/51 - 106/51)  BP(mean): --  RR: 16 (04-17-20 @ 05:54) (16 - 16)  SpO2: 100% (04-17-20 @ 05:54) (100% - 100%)    General: Pt Alert and oriented, NAD, controlled pain.  Right elbow dressings removed to reveal incision to remain C/D/I. No bleeding.  Drain removed intact. New sterile dressings applied   Pulses: 2+radial pulse . Cap refill < 2 sec. Mild swelling of right hand   Sensation: Grossly intact to light touch without deficit.  Motor: Full ROM of all digits. Elbow AROM 30-90                            6.6    17.11 )-----------( 357      ( 17 Apr 2020 06:37 )             19.8   17 Apr 2020 06:37    138    |  104    |  55.0   ----------------------------<  51     4.2     |  25.0   |  2.51     Ca    7.6        17 Apr 2020 06:37  Phos  4.2       16 Apr 2020 06:55  Mg     2.1       17 Apr 2020 06:37    TPro  4.6    /  Alb  1.5    /  TBili  0.3    /  DBili  x      /  AST  28     /  ALT  17     /  AlkPhos  141    17 Apr 2020 06:37      MEDICATIONS  (STANDING):  aspirin enteric coated 81 milliGRAM(s) Oral daily  atorvastatin 80 milliGRAM(s) Oral at bedtime  carvedilol 25 milliGRAM(s) Oral every 12 hours  clindamycin IVPB 600 milliGRAM(s) IV Intermittent every 8 hours  clopidogrel Tablet 75 milliGRAM(s) Oral daily  digoxin     Tablet 0.125 milliGRAM(s) Oral daily  finasteride 5 milliGRAM(s) Oral daily  heparin  Injectable 5000 Unit(s) SubCutaneous every 8 hours  insulin glargine Injectable (LANTUS) 55 Unit(s) SubCutaneous at bedtime  insulin lispro (HumaLOG) corrective regimen sliding scale   SubCutaneous three times a day before meals  insulin lispro Injectable (HumaLOG) 30 Unit(s) SubCutaneous three times a day before meals  insulin lispro Injectable (HumaLOG) 22 Unit(s) SubCutaneous at bedtime  lactobacillus acidophilus 1 Tablet(s) Oral three times a day with meals  meropenem  IVPB 500 milliGRAM(s) IV Intermittent every 12 hours  pantoprazole    Tablet 40 milliGRAM(s) Oral before breakfast  senna 2 Tablet(s) Oral at bedtime  simethicone 80 milliGRAM(s) Chew every 6 hours  sodium bicarbonate  Infusion 0.104 mEq/kG/Hr (60 mL/Hr) IV Continuous <Continuous>    MEDICATIONS  (PRN):  acetaminophen   Tablet .. 650 milliGRAM(s) Oral every 6 hours PRN Temp greater or equal to 38C (100.4F)  dextrose 40% Gel 15 Gram(s) Oral once PRN Blood Glucose LESS THAN 70 milliGRAM(s)/deciLiter  fentaNYL    Injectable 25 MICROGram(s) IV Push every 5 minutes PRN Moderate Pain (4 - 6)  fentaNYL    Injectable 50 MICROGram(s) IV Push every 5 minutes PRN Severe Pain (7 - 10)  HYDROmorphone  Injectable 0.5 milliGRAM(s) IV Push every 6 hours PRN Breakthrough pain and dressing changes  ondansetron Injectable 4 milliGRAM(s) IV Push every 6 hours PRN Nausea and/or Vomiting  oxyCODONE    IR 5 milliGRAM(s) Oral every 4 hours PRN Moderate Pain (4 - 6)  oxyCODONE    IR 10 milliGRAM(s) Oral every 4 hours PRN Severe Pain (7 - 10)            A/P: 64yMale POD#2 s/p I&D right elbow   - Ortho Stable  - Pain Control  - DVT ppx: Plavix ans ASA  - Post op abx: as per primary team   - PT eval pending  - Weight bearing status: WBAT Right UE, Encourage ROM of right elbow   - Culture shows no growth and preliminary likely Gout due to crystals being present   - Patient may follow up with Dr Booker in 3 weeks as out patient or PRN   - Remove right elbow dressing in 7 days then may leave open to air.

## 2020-04-17 NOTE — PROGRESS NOTE ADULT - ASSESSMENT
64 year old male s/p excisional debridement of left groin nec fasc POD #7now most recently s/p repeat excisional debridement and right elbow washout POD #2  Wound with continued concern for necrotic edges  Hyperkalemia-resolved  Wound vac placed  Acute on chronic anemia sec to blood loss likely equilibration from OR and sig superficial oozing day prior  - Cont ASA and Plavix for recent LANDON  -Transfuse 2 units PRBC. Will assess between units for lasix which will likely need  - Maintain wound vac at current settings. Next wound vac change 4/18  - Appreciate renal input. Await decision from renal whether to discontinue NaHCO3 gtt   - Appreciate ID consult; continue IV antibiotic as per ID-clinda and meropenum.   - DM management. Will reduce Lantus and premeal back and discuss with endo  - Ortho to follow up on right elbow cultures, preliminary cell count suspicious for gout-+/- dc hemovac  - Pain control as ordered 64 year old male s/p excisional debridement of left groin nec fasc POD #7now most recently s/p repeat excisional debridement and right elbow washout POD #2  Wound with continued concern for necrotic edges  Hyperkalemia-resolved  Wound vac placed  Acute on chronic anemia sec to blood loss likely equilibration from OR and sig superficial oozing day prior  - Cont ASA and Plavix for recent LANDON  -Transfuse 2 units PRBC. Will assess between units for lasix which will likely need  -Noncon CT abd and pelvis to assess for possible bleed  - Maintain wound vac at current settings. Next wound vac change 4/18  - Appreciate renal input. Await decision from renal whether to discontinue NaHCO3 gtt   - Appreciate ID consult; continue IV antibiotic as per ID-clinda and meropenum.   - DM management. Will reduce Lantus and premeal back and discuss with endo  - Ortho to follow up on right elbow cultures, preliminary cell count suspicious for gout-+/- dc hemovac  - Pain control as ordered

## 2020-04-17 NOTE — PROGRESS NOTE ADULT - ASSESSMENT
Improved MARSHALL on CKD stage III - baseline creat usually 2.2 - 2.5 due to DM/HTN   Follows w Dr Pedro ---> Renal function now back to baseline   Has h/o CAD, S/P MI, CABG, ICM with AICD  Renal sono noted no hydronephrosis , recent CT normal kidneys   D/C IV Bicarb         s/p OR on 4/10- Debridement, external genitalia, perineum  I&D abscess   L groin hematoma/ cellulitis on abx/ Leukocytosis  S/P R elbow drainage   Abx as per ID     Anemia - S/P transfusion   Vasular follow up noted   Will add Epogen and folate   D/C extra IVF     Hyperkalemia , Improved   Pt on Digoxin ----> level OK   Add bicarb to IVF   ? Reabsorption of hematoma  Follow labs       Will follow

## 2020-04-17 NOTE — PROGRESS NOTE ADULT - ASSESSMENT
Assessment  Left groin abcess draiange/Julio Cesar gangrene wound vac in place  Sig drop in H/H ? etiology, no evidence GIB must exclude RP bleed/thigh hematoma  recent PCI 2 weeks ago with mynx closure  ischemic CM with improved EF 50 % on meds  remote CABg  ICD  CRI  DM  HLD      Rec  transfuse Hgb >9  cont dapt with recent PCI  will discuss CT abdomen/pelvis with vascular to exclude sig bleed due to sig drop in H/H Assessment  Left groin abcess draiange/Julio Cesar gangrene wound vac in place  Sig drop in H/H ? etiology, no evidence GIB must exclude RP bleed/thigh hematoma  recent PCI 2 weeks ago with mynx closure  ischemic CM with improved EF 50 % on meds  remote CABg  ICD  CRI  DM  HLD      Rec  transfuse Hgb >9  cont dapt with recent PCI  will discuss CT abdomen/pelvis with vascular to exclude sig bleed due to sig drop in H/H    Addendum  Spoke with vascular team  initial presentation infected hematoma, femoral artery intact, Mynx device still in place, no evidence of pseudoaneurysm  Will transfuse and repeat CT today

## 2020-04-18 LAB
ANION GAP SERPL CALC-SCNC: 11 MMOL/L — SIGNIFICANT CHANGE UP (ref 5–17)
BLD GP AB SCN SERPL QL: SIGNIFICANT CHANGE UP
BUN SERPL-MCNC: 54 MG/DL — HIGH (ref 8–20)
CALCIUM SERPL-MCNC: 7.4 MG/DL — LOW (ref 8.6–10.2)
CHLORIDE SERPL-SCNC: 105 MMOL/L — SIGNIFICANT CHANGE UP (ref 98–107)
CO2 SERPL-SCNC: 24 MMOL/L — SIGNIFICANT CHANGE UP (ref 22–29)
CREAT SERPL-MCNC: 2.28 MG/DL — HIGH (ref 0.5–1.3)
CULTURE RESULTS: SIGNIFICANT CHANGE UP
GLUCOSE BLDC GLUCOMTR-MCNC: 155 MG/DL — HIGH (ref 70–99)
GLUCOSE BLDC GLUCOMTR-MCNC: 174 MG/DL — HIGH (ref 70–99)
GLUCOSE BLDC GLUCOMTR-MCNC: 181 MG/DL — HIGH (ref 70–99)
GLUCOSE BLDC GLUCOMTR-MCNC: 250 MG/DL — HIGH (ref 70–99)
GLUCOSE BLDC GLUCOMTR-MCNC: 95 MG/DL — SIGNIFICANT CHANGE UP (ref 70–99)
GLUCOSE SERPL-MCNC: 115 MG/DL — HIGH (ref 70–99)
HCT VFR BLD CALC: 21.9 % — LOW (ref 39–50)
HGB BLD-MCNC: 7.4 G/DL — LOW (ref 13–17)
MCHC RBC-ENTMCNC: 29.8 PG — SIGNIFICANT CHANGE UP (ref 27–34)
MCHC RBC-ENTMCNC: 33.8 GM/DL — SIGNIFICANT CHANGE UP (ref 32–36)
MCV RBC AUTO: 88.3 FL — SIGNIFICANT CHANGE UP (ref 80–100)
PLATELET # BLD AUTO: 334 K/UL — SIGNIFICANT CHANGE UP (ref 150–400)
POTASSIUM SERPL-MCNC: 4.8 MMOL/L — SIGNIFICANT CHANGE UP (ref 3.5–5.3)
POTASSIUM SERPL-SCNC: 4.8 MMOL/L — SIGNIFICANT CHANGE UP (ref 3.5–5.3)
RBC # BLD: 2.48 M/UL — LOW (ref 4.2–5.8)
RBC # FLD: 15.2 % — HIGH (ref 10.3–14.5)
SODIUM SERPL-SCNC: 140 MMOL/L — SIGNIFICANT CHANGE UP (ref 135–145)
SPECIMEN SOURCE: SIGNIFICANT CHANGE UP
WBC # BLD: 13.2 K/UL — HIGH (ref 3.8–10.5)
WBC # FLD AUTO: 13.2 K/UL — HIGH (ref 3.8–10.5)

## 2020-04-18 PROCEDURE — 99232 SBSQ HOSP IP/OBS MODERATE 35: CPT

## 2020-04-18 PROCEDURE — 99449 NTRPROF PH1/NTRNET/EHR 31/>: CPT

## 2020-04-18 RX ORDER — COLCHICINE 0.6 MG
0.6 TABLET ORAL DAILY
Refills: 0 | Status: DISCONTINUED | OUTPATIENT
Start: 2020-04-18 | End: 2020-04-27

## 2020-04-18 RX ORDER — INSULIN LISPRO 100/ML
VIAL (ML) SUBCUTANEOUS AT BEDTIME
Refills: 0 | Status: DISCONTINUED | OUTPATIENT
Start: 2020-04-18 | End: 2020-04-24

## 2020-04-18 RX ADMIN — ERYTHROPOIETIN 20000 UNIT(S): 10000 INJECTION, SOLUTION INTRAVENOUS; SUBCUTANEOUS at 11:49

## 2020-04-18 RX ADMIN — CARVEDILOL PHOSPHATE 25 MILLIGRAM(S): 80 CAPSULE, EXTENDED RELEASE ORAL at 04:55

## 2020-04-18 RX ADMIN — SIMETHICONE 80 MILLIGRAM(S): 80 TABLET, CHEWABLE ORAL at 12:29

## 2020-04-18 RX ADMIN — Medication 30 UNIT(S): at 16:15

## 2020-04-18 RX ADMIN — Medication 1 TABLET(S): at 05:00

## 2020-04-18 RX ADMIN — MEROPENEM 100 MILLIGRAM(S): 1 INJECTION INTRAVENOUS at 16:16

## 2020-04-18 RX ADMIN — Medication 4: at 16:14

## 2020-04-18 RX ADMIN — CLOPIDOGREL BISULFATE 75 MILLIGRAM(S): 75 TABLET, FILM COATED ORAL at 11:49

## 2020-04-18 RX ADMIN — Medication 100 MILLIGRAM(S): at 22:31

## 2020-04-18 RX ADMIN — Medication 81 MILLIGRAM(S): at 11:49

## 2020-04-18 RX ADMIN — Medication 10 MILLIGRAM(S): at 14:14

## 2020-04-18 RX ADMIN — MEROPENEM 100 MILLIGRAM(S): 1 INJECTION INTRAVENOUS at 04:48

## 2020-04-18 RX ADMIN — Medication 0.12 MILLIGRAM(S): at 04:55

## 2020-04-18 RX ADMIN — FINASTERIDE 5 MILLIGRAM(S): 5 TABLET, FILM COATED ORAL at 11:50

## 2020-04-18 RX ADMIN — CARVEDILOL PHOSPHATE 25 MILLIGRAM(S): 80 CAPSULE, EXTENDED RELEASE ORAL at 16:15

## 2020-04-18 RX ADMIN — PANTOPRAZOLE SODIUM 40 MILLIGRAM(S): 20 TABLET, DELAYED RELEASE ORAL at 04:56

## 2020-04-18 RX ADMIN — ATORVASTATIN CALCIUM 80 MILLIGRAM(S): 80 TABLET, FILM COATED ORAL at 22:31

## 2020-04-18 RX ADMIN — SIMETHICONE 80 MILLIGRAM(S): 80 TABLET, CHEWABLE ORAL at 04:57

## 2020-04-18 RX ADMIN — Medication 2: at 12:07

## 2020-04-18 RX ADMIN — HEPARIN SODIUM 5000 UNIT(S): 5000 INJECTION INTRAVENOUS; SUBCUTANEOUS at 22:31

## 2020-04-18 RX ADMIN — HEPARIN SODIUM 5000 UNIT(S): 5000 INJECTION INTRAVENOUS; SUBCUTANEOUS at 14:13

## 2020-04-18 RX ADMIN — HEPARIN SODIUM 5000 UNIT(S): 5000 INJECTION INTRAVENOUS; SUBCUTANEOUS at 04:49

## 2020-04-18 RX ADMIN — Medication 0.6 MILLIGRAM(S): at 14:13

## 2020-04-18 RX ADMIN — Medication 1 TABLET(S): at 11:50

## 2020-04-18 RX ADMIN — Medication 1 MILLIGRAM(S): at 11:50

## 2020-04-18 RX ADMIN — INSULIN GLARGINE 60 UNIT(S): 100 INJECTION, SOLUTION SUBCUTANEOUS at 22:56

## 2020-04-18 RX ADMIN — Medication 100 MILLIGRAM(S): at 04:55

## 2020-04-18 RX ADMIN — SIMETHICONE 80 MILLIGRAM(S): 80 TABLET, CHEWABLE ORAL at 16:15

## 2020-04-18 RX ADMIN — Medication 1 TABLET(S): at 16:15

## 2020-04-18 RX ADMIN — FENTANYL CITRATE 25 MICROGRAM(S): 50 INJECTION INTRAVENOUS at 18:57

## 2020-04-18 RX ADMIN — Medication 100 MILLIGRAM(S): at 14:31

## 2020-04-18 NOTE — PROGRESS NOTE ADULT - ASSESSMENT
64M with extensive cardiac history, htn, DM2 cb ckd came to the ER for left groin area infection which started 1 week ago after he had angiography through left groin area 2 weeks ago, s/p 3 stents (done at [facility] by ok marsh ). Pt stated that he has been on levaquin by his doctor but getting worse. some pain in the left groin area, denies fever but had chills. no cp, no sob. no dizziness, no abd. pain. no n/v/d. As per pt. his cardiologist is aware that his groin area is not healing, was contacted via phone.   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 please DO NOT give high doses of lispro at bedtime as it can cause AM hypoglycemia and makes it difficult to adjust standing insulin doses    Uncontrolled T2DM- hyperglycemic  -A1c 11.5  -check sugars AC and bedtime  -ensure diabetic diet  -continue lantus 60 units QHS  -continue lispro 30 units TID  -added bedtime insulin sliding scale  -continue insulin sliding scale    HLD- continue statin    Hypocalcemia- mild. corrected normal. normal vit d and pth

## 2020-04-18 NOTE — PROGRESS NOTE ADULT - ASSESSMENT
63 y/o male with extensive cardiac history, htn, DM came to the ER for left groin area infection which started 1 week ago after he had angiography through left groin area 2 weeks ago, got 3 stents ( done at [facility] by ok marsh ). Pt stated that he has been on levaquin by his doctor but getting worse.       Left groin necrotizing soft tissue infectoin  Leukocytosis  MARSHALL on CKD  Right elbow gout  Anemia ? blood loss    - s/p left groin exploration washout and wide excisional debridement of necrotizing soft tissue infection on 4/10, redebridement 4/16  - s/p right elbow washout- cultures negative, MSU crystals noted  - Blood cultures NGTD  - OR cultures- staph lugdunensis, finegoldia magna  - abx broadened to meropenem + clindamycin on 4/16/20  - agree with CT  - Trend Fever  - Trend Leukocytosis    Will Follow

## 2020-04-18 NOTE — PROGRESS NOTE ADULT - ASSESSMENT
Improved MARSHALL on CKD stage III - baseline creat usually 2.2 - 2.5 due to DM/HTN   Follows w Dr Pedro ---> Renal function now back to baseline   Has h/o CAD, S/P MI, CABG, ICM with AICD  Renal sono noted no hydronephrosis , recent CT normal kidneys   Some edema   Add Torsemide 10 mg daily   Check Uric acid         s/p OR on 4/10- Debridement, external genitalia, perineum  I&D abscess   L groin hematoma/ cellulitis on abx/ Leukocytosis  S/P R elbow drainage   Abx as per ID     Anemia - S/P transfusion   Vasular follow up noted   Added Epogen and folate   Follow labs       Hyperkalemia , Improved   Pt on Digoxin ----> level OK   ? Reabsorption of hematoma  Follow labs

## 2020-04-18 NOTE — CHART NOTE - NSCHARTNOTEFT_GEN_A_CORE
Per discussion with pharmacy, based on pt's CrCl which is 40.2, there is no dose adjustment needed for starting colchicine for his acute gout flare. Will start colchicine 0.6 Qday

## 2020-04-18 NOTE — PROGRESS NOTE ADULT - SUBJECTIVE AND OBJECTIVE BOX
INTERVAL HPI/OVERNIGHT EVENTS:    Wound vac removed at this time with packing change. s/p blood transfusion, responded appropriately. Patient seen this AM with no acute events overnight. Patient without any acute complaints at this time. Patients' pain is well controlled on current pain regiment. Tolerating diet without any n/v, has been having normal bowel function. Remains hemodynamically stable and denies fevers, chills. No CP or SOB        MEDICATIONS  (STANDING):  aspirin enteric coated 81 milliGRAM(s) Oral daily  atorvastatin 80 milliGRAM(s) Oral at bedtime  carvedilol 25 milliGRAM(s) Oral every 12 hours  clindamycin IVPB 600 milliGRAM(s) IV Intermittent every 8 hours  clopidogrel Tablet 75 milliGRAM(s) Oral daily  digoxin     Tablet 0.125 milliGRAM(s) Oral daily  epoetin-adrian-epbx (RETACRIT) Injectable 92607 Unit(s) SubCutaneous every 7 days  finasteride 5 milliGRAM(s) Oral daily  folic acid 1 milliGRAM(s) Oral daily  heparin  Injectable 5000 Unit(s) SubCutaneous every 8 hours  insulin glargine Injectable (LANTUS) 60 Unit(s) SubCutaneous at bedtime  insulin lispro (HumaLOG) corrective regimen sliding scale   SubCutaneous three times a day before meals  insulin lispro Injectable (HumaLOG) 30 Unit(s) SubCutaneous three times a day before meals  insulin lispro Injectable (HumaLOG) 22 Unit(s) SubCutaneous at bedtime  lactobacillus acidophilus 1 Tablet(s) Oral three times a day with meals  meropenem  IVPB 500 milliGRAM(s) IV Intermittent every 12 hours  pantoprazole    Tablet 40 milliGRAM(s) Oral before breakfast  senna 2 Tablet(s) Oral at bedtime  simethicone 80 milliGRAM(s) Chew every 6 hours    MEDICATIONS  (PRN):  acetaminophen   Tablet .. 650 milliGRAM(s) Oral every 6 hours PRN Temp greater or equal to 38C (100.4F)  dextrose 40% Gel 15 Gram(s) Oral once PRN Blood Glucose LESS THAN 70 milliGRAM(s)/deciLiter  fentaNYL    Injectable 25 MICROGram(s) IV Push every 5 minutes PRN Moderate Pain (4 - 6)  fentaNYL    Injectable 50 MICROGram(s) IV Push every 5 minutes PRN Severe Pain (7 - 10)  HYDROmorphone  Injectable 0.5 milliGRAM(s) IV Push once PRN Breakthrough Pain  HYDROmorphone  Injectable 0.5 milliGRAM(s) IV Push every 6 hours PRN Breakthrough pain and dressing changes  ondansetron Injectable 4 milliGRAM(s) IV Push every 6 hours PRN Nausea and/or Vomiting  oxyCODONE    IR 5 milliGRAM(s) Oral every 4 hours PRN Moderate Pain (4 - 6)  oxyCODONE    IR 10 milliGRAM(s) Oral every 4 hours PRN Severe Pain (7 - 10)      Vital Signs Last 24 Hrs  T(C): 36.7 (17 Apr 2020 07:30), Max: 36.7 (17 Apr 2020 07:30)  T(F): 98.1 (17 Apr 2020 07:30), Max: 98.1 (17 Apr 2020 07:30)  HR: 87 (17 Apr 2020 18:04) (66 - 87)  BP: 159/70 (17 Apr 2020 18:04) (106/51 - 159/70)  BP(mean): --  RR: 18 (17 Apr 2020 18:04) (16 - 18)  SpO2: 100% (17 Apr 2020 18:04) (96% - 100%)      Physical exam:   General: Ill appearing pale M In no acute distress  Pulm: Nonlabored breathing on room air  CV: RRR, S1, S2  Abdomen: soft, NT, ND  Vasc: Left groin wound dressing in place with strikethrough. Wound without active bleeding. Site packed and abd over wound. Left le warm to touch with palpable pulses, sling to the right upper ext. The dressing is clean, dry and intact.   Neuro: Sensation to light touch is grossly intact without focal deficit and is symmetric bilaterally. No calf tenderness. Sensation to light touch is grossly intact distally. Motor function distally is 5/5. No foot drop. no wrist drop. Capillary refill is less than 2 seconds. No cyanosis.          I&O's Detail    16 Apr 2020 07:01  -  17 Apr 2020 07:00  --------------------------------------------------------  IN:    sodium bicarbonate  Infusion: 420 mL    Solution: 100 mL    Solution: 50 mL  Total IN: 570 mL    OUT:  Total OUT: 0 mL    Total NET: 570 mL      17 Apr 2020 07:01  -  18 Apr 2020 00:21  --------------------------------------------------------  IN:    Packed Red Blood Cells: 300 mL  Total IN: 300 mL    OUT:    Indwelling Catheter - Urethral: 400 mL    Voided: 150 mL  Total OUT: 550 mL    Total NET: -250 mL          LABS:                        8.1    13.63 )-----------( 318      ( 17 Apr 2020 18:08 )             24.4     04-17    138  |  104  |  55.0<H>  ----------------------------<  51<L>  4.2   |  25.0  |  2.51<H>    Ca    7.6<L>      17 Apr 2020 06:37  Phos  4.2     04-16  Mg     2.1     04-17    TPro  4.6<L>  /  Alb  1.5<L>  /  TBili  0.3<L>  /  DBili  x   /  AST  28  /  ALT  17  /  AlkPhos  141<H>  04-17          RADIOLOGY & ADDITIONAL STUDIES:

## 2020-04-18 NOTE — PROGRESS NOTE ADULT - ASSESSMENT
64 year old male s/p excisional debridement of left groin nec fasc POD #7now most recently s/p repeat excisional debridement and right elbow washout POD #3  Wound with continued concern for necrotic edges  Hyperkalemia-resolved  Wound vac removed at this time for dressing changes. continue to trend hemoglobin/hematocrit and transfuse as indicated.   Acute on chronic anemia sec to blood loss likely equilibration from OR and sig superficial oozing day prior  - Cont ASA and Plavix for recent LANDON, SQH dvt ppx  - Continue to assess wound for placement of wound vac  - Appreciate renal input. Await decision from renal whether to discontinue NaHCO3 gtt   - Appreciate ID consult; continue IV antibiotic as per ID-clinda and meropenum.   - DM management. Endocrine following, recommendations appreciated  - Ortho to follow up on right elbow cultures, preliminary cell count suspicious for gout-+/-   - Pain control as ordered  - Carb controlled diet  - Bowel regimen

## 2020-04-18 NOTE — PROGRESS NOTE ADULT - SUBJECTIVE AND OBJECTIVE BOX
[facility]  INFECTIOUS DISEASES AND INTERNAL MEDICINE at [place]  =======================================================  Noe Navarro MD  Diplomates American Board of Internal Medicine and Infectious Diseases  Tel: [phone]      Fax: [phone]  =======================================================    CODYMELISSA ESPARZAMOND [id number]    Follow up: left groin necrotising SSTI  feels well, some elbow pain    Allergies:  No Known Allergies      Medications:  acetaminophen   Tablet .. 650 milliGRAM(s) Oral every 6 hours PRN  aspirin enteric coated 81 milliGRAM(s) Oral daily  atorvastatin 80 milliGRAM(s) Oral at bedtime  carvedilol 25 milliGRAM(s) Oral every 12 hours  clindamycin IVPB 600 milliGRAM(s) IV Intermittent every 8 hours  clopidogrel Tablet 75 milliGRAM(s) Oral daily  dextrose 40% Gel 15 Gram(s) Oral once PRN  digoxin     Tablet 0.125 milliGRAM(s) Oral daily  epoetin-adrian-epbx (RETACRIT) Injectable 72261 Unit(s) SubCutaneous every 7 days  fentaNYL    Injectable 25 MICROGram(s) IV Push every 5 minutes PRN  fentaNYL    Injectable 50 MICROGram(s) IV Push every 5 minutes PRN  finasteride 5 milliGRAM(s) Oral daily  folic acid 1 milliGRAM(s) Oral daily  heparin  Injectable 5000 Unit(s) SubCutaneous every 8 hours  HYDROmorphone  Injectable 0.5 milliGRAM(s) IV Push once PRN  insulin glargine Injectable (LANTUS) 60 Unit(s) SubCutaneous at bedtime  insulin lispro (HumaLOG) corrective regimen sliding scale   SubCutaneous three times a day before meals  insulin lispro (HumaLOG) corrective regimen sliding scale   SubCutaneous at bedtime  insulin lispro Injectable (HumaLOG) 30 Unit(s) SubCutaneous three times a day before meals  lactobacillus acidophilus 1 Tablet(s) Oral three times a day with meals  meropenem  IVPB 500 milliGRAM(s) IV Intermittent every 12 hours  ondansetron Injectable 4 milliGRAM(s) IV Push every 6 hours PRN  pantoprazole    Tablet 40 milliGRAM(s) Oral before breakfast  senna 2 Tablet(s) Oral at bedtime  simethicone 80 milliGRAM(s) Chew every 6 hours  torsemide 10 milliGRAM(s) Oral daily            REVIEW OF SYSTEMS:  CONSTITUTIONAL:  No Fever or chills  HEENT:   No diplopia or blurred vision.  No earache, sore throat or runny nose.  CARDIOVASCULAR:  No pressure, squeezing, strangling, tightness, heaviness or aching about the chest, neck, axilla or epigastrium.  RESPIRATORY:  No cough, shortness of breath  GASTROINTESTINAL:  No nausea, vomiting or diarrhea.  GENITOURINARY:  No dysuria, frequency or urgency. No Blood in urine  MUSCULOSKELETAL:  no joint aches, no muscle pain  SKIN:  No change in skin, hair or nails.  NEUROLOGIC:  No Headaches, seizures or weakness.  PSYCHIATRIC:  No disorder of thought or mood.  ENDOCRINE:  No heat or cold intolerance  HEMATOLOGICAL:  No easy bruising or bleeding.            Physical Exam:  ICU Vital Signs Last 24 Hrs  T(C): 37 (18 Apr 2020 07:35), Max: 37 (18 Apr 2020 07:35)  T(F): 98.6 (18 Apr 2020 07:35), Max: 98.6 (18 Apr 2020 07:35)  HR: 63 (18 Apr 2020 07:35) (63 - 87)  BP: 138/62 (18 Apr 2020 07:35) (138/62 - 159/70)  BP(mean): --  ABP: --  ABP(mean): --  RR: 16 (18 Apr 2020 07:35) (16 - 18)  SpO2: 100% (18 Apr 2020 07:35) (100% - 100%)      GEN: NAD, pleasant  HEENT: normocephalic and atraumatic. EOMI. PERRL.  Anicteric   NECK: Supple.   LUNGS: Clear to auscultation.  HEART: Regular rate and rhythm without murmur.  ABDOMEN: Soft, nontender, and nondistended.  Positive bowel sounds.    : No CVA tenderness  EXTREMITIES: Without any edema.  MSK: no joint swelling  NEUROLOGIC: Cranial nerves II through XII are grossly intact. No focal deficits  PSYCHIATRIC: Appropriate affect .  SKIN: No Rash      Labs:                          7.4    13.20 )-----------( 334      ( 18 Apr 2020 05:45 )             21.9   04-18    140  |  105  |  54.0<H>  ----------------------------<  115<H>  4.8   |  24.0  |  2.28<H>    Ca    7.4<L>      18 Apr 2020 05:44  Mg     2.1     04-17    TPro  4.6<L>  /  Alb  1.5<L>  /  TBili  0.3<L>  /  DBili  x   /  AST  28  /  ALT  17  /  AlkPhos  141<H>  04-17

## 2020-04-18 NOTE — PROGRESS NOTE ADULT - SUBJECTIVE AND OBJECTIVE BOX
[facility] - [facility]                                   [address] [place] [address]                                                      PHONE: [phone]                                                         FAX: [phone]  [email or web address]  ---------------------------------------------------------------------------------------------------------------------------------    Overnight events/patient complaints:  PT with persistent anemia     No Known Allergies    MEDICATIONS  (STANDING):  aspirin enteric coated 81 milliGRAM(s) Oral daily  atorvastatin 80 milliGRAM(s) Oral at bedtime  carvedilol 25 milliGRAM(s) Oral every 12 hours  clindamycin IVPB 600 milliGRAM(s) IV Intermittent every 8 hours  clopidogrel Tablet 75 milliGRAM(s) Oral daily  digoxin     Tablet 0.125 milliGRAM(s) Oral daily  epoetin-adrian-epbx (RETACRIT) Injectable 58325 Unit(s) SubCutaneous every 7 days  finasteride 5 milliGRAM(s) Oral daily  folic acid 1 milliGRAM(s) Oral daily  heparin  Injectable 5000 Unit(s) SubCutaneous every 8 hours  insulin glargine Injectable (LANTUS) 60 Unit(s) SubCutaneous at bedtime  insulin lispro (HumaLOG) corrective regimen sliding scale   SubCutaneous three times a day before meals  insulin lispro Injectable (HumaLOG) 30 Unit(s) SubCutaneous three times a day before meals  insulin lispro Injectable (HumaLOG) 22 Unit(s) SubCutaneous at bedtime  lactobacillus acidophilus 1 Tablet(s) Oral three times a day with meals  meropenem  IVPB 500 milliGRAM(s) IV Intermittent every 12 hours  pantoprazole    Tablet 40 milliGRAM(s) Oral before breakfast  senna 2 Tablet(s) Oral at bedtime  simethicone 80 milliGRAM(s) Chew every 6 hours  torsemide 10 milliGRAM(s) Oral daily    MEDICATIONS  (PRN):  acetaminophen   Tablet .. 650 milliGRAM(s) Oral every 6 hours PRN Temp greater or equal to 38C (100.4F)  dextrose 40% Gel 15 Gram(s) Oral once PRN Blood Glucose LESS THAN 70 milliGRAM(s)/deciLiter  fentaNYL    Injectable 25 MICROGram(s) IV Push every 5 minutes PRN Moderate Pain (4 - 6)  fentaNYL    Injectable 50 MICROGram(s) IV Push every 5 minutes PRN Severe Pain (7 - 10)  HYDROmorphone  Injectable 0.5 milliGRAM(s) IV Push once PRN Breakthrough Pain  ondansetron Injectable 4 milliGRAM(s) IV Push every 6 hours PRN Nausea and/or Vomiting      Vital Signs Last 24 Hrs  T(C): 37 (18 Apr 2020 07:35), Max: 37 (18 Apr 2020 07:35)  T(F): 98.6 (18 Apr 2020 07:35), Max: 98.6 (18 Apr 2020 07:35)  HR: 63 (18 Apr 2020 07:35) (63 - 87)  BP: 138/62 (18 Apr 2020 07:35) (138/62 - 159/70)  BP(mean): --  RR: 16 (18 Apr 2020 07:35) (16 - 18)  SpO2: 100% (18 Apr 2020 07:35) (100% - 100%)  ICU Vital Signs Last 24 Hrs  MARIA ROSS  I&O's Detail    17 Apr 2020 07:01  -  18 Apr 2020 07:00  --------------------------------------------------------  IN:    Packed Red Blood Cells: 300 mL  Total IN: 300 mL    OUT:    Indwelling Catheter - Urethral: 400 mL    Voided: 150 mL  Total OUT: 550 mL    Total NET: -250 mL        Drug Dosing Weight  MARIA ROSS      PHYSICAL EXAM:  General: Appears well developed, well nourished alert and cooperative.  HEENT: Head; normocephalic, atraumatic.  Eyes: Pupils reactive, cornea wnl.  Neck: Supple, no nodes adenopathy, no NVD or carotid bruit or thyromegaly.  CARDIOVASCULAR: Normal S1 and S2, No murmur, rub, gallop or lift.   LUNGS: No rales, rhonchi or wheeze. Normal breath sounds bilaterally.  ABDOMEN: Soft, nontender without mass or organomegaly. bowel sounds normoactive.  EXTREMITIES: No clubbing, cyanosis or edema. Distal pulses wnl.  right elbow I/D, left groin debridement dressing intact    SKIN: warm and dry with normal turgor.  NEURO: Alert/oriented x 3/normal motor exam. No pathologic reflexes.    PSYCH: normal affect.        LABS:                        7.4    13.20 )-----------( 334      ( 18 Apr 2020 05:45 )             21.9     04-18    140  |  105  |  54.0<H>  ----------------------------<  115<H>  4.8   |  24.0  |  2.28<H>    Ca    7.4<L>      18 Apr 2020 05:44  Mg     2.1     04-17    TPro  4.6<L>  /  Alb  1.5<L>  /  TBili  0.3<L>  /  DBili  x   /  AST  28  /  ALT  17  /  AlkPhos  141<H>  04-17    MARIA ROSS            RADIOLOGY & ADDITIONAL STUDIES:    INTERPRETATION OF TELEMETRY (personally reviewed): no events       ECHO: Most recent echo in office 2/26/2020; LVEF 50%, mild MR, aortic sclerosis, dual chamber device in RA/RV      CARDIAC CATHETERIZATION: < from: Cardiac Cath Lab - Adult (03.11.20 @ 10:55) >  VENTRICLES: LV not done Cr 1.7 Recent YOLANDA 55%  CORONARY VESSELS: The coronary circulation is right dominant.  LM:   --  LM: Normal.  LAD:   --  Mid LAD: There was a 100 % stenosis. There was good blood supply  to the distal myocardium from a graft.  --  D1: There was a 90 % stenosis.  CX:   --  Proximal circumflex: There was a 100 % stenosis. There was good  blood supply to the distal myocardium from a graft.  RCA:   -- Mid RCA: There was a 100 % stenosis. There was good blood supply  to the distal myocardium from a graft.  GRAFTS:   --  Graft to the mid LAD: The graft was a LIMA. It was normal.  --  Graft to the 1st obtuse marginal: The graft was a saphenous vein graft  from the aorta. It was normal.  --  Graft to the RPDA: The graft was a saphenous vein graft from the aorta.  It was normal.  COMPLICATIONS: There were no complications. No complications occurred  during the cath lab visit.  DIAGNOSTIC IMPRESSIONS: Prior LIMA to LAD and SVG to OM and RPDA are  patent. Severe LAD-maria e disease  DIAGNOSTIC RECOMMENDATIONS: Plan for LAD-maria e PCI  INTERVENTIONAL IMPRESSIONS: Prior LIMA to LAD and SVG to OM and RPDA are  patent. Severe LAD-maria e disease  INTERVENTIONAL RECOMMENDATIONS: Plan for LAD-maria e PCI  Prepared and signed by  Julio Blanco MD  Signed 03/11/2020 15:24:54    < end of copied text >      ASSESSMENT AND PLAN:  In summary, MARIA ROSS is an 64y Male with past medical history significant for CAD sp PCI 3/2020, ICM currently euvolemic, ICD, DM, sp right elbow I/D and left groin debridement. Now complicated by new anemia.    1) cw DAPT  2) Transfuse as necessary PRBC  3) Vascular following and does not want CT scan  4) cw torsemide, lipitor, coreg

## 2020-04-18 NOTE — PROGRESS NOTE ADULT - SUBJECTIVE AND OBJECTIVE BOX
NEPHROLOGY INTERVAL HPI/OVERNIGHT EVENTS:    vascular surgery follow up noted   No SOB or CP   + edema   UO 55o ml with fernandez       MEDICATIONS  (STANDING):  aspirin enteric coated 81 milliGRAM(s) Oral daily  atorvastatin 80 milliGRAM(s) Oral at bedtime  carvedilol 25 milliGRAM(s) Oral every 12 hours  clindamycin IVPB 600 milliGRAM(s) IV Intermittent every 8 hours  clopidogrel Tablet 75 milliGRAM(s) Oral daily  digoxin     Tablet 0.125 milliGRAM(s) Oral daily  epoetin-adrian-epbx (RETACRIT) Injectable 59181 Unit(s) SubCutaneous every 7 days  finasteride 5 milliGRAM(s) Oral daily  folic acid 1 milliGRAM(s) Oral daily  heparin  Injectable 5000 Unit(s) SubCutaneous every 8 hours  insulin glargine Injectable (LANTUS) 60 Unit(s) SubCutaneous at bedtime  insulin lispro (HumaLOG) corrective regimen sliding scale   SubCutaneous three times a day before meals  insulin lispro Injectable (HumaLOG) 30 Unit(s) SubCutaneous three times a day before meals  insulin lispro Injectable (HumaLOG) 22 Unit(s) SubCutaneous at bedtime  lactobacillus acidophilus 1 Tablet(s) Oral three times a day with meals  meropenem  IVPB 500 milliGRAM(s) IV Intermittent every 12 hours  pantoprazole    Tablet 40 milliGRAM(s) Oral before breakfast  senna 2 Tablet(s) Oral at bedtime  simethicone 80 milliGRAM(s) Chew every 6 hours    MEDICATIONS  (PRN):  acetaminophen   Tablet .. 650 milliGRAM(s) Oral every 6 hours PRN Temp greater or equal to 38C (100.4F)  dextrose 40% Gel 15 Gram(s) Oral once PRN Blood Glucose LESS THAN 70 milliGRAM(s)/deciLiter  fentaNYL    Injectable 25 MICROGram(s) IV Push every 5 minutes PRN Moderate Pain (4 - 6)  fentaNYL    Injectable 50 MICROGram(s) IV Push every 5 minutes PRN Severe Pain (7 - 10)  HYDROmorphone  Injectable 0.5 milliGRAM(s) IV Push once PRN Breakthrough Pain  ondansetron Injectable 4 milliGRAM(s) IV Push every 6 hours PRN Nausea and/or Vomiting      Allergies    No Known Allergies    Intolerances          Vital Signs Last 24 Hrs  T(C): 37 (18 Apr 2020 07:35), Max: 37 (18 Apr 2020 07:35)  T(F): 98.6 (18 Apr 2020 07:35), Max: 98.6 (18 Apr 2020 07:35)  HR: 63 (18 Apr 2020 07:35) (63 - 87)  BP: 138/62 (18 Apr 2020 07:35) (138/62 - 159/70)  BP(mean): --  RR: 16 (18 Apr 2020 07:35) (16 - 18)  SpO2: 100% (18 Apr 2020 07:35) (100% - 100%)  Daily     Daily   I&O's Detail    17 Apr 2020 07:01  -  18 Apr 2020 07:00  --------------------------------------------------------  IN:    Packed Red Blood Cells: 300 mL  Total IN: 300 mL    OUT:    Indwelling Catheter - Urethral: 400 mL    Voided: 150 mL  Total OUT: 550 mL    Total NET: -250 mL        I&O's Summary    17 Apr 2020 07:01  -  18 Apr 2020 07:00  --------------------------------------------------------  IN: 300 mL / OUT: 550 mL / NET: -250 mL        PHYSICAL EXAM:  GENERAL: appears chronically ill  NECK: Supple, neck  veins full  NERVOUS SYSTEM:  Alert & Oriented X3  CHEST/LUNG: Clear / EAE   HEART: Regular rate and rhythm  ABDOMEN: Soft, Nontender, Nondistended; Bowel sounds present  EXT - min edema ,L inguinal area dressed , R elbow dressed   + fernandez catheter     LABS:                        7.4    13.20 )-----------( 334      ( 18 Apr 2020 05:45 )             21.9     04-18    140  |  105  |  54.0<H>  ----------------------------<  115<H>  4.8   |  24.0  |  2.28<H>    Ca    7.4<L>      18 Apr 2020 05:44  Mg     2.1     04-17    TPro  4.6<L>  /  Alb  1.5<L>  /  TBili  0.3<L>  /  DBili  x   /  AST  28  /  ALT  17  /  AlkPhos  141<H>  04-17                RADIOLOGY & ADDITIONAL TESTS:

## 2020-04-18 NOTE — PROGRESS NOTE ADULT - SUBJECTIVE AND OBJECTIVE BOX
INTERVAL HPI/OVERNIGHT EVENTS:  FS reviewed  lispro 22 units given at bedtime    Review of systems:  unable to obtain    MEDICATIONS  (STANDING):  aspirin enteric coated 81 milliGRAM(s) Oral daily  atorvastatin 80 milliGRAM(s) Oral at bedtime  carvedilol 25 milliGRAM(s) Oral every 12 hours  clindamycin IVPB 600 milliGRAM(s) IV Intermittent every 8 hours  clopidogrel Tablet 75 milliGRAM(s) Oral daily  digoxin     Tablet 0.125 milliGRAM(s) Oral daily  epoetin-adrian-epbx (RETACRIT) Injectable 09636 Unit(s) SubCutaneous every 7 days  finasteride 5 milliGRAM(s) Oral daily  folic acid 1 milliGRAM(s) Oral daily  heparin  Injectable 5000 Unit(s) SubCutaneous every 8 hours  insulin glargine Injectable (LANTUS) 60 Unit(s) SubCutaneous at bedtime  insulin lispro (HumaLOG) corrective regimen sliding scale   SubCutaneous three times a day before meals  insulin lispro (HumaLOG) corrective regimen sliding scale   SubCutaneous at bedtime  insulin lispro Injectable (HumaLOG) 30 Unit(s) SubCutaneous three times a day before meals  lactobacillus acidophilus 1 Tablet(s) Oral three times a day with meals  meropenem  IVPB 500 milliGRAM(s) IV Intermittent every 12 hours  pantoprazole    Tablet 40 milliGRAM(s) Oral before breakfast  senna 2 Tablet(s) Oral at bedtime  simethicone 80 milliGRAM(s) Chew every 6 hours  torsemide 10 milliGRAM(s) Oral daily    MEDICATIONS  (PRN):  acetaminophen   Tablet .. 650 milliGRAM(s) Oral every 6 hours PRN Temp greater or equal to 38C (100.4F)  dextrose 40% Gel 15 Gram(s) Oral once PRN Blood Glucose LESS THAN 70 milliGRAM(s)/deciLiter  fentaNYL    Injectable 25 MICROGram(s) IV Push every 5 minutes PRN Moderate Pain (4 - 6)  fentaNYL    Injectable 50 MICROGram(s) IV Push every 5 minutes PRN Severe Pain (7 - 10)  HYDROmorphone  Injectable 0.5 milliGRAM(s) IV Push once PRN Breakthrough Pain  ondansetron Injectable 4 milliGRAM(s) IV Push every 6 hours PRN Nausea and/or Vomiting      Allergies    No Known Allergies    Intolerances        Vital Signs Last 24 Hrs  T(C): 37 (18 Apr 2020 07:35), Max: 37 (18 Apr 2020 07:35)  T(F): 98.6 (18 Apr 2020 07:35), Max: 98.6 (18 Apr 2020 07:35)  HR: 63 (18 Apr 2020 07:35) (63 - 87)  BP: 138/62 (18 Apr 2020 07:35) (138/62 - 159/70)  BP(mean): --  RR: 16 (18 Apr 2020 07:35) (16 - 18)  SpO2: 100% (18 Apr 2020 07:35) (100% - 100%)    deferred due to covid    LABS:                        7.4    13.20 )-----------( 334      ( 18 Apr 2020 05:45 )             21.9     04-18    140  |  105  |  54.0<H>  ----------------------------<  115<H>  4.8   |  24.0  |  2.28<H>    Ca    7.4<L>      18 Apr 2020 05:44  Mg     2.1     04-17    TPro  4.6<L>  /  Alb  1.5<L>  /  TBili  0.3<L>  /  DBili  x   /  AST  28  /  ALT  17  /  AlkPhos  141<H>  04-17        Hemoglobin A1C, Whole Blood: 11.5 % [4.0 - 5.6] (04-11-20 @ 06:52)        CAPILLARY BLOOD GLUCOSE      POCT Blood Glucose.: 95 mg/dL (18 Apr 2020 08:12)  POCT Blood Glucose.: 305 mg/dL (17 Apr 2020 16:31)          RADIOLOGY & ADDITIONAL TESTS:  none

## 2020-04-19 LAB
ANION GAP SERPL CALC-SCNC: 12 MMOL/L — SIGNIFICANT CHANGE UP (ref 5–17)
BASOPHILS # BLD AUTO: 0.03 K/UL — SIGNIFICANT CHANGE UP (ref 0–0.2)
BASOPHILS NFR BLD AUTO: 0.3 % — SIGNIFICANT CHANGE UP (ref 0–2)
BUN SERPL-MCNC: 54 MG/DL — HIGH (ref 8–20)
CALCIUM SERPL-MCNC: 7.4 MG/DL — LOW (ref 8.6–10.2)
CHLORIDE SERPL-SCNC: 103 MMOL/L — SIGNIFICANT CHANGE UP (ref 98–107)
CO2 SERPL-SCNC: 24 MMOL/L — SIGNIFICANT CHANGE UP (ref 22–29)
CREAT SERPL-MCNC: 2.4 MG/DL — HIGH (ref 0.5–1.3)
EOSINOPHIL # BLD AUTO: 0.29 K/UL — SIGNIFICANT CHANGE UP (ref 0–0.5)
EOSINOPHIL NFR BLD AUTO: 3.1 % — SIGNIFICANT CHANGE UP (ref 0–6)
GLUCOSE BLDC GLUCOMTR-MCNC: 138 MG/DL — HIGH (ref 70–99)
GLUCOSE BLDC GLUCOMTR-MCNC: 152 MG/DL — HIGH (ref 70–99)
GLUCOSE BLDC GLUCOMTR-MCNC: 160 MG/DL — HIGH (ref 70–99)
GLUCOSE BLDC GLUCOMTR-MCNC: 171 MG/DL — HIGH (ref 70–99)
GLUCOSE BLDC GLUCOMTR-MCNC: 241 MG/DL — HIGH (ref 70–99)
GLUCOSE SERPL-MCNC: 186 MG/DL — HIGH (ref 70–99)
HCT VFR BLD CALC: 24.9 % — LOW (ref 39–50)
HGB BLD-MCNC: 8 G/DL — LOW (ref 13–17)
IMM GRANULOCYTES NFR BLD AUTO: 1.9 % — HIGH (ref 0–1.5)
LYMPHOCYTES # BLD AUTO: 1.3 K/UL — SIGNIFICANT CHANGE UP (ref 1–3.3)
LYMPHOCYTES # BLD AUTO: 13.7 % — SIGNIFICANT CHANGE UP (ref 13–44)
MAGNESIUM SERPL-MCNC: 2.1 MG/DL — SIGNIFICANT CHANGE UP (ref 1.6–2.6)
MCHC RBC-ENTMCNC: 29 PG — SIGNIFICANT CHANGE UP (ref 27–34)
MCHC RBC-ENTMCNC: 32.1 GM/DL — SIGNIFICANT CHANGE UP (ref 32–36)
MCV RBC AUTO: 90.2 FL — SIGNIFICANT CHANGE UP (ref 80–100)
MONOCYTES # BLD AUTO: 0.64 K/UL — SIGNIFICANT CHANGE UP (ref 0–0.9)
MONOCYTES NFR BLD AUTO: 6.8 % — SIGNIFICANT CHANGE UP (ref 2–14)
NEUTROPHILS # BLD AUTO: 7.03 K/UL — SIGNIFICANT CHANGE UP (ref 1.8–7.4)
NEUTROPHILS NFR BLD AUTO: 74.2 % — SIGNIFICANT CHANGE UP (ref 43–77)
PHOSPHATE SERPL-MCNC: 3.3 MG/DL — SIGNIFICANT CHANGE UP (ref 2.4–4.7)
PLATELET # BLD AUTO: 331 K/UL — SIGNIFICANT CHANGE UP (ref 150–400)
POTASSIUM SERPL-MCNC: 5.2 MMOL/L — SIGNIFICANT CHANGE UP (ref 3.5–5.3)
POTASSIUM SERPL-SCNC: 5.2 MMOL/L — SIGNIFICANT CHANGE UP (ref 3.5–5.3)
RBC # BLD: 2.76 M/UL — LOW (ref 4.2–5.8)
RBC # FLD: 15 % — HIGH (ref 10.3–14.5)
SODIUM SERPL-SCNC: 139 MMOL/L — SIGNIFICANT CHANGE UP (ref 135–145)
URATE SERPL-MCNC: 7.2 MG/DL — HIGH (ref 3.4–7)
WBC # BLD: 9.47 K/UL — SIGNIFICANT CHANGE UP (ref 3.8–10.5)
WBC # FLD AUTO: 9.47 K/UL — SIGNIFICANT CHANGE UP (ref 3.8–10.5)

## 2020-04-19 RX ORDER — HYDROMORPHONE HYDROCHLORIDE 2 MG/ML
0.5 INJECTION INTRAMUSCULAR; INTRAVENOUS; SUBCUTANEOUS ONCE
Refills: 0 | Status: DISCONTINUED | OUTPATIENT
Start: 2020-04-19 | End: 2020-04-19

## 2020-04-19 RX ORDER — SODIUM CHLORIDE 9 MG/ML
1000 INJECTION INTRAMUSCULAR; INTRAVENOUS; SUBCUTANEOUS
Refills: 0 | Status: DISCONTINUED | OUTPATIENT
Start: 2020-04-19 | End: 2020-04-22

## 2020-04-19 RX ADMIN — HEPARIN SODIUM 5000 UNIT(S): 5000 INJECTION INTRAVENOUS; SUBCUTANEOUS at 11:31

## 2020-04-19 RX ADMIN — Medication 1 TABLET(S): at 11:30

## 2020-04-19 RX ADMIN — FINASTERIDE 5 MILLIGRAM(S): 5 TABLET, FILM COATED ORAL at 11:30

## 2020-04-19 RX ADMIN — Medication 0.6 MILLIGRAM(S): at 11:30

## 2020-04-19 RX ADMIN — Medication 100 MILLIGRAM(S): at 22:48

## 2020-04-19 RX ADMIN — CARVEDILOL PHOSPHATE 25 MILLIGRAM(S): 80 CAPSULE, EXTENDED RELEASE ORAL at 16:44

## 2020-04-19 RX ADMIN — MEROPENEM 100 MILLIGRAM(S): 1 INJECTION INTRAVENOUS at 05:43

## 2020-04-19 RX ADMIN — SODIUM CHLORIDE 50 MILLILITER(S): 9 INJECTION INTRAMUSCULAR; INTRAVENOUS; SUBCUTANEOUS at 20:34

## 2020-04-19 RX ADMIN — Medication 10 MILLIGRAM(S): at 05:44

## 2020-04-19 RX ADMIN — HEPARIN SODIUM 5000 UNIT(S): 5000 INJECTION INTRAVENOUS; SUBCUTANEOUS at 05:43

## 2020-04-19 RX ADMIN — Medication 100 MILLIGRAM(S): at 11:31

## 2020-04-19 RX ADMIN — MEROPENEM 100 MILLIGRAM(S): 1 INJECTION INTRAVENOUS at 16:44

## 2020-04-19 RX ADMIN — Medication 0.12 MILLIGRAM(S): at 05:45

## 2020-04-19 RX ADMIN — Medication 30 UNIT(S): at 08:31

## 2020-04-19 RX ADMIN — Medication 100 MILLIGRAM(S): at 05:43

## 2020-04-19 RX ADMIN — CARVEDILOL PHOSPHATE 25 MILLIGRAM(S): 80 CAPSULE, EXTENDED RELEASE ORAL at 05:45

## 2020-04-19 RX ADMIN — HEPARIN SODIUM 5000 UNIT(S): 5000 INJECTION INTRAVENOUS; SUBCUTANEOUS at 22:48

## 2020-04-19 RX ADMIN — Medication 1 MILLIGRAM(S): at 11:30

## 2020-04-19 RX ADMIN — HYDROMORPHONE HYDROCHLORIDE 0.5 MILLIGRAM(S): 2 INJECTION INTRAMUSCULAR; INTRAVENOUS; SUBCUTANEOUS at 19:50

## 2020-04-19 RX ADMIN — Medication 30 UNIT(S): at 18:50

## 2020-04-19 RX ADMIN — CLOPIDOGREL BISULFATE 75 MILLIGRAM(S): 75 TABLET, FILM COATED ORAL at 11:30

## 2020-04-19 RX ADMIN — Medication 81 MILLIGRAM(S): at 11:30

## 2020-04-19 RX ADMIN — Medication 1 TABLET(S): at 16:44

## 2020-04-19 RX ADMIN — HYDROMORPHONE HYDROCHLORIDE 0.5 MILLIGRAM(S): 2 INJECTION INTRAMUSCULAR; INTRAVENOUS; SUBCUTANEOUS at 09:17

## 2020-04-19 RX ADMIN — Medication 1 TABLET(S): at 08:32

## 2020-04-19 RX ADMIN — Medication 30 UNIT(S): at 11:44

## 2020-04-19 RX ADMIN — PANTOPRAZOLE SODIUM 40 MILLIGRAM(S): 20 TABLET, DELAYED RELEASE ORAL at 05:44

## 2020-04-19 NOTE — PROGRESS NOTE ADULT - ASSESSMENT
Improved MARSHALL on CKD stage III - baseline creat usually 2.2 - 2.5 due to DM/HTN   Follows w Dr Pedro ---> Renal function now back to baseline   Has h/o CAD, S/P MI, CABG, ICM with AICD  Renal sono noted no hydronephrosis , recent CT normal kidneys   Some edema   hold Torsemide 10 mg daily - during gout flare  Elevated Uric acid gout flare on colchicine    s/p OR on 4/10- Debridement, external genitalia, perineum  I&D abscess   L groin hematoma/ cellulitis on abx/ Leukocytosis  S/P R elbow drainage   Abx as per ID     Anemia - S/P transfusion   Vasular follow up noted   cont Epogen and folate   Follow labs     Hyperkalemia better  Pt on Digoxin ----> level OK   ? Reabsorption of hematoma    Will follow

## 2020-04-19 NOTE — PROGRESS NOTE ADULT - SUBJECTIVE AND OBJECTIVE BOX
[facility] - [facility], [facility]                                   [address] [place] [address]                                                      PHONE: [phone]                                                         FAX: [phone]  [email or web address]  ---------------------------------------------------------------------------------------------------------------------------------    Overnight events/patient complaints:  Pt transfused 1 Unit PRBC now with gout flare    No Known Allergies    MEDICATIONS  (STANDING):  aspirin enteric coated 81 milliGRAM(s) Oral daily  atorvastatin 80 milliGRAM(s) Oral at bedtime  carvedilol 25 milliGRAM(s) Oral every 12 hours  clindamycin IVPB 600 milliGRAM(s) IV Intermittent every 8 hours  clopidogrel Tablet 75 milliGRAM(s) Oral daily  colchicine 0.6 milliGRAM(s) Oral daily  digoxin     Tablet 0.125 milliGRAM(s) Oral daily  epoetin-adrian-epbx (RETACRIT) Injectable 87920 Unit(s) SubCutaneous every 7 days  finasteride 5 milliGRAM(s) Oral daily  folic acid 1 milliGRAM(s) Oral daily  heparin  Injectable 5000 Unit(s) SubCutaneous every 8 hours  insulin glargine Injectable (LANTUS) 60 Unit(s) SubCutaneous at bedtime  insulin lispro (HumaLOG) corrective regimen sliding scale   SubCutaneous at bedtime  insulin lispro Injectable (HumaLOG) 30 Unit(s) SubCutaneous three times a day before meals  lactobacillus acidophilus 1 Tablet(s) Oral three times a day with meals  meropenem  IVPB 500 milliGRAM(s) IV Intermittent every 12 hours  pantoprazole    Tablet 40 milliGRAM(s) Oral before breakfast  senna 2 Tablet(s) Oral at bedtime  simethicone 80 milliGRAM(s) Chew every 6 hours    MEDICATIONS  (PRN):  acetaminophen   Tablet .. 650 milliGRAM(s) Oral every 6 hours PRN Temp greater or equal to 38C (100.4F)  dextrose 40% Gel 15 Gram(s) Oral once PRN Blood Glucose LESS THAN 70 milliGRAM(s)/deciLiter  fentaNYL    Injectable 25 MICROGram(s) IV Push every 5 minutes PRN Moderate Pain (4 - 6)  fentaNYL    Injectable 50 MICROGram(s) IV Push every 5 minutes PRN Severe Pain (7 - 10)  ondansetron Injectable 4 milliGRAM(s) IV Push every 6 hours PRN Nausea and/or Vomiting      Vital Signs Last 24 Hrs  T(C): 36.2 (19 Apr 2020 08:13), Max: 37.3 (18 Apr 2020 22:00)  T(F): 97.2 (19 Apr 2020 08:13), Max: 99.1 (18 Apr 2020 22:00)  HR: 62 (19 Apr 2020 08:13) (62 - 74)  BP: 171/72 (19 Apr 2020 08:13) (141/62 - 171/72)  BP(mean): 96 (18 Apr 2020 18:59) (89 - 96)  RR: 16 (19 Apr 2020 08:13) (16 - 20)  SpO2: 100% (19 Apr 2020 08:13) (100% - 100%)  ICU Vital Signs Last 24 Hrs  MARIA ROSS  I&O's Detail    18 Apr 2020 07:01  -  19 Apr 2020 07:00  --------------------------------------------------------  IN:    Oral Fluid: 240 mL    Solution: 100 mL    Solution: 50 mL  Total IN: 390 mL    OUT:    Indwelling Catheter - Urethral: 1725 mL  Total OUT: 1725 mL    Total NET: -1335 mL        Drug Dosing Weight  MARIA ROSS      PHYSICAL EXAM:  General: Appears well developed, well nourished alert and cooperative.  HEENT: Head; normocephalic, atraumatic.  Eyes: Pupils reactive, cornea wnl.  Neck: Supple, no nodes adenopathy, no NVD or carotid bruit or thyromegaly.  CARDIOVASCULAR: Normal S1 and S2, No murmur, rub, gallop or lift.   LUNGS: No rales, rhonchi or wheeze. Normal breath sounds bilaterally.  ABDOMEN: Soft, nontender without mass or organomegaly. bowel sounds normoactive.  EXTREMITIES: No clubbing, cyanosis or edema. Distal pulses wnl. right elbow I/D, left groin debridement dressing intact    SKIN: warm and dry with normal turgor.  NEURO: Alert/oriented x 3/normal motor exam. No pathologic reflexes.    PSYCH: normal affect.        LABS:                        8.0    9.47  )-----------( 331      ( 19 Apr 2020 06:03 )             24.9     04-19    139  |  103  |  54.0<H>  ----------------------------<  186<H>  5.2   |  24.0  |  2.40<H>    Ca    7.4<L>      19 Apr 2020 06:03  Phos  3.3     04-19  Mg     2.1     04-19      [facility]            RADIOLOGY & ADDITIONAL STUDIES:    INTERPRETATION OF TELEMETRY (personally reviewed): no events       ECHO: Most recent echo in office 2/26/2020; LVEF 50%, mild MR, aortic sclerosis, dual chamber device in RA/RV      CARDIAC CATHETERIZATION: < from: Cardiac Cath Lab - Adult (03.11.20 @ 10:55) >  VENTRICLES: LV not done Cr 1.7 Recent YOLANDA 55%  CORONARY VESSELS: The coronary circulation is right dominant.  LM:   --  LM: Normal.  LAD:   --  Mid LAD: There was a 100 % stenosis. There was good blood supply  to the distal myocardium from a graft.  --  D1: There was a 90 % stenosis.  CX:   --  Proximal circumflex: There was a 100 % stenosis. There was good  blood supply to the distal myocardium from a graft.  RCA:   -- Mid RCA: There was a 100 % stenosis. There was good blood supply  to the distal myocardium from a graft.  GRAFTS:   --  Graft to the mid LAD: The graft was a LIMA. It was normal.  --  Graft to the 1st obtuse marginal: The graft was a saphenous vein graft  from the aorta. It was normal.  --  Graft to the RPDA: The graft was a saphenous vein graft from the aorta.  It was normal.  COMPLICATIONS: There were no complications. No complications occurred  during the cath lab visit.  DIAGNOSTIC IMPRESSIONS: Prior LIMA to LAD and SVG to OM and RPDA are  patent. Severe LAD-maria e disease  DIAGNOSTIC RECOMMENDATIONS: Plan for LAD-maria e PCI  INTERVENTIONAL IMPRESSIONS: Prior LIMA to LAD and SVG to OM and RPDA are  patent. Severe LAD-maria e disease  INTERVENTIONAL RECOMMENDATIONS: Plan for LAD-maria e PCI  Prepared and signed by  Julio Blanco MD  Signed 03/11/2020 15:24:54    < end of copied text >      ASSESSMENT AND PLAN:  In summary, MARIA ROSS is an 64y Male with past medical history significant for CAD sp PCI 3/2020, ICM currently euvolemic, ICD, DM, sp right elbow I/D and left groin debridement. Now complicated by new anemia.    1) cw DAPT  2) Transfuse as necessary PRBC, trend Hb as only increased 0.6 after 1 u PRBC  3) Vascular following   4) cw  lipitor, coreg  5) torsemide on hold for gout

## 2020-04-19 NOTE — PROGRESS NOTE ADULT - SUBJECTIVE AND OBJECTIVE BOX
HPI/OVERNIGHT EVENTS:  PEDRO overnight. Left groin dressing changed. Pain better controlled. Patient without acute complaints at this time.     MEDICATIONS  (STANDING):  aspirin enteric coated 81 milliGRAM(s) Oral daily  atorvastatin 80 milliGRAM(s) Oral at bedtime  carvedilol 25 milliGRAM(s) Oral every 12 hours  clindamycin IVPB 600 milliGRAM(s) IV Intermittent every 8 hours  clopidogrel Tablet 75 milliGRAM(s) Oral daily  colchicine 0.6 milliGRAM(s) Oral daily  digoxin     Tablet 0.125 milliGRAM(s) Oral daily  epoetin-adrian-epbx (RETACRIT) Injectable 58619 Unit(s) SubCutaneous every 7 days  finasteride 5 milliGRAM(s) Oral daily  folic acid 1 milliGRAM(s) Oral daily  heparin  Injectable 5000 Unit(s) SubCutaneous every 8 hours  insulin glargine Injectable (LANTUS) 60 Unit(s) SubCutaneous at bedtime  insulin lispro (HumaLOG) corrective regimen sliding scale   SubCutaneous three times a day before meals  insulin lispro (HumaLOG) corrective regimen sliding scale   SubCutaneous at bedtime  insulin lispro Injectable (HumaLOG) 30 Unit(s) SubCutaneous three times a day before meals  lactobacillus acidophilus 1 Tablet(s) Oral three times a day with meals  meropenem  IVPB 500 milliGRAM(s) IV Intermittent every 12 hours  pantoprazole    Tablet 40 milliGRAM(s) Oral before breakfast  senna 2 Tablet(s) Oral at bedtime  simethicone 80 milliGRAM(s) Chew every 6 hours  torsemide 10 milliGRAM(s) Oral daily    MEDICATIONS  (PRN):  acetaminophen   Tablet .. 650 milliGRAM(s) Oral every 6 hours PRN Temp greater or equal to 38C (100.4F)  dextrose 40% Gel 15 Gram(s) Oral once PRN Blood Glucose LESS THAN 70 milliGRAM(s)/deciLiter  fentaNYL    Injectable 25 MICROGram(s) IV Push every 5 minutes PRN Moderate Pain (4 - 6)  fentaNYL    Injectable 50 MICROGram(s) IV Push every 5 minutes PRN Severe Pain (7 - 10)  HYDROmorphone  Injectable 0.5 milliGRAM(s) IV Push once PRN Breakthrough Pain  ondansetron Injectable 4 milliGRAM(s) IV Push every 6 hours PRN Nausea and/or Vomiting      Vital Signs Last 24 Hrs  T(C): 37.1 (18 Apr 2020 15:45), Max: 37.1 (18 Apr 2020 15:45)  T(F): 98.7 (18 Apr 2020 15:45), Max: 98.7 (18 Apr 2020 15:45)  HR: 72 (18 Apr 2020 22:00) (63 - 74)  BP: 161/69 (18 Apr 2020 22:00) (138/62 - 161/70)  BP(mean): 96 (18 Apr 2020 18:59) (89 - 96)  RR: 16 (18 Apr 2020 22:00) (16 - 20)  SpO2: 100% (18 Apr 2020 22:00) (100% - 100%)      Physical exam:   General: Pale, NAD  Pulm: Nonlabored breathing on room air  CV: RRR, S1, S2  Abdomen: soft, NT, ND  Vasc: Left groin wound dressing in place with strikethrough. Wound without active bleeding. Site packed and ABD over wound. Left le warm to touch with palpable pulses, The dressing is clean, dry and intact.   Neuro: Sensation to light touch is grossly intact without focal deficit and is symmetric bilaterally. No calf tenderness. Sensation to light touch is grossly intact distally. Motor function distally is 5/5. No foot drop. no wrist drop. Capillary refill is less than 2 seconds. No cyanosis.  I&O's Detail    17 Apr 2020 07:01  -  18 Apr 2020 07:00  --------------------------------------------------------  IN:    Packed Red Blood Cells: 300 mL  Total IN: 300 mL    OUT:    Indwelling Catheter - Urethral: 400 mL    Voided: 150 mL  Total OUT: 550 mL    Total NET: -250 mL      18 Apr 2020 07:01  -  19 Apr 2020 01:06  --------------------------------------------------------  IN:    Oral Fluid: 240 mL    Solution: 50 mL  Total IN: 290 mL    OUT:    Indwelling Catheter - Urethral: 1100 mL  Total OUT: 1100 mL    Total NET: -810 mL          LABS:                        7.4    13.20 )-----------( 334      ( 18 Apr 2020 05:45 )             21.9     04-18    140  |  105  |  54.0<H>  ----------------------------<  115<H>  4.8   |  24.0  |  2.28<H>    Ca    7.4<L>      18 Apr 2020 05:44  Mg     2.1     04-17    TPro  4.6<L>  /  Alb  1.5<L>  /  TBili  0.3<L>  /  DBili  x   /  AST  28  /  ALT  17  /  AlkPhos  141<H>  04-17 HPI/OVERNIGHT EVENTS:  PEDRO overnight. Left groin dressing changed. Found to have increased oozing. CBC showed hemoglobin 7.4, patient transfused 1U.  Pain better controlled. Patient without acute complaints at this time.     MEDICATIONS  (STANDING):  aspirin enteric coated 81 milliGRAM(s) Oral daily  atorvastatin 80 milliGRAM(s) Oral at bedtime  carvedilol 25 milliGRAM(s) Oral every 12 hours  clindamycin IVPB 600 milliGRAM(s) IV Intermittent every 8 hours  clopidogrel Tablet 75 milliGRAM(s) Oral daily  colchicine 0.6 milliGRAM(s) Oral daily  digoxin     Tablet 0.125 milliGRAM(s) Oral daily  epoetin-adrian-epbx (RETACRIT) Injectable 14054 Unit(s) SubCutaneous every 7 days  finasteride 5 milliGRAM(s) Oral daily  folic acid 1 milliGRAM(s) Oral daily  heparin  Injectable 5000 Unit(s) SubCutaneous every 8 hours  insulin glargine Injectable (LANTUS) 60 Unit(s) SubCutaneous at bedtime  insulin lispro (HumaLOG) corrective regimen sliding scale   SubCutaneous three times a day before meals  insulin lispro (HumaLOG) corrective regimen sliding scale   SubCutaneous at bedtime  insulin lispro Injectable (HumaLOG) 30 Unit(s) SubCutaneous three times a day before meals  lactobacillus acidophilus 1 Tablet(s) Oral three times a day with meals  meropenem  IVPB 500 milliGRAM(s) IV Intermittent every 12 hours  pantoprazole    Tablet 40 milliGRAM(s) Oral before breakfast  senna 2 Tablet(s) Oral at bedtime  simethicone 80 milliGRAM(s) Chew every 6 hours  torsemide 10 milliGRAM(s) Oral daily    MEDICATIONS  (PRN):  acetaminophen   Tablet .. 650 milliGRAM(s) Oral every 6 hours PRN Temp greater or equal to 38C (100.4F)  dextrose 40% Gel 15 Gram(s) Oral once PRN Blood Glucose LESS THAN 70 milliGRAM(s)/deciLiter  fentaNYL    Injectable 25 MICROGram(s) IV Push every 5 minutes PRN Moderate Pain (4 - 6)  fentaNYL    Injectable 50 MICROGram(s) IV Push every 5 minutes PRN Severe Pain (7 - 10)  HYDROmorphone  Injectable 0.5 milliGRAM(s) IV Push once PRN Breakthrough Pain  ondansetron Injectable 4 milliGRAM(s) IV Push every 6 hours PRN Nausea and/or Vomiting      Vital Signs Last 24 Hrs  T(C): 37.1 (18 Apr 2020 15:45), Max: 37.1 (18 Apr 2020 15:45)  T(F): 98.7 (18 Apr 2020 15:45), Max: 98.7 (18 Apr 2020 15:45)  HR: 72 (18 Apr 2020 22:00) (63 - 74)  BP: 161/69 (18 Apr 2020 22:00) (138/62 - 161/70)  BP(mean): 96 (18 Apr 2020 18:59) (89 - 96)  RR: 16 (18 Apr 2020 22:00) (16 - 20)  SpO2: 100% (18 Apr 2020 22:00) (100% - 100%)      Physical exam:   General: Pale, NAD  Pulm: Nonlabored breathing on room air  CV: RRR, S1, S2  Abdomen: soft, NT, ND  Vasc: Left groin wound dressing in place with strikethrough. Wound without active bleeding. Site packed and ABD over wound. Left le warm to touch with palpable pulses, The dressing is clean, dry and intact.   Neuro: Sensation to light touch is grossly intact without focal deficit and is symmetric bilaterally. No calf tenderness. Sensation to light touch is grossly intact distally. Motor function distally is 5/5. No foot drop. no wrist drop. Capillary refill is less than 2 seconds. No cyanosis.  I&O's Detail    17 Apr 2020 07:01  -  18 Apr 2020 07:00  --------------------------------------------------------  IN:    Packed Red Blood Cells: 300 mL  Total IN: 300 mL    OUT:    Indwelling Catheter - Urethral: 400 mL    Voided: 150 mL  Total OUT: 550 mL    Total NET: -250 mL      18 Apr 2020 07:01  -  19 Apr 2020 01:06  --------------------------------------------------------  IN:    Oral Fluid: 240 mL    Solution: 50 mL  Total IN: 290 mL    OUT:    Indwelling Catheter - Urethral: 1100 mL  Total OUT: 1100 mL    Total NET: -810 mL          LABS:                        7.4    13.20 )-----------( 334      ( 18 Apr 2020 05:45 )             21.9     04-18    140  |  105  |  54.0<H>  ----------------------------<  115<H>  4.8   |  24.0  |  2.28<H>    Ca    7.4<L>      18 Apr 2020 05:44  Mg     2.1     04-17    TPro  4.6<L>  /  Alb  1.5<L>  /  TBili  0.3<L>  /  DBili  x   /  AST  28  /  ALT  17  /  AlkPhos  141<H>  04-17

## 2020-04-19 NOTE — PROGRESS NOTE ADULT - ASSESSMENT
64 year old male s/p excisional debridement of left groin nec fasc POD #7now most recently s/p repeat excisional debridement and right elbow washout. Wound with good granulation tissue.     -Hyperkalemia-resolved  -Wound vac removed at this time for dressing changes. continue to trend hemoglobin/hematocrit and transfuse as indicated.   - Cards: cw torsemide, lipitor, coreg  - Heme:  Cont ASA and Plavix for recent LANDON, SQH dvt ppx  - Continue to assess wound for placement of wound vac  - Renal: Add Torsemide 10 mg daily, Check Uric acid, Epogen and folate   - Appreciate ID consult; continue IV antibiotic as per ID-clinda and meropenum.   - Endo: continue lantus 60 units QHS, continue lispro 30 units TID, continue insulin sliding scale  -ID: c/w  meropenem + clindamycin  - Ortho to follow up on right elbow cultures, preliminary cell count suspicious for gout-+/-   - Pain control as ordered  - Carb controlled diet  - Bowel regimen 64 year old male s/p excisional debridement of left groin nec fasc POD #7now most recently s/p repeat excisional debridement and right elbow washout. Wound increased oozing during dressing change 4/18, hemoglobin found to be 7.4, patient now s/p 1UpRBCs transfusion. Afebrile. VSS.     -f/u AM CBC, trend H/H  -Hyperkalemia-resolved  -Wound vac removed at this time for dressing changes. continue to trend hemoglobin/hematocrit and transfuse as indicated.   - Cards: cw torsemide, lipitor, coreg  - Heme:  Cont ASA and Plavix for recent LANDON, SQH dvt ppx  - Continue to assess wound for placement of wound vac  - Renal: Add Torsemide 10 mg daily, Check Uric acid, Epogen and folate   - Appreciate ID consult; continue IV antibiotic as per ID-clinda and meropenum.   - Endo: continue lantus 60 units QHS, continue lispro 30 units TID, continue insulin sliding scale  -ID: c/w  meropenem + clindamycin  - Ortho to follow up on right elbow cultures, preliminary cell count suspicious for gout-+/-   - Pain control as ordered  - Carb controlled diet  - Bowel regimen

## 2020-04-19 NOTE — PROGRESS NOTE ADULT - SUBJECTIVE AND OBJECTIVE BOX
NEPHROLOGY INTERVAL HPI/OVERNIGHT EVENTS:    Examined   No distress  ok UOP via fernandez    MEDICATIONS  (STANDING):  aspirin enteric coated 81 milliGRAM(s) Oral daily  atorvastatin 80 milliGRAM(s) Oral at bedtime  carvedilol 25 milliGRAM(s) Oral every 12 hours  clindamycin IVPB 600 milliGRAM(s) IV Intermittent every 8 hours  clopidogrel Tablet 75 milliGRAM(s) Oral daily  colchicine 0.6 milliGRAM(s) Oral daily  digoxin     Tablet 0.125 milliGRAM(s) Oral daily  epoetin-adrian-epbx (RETACRIT) Injectable 17938 Unit(s) SubCutaneous every 7 days  finasteride 5 milliGRAM(s) Oral daily  folic acid 1 milliGRAM(s) Oral daily  heparin  Injectable 5000 Unit(s) SubCutaneous every 8 hours  insulin glargine Injectable (LANTUS) 60 Unit(s) SubCutaneous at bedtime  insulin lispro (HumaLOG) corrective regimen sliding scale   SubCutaneous at bedtime  insulin lispro Injectable (HumaLOG) 30 Unit(s) SubCutaneous three times a day before meals  lactobacillus acidophilus 1 Tablet(s) Oral three times a day with meals  meropenem  IVPB 500 milliGRAM(s) IV Intermittent every 12 hours  pantoprazole    Tablet 40 milliGRAM(s) Oral before breakfast  senna 2 Tablet(s) Oral at bedtime  simethicone 80 milliGRAM(s) Chew every 6 hours  torsemide 10 milliGRAM(s) Oral daily    MEDICATIONS  (PRN):  acetaminophen   Tablet .. 650 milliGRAM(s) Oral every 6 hours PRN Temp greater or equal to 38C (100.4F)  dextrose 40% Gel 15 Gram(s) Oral once PRN Blood Glucose LESS THAN 70 milliGRAM(s)/deciLiter  fentaNYL    Injectable 25 MICROGram(s) IV Push every 5 minutes PRN Moderate Pain (4 - 6)  fentaNYL    Injectable 50 MICROGram(s) IV Push every 5 minutes PRN Severe Pain (7 - 10)  HYDROmorphone  Injectable 0.5 milliGRAM(s) IV Push once PRN Breakthrough Pain  ondansetron Injectable 4 milliGRAM(s) IV Push every 6 hours PRN Nausea and/or Vomiting      Allergies    No Known Allergies    Intolerances        Vital Signs Last 24 Hrs  T(C): 36.2 (19 Apr 2020 08:13), Max: 37.3 (18 Apr 2020 22:00)  T(F): 97.2 (19 Apr 2020 08:13), Max: 99.1 (18 Apr 2020 22:00)  HR: 62 (19 Apr 2020 08:13) (62 - 74)  BP: 171/72 (19 Apr 2020 08:13) (141/62 - 171/72)  BP(mean): 96 (18 Apr 2020 18:59) (89 - 96)  RR: 16 (19 Apr 2020 08:13) (16 - 20)  SpO2: 100% (19 Apr 2020 08:13) (100% - 100%)  Daily     Daily     PHYSICAL EXAM:  GENERAL: appears chronically ill  NECK: Supple, neck  veins full  NERVOUS SYSTEM:  Alert & Oriented X3  CHEST/LUNG: CTA B/L  HEART: Regular rate and rhythm  ABDOMEN: Soft, Nontender, Nondistended; Bowel sounds present  EXT - min edema ,L inguinal area dressed , R elbow dressed   + fernandez catheter     LABS:                        8.0    9.47  )-----------( 331      ( 19 Apr 2020 06:03 )             24.9     04-19    139  |  103  |  54.0<H>  ----------------------------<  186<H>  5.2   |  24.0  |  2.40<H>    Ca    7.4<L>      19 Apr 2020 06:03  Phos  3.3     04-19  Mg     2.1     04-19          Magnesium, Serum: 2.1 mg/dL (04-19 @ 06:03)  Phosphorus Level, Serum: 3.3 mg/dL (04-19 @ 06:03)          RADIOLOGY & ADDITIONAL TESTS:

## 2020-04-20 LAB
ANION GAP SERPL CALC-SCNC: 11 MMOL/L — SIGNIFICANT CHANGE UP (ref 5–17)
BUN SERPL-MCNC: 55 MG/DL — HIGH (ref 8–20)
CALCIUM SERPL-MCNC: 7.5 MG/DL — LOW (ref 8.6–10.2)
CHLORIDE SERPL-SCNC: 102 MMOL/L — SIGNIFICANT CHANGE UP (ref 98–107)
CO2 SERPL-SCNC: 24 MMOL/L — SIGNIFICANT CHANGE UP (ref 22–29)
CREAT SERPL-MCNC: 2.45 MG/DL — HIGH (ref 0.5–1.3)
GLUCOSE BLDC GLUCOMTR-MCNC: 106 MG/DL — HIGH (ref 70–99)
GLUCOSE BLDC GLUCOMTR-MCNC: 117 MG/DL — HIGH (ref 70–99)
GLUCOSE BLDC GLUCOMTR-MCNC: 134 MG/DL — HIGH (ref 70–99)
GLUCOSE BLDC GLUCOMTR-MCNC: 167 MG/DL — HIGH (ref 70–99)
GLUCOSE BLDC GLUCOMTR-MCNC: 57 MG/DL — LOW (ref 70–99)
GLUCOSE BLDC GLUCOMTR-MCNC: 63 MG/DL — LOW (ref 70–99)
GLUCOSE SERPL-MCNC: 123 MG/DL — HIGH (ref 70–99)
HCT VFR BLD CALC: 28.6 % — LOW (ref 39–50)
HGB BLD-MCNC: 9.2 G/DL — LOW (ref 13–17)
MAGNESIUM SERPL-MCNC: 1.9 MG/DL — SIGNIFICANT CHANGE UP (ref 1.8–2.6)
MCHC RBC-ENTMCNC: 29.5 PG — SIGNIFICANT CHANGE UP (ref 27–34)
MCHC RBC-ENTMCNC: 32.2 GM/DL — SIGNIFICANT CHANGE UP (ref 32–36)
MCV RBC AUTO: 91.7 FL — SIGNIFICANT CHANGE UP (ref 80–100)
PHOSPHATE SERPL-MCNC: 3.1 MG/DL — SIGNIFICANT CHANGE UP (ref 2.4–4.7)
PLATELET # BLD AUTO: 414 K/UL — HIGH (ref 150–400)
POTASSIUM SERPL-MCNC: 5.1 MMOL/L — SIGNIFICANT CHANGE UP (ref 3.5–5.3)
POTASSIUM SERPL-SCNC: 5.1 MMOL/L — SIGNIFICANT CHANGE UP (ref 3.5–5.3)
RBC # BLD: 3.12 M/UL — LOW (ref 4.2–5.8)
RBC # FLD: 15.1 % — HIGH (ref 10.3–14.5)
SODIUM SERPL-SCNC: 137 MMOL/L — SIGNIFICANT CHANGE UP (ref 135–145)
WBC # BLD: 10.15 K/UL — SIGNIFICANT CHANGE UP (ref 3.8–10.5)
WBC # FLD AUTO: 10.15 K/UL — SIGNIFICANT CHANGE UP (ref 3.8–10.5)

## 2020-04-20 RX ORDER — INSULIN LISPRO 100/ML
26 VIAL (ML) SUBCUTANEOUS
Refills: 0 | Status: DISCONTINUED | OUTPATIENT
Start: 2020-04-20 | End: 2020-04-21

## 2020-04-20 RX ORDER — DIPHENHYDRAMINE HCL 50 MG
25 CAPSULE ORAL EVERY 4 HOURS
Refills: 0 | Status: DISCONTINUED | OUTPATIENT
Start: 2020-04-20 | End: 2020-04-27

## 2020-04-20 RX ADMIN — Medication 0.12 MILLIGRAM(S): at 05:19

## 2020-04-20 RX ADMIN — SIMETHICONE 80 MILLIGRAM(S): 80 TABLET, CHEWABLE ORAL at 16:57

## 2020-04-20 RX ADMIN — Medication 1 TABLET(S): at 12:41

## 2020-04-20 RX ADMIN — SIMETHICONE 80 MILLIGRAM(S): 80 TABLET, CHEWABLE ORAL at 20:45

## 2020-04-20 RX ADMIN — CARVEDILOL PHOSPHATE 25 MILLIGRAM(S): 80 CAPSULE, EXTENDED RELEASE ORAL at 05:19

## 2020-04-20 RX ADMIN — SIMETHICONE 80 MILLIGRAM(S): 80 TABLET, CHEWABLE ORAL at 12:42

## 2020-04-20 RX ADMIN — INSULIN GLARGINE 60 UNIT(S): 100 INJECTION, SOLUTION SUBCUTANEOUS at 00:05

## 2020-04-20 RX ADMIN — INSULIN GLARGINE 60 UNIT(S): 100 INJECTION, SOLUTION SUBCUTANEOUS at 20:41

## 2020-04-20 RX ADMIN — Medication 100 MILLIGRAM(S): at 13:35

## 2020-04-20 RX ADMIN — MEROPENEM 100 MILLIGRAM(S): 1 INJECTION INTRAVENOUS at 17:41

## 2020-04-20 RX ADMIN — Medication 25 MILLIGRAM(S): at 13:23

## 2020-04-20 RX ADMIN — CLOPIDOGREL BISULFATE 75 MILLIGRAM(S): 75 TABLET, FILM COATED ORAL at 12:42

## 2020-04-20 RX ADMIN — Medication 81 MILLIGRAM(S): at 12:42

## 2020-04-20 RX ADMIN — Medication 100 MILLIGRAM(S): at 20:47

## 2020-04-20 RX ADMIN — HEPARIN SODIUM 5000 UNIT(S): 5000 INJECTION INTRAVENOUS; SUBCUTANEOUS at 12:42

## 2020-04-20 RX ADMIN — SENNA PLUS 2 TABLET(S): 8.6 TABLET ORAL at 00:05

## 2020-04-20 RX ADMIN — SENNA PLUS 2 TABLET(S): 8.6 TABLET ORAL at 20:41

## 2020-04-20 RX ADMIN — Medication 25 MILLIGRAM(S): at 22:15

## 2020-04-20 RX ADMIN — Medication 100 MILLIGRAM(S): at 05:18

## 2020-04-20 RX ADMIN — ATORVASTATIN CALCIUM 80 MILLIGRAM(S): 80 TABLET, FILM COATED ORAL at 20:39

## 2020-04-20 RX ADMIN — Medication 26 UNIT(S): at 17:03

## 2020-04-20 RX ADMIN — MEROPENEM 100 MILLIGRAM(S): 1 INJECTION INTRAVENOUS at 05:18

## 2020-04-20 RX ADMIN — CARVEDILOL PHOSPHATE 25 MILLIGRAM(S): 80 CAPSULE, EXTENDED RELEASE ORAL at 17:04

## 2020-04-20 RX ADMIN — Medication 1 TABLET(S): at 16:56

## 2020-04-20 RX ADMIN — HEPARIN SODIUM 5000 UNIT(S): 5000 INJECTION INTRAVENOUS; SUBCUTANEOUS at 20:41

## 2020-04-20 RX ADMIN — HEPARIN SODIUM 5000 UNIT(S): 5000 INJECTION INTRAVENOUS; SUBCUTANEOUS at 05:19

## 2020-04-20 RX ADMIN — Medication 1 MILLIGRAM(S): at 12:42

## 2020-04-20 RX ADMIN — ATORVASTATIN CALCIUM 80 MILLIGRAM(S): 80 TABLET, FILM COATED ORAL at 00:05

## 2020-04-20 RX ADMIN — PANTOPRAZOLE SODIUM 40 MILLIGRAM(S): 20 TABLET, DELAYED RELEASE ORAL at 05:20

## 2020-04-20 RX ADMIN — SIMETHICONE 80 MILLIGRAM(S): 80 TABLET, CHEWABLE ORAL at 00:14

## 2020-04-20 RX ADMIN — Medication 1 TABLET(S): at 05:20

## 2020-04-20 RX ADMIN — Medication 0.6 MILLIGRAM(S): at 12:42

## 2020-04-20 RX ADMIN — FINASTERIDE 5 MILLIGRAM(S): 5 TABLET, FILM COATED ORAL at 12:42

## 2020-04-20 RX ADMIN — SIMETHICONE 80 MILLIGRAM(S): 80 TABLET, CHEWABLE ORAL at 05:21

## 2020-04-20 RX ADMIN — Medication 30 UNIT(S): at 08:06

## 2020-04-20 NOTE — PROGRESS NOTE ADULT - ASSESSMENT
Improved MARSHALL on CKD stage III - baseline creat usually 2.2 - 2.5 due to DM/HTN   Follows w/ me  ---> Renal function now back to baseline   Has h/o CAD, S/P MI, CABG, ICM with AICD  Renal sono noted no hydronephrosis , recent CT normal kidneys   Some edema   Added Torsemide 10 mg daily   Uric acid 7.2     s/p OR on 4/10- Debridement, external genitalia, perineum  I&D abscess   L groin hematoma/ cellulitis on abx/ Leukocytosis  S/P R elbow drainage   Abx as per ID     Anemia - S/P transfusion   Vasular follow up noted   Added Epogen and folate     Hyperkalemia , Improved   Pt on Digoxin ----> level OK   ? Reabsorption of hematoma  Follow labs

## 2020-04-20 NOTE — PROGRESS NOTE ADULT - SUBJECTIVE AND OBJECTIVE BOX
[facility] - [facility], [facility]                                   [address] [place] [address]                                                      PHONE: [phone]                                                         FAX: [phone]  [email or web address]  ---------------------------------------------------------------------------------------------------------------------------------    Overnight events/patient complaints: no chest pain, groin pain improving, vac off , wet to dry in place, tele no sig VT appropriate pacing      No Known Allergies    MEDICATIONS  (STANDING):  aspirin enteric coated 81 milliGRAM(s) Oral daily  atorvastatin 80 milliGRAM(s) Oral at bedtime  carvedilol 25 milliGRAM(s) Oral every 12 hours  clindamycin IVPB 600 milliGRAM(s) IV Intermittent every 8 hours  clopidogrel Tablet 75 milliGRAM(s) Oral daily  colchicine 0.6 milliGRAM(s) Oral daily  digoxin     Tablet 0.125 milliGRAM(s) Oral daily  epoetin-adrian-epbx (RETACRIT) Injectable 67022 Unit(s) SubCutaneous every 7 days  finasteride 5 milliGRAM(s) Oral daily  folic acid 1 milliGRAM(s) Oral daily  heparin  Injectable 5000 Unit(s) SubCutaneous every 8 hours  insulin glargine Injectable (LANTUS) 60 Unit(s) SubCutaneous at bedtime  insulin lispro (HumaLOG) corrective regimen sliding scale   SubCutaneous at bedtime  insulin lispro Injectable (HumaLOG) 30 Unit(s) SubCutaneous three times a day before meals  lactobacillus acidophilus 1 Tablet(s) Oral three times a day with meals  meropenem  IVPB 500 milliGRAM(s) IV Intermittent every 12 hours  pantoprazole    Tablet 40 milliGRAM(s) Oral before breakfast  senna 2 Tablet(s) Oral at bedtime  simethicone 80 milliGRAM(s) Chew every 6 hours  sodium chloride 0.9%. 1000 milliLiter(s) (50 mL/Hr) IV Continuous <Continuous>    MEDICATIONS  (PRN):  acetaminophen   Tablet .. 650 milliGRAM(s) Oral every 6 hours PRN Temp greater or equal to 38C (100.4F)  dextrose 40% Gel 15 Gram(s) Oral once PRN Blood Glucose LESS THAN 70 milliGRAM(s)/deciLiter  fentaNYL    Injectable 25 MICROGram(s) IV Push every 5 minutes PRN Moderate Pain (4 - 6)  fentaNYL    Injectable 50 MICROGram(s) IV Push every 5 minutes PRN Severe Pain (7 - 10)  ondansetron Injectable 4 milliGRAM(s) IV Push every 6 hours PRN Nausea and/or Vomiting      Vital Signs Last 24 Hrs  T(C): 36.7 (20 Apr 2020 08:00), Max: 36.9 (19 Apr 2020 22:59)  T(F): 98.1 (20 Apr 2020 08:00), Max: 98.4 (19 Apr 2020 22:59)  HR: 73 (20 Apr 2020 08:00) (66 - 73)  BP: 156/92 (20 Apr 2020 08:00) (156/92 - 180/72)  BP(mean): --  RR: 17 (20 Apr 2020 08:00) (16 - 17)  SpO2: 99% (20 Apr 2020 08:00) (99% - 100%)  Daily     Daily   ICU Vital Signs Last 24 Hrs  MARIA ROSS  I&O's Detail    19 Apr 2020 07:01  -  20 Apr 2020 07:00  --------------------------------------------------------  IN:    sodium chloride 0.9%.: 400 mL    Solution: 100 mL    Solution: 50 mL  Total IN: 550 mL    OUT:    Indwelling Catheter - Urethral: 1400 mL  Total OUT: 1400 mL    Total NET: -850 mL        I&O's Summary    19 Apr 2020 07:01  -  20 Apr 2020 07:00  --------------------------------------------------------  IN: 550 mL / OUT: 1400 mL / NET: -850 mL      Drug Dosing Weight  MARIA ROSS      PHYSICAL EXAM:  General: Appears well developed, well nourished alert and cooperative.  HEENT: Head; normocephalic, atraumatic.  Eyes: Pupils reactive, cornea wnl.  Neck: Supple, no nodes adenopathy, no NVD or carotid bruit or thyromegaly.  CARDIOVASCULAR: Normal S1 and S2, No murmur, rub, gallop or lift.   LUNGS: No rales, rhonchi or wheeze. Normal breath sounds bilaterally.  ABDOMEN: Soft, nontender without mass or organomegaly. bowel sounds normoactive.  EXTREMITIES: No clubbing, cyanosis or edema. Distal pulses wnl. 1+Dp pulses  SKIN: warm and dry with normal turgor.  NEURO: Alert/oriented x 3/normal motor exam. No pathologic reflexes.    PSYCH: normal affect.        LABS:                        9.2    10.15 )-----------( 414      ( 20 Apr 2020 06:44 )             28.6     04-20    137  |  102  |  55.0<H>  ----------------------------<  123<H>  5.1   |  24.0  |  2.45<H>    Ca    7.5<L>      20 Apr 2020 06:44  Phos  3.1     04-20  Mg     1.9     04-20      MARIA RIVERAAAN            RADIOLOGY & ADDITIONAL STUDIES:    INTERPRETATION OF TELEMETRY (personally reviewed):    ECG:< from: 12 Lead ECG (04.09.20 @ 12:52) >  Diagnosis Line Normal sinus rhythm  Possible Left atrial enlargement  Septal infarct , age undetermined  T wave abnormality, consider inferior ischemia    Confirmed by NICHOLAS REYNA ([id number]) on 4/10/2020 9:57:29 AM    < end of copied text >

## 2020-04-20 NOTE — CHART NOTE - NSCHARTNOTEFT_GEN_A_CORE
Endocrinology Note:    Chart reviewed.  Patient became hypoglycemic today after receiving dose of Humalog 30 units with breakfast.   Dose of premeal Humalog has since been decreased to 26 units.    MEDICATIONS  (STANDING):  aspirin enteric coated 81 milliGRAM(s) Oral daily  atorvastatin 80 milliGRAM(s) Oral at bedtime  carvedilol 25 milliGRAM(s) Oral every 12 hours  clindamycin IVPB 600 milliGRAM(s) IV Intermittent every 8 hours  clopidogrel Tablet 75 milliGRAM(s) Oral daily  colchicine 0.6 milliGRAM(s) Oral daily  digoxin     Tablet 0.125 milliGRAM(s) Oral daily  epoetin-adrian-epbx (RETACRIT) Injectable 59619 Unit(s) SubCutaneous every 7 days  finasteride 5 milliGRAM(s) Oral daily  folic acid 1 milliGRAM(s) Oral daily  heparin  Injectable 5000 Unit(s) SubCutaneous every 8 hours  insulin glargine Injectable (LANTUS) 60 Unit(s) SubCutaneous at bedtime  insulin lispro (HumaLOG) corrective regimen sliding scale   SubCutaneous at bedtime  insulin lispro Injectable (HumaLOG) 26 Unit(s) SubCutaneous three times a day before meals  lactobacillus acidophilus 1 Tablet(s) Oral three times a day with meals  meropenem  IVPB 500 milliGRAM(s) IV Intermittent every 12 hours  pantoprazole    Tablet 40 milliGRAM(s) Oral before breakfast  senna 2 Tablet(s) Oral at bedtime  simethicone 80 milliGRAM(s) Chew every 6 hours  sodium chloride 0.9%. 1000 milliLiter(s) (50 mL/Hr) IV Continuous <Continuous>    Impression:    64 year old male with Type 2 DM with associated nephropathy, CAD s/p PCI, ischemic CM, HTN admitted with necrotizing fasciitis s/p debridement.  Glycemic control has been improving on basal bolus therapy, but patient became hypoglycemic today.     Agree with dose reduction in humalog.  Will follow glucose over next 24 hours and make further recs based on glycemic trend.

## 2020-04-20 NOTE — PROGRESS NOTE ADULT - SUBJECTIVE AND OBJECTIVE BOX
NEPHROLOGY INTERVAL HPI/OVERNIGHT EVENTS:    Interim noted   Cardio and Vascular follow up noted   Remains w/ fernandez   + Torsemide   Hgb stable this am   UO 1.4 L     MEDICATIONS  (STANDING):  aspirin enteric coated 81 milliGRAM(s) Oral daily  atorvastatin 80 milliGRAM(s) Oral at bedtime  carvedilol 25 milliGRAM(s) Oral every 12 hours  clindamycin IVPB 600 milliGRAM(s) IV Intermittent every 8 hours  clopidogrel Tablet 75 milliGRAM(s) Oral daily  colchicine 0.6 milliGRAM(s) Oral daily  digoxin     Tablet 0.125 milliGRAM(s) Oral daily  epoetin-adrian-epbx (RETACRIT) Injectable 91751 Unit(s) SubCutaneous every 7 days  finasteride 5 milliGRAM(s) Oral daily  folic acid 1 milliGRAM(s) Oral daily  heparin  Injectable 5000 Unit(s) SubCutaneous every 8 hours  insulin glargine Injectable (LANTUS) 60 Unit(s) SubCutaneous at bedtime  insulin lispro (HumaLOG) corrective regimen sliding scale   SubCutaneous at bedtime  insulin lispro Injectable (HumaLOG) 26 Unit(s) SubCutaneous three times a day before meals  lactobacillus acidophilus 1 Tablet(s) Oral three times a day with meals  meropenem  IVPB 500 milliGRAM(s) IV Intermittent every 12 hours  pantoprazole    Tablet 40 milliGRAM(s) Oral before breakfast  senna 2 Tablet(s) Oral at bedtime  simethicone 80 milliGRAM(s) Chew every 6 hours  sodium chloride 0.9%. 1000 milliLiter(s) (50 mL/Hr) IV Continuous <Continuous>    MEDICATIONS  (PRN):  acetaminophen   Tablet .. 650 milliGRAM(s) Oral every 6 hours PRN Temp greater or equal to 38C (100.4F)  dextrose 40% Gel 15 Gram(s) Oral once PRN Blood Glucose LESS THAN 70 milliGRAM(s)/deciLiter  diphenhydrAMINE 25 milliGRAM(s) Oral every 4 hours PRN Rash and/or Itching  fentaNYL    Injectable 25 MICROGram(s) IV Push every 5 minutes PRN Moderate Pain (4 - 6)  fentaNYL    Injectable 50 MICROGram(s) IV Push every 5 minutes PRN Severe Pain (7 - 10)  ondansetron Injectable 4 milliGRAM(s) IV Push every 6 hours PRN Nausea and/or Vomiting      Allergies    No Known Allergies    Intolerances        Vital Signs Last 24 Hrs  T(C): 36.7 (20 Apr 2020 08:00), Max: 36.9 (19 Apr 2020 22:59)  T(F): 98.1 (20 Apr 2020 08:00), Max: 98.4 (19 Apr 2020 22:59)  HR: 73 (20 Apr 2020 08:00) (66 - 73)  BP: 156/92 (20 Apr 2020 08:00) (156/92 - 180/72)  BP(mean): --  RR: 17 (20 Apr 2020 08:00) (16 - 17)  SpO2: 99% (20 Apr 2020 08:00) (99% - 100%)  Daily     Daily   I&O's Detail    19 Apr 2020 07:01  -  20 Apr 2020 07:00  --------------------------------------------------------  IN:    sodium chloride 0.9%.: 400 mL    Solution: 50 mL    Solution: 100 mL  Total IN: 550 mL    OUT:    Indwelling Catheter - Urethral: 1400 mL  Total OUT: 1400 mL    Total NET: -850 mL      20 Apr 2020 07:01  -  20 Apr 2020 14:07  --------------------------------------------------------  IN:    Solution: 50 mL  Total IN: 50 mL    OUT:    Indwelling Catheter - Urethral: 500 mL  Total OUT: 500 mL    Total NET: -450 mL        I&O's Summary    19 Apr 2020 07:01  -  20 Apr 2020 07:00  --------------------------------------------------------  IN: 550 mL / OUT: 1400 mL / NET: -850 mL    20 Apr 2020 07:01  -  20 Apr 2020 14:07  --------------------------------------------------------  IN: 50 mL / OUT: 500 mL / NET: -450 mL        PHYSICAL EXAM:  GENERAL: appears chronically ill  NECK: Supple, neck  veins full  NERVOUS SYSTEM:  Alert & Oriented X3  CHEST/LUNG: Clear / EAE   HEART: Regular rate and rhythm  ABDOMEN: Soft, Nontender, Nondistended; Bowel sounds present  EXT - min edema ,L inguinal area dressed , R elbow dressed   + fernandez catheter       LABS:                        9.2    10.15 )-----------( 414      ( 20 Apr 2020 06:44 )             28.6     04-20    137  |  102  |  55.0<H>  ----------------------------<  123<H>  5.1   |  24.0  |  2.45<H>    Ca    7.5<L>      20 Apr 2020 06:44  Phos  3.1     04-20  Mg     1.9     04-20          Magnesium, Serum: 1.9 mg/dL (04-20 @ 06:44)  Phosphorus Level, Serum: 3.1 mg/dL (04-20 @ 06:44)          RADIOLOGY & ADDITIONAL TESTS:

## 2020-04-20 NOTE — PROGRESS NOTE ADULT - SUBJECTIVE AND OBJECTIVE BOX
MARIA ROMANON    [id number]    History:  The patient is status post extensive debridements on 4/10 and 4/15 of left groin due to necrotizing fasciitis. Patient is currently doing well. The patient's pain is controlled using the prescribed pain medications. Denies nausea, vomiting, chest pain, shortness of breath, abdominal pain or fever.  No reports of hemorrhage at surgical site.    Vital Signs Last 24 Hrs  T(C): 36.7 (20 Apr 2020 08:00), Max: 36.9 (19 Apr 2020 22:59)  T(F): 98.1 (20 Apr 2020 08:00), Max: 98.4 (19 Apr 2020 22:59)  HR: 73 (20 Apr 2020 08:00) (66 - 73)  BP: 156/92 (20 Apr 2020 08:00) (156/92 - 180/72)  BP(mean): --  RR: 17 (20 Apr 2020 08:00) (16 - 17)  SpO2: 99% (20 Apr 2020 08:00) (99% - 100%)  I&O's Summary    19 Apr 2020 07:01  -  20 Apr 2020 07:00  --------------------------------------------------------  IN: 550 mL / OUT: 1400 mL / NET: -850 mL                              9.2    10.15 )-----------( 414      ( 20 Apr 2020 06:44 )             28.6     04-20    137  |  102  |  55.0<H>  ----------------------------<  123<H>  5.1   |  24.0  |  2.45<H>    Ca    7.5<L>      20 Apr 2020 06:44  Phos  3.1     04-20  Mg     1.9     04-20        MEDICATIONS  (STANDING):  aspirin enteric coated 81 milliGRAM(s) Oral daily  atorvastatin 80 milliGRAM(s) Oral at bedtime  carvedilol 25 milliGRAM(s) Oral every 12 hours  clindamycin IVPB 600 milliGRAM(s) IV Intermittent every 8 hours  clopidogrel Tablet 75 milliGRAM(s) Oral daily  colchicine 0.6 milliGRAM(s) Oral daily  digoxin     Tablet 0.125 milliGRAM(s) Oral daily  epoetin-adrian-epbx (RETACRIT) Injectable 55734 Unit(s) SubCutaneous every 7 days  finasteride 5 milliGRAM(s) Oral daily  folic acid 1 milliGRAM(s) Oral daily  heparin  Injectable 5000 Unit(s) SubCutaneous every 8 hours  insulin glargine Injectable (LANTUS) 60 Unit(s) SubCutaneous at bedtime  insulin lispro (HumaLOG) corrective regimen sliding scale   SubCutaneous at bedtime  insulin lispro Injectable (HumaLOG) 30 Unit(s) SubCutaneous three times a day before meals  lactobacillus acidophilus 1 Tablet(s) Oral three times a day with meals  meropenem  IVPB 500 milliGRAM(s) IV Intermittent every 12 hours  pantoprazole    Tablet 40 milliGRAM(s) Oral before breakfast  senna 2 Tablet(s) Oral at bedtime  simethicone 80 milliGRAM(s) Chew every 6 hours  sodium chloride 0.9%. 1000 milliLiter(s) (50 mL/Hr) IV Continuous <Continuous>    MEDICATIONS  (PRN):  acetaminophen   Tablet .. 650 milliGRAM(s) Oral every 6 hours PRN Temp greater or equal to 38C (100.4F)  dextrose 40% Gel 15 Gram(s) Oral once PRN Blood Glucose LESS THAN 70 milliGRAM(s)/deciLiter  fentaNYL    Injectable 25 MICROGram(s) IV Push every 5 minutes PRN Moderate Pain (4 - 6)  fentaNYL    Injectable 50 MICROGram(s) IV Push every 5 minutes PRN Severe Pain (7 - 10)  ondansetron Injectable 4 milliGRAM(s) IV Push every 6 hours PRN Nausea and/or Vomiting      Physical exam:     Alert and oriented   No distress  Non labored breathing on room air  obese abdomen  clean dressing to the left groin region  left thigh is soft and compressible, ecchymosis distal/medial  + pedals bilateral  right elbow with clean dressing in place  The dressing is clean, dry and intact.      Primary Assessment:  • s/p serial debridements related to Necrotizing fasciitis  Right elbow pain, s/p washout, related to gout       •   Plan:   • DVT prophylaxis as prescribed, including use of compression devices and ankle pumps  • Needs physical therapy  • Right upper ext, wbat  • Pain control as clinically indicated  • Continue IV abx  - Dr. Johnson (plastics) consulted to help with expediting wound closure   - Dressings to be changed later today

## 2020-04-20 NOTE — PROGRESS NOTE ADULT - ASSESSMENT
Assessment  Infected hematoma post cath s/p debridement with open wound  gouty arthritis on colchicine  No evidence of vascular injury -Mynx closure device in place  Recent PCI on DAPT asx without angina nor CHF  CRI stable  Ischemic CM with improved Ef 50% on meds  ICD in place    Rec  plastic surgery to evaluate groin as per vascular  cont colchicine and IV AB as per ID  cont coreg, DAPT statin and torsemide  Once groin stable for dc pt will likely need acute rehab  PT consult in house

## 2020-04-21 LAB
ANION GAP SERPL CALC-SCNC: 14 MMOL/L — SIGNIFICANT CHANGE UP (ref 5–17)
BLD GP AB SCN SERPL QL: SIGNIFICANT CHANGE UP
BUN SERPL-MCNC: 52 MG/DL — HIGH (ref 8–20)
CALCIUM SERPL-MCNC: 7.6 MG/DL — LOW (ref 8.6–10.2)
CHLORIDE SERPL-SCNC: 105 MMOL/L — SIGNIFICANT CHANGE UP (ref 98–107)
CO2 SERPL-SCNC: 21 MMOL/L — LOW (ref 22–29)
CREAT SERPL-MCNC: 2.62 MG/DL — HIGH (ref 0.5–1.3)
GLUCOSE BLDC GLUCOMTR-MCNC: 137 MG/DL — HIGH (ref 70–99)
GLUCOSE BLDC GLUCOMTR-MCNC: 72 MG/DL — SIGNIFICANT CHANGE UP (ref 70–99)
GLUCOSE BLDC GLUCOMTR-MCNC: 89 MG/DL — SIGNIFICANT CHANGE UP (ref 70–99)
GLUCOSE SERPL-MCNC: 91 MG/DL — SIGNIFICANT CHANGE UP (ref 70–99)
HCT VFR BLD CALC: 23.2 % — LOW (ref 39–50)
HGB BLD-MCNC: 7.6 G/DL — LOW (ref 13–17)
MAGNESIUM SERPL-MCNC: 1.9 MG/DL — SIGNIFICANT CHANGE UP (ref 1.6–2.6)
MCHC RBC-ENTMCNC: 29.9 PG — SIGNIFICANT CHANGE UP (ref 27–34)
MCHC RBC-ENTMCNC: 32.8 GM/DL — SIGNIFICANT CHANGE UP (ref 32–36)
MCV RBC AUTO: 91.3 FL — SIGNIFICANT CHANGE UP (ref 80–100)
PLATELET # BLD AUTO: 386 K/UL — SIGNIFICANT CHANGE UP (ref 150–400)
POTASSIUM SERPL-MCNC: 4.8 MMOL/L — SIGNIFICANT CHANGE UP (ref 3.5–5.3)
POTASSIUM SERPL-SCNC: 4.8 MMOL/L — SIGNIFICANT CHANGE UP (ref 3.5–5.3)
RBC # BLD: 2.54 M/UL — LOW (ref 4.2–5.8)
RBC # FLD: 15.4 % — HIGH (ref 10.3–14.5)
SODIUM SERPL-SCNC: 139 MMOL/L — SIGNIFICANT CHANGE UP (ref 135–145)
WBC # BLD: 8.63 K/UL — SIGNIFICANT CHANGE UP (ref 3.8–10.5)
WBC # FLD AUTO: 8.63 K/UL — SIGNIFICANT CHANGE UP (ref 3.8–10.5)

## 2020-04-21 PROCEDURE — 99232 SBSQ HOSP IP/OBS MODERATE 35: CPT

## 2020-04-21 RX ORDER — INSULIN LISPRO 100/ML
20 VIAL (ML) SUBCUTANEOUS
Refills: 0 | Status: DISCONTINUED | OUTPATIENT
Start: 2020-04-21 | End: 2020-04-24

## 2020-04-21 RX ORDER — INSULIN GLARGINE 100 [IU]/ML
40 INJECTION, SOLUTION SUBCUTANEOUS AT BEDTIME
Refills: 0 | Status: DISCONTINUED | OUTPATIENT
Start: 2020-04-21 | End: 2020-04-23

## 2020-04-21 RX ADMIN — HEPARIN SODIUM 5000 UNIT(S): 5000 INJECTION INTRAVENOUS; SUBCUTANEOUS at 20:20

## 2020-04-21 RX ADMIN — PANTOPRAZOLE SODIUM 40 MILLIGRAM(S): 20 TABLET, DELAYED RELEASE ORAL at 06:01

## 2020-04-21 RX ADMIN — Medication 1 TABLET(S): at 15:28

## 2020-04-21 RX ADMIN — SENNA PLUS 2 TABLET(S): 8.6 TABLET ORAL at 21:01

## 2020-04-21 RX ADMIN — CLOPIDOGREL BISULFATE 75 MILLIGRAM(S): 75 TABLET, FILM COATED ORAL at 11:58

## 2020-04-21 RX ADMIN — ATORVASTATIN CALCIUM 80 MILLIGRAM(S): 80 TABLET, FILM COATED ORAL at 20:17

## 2020-04-21 RX ADMIN — Medication 100 MILLIGRAM(S): at 05:30

## 2020-04-21 RX ADMIN — Medication 0.12 MILLIGRAM(S): at 06:01

## 2020-04-21 RX ADMIN — SIMETHICONE 80 MILLIGRAM(S): 80 TABLET, CHEWABLE ORAL at 20:21

## 2020-04-21 RX ADMIN — Medication 100 MILLIGRAM(S): at 13:36

## 2020-04-21 RX ADMIN — CARVEDILOL PHOSPHATE 25 MILLIGRAM(S): 80 CAPSULE, EXTENDED RELEASE ORAL at 06:01

## 2020-04-21 RX ADMIN — Medication 0.6 MILLIGRAM(S): at 11:58

## 2020-04-21 RX ADMIN — HEPARIN SODIUM 5000 UNIT(S): 5000 INJECTION INTRAVENOUS; SUBCUTANEOUS at 13:37

## 2020-04-21 RX ADMIN — INSULIN GLARGINE 40 UNIT(S): 100 INJECTION, SOLUTION SUBCUTANEOUS at 20:20

## 2020-04-21 RX ADMIN — Medication 1 TABLET(S): at 07:47

## 2020-04-21 RX ADMIN — Medication 81 MILLIGRAM(S): at 11:59

## 2020-04-21 RX ADMIN — Medication 100 MILLIGRAM(S): at 20:17

## 2020-04-21 RX ADMIN — SIMETHICONE 80 MILLIGRAM(S): 80 TABLET, CHEWABLE ORAL at 16:49

## 2020-04-21 RX ADMIN — Medication 1 TABLET(S): at 11:58

## 2020-04-21 RX ADMIN — Medication 1 MILLIGRAM(S): at 11:58

## 2020-04-21 RX ADMIN — SIMETHICONE 80 MILLIGRAM(S): 80 TABLET, CHEWABLE ORAL at 11:58

## 2020-04-21 RX ADMIN — MEROPENEM 100 MILLIGRAM(S): 1 INJECTION INTRAVENOUS at 06:00

## 2020-04-21 RX ADMIN — SIMETHICONE 80 MILLIGRAM(S): 80 TABLET, CHEWABLE ORAL at 06:01

## 2020-04-21 RX ADMIN — FINASTERIDE 5 MILLIGRAM(S): 5 TABLET, FILM COATED ORAL at 11:58

## 2020-04-21 RX ADMIN — CARVEDILOL PHOSPHATE 25 MILLIGRAM(S): 80 CAPSULE, EXTENDED RELEASE ORAL at 16:49

## 2020-04-21 RX ADMIN — MEROPENEM 100 MILLIGRAM(S): 1 INJECTION INTRAVENOUS at 17:03

## 2020-04-21 RX ADMIN — Medication 20 UNIT(S): at 15:29

## 2020-04-21 RX ADMIN — HEPARIN SODIUM 5000 UNIT(S): 5000 INJECTION INTRAVENOUS; SUBCUTANEOUS at 06:02

## 2020-04-21 RX ADMIN — Medication 26 UNIT(S): at 07:47

## 2020-04-21 NOTE — CHART NOTE - NSCHARTNOTEFT_GEN_A_CORE
Source: Patient [ ]  Family [ ]   other [x ]    Current Diet: Diet, Consistent Carbohydrate Renal w/Evening Snack:   Supplement Feeding Modality:  Oral  Glucerna Shake Cans or Servings Per Day:  1       Frequency:  Three Times a day (04-11-20 @ 15:32)    PO intake: No recent po intake documented, pt consumed 80% of meal on 4/9    Current Weight:   (4/10) 190#  -Obtain current wt, continue to monitor. No edema noted.     Pertinent Medications: MEDICATIONS  (STANDING):  aspirin enteric coated 81 milliGRAM(s) Oral daily  atorvastatin 80 milliGRAM(s) Oral at bedtime  carvedilol 25 milliGRAM(s) Oral every 12 hours  clindamycin IVPB 600 milliGRAM(s) IV Intermittent every 8 hours  clopidogrel Tablet 75 milliGRAM(s) Oral daily  colchicine 0.6 milliGRAM(s) Oral daily  digoxin     Tablet 0.125 milliGRAM(s) Oral daily  epoetin-adrian-epbx (RETACRIT) Injectable 27895 Unit(s) SubCutaneous every 7 days  finasteride 5 milliGRAM(s) Oral daily  folic acid 1 milliGRAM(s) Oral daily  heparin  Injectable 5000 Unit(s) SubCutaneous every 8 hours  insulin glargine Injectable (LANTUS) 60 Unit(s) SubCutaneous at bedtime  insulin lispro (HumaLOG) corrective regimen sliding scale   SubCutaneous at bedtime  insulin lispro Injectable (HumaLOG) 26 Unit(s) SubCutaneous three times a day before meals  lactobacillus acidophilus 1 Tablet(s) Oral three times a day with meals  meropenem  IVPB 500 milliGRAM(s) IV Intermittent every 12 hours  pantoprazole    Tablet 40 milliGRAM(s) Oral before breakfast  senna 2 Tablet(s) Oral at bedtime  simethicone 80 milliGRAM(s) Chew every 6 hours  sodium chloride 0.9%. 1000 milliLiter(s) (50 mL/Hr) IV Continuous <Continuous>    MEDICATIONS  (PRN):  acetaminophen   Tablet .. 650 milliGRAM(s) Oral every 6 hours PRN Temp greater or equal to 38C (100.4F)  dextrose 40% Gel 15 Gram(s) Oral once PRN Blood Glucose LESS THAN 70 milliGRAM(s)/deciLiter  diphenhydrAMINE 25 milliGRAM(s) Oral every 4 hours PRN Rash and/or Itching  fentaNYL    Injectable 25 MICROGram(s) IV Push every 5 minutes PRN Moderate Pain (4 - 6)  fentaNYL    Injectable 50 MICROGram(s) IV Push every 5 minutes PRN Severe Pain (7 - 10)  ondansetron Injectable 4 milliGRAM(s) IV Push every 6 hours PRN Nausea and/or Vomiting    Pertinent Labs: CBC Full  -  ( 21 Apr 2020 06:33 )  WBC Count : 8.63 K/uL  RBC Count : 2.54 M/uL  Hemoglobin : 7.6 g/dL  Hematocrit : 23.2 %  Platelet Count - Automated : 386 K/uL  Mean Cell Volume : 91.3 fl  Mean Cell Hemoglobin : 29.9 pg  Mean Cell Hemoglobin Concentration : 32.8 gm/dL  Auto Neutrophil # : x  Auto Lymphocyte # : x  Auto Monocyte # : x  Auto Eosinophil # : x  Auto Basophil # : x  Auto Neutrophil % : x  Auto Lymphocyte % : x  Auto Monocyte % : x  Auto Eosinophil % : x  Auto Basophil % : x  04-21 Na139 mmol/L Glu 91 mg/dL K+ 4.8 mmol/L Cr  2.62 mg/dL<H> BUN 52.0 mg/dL<H> Phos n/a   Alb n/a   PAB n/a       Skin: Left groin wound dressing in place, right elbow with clean dressing in place per documentation    Nutrition focused physical exam not conducted - found signs of malnutrition [ ]absent [ ]present    Subcutaneous fat loss: [ ] Orbital fat pads region, [ ]Buccal fat region, [ ]Triceps region,  [ ]Ribs region    Muscle wasting: [ ]Temples region, [ ]Clavicle region, [ ]Shoulder region, [ ]Scapula region, [ ]Interosseous region,  [ ]thigh region, [ ]Calf region    Estimated Needs:   [ x] no change since previous assessment  [ ] recalculated:     Current Nutrition Diagnosis: Pt remains at high nutrition risk secondary to Altered Nutrition Related Lab Values related to uncontrolled DM as evidenced by HgbA1c 11.5%. Per attending chart note 4/20 "Glycemic control has been improving on basal bolus therapy, but patient became hypoglycemic today." The patient is s/p extensive debridements on 4/10 and 4/15 of left groin due to necrotizing fasciitis. Last documented BM 4/7. No recent po intake documented. RD to remain available.     Recommendations:   1) Continue glucerna TID to optimize po intake and provide an additional 220kcal, 10g protein per serving   2) RX: MVI and Vitamin C   3) Continue bowel regimen prn  4) Encourage po intake and HBV protein  5) Monitor wts and labs    Monitoring and Evaluation:   [x ] PO intake [x ] Tolerance to diet prescription [X] Weights  [X] Follow up per protocol [X] Labs:

## 2020-04-21 NOTE — PROGRESS NOTE ADULT - ASSESSMENT
64 year old male with Type 2 DM with associated nephropathy, CAD s/p PCI, ischemic CM, HTN admitted with necrotizing fasciitis s/p debridement.  Will be going to OR in AM for further debridement and wound vac placement.     1.  Type 2 DM-  due to low BG today, will reduce premeal humalog to 20 units.  Since patient is NPO tonight, will lower Lantus to 40 units.  Will follow BG and adjust further after surgery.  2.  Necrotizing fasciitis-  abx and surgical management as per surgical team.

## 2020-04-21 NOTE — PROGRESS NOTE ADULT - SUBJECTIVE AND OBJECTIVE BOX
Brief PA note    patient seen and examined    rash improved     Dressing change performed    No active hemorrhage     cardiology's imput appreciated    - Will transfuse 2 units prbc today'  - continue iv abx  - plan for OR tomorrow for wound redebridement and vac application

## 2020-04-21 NOTE — PROGRESS NOTE ADULT - SUBJECTIVE AND OBJECTIVE BOX
[facility]  INFECTIOUS DISEASES AND INTERNAL MEDICINE at [place]  =======================================================  Noe Navarro MD  Diplomates American Board of Internal Medicine and Infectious Diseases  Tel: [phone]      Fax: [phone]  =======================================================    CODY MARIA [id number]    Follow up: left groin infection  dressings changed  has some pain in right elbow    Allergies:  No Known Allergies      Medications:  acetaminophen   Tablet .. 650 milliGRAM(s) Oral every 6 hours PRN  aspirin enteric coated 81 milliGRAM(s) Oral daily  atorvastatin 80 milliGRAM(s) Oral at bedtime  carvedilol 25 milliGRAM(s) Oral every 12 hours  clindamycin IVPB 600 milliGRAM(s) IV Intermittent every 8 hours  clopidogrel Tablet 75 milliGRAM(s) Oral daily  colchicine 0.6 milliGRAM(s) Oral daily  dextrose 40% Gel 15 Gram(s) Oral once PRN  digoxin     Tablet 0.125 milliGRAM(s) Oral daily  diphenhydrAMINE 25 milliGRAM(s) Oral every 4 hours PRN  epoetin-adrian-epbx (RETACRIT) Injectable 99349 Unit(s) SubCutaneous every 7 days  fentaNYL    Injectable 25 MICROGram(s) IV Push every 5 minutes PRN  fentaNYL    Injectable 50 MICROGram(s) IV Push every 5 minutes PRN  finasteride 5 milliGRAM(s) Oral daily  folic acid 1 milliGRAM(s) Oral daily  heparin  Injectable 5000 Unit(s) SubCutaneous every 8 hours  insulin glargine Injectable (LANTUS) 60 Unit(s) SubCutaneous at bedtime  insulin lispro (HumaLOG) corrective regimen sliding scale   SubCutaneous at bedtime  insulin lispro Injectable (HumaLOG) 26 Unit(s) SubCutaneous three times a day before meals  lactobacillus acidophilus 1 Tablet(s) Oral three times a day with meals  meropenem  IVPB 500 milliGRAM(s) IV Intermittent every 12 hours  ondansetron Injectable 4 milliGRAM(s) IV Push every 6 hours PRN  pantoprazole    Tablet 40 milliGRAM(s) Oral before breakfast  senna 2 Tablet(s) Oral at bedtime  simethicone 80 milliGRAM(s) Chew every 6 hours  sodium chloride 0.9%. 1000 milliLiter(s) IV Continuous <Continuous>            REVIEW OF SYSTEMS:  CONSTITUTIONAL:  No Fever or chills  HEENT:   No diplopia or blurred vision.  No earache, sore throat or runny nose.  CARDIOVASCULAR:  No pressure, squeezing, strangling, tightness, heaviness or aching about the chest, neck, axilla or epigastrium.  RESPIRATORY:  No cough, shortness of breath  GASTROINTESTINAL:  No nausea, vomiting or diarrhea.  GENITOURINARY:  No dysuria, frequency or urgency. No Blood in urine  MUSCULOSKELETAL:  no joint aches, no muscle pain  SKIN:  No change in skin, hair or nails.  NEUROLOGIC:  No Headaches, seizures or weakness.  PSYCHIATRIC:  No disorder of thought or mood.  ENDOCRINE:  No heat or cold intolerance  HEMATOLOGICAL:  No easy bruising or bleeding.            Physical Exam:  ICU Vital Signs Last 24 Hrs  T(C): 36.7 (21 Apr 2020 12:10), Max: 37 (21 Apr 2020 07:35)  T(F): 98 (21 Apr 2020 12:10), Max: 98.6 (21 Apr 2020 07:35)  HR: 80 (21 Apr 2020 12:10) (75 - 80)  BP: 136/61 (21 Apr 2020 12:10) (136/61 - 158/68)  BP(mean): --  ABP: --  ABP(mean): --  RR: 14 (21 Apr 2020 05:45) (14 - 16)  SpO2: 97% (21 Apr 2020 07:35) (97% - 98%)      GEN: NAD, pleasant  HEENT: normocephalic and atraumatic. EOMI. PERRL.  Anicteric   NECK: Supple.   LUNGS: Clear to auscultation.  HEART: Regular rate and rhythm without murmur.  ABDOMEN: Soft, nontender, and nondistended.  Positive bowel sounds.    : No CVA tenderness left groin dressing intact  EXTREMITIES: Without any edema.  MSK: no joint swelling  NEUROLOGIC: Cranial nerves II through XII are grossly intact. No focal deficits  PSYCHIATRIC: Appropriate affect .  SKIN: No Rash right elbow dressing intact, limited ROM      Labs:

## 2020-04-21 NOTE — PROGRESS NOTE ADULT - ASSESSMENT
63 y/o male with extensive cardiac history, htn, DM came to the ER for left groin area infection which started 1 week ago after he had angiography through left groin area 2 weeks ago, got 3 stents ( done at [facility] by [facility] ). Pt stated that he has been on levaquin by his doctor but getting worse.       Left groin necrotizing soft tissue infectoin  Leukocytosis  MARSHALL on CKD  Right elbow gout  Anemia ? blood loss    - s/p left groin exploration washout and wide excisional debridement of necrotizing soft tissue infection on 4/10, redebridement 4/16  - s/p right elbow washout- cultures negative, MSU crystals noted  - Blood cultures NGTD  - OR cultures- staph lugdunensis, finegoldia magna  - WBC normal  - abx broadened to meropenem + clindamycin on 4/16/20  - plan for redebridement tomorrow  - Trend Fever  - Trend Leukocytosis   - PT    Will Follow

## 2020-04-21 NOTE — PROGRESS NOTE ADULT - SUBJECTIVE AND OBJECTIVE BOX
NEPHROLOGY INTERVAL HPI/OVERNIGHT EVENTS:    Feels better   Vascular to plan OR tomorrow   Transfusing 2 units of blood   No SOB or CP   Good UO on Torsemide   + fernandez     MEDICATIONS  (STANDING):  aspirin enteric coated 81 milliGRAM(s) Oral daily  atorvastatin 80 milliGRAM(s) Oral at bedtime  carvedilol 25 milliGRAM(s) Oral every 12 hours  clindamycin IVPB 600 milliGRAM(s) IV Intermittent every 8 hours  clopidogrel Tablet 75 milliGRAM(s) Oral daily  colchicine 0.6 milliGRAM(s) Oral daily  digoxin     Tablet 0.125 milliGRAM(s) Oral daily  epoetin-adrian-epbx (RETACRIT) Injectable 41219 Unit(s) SubCutaneous every 7 days  finasteride 5 milliGRAM(s) Oral daily  folic acid 1 milliGRAM(s) Oral daily  heparin  Injectable 5000 Unit(s) SubCutaneous every 8 hours  insulin glargine Injectable (LANTUS) 60 Unit(s) SubCutaneous at bedtime  insulin lispro (HumaLOG) corrective regimen sliding scale   SubCutaneous at bedtime  insulin lispro Injectable (HumaLOG) 26 Unit(s) SubCutaneous three times a day before meals  lactobacillus acidophilus 1 Tablet(s) Oral three times a day with meals  meropenem  IVPB 500 milliGRAM(s) IV Intermittent every 12 hours  pantoprazole    Tablet 40 milliGRAM(s) Oral before breakfast  senna 2 Tablet(s) Oral at bedtime  simethicone 80 milliGRAM(s) Chew every 6 hours  sodium chloride 0.9%. 1000 milliLiter(s) (50 mL/Hr) IV Continuous <Continuous>    MEDICATIONS  (PRN):  acetaminophen   Tablet .. 650 milliGRAM(s) Oral every 6 hours PRN Temp greater or equal to 38C (100.4F)  dextrose 40% Gel 15 Gram(s) Oral once PRN Blood Glucose LESS THAN 70 milliGRAM(s)/deciLiter  diphenhydrAMINE 25 milliGRAM(s) Oral every 4 hours PRN Rash and/or Itching  fentaNYL    Injectable 25 MICROGram(s) IV Push every 5 minutes PRN Moderate Pain (4 - 6)  fentaNYL    Injectable 50 MICROGram(s) IV Push every 5 minutes PRN Severe Pain (7 - 10)  ondansetron Injectable 4 milliGRAM(s) IV Push every 6 hours PRN Nausea and/or Vomiting      Allergies    No Known Allergies    Intolerances            Vital Signs Last 24 Hrs  T(C): 36.7 (21 Apr 2020 12:10), Max: 37 (21 Apr 2020 07:35)  T(F): 98 (21 Apr 2020 12:10), Max: 98.6 (21 Apr 2020 07:35)  HR: 80 (21 Apr 2020 12:10) (75 - 80)  BP: 136/61 (21 Apr 2020 12:10) (136/61 - 158/68)  BP(mean): --  RR: 14 (21 Apr 2020 05:45) (14 - 16)  SpO2: 97% (21 Apr 2020 07:35) (97% - 98%)  Daily     Daily   I&O's Detail    20 Apr 2020 07:01  -  21 Apr 2020 07:00  --------------------------------------------------------  IN:    Oral Fluid: 360 mL    Solution: 50 mL    Solution: 100 mL  Total IN: 510 mL    OUT:    Indwelling Catheter - Urethral: 2400 mL  Total OUT: 2400 mL    Total NET: -1890 mL        I&O's Summary    20 Apr 2020 07:01  -  21 Apr 2020 07:00  --------------------------------------------------------  IN: 510 mL / OUT: 2400 mL / NET: -1890 mL        PHYSICAL EXAM:  NECK: Supple, neck  veins full  NERVOUS SYSTEM:  Alert & Oriented X3  CHEST/LUNG: Clear / EAE   HEART: Regular rate and rhythm  ABDOMEN: Soft, Nontender, Nondistended; Bowel sounds present  EXT - min edema ,L inguinal area dressed , R elbow dressed   + fernandez catheter   LABS:                        7.6    8.63  )-----------( 386      ( 21 Apr 2020 06:33 )             23.2     04-21    139  |  105  |  52.0<H>  ----------------------------<  91  4.8   |  21.0<L>  |  2.62<H>    Ca    7.6<L>      21 Apr 2020 06:33  Phos  3.1     04-20  Mg     1.9     04-21          Magnesium, Serum: 1.9 mg/dL (04-21 @ 06:33)          RADIOLOGY & ADDITIONAL TESTS:

## 2020-04-21 NOTE — PROGRESS NOTE ADULT - SUBJECTIVE AND OBJECTIVE BOX
Patient chart reviewed remotely due to COVID 19 Pandemic.    CC:  follow up DM    Interval HPI:  Glycemic data reviewed and BG has been low normal today.  pRBC ordered today  Will be going to OR in AM for debridement and vac placement.     MEDICATIONS  (STANDING):  aspirin enteric coated 81 milliGRAM(s) Oral daily  atorvastatin 80 milliGRAM(s) Oral at bedtime  carvedilol 25 milliGRAM(s) Oral every 12 hours  clindamycin IVPB 600 milliGRAM(s) IV Intermittent every 8 hours  clopidogrel Tablet 75 milliGRAM(s) Oral daily  colchicine 0.6 milliGRAM(s) Oral daily  digoxin     Tablet 0.125 milliGRAM(s) Oral daily  epoetin-adrian-epbx (RETACRIT) Injectable 71347 Unit(s) SubCutaneous every 7 days  finasteride 5 milliGRAM(s) Oral daily  folic acid 1 milliGRAM(s) Oral daily  heparin  Injectable 5000 Unit(s) SubCutaneous every 8 hours  insulin glargine Injectable (LANTUS) 60 Unit(s) SubCutaneous at bedtime  insulin lispro (HumaLOG) corrective regimen sliding scale   SubCutaneous at bedtime  insulin lispro Injectable (HumaLOG) 26 Unit(s) SubCutaneous three times a day before meals  lactobacillus acidophilus 1 Tablet(s) Oral three times a day with meals  meropenem  IVPB 500 milliGRAM(s) IV Intermittent every 12 hours  pantoprazole    Tablet 40 milliGRAM(s) Oral before breakfast  senna 2 Tablet(s) Oral at bedtime  simethicone 80 milliGRAM(s) Chew every 6 hours  sodium chloride 0.9%. 1000 milliLiter(s) (50 mL/Hr) IV Continuous <Continuous>    Vital Signs Last 24 Hrs  T(C): 36.7 (21 Apr 2020 12:10), Max: 37 (21 Apr 2020 07:35)  T(F): 98 (21 Apr 2020 12:10), Max: 98.6 (21 Apr 2020 07:35)  HR: 80 (21 Apr 2020 12:10) (75 - 80)  BP: 136/61 (21 Apr 2020 12:10) (136/61 - 158/68)  RR: 14 (21 Apr 2020 05:45) (14 - 16)  SpO2: 97% (21 Apr 2020 07:35) (97% - 98%)    PE:  not performed    LABS:  04-21    139  |  105  |  52.0<H>  ----------------------------<  91  4.8   |  21.0<L>  |  2.62<H>    Ca    7.6<L>      21 Apr 2020 06:33  Phos  3.1     04-20  Mg     1.9     04-21                          7.6    8.63  )-----------( 386      ( 21 Apr 2020 06:33 )             23.2     CAPILLARY BLOOD GLUCOSE  POCT Blood Glucose.: 72 mg/dL (21 Apr 2020 11:15)  POCT Blood Glucose.: 89 mg/dL (21 Apr 2020 07:15)  POCT Blood Glucose.: 134 mg/dL (20 Apr 2020 20:23)  POCT Blood Glucose.: 167 mg/dL (20 Apr 2020 16:54)

## 2020-04-21 NOTE — PROGRESS NOTE ADULT - ASSESSMENT
Improved MARSHALL on CKD stage III - baseline creat usually 2.2 - 2.5 due to DM/HTN   Follows w/ me  ---> Renal function now back to baseline   Has h/o CAD, S/P MI, CABG, ICM with AICD  Renal sono noted no hydronephrosis , recent CT normal kidneys   Added Torsemide 10 mg daily   Uric acid 7.2     s/p OR on 4/10- Debridement, external genitalia, perineum  I&D abscess   L groin hematoma/ cellulitis on abx/ Leukocytosis  S/P R elbow drainage   Abx as per ID     Anemia - S/P transfusion   Vasular follow up noted   Added Epogen and folate   Transfusion today

## 2020-04-21 NOTE — PROGRESS NOTE ADULT - SUBJECTIVE AND OBJECTIVE BOX
[facility] - [facility], THE [facility]                                   [address] [place] [address]                                                      PHONE: [phone]                                                         FAX: [phone]  [email or web address]  ---------------------------------------------------------------------------------------------------------------------------------    Overnight events/patient complaints: no chest pain, H/H still dropping , open wound left groin, awaiitng repeat procedure with plastics in am      No Known Allergies    MEDICATIONS  (STANDING):  aspirin enteric coated 81 milliGRAM(s) Oral daily  atorvastatin 80 milliGRAM(s) Oral at bedtime  carvedilol 25 milliGRAM(s) Oral every 12 hours  clindamycin IVPB 600 milliGRAM(s) IV Intermittent every 8 hours  clopidogrel Tablet 75 milliGRAM(s) Oral daily  colchicine 0.6 milliGRAM(s) Oral daily  digoxin     Tablet 0.125 milliGRAM(s) Oral daily  epoetin-adrian-epbx (RETACRIT) Injectable 54823 Unit(s) SubCutaneous every 7 days  finasteride 5 milliGRAM(s) Oral daily  folic acid 1 milliGRAM(s) Oral daily  heparin  Injectable 5000 Unit(s) SubCutaneous every 8 hours  insulin glargine Injectable (LANTUS) 60 Unit(s) SubCutaneous at bedtime  insulin lispro (HumaLOG) corrective regimen sliding scale   SubCutaneous at bedtime  insulin lispro Injectable (HumaLOG) 26 Unit(s) SubCutaneous three times a day before meals  lactobacillus acidophilus 1 Tablet(s) Oral three times a day with meals  meropenem  IVPB 500 milliGRAM(s) IV Intermittent every 12 hours  pantoprazole    Tablet 40 milliGRAM(s) Oral before breakfast  senna 2 Tablet(s) Oral at bedtime  simethicone 80 milliGRAM(s) Chew every 6 hours  sodium chloride 0.9%. 1000 milliLiter(s) (50 mL/Hr) IV Continuous <Continuous>    MEDICATIONS  (PRN):  acetaminophen   Tablet .. 650 milliGRAM(s) Oral every 6 hours PRN Temp greater or equal to 38C (100.4F)  dextrose 40% Gel 15 Gram(s) Oral once PRN Blood Glucose LESS THAN 70 milliGRAM(s)/deciLiter  diphenhydrAMINE 25 milliGRAM(s) Oral every 4 hours PRN Rash and/or Itching  fentaNYL    Injectable 25 MICROGram(s) IV Push every 5 minutes PRN Moderate Pain (4 - 6)  fentaNYL    Injectable 50 MICROGram(s) IV Push every 5 minutes PRN Severe Pain (7 - 10)  ondansetron Injectable 4 milliGRAM(s) IV Push every 6 hours PRN Nausea and/or Vomiting      Vital Signs Last 24 Hrs  T(C): 37 (21 Apr 2020 07:35), Max: 37 (21 Apr 2020 07:35)  T(F): 98.6 (21 Apr 2020 07:35), Max: 98.6 (21 Apr 2020 07:35)  HR: 76 (21 Apr 2020 07:35) (75 - 77)  BP: 137/61 (21 Apr 2020 07:35) (137/61 - 178/75)  BP(mean): --  RR: 14 (21 Apr 2020 05:45) (14 - 16)  SpO2: 97% (21 Apr 2020 07:35) (97% - 98%)  Daily     Daily   ICU Vital Signs Last 24 Hrs  MARIA ROSS  I&O's Detail    20 Apr 2020 07:01  -  21 Apr 2020 07:00  --------------------------------------------------------  IN:    Oral Fluid: 360 mL    Solution: 50 mL    Solution: 100 mL  Total IN: 510 mL    OUT:    Indwelling Catheter - Urethral: 2400 mL  Total OUT: 2400 mL    Total NET: -1890 mL        I&O's Summary    20 Apr 2020 07:01  -  21 Apr 2020 07:00  --------------------------------------------------------  IN: 510 mL / OUT: 2400 mL / NET: -1890 mL      Drug Dosing Weight  MARIA ROSS      PHYSICAL EXAM:  General: Appears well developed, well nourished alert and cooperative.  HEENT: Head; normocephalic, atraumatic.  Eyes: Pupils reactive, cornea wnl.  Neck: Supple, no nodes adenopathy, no NVD or carotid bruit or thyromegaly.  CARDIOVASCULAR: Normal S1 and S2, No murmur, rub, gallop or lift.   LUNGS: No rales, rhonchi or wheeze. Normal breath sounds bilaterally.  ABDOMEN: Soft, nontender without mass or organomegaly. bowel sounds normoactive.  EXTREMITIES: No clubbing, cyanosis or edema. Distal pulses wnl. right elbow swollen with mild erythema  SKIN: warm and dry with normal turgor.  NEURO: Alert/oriented x 3/normal motor exam. No pathologic reflexes.    PSYCH: normal affect.        LABS:                        7.6    8.63  )-----------( 386      ( 21 Apr 2020 06:33 )             23.2     04-21    139  |  105  |  52.0<H>  ----------------------------<  91  4.8   |  21.0<L>  |  2.62<H>    Ca    7.6<L>      21 Apr 2020 06:33  Phos  3.1     04-20  Mg     1.9     04-21      MARIA ROMANON            RADIOLOGY & ADDITIONAL STUDIES:    INTERPRETATION OF TELEMETRY (personally reviewed):    ECG:< from: 12 Lead ECG (04.09.20 @ 12:52) >  Diagnosis Line Normal sinus rhythm  Possible Left atrial enlargement  Septal infarct , age undetermined  T wave abnormality, consider inferior ischemia    Confirmed by NICHOLAS REYNA ([id number]) on 4/10/2020 9:57:29 AM    < end of copied text >

## 2020-04-21 NOTE — PROGRESS NOTE ADULT - ASSESSMENT
Assessment  Left groin debridement  anemia secondary to above  Post cath infected hematoma  s/p minx closure without vascular injury  recent PCI several weeks ago on DAPT  Ischemic CM s/p remote CABG recent PCI, improved EF on meds (EF ~ 50%)  ICD in place  DM  CRI  Right elbow gouty arthritis      Rec  transfuse Hgb > 9 for OR in am  suppl Calcium  cardiac status stable for repeat operative procedure if Hgb acceptable  turn off ICD preop if electrocautery anticipated   cont IV AB as per ID  eventual rehab placement

## 2020-04-22 ENCOUNTER — TRANSCRIPTION ENCOUNTER (OUTPATIENT)
Age: 65
End: 2020-04-22

## 2020-04-22 LAB
ANION GAP SERPL CALC-SCNC: 13 MMOL/L — SIGNIFICANT CHANGE UP (ref 5–17)
BUN SERPL-MCNC: 45 MG/DL — HIGH (ref 8–20)
CALCIUM SERPL-MCNC: 7.8 MG/DL — LOW (ref 8.6–10.2)
CHLORIDE SERPL-SCNC: 108 MMOL/L — HIGH (ref 98–107)
CO2 SERPL-SCNC: 22 MMOL/L — SIGNIFICANT CHANGE UP (ref 22–29)
CREAT SERPL-MCNC: 1.98 MG/DL — HIGH (ref 0.5–1.3)
GLUCOSE BLDC GLUCOMTR-MCNC: 123 MG/DL — HIGH (ref 70–99)
GLUCOSE BLDC GLUCOMTR-MCNC: 148 MG/DL — HIGH (ref 70–99)
GLUCOSE BLDC GLUCOMTR-MCNC: 275 MG/DL — HIGH (ref 70–99)
GLUCOSE BLDC GLUCOMTR-MCNC: 325 MG/DL — HIGH (ref 70–99)
GLUCOSE SERPL-MCNC: 114 MG/DL — HIGH (ref 70–99)
HCT VFR BLD CALC: 32.9 % — LOW (ref 39–50)
HCT VFR BLD CALC: 34.2 % — LOW (ref 39–50)
HGB BLD-MCNC: 10.8 G/DL — LOW (ref 13–17)
HGB BLD-MCNC: 11.2 G/DL — LOW (ref 13–17)
MAGNESIUM SERPL-MCNC: 1.6 MG/DL — SIGNIFICANT CHANGE UP (ref 1.6–2.6)
MCHC RBC-ENTMCNC: 29.3 PG — SIGNIFICANT CHANGE UP (ref 27–34)
MCHC RBC-ENTMCNC: 29.5 PG — SIGNIFICANT CHANGE UP (ref 27–34)
MCHC RBC-ENTMCNC: 32.7 GM/DL — SIGNIFICANT CHANGE UP (ref 32–36)
MCHC RBC-ENTMCNC: 32.8 GM/DL — SIGNIFICANT CHANGE UP (ref 32–36)
MCV RBC AUTO: 89.5 FL — SIGNIFICANT CHANGE UP (ref 80–100)
MCV RBC AUTO: 89.9 FL — SIGNIFICANT CHANGE UP (ref 80–100)
PLATELET # BLD AUTO: 391 K/UL — SIGNIFICANT CHANGE UP (ref 150–400)
PLATELET # BLD AUTO: 417 K/UL — HIGH (ref 150–400)
POTASSIUM SERPL-MCNC: 4.7 MMOL/L — SIGNIFICANT CHANGE UP (ref 3.5–5.3)
POTASSIUM SERPL-SCNC: 4.7 MMOL/L — SIGNIFICANT CHANGE UP (ref 3.5–5.3)
RBC # BLD: 3.66 M/UL — LOW (ref 4.2–5.8)
RBC # BLD: 3.82 M/UL — LOW (ref 4.2–5.8)
RBC # FLD: 15.5 % — HIGH (ref 10.3–14.5)
RBC # FLD: 15.6 % — HIGH (ref 10.3–14.5)
SODIUM SERPL-SCNC: 143 MMOL/L — SIGNIFICANT CHANGE UP (ref 135–145)
WBC # BLD: 8.2 K/UL — SIGNIFICANT CHANGE UP (ref 3.8–10.5)
WBC # BLD: 8.33 K/UL — SIGNIFICANT CHANGE UP (ref 3.8–10.5)
WBC # FLD AUTO: 8.2 K/UL — SIGNIFICANT CHANGE UP (ref 3.8–10.5)
WBC # FLD AUTO: 8.33 K/UL — SIGNIFICANT CHANGE UP (ref 3.8–10.5)

## 2020-04-22 RX ORDER — MAGNESIUM OXIDE 400 MG ORAL TABLET 241.3 MG
400 TABLET ORAL DAILY
Refills: 0 | Status: DISCONTINUED | OUTPATIENT
Start: 2020-04-23 | End: 2020-04-27

## 2020-04-22 RX ORDER — SODIUM CHLORIDE 9 MG/ML
1000 INJECTION INTRAMUSCULAR; INTRAVENOUS; SUBCUTANEOUS
Refills: 0 | Status: DISCONTINUED | OUTPATIENT
Start: 2020-04-22 | End: 2020-04-24

## 2020-04-22 RX ORDER — MAGNESIUM SULFATE 500 MG/ML
2 VIAL (ML) INJECTION ONCE
Refills: 0 | Status: COMPLETED | OUTPATIENT
Start: 2020-04-22 | End: 2020-04-22

## 2020-04-22 RX ADMIN — SIMETHICONE 80 MILLIGRAM(S): 80 TABLET, CHEWABLE ORAL at 05:30

## 2020-04-22 RX ADMIN — SIMETHICONE 80 MILLIGRAM(S): 80 TABLET, CHEWABLE ORAL at 10:42

## 2020-04-22 RX ADMIN — CARVEDILOL PHOSPHATE 25 MILLIGRAM(S): 80 CAPSULE, EXTENDED RELEASE ORAL at 05:30

## 2020-04-22 RX ADMIN — Medication 50 GRAM(S): at 13:41

## 2020-04-22 RX ADMIN — HEPARIN SODIUM 5000 UNIT(S): 5000 INJECTION INTRAVENOUS; SUBCUTANEOUS at 14:39

## 2020-04-22 RX ADMIN — Medication 1 TABLET(S): at 08:44

## 2020-04-22 RX ADMIN — SIMETHICONE 80 MILLIGRAM(S): 80 TABLET, CHEWABLE ORAL at 22:17

## 2020-04-22 RX ADMIN — SIMETHICONE 80 MILLIGRAM(S): 80 TABLET, CHEWABLE ORAL at 18:00

## 2020-04-22 RX ADMIN — ATORVASTATIN CALCIUM 80 MILLIGRAM(S): 80 TABLET, FILM COATED ORAL at 21:54

## 2020-04-22 RX ADMIN — Medication 20 UNIT(S): at 08:20

## 2020-04-22 RX ADMIN — SENNA PLUS 2 TABLET(S): 8.6 TABLET ORAL at 21:56

## 2020-04-22 RX ADMIN — PANTOPRAZOLE SODIUM 40 MILLIGRAM(S): 20 TABLET, DELAYED RELEASE ORAL at 05:31

## 2020-04-22 RX ADMIN — FINASTERIDE 5 MILLIGRAM(S): 5 TABLET, FILM COATED ORAL at 10:41

## 2020-04-22 RX ADMIN — Medication 20 UNIT(S): at 11:37

## 2020-04-22 RX ADMIN — HEPARIN SODIUM 5000 UNIT(S): 5000 INJECTION INTRAVENOUS; SUBCUTANEOUS at 05:29

## 2020-04-22 RX ADMIN — Medication 0.12 MILLIGRAM(S): at 05:30

## 2020-04-22 RX ADMIN — Medication 20 UNIT(S): at 16:33

## 2020-04-22 RX ADMIN — CARVEDILOL PHOSPHATE 25 MILLIGRAM(S): 80 CAPSULE, EXTENDED RELEASE ORAL at 18:00

## 2020-04-22 RX ADMIN — INSULIN GLARGINE 40 UNIT(S): 100 INJECTION, SOLUTION SUBCUTANEOUS at 21:54

## 2020-04-22 RX ADMIN — Medication 1 TABLET(S): at 12:11

## 2020-04-22 RX ADMIN — CLOPIDOGREL BISULFATE 75 MILLIGRAM(S): 75 TABLET, FILM COATED ORAL at 10:41

## 2020-04-22 RX ADMIN — Medication 1 TABLET(S): at 16:33

## 2020-04-22 RX ADMIN — Medication 0.6 MILLIGRAM(S): at 10:41

## 2020-04-22 RX ADMIN — HEPARIN SODIUM 5000 UNIT(S): 5000 INJECTION INTRAVENOUS; SUBCUTANEOUS at 21:54

## 2020-04-22 RX ADMIN — Medication 25 MILLIGRAM(S): at 14:39

## 2020-04-22 RX ADMIN — Medication 81 MILLIGRAM(S): at 10:41

## 2020-04-22 RX ADMIN — Medication 100 MILLIGRAM(S): at 08:45

## 2020-04-22 RX ADMIN — MEROPENEM 100 MILLIGRAM(S): 1 INJECTION INTRAVENOUS at 05:29

## 2020-04-22 RX ADMIN — Medication 100 MILLIGRAM(S): at 14:38

## 2020-04-22 RX ADMIN — Medication 1 MILLIGRAM(S): at 10:41

## 2020-04-22 NOTE — PROGRESS NOTE ADULT - SUBJECTIVE AND OBJECTIVE BOX
[facility], THE [facility]                                   [address] [place] [address]                                                      PHONE: [phone]                                                         FAX: [phone]  [email or web address]  ---------------------------------------------------------------------------------------------------------------------------------    Overnight events/patient complaints:  Hb continues to drop. Plan for OR today.    No Known Allergies    MEDICATIONS  (STANDING):  aspirin enteric coated 81 milliGRAM(s) Oral daily  atorvastatin 80 milliGRAM(s) Oral at bedtime  carvedilol 25 milliGRAM(s) Oral every 12 hours  clindamycin IVPB 600 milliGRAM(s) IV Intermittent every 8 hours  clopidogrel Tablet 75 milliGRAM(s) Oral daily  colchicine 0.6 milliGRAM(s) Oral daily  digoxin     Tablet 0.125 milliGRAM(s) Oral daily  epoetin-adrian-epbx (RETACRIT) Injectable 92845 Unit(s) SubCutaneous every 7 days  finasteride 5 milliGRAM(s) Oral daily  folic acid 1 milliGRAM(s) Oral daily  heparin  Injectable 5000 Unit(s) SubCutaneous every 8 hours  insulin glargine Injectable (LANTUS) 40 Unit(s) SubCutaneous at bedtime  insulin lispro (HumaLOG) corrective regimen sliding scale   SubCutaneous at bedtime  insulin lispro Injectable (HumaLOG) 20 Unit(s) SubCutaneous three times a day before meals  lactobacillus acidophilus 1 Tablet(s) Oral three times a day with meals  meropenem  IVPB 500 milliGRAM(s) IV Intermittent every 12 hours  pantoprazole    Tablet 40 milliGRAM(s) Oral before breakfast  senna 2 Tablet(s) Oral at bedtime  simethicone 80 milliGRAM(s) Chew every 6 hours  sodium chloride 0.9%. 1000 milliLiter(s) (75 mL/Hr) IV Continuous <Continuous>    MEDICATIONS  (PRN):  acetaminophen   Tablet .. 650 milliGRAM(s) Oral every 6 hours PRN Temp greater or equal to 38C (100.4F)  dextrose 40% Gel 15 Gram(s) Oral once PRN Blood Glucose LESS THAN 70 milliGRAM(s)/deciLiter  diphenhydrAMINE 25 milliGRAM(s) Oral every 4 hours PRN Rash and/or Itching  fentaNYL    Injectable 25 MICROGram(s) IV Push every 5 minutes PRN Moderate Pain (4 - 6)  fentaNYL    Injectable 50 MICROGram(s) IV Push every 5 minutes PRN Severe Pain (7 - 10)  ondansetron Injectable 4 milliGRAM(s) IV Push every 6 hours PRN Nausea and/or Vomiting      Vital Signs Last 24 Hrs  T(C): 36.9 (22 Apr 2020 07:23), Max: 37.2 (22 Apr 2020 05:00)  T(F): 98.4 (22 Apr 2020 07:23), Max: 98.9 (22 Apr 2020 05:00)  HR: 72 (22 Apr 2020 07:23) (67 - 80)  BP: 166/72 (22 Apr 2020 07:23) (136/61 - 174/75)  BP(mean): --  RR: 16 (22 Apr 2020 05:00) (14 - 16)  SpO2: 94% (22 Apr 2020 07:23) (94% - 95%)  ICU Vital Signs Last 24 Hrs  MARIA ROSS  I&O's Detail    21 Apr 2020 07:01  -  22 Apr 2020 07:00  --------------------------------------------------------  IN:    Oral Fluid: 240 mL    Solution: 100 mL    Solution: 100 mL  Total IN: 440 mL    OUT:    Indwelling Catheter - Urethral: 2500 mL  Total OUT: 2500 mL    Total NET: -2060 mL        Drug Dosing Weight  MARIA ROSS      PHYSICAL EXAM:  General: Appears well developed, well nourished alert and cooperative.  HEENT: Head; normocephalic, atraumatic.  Eyes: Pupils reactive, cornea wnl.  Neck: Supple, no nodes adenopathy, no NVD or carotid bruit or thyromegaly.  CARDIOVASCULAR: Normal S1 and S2, No murmur, rub, gallop or lift.   LUNGS: No rales, rhonchi or wheeze. Normal breath sounds bilaterally.  ABDOMEN: Soft, nontender without mass or organomegaly. bowel sounds normoactive.  EXTREMITIES: No clubbing, cyanosis or edema. Distal pulses wnl.  right elbow I/D, left groin debridement    SKIN: warm and dry with normal turgor.  NEURO: Alert/oriented x 3/normal motor exam. No pathologic reflexes.    PSYCH: normal affect.        LABS:                        7.6    8.63  )-----------( 386      ( 21 Apr 2020 06:33 )             23.2     04-22    143  |  108<H>  |  45.0<H>  ----------------------------<  114<H>  4.7   |  22.0  |  1.98<H>    Ca    7.8<L>      22 Apr 2020 07:07  Mg     1.6     04-22      MARIAEDILSON RIVERAAAN            RADIOLOGY & ADDITIONAL STUDIES:    INTERPRETATION OF TELEMETRY (personally reviewed): no events       ECHO: Most recent echo in office 2/26/2020; LVEF 50%, mild MR, aortic sclerosis, dual chamber device in RA/RV      CARDIAC CATHETERIZATION: < from: Cardiac Cath Lab - Adult (03.11.20 @ 10:55) >  VENTRICLES: LV not done Cr 1.7 Recent YOLANDA 55%  CORONARY VESSELS: The coronary circulation is right dominant.  LM:   --  LM: Normal.  LAD:   --  Mid LAD: There was a 100 % stenosis. There was good blood supply  to the distal myocardium from a graft.  --  D1: There was a 90 % stenosis.  CX:   --  Proximal circumflex: There was a 100 % stenosis. There was good  blood supply to the distal myocardium from a graft.  RCA:   -- Mid RCA: There was a 100 % stenosis. There was good blood supply  to the distal myocardium from a graft.  GRAFTS:   --  Graft to the mid LAD: The graft was a LIMA. It was normal.  --  Graft to the 1st obtuse marginal: The graft was a saphenous vein graft  from the aorta. It was normal.  --  Graft to the RPDA: The graft was a saphenous vein graft from the aorta.  It was normal.  COMPLICATIONS: There were no complications. No complications occurred  during the cath lab visit.  DIAGNOSTIC IMPRESSIONS: Prior LIMA to LAD and SVG to OM and RPDA are  patent. Severe LAD-maria e disease  DIAGNOSTIC RECOMMENDATIONS: Plan for LAD-maria e PCI  INTERVENTIONAL IMPRESSIONS: Prior LIMA to LAD and SVG to OM and RPDA are  patent. Severe LAD-maria e disease  INTERVENTIONAL RECOMMENDATIONS: Plan for LAD-maria e PCI  Prepared and signed by  Julio Blanco MD  Signed 03/11/2020 15:24:54    < end of copied text >      ASSESSMENT AND PLAN:  In summary, MARIA ROSS is an 64y Male with past medical history significant for CAD sp PCI 3/2020, ICM currently euvolemic, ICD, DM, sp right elbow I/D and left groin debridement. Now complicated by new anemia.    1) cw DAPT event throughout surgery.        -Preoperative Optimization       -No Cardiovascular Contraindication to surgery       -Patient is elevatedrisk for an elevated risk surgery  2) Transfuse as necessary PRBC, trend Hb continues to drop  3) Vascular following. Plan for OR today  4) cw  lipitor, coreg  5) cw torsemide as good outpt

## 2020-04-22 NOTE — PROGRESS NOTE ADULT - SUBJECTIVE AND OBJECTIVE BOX
NEPHROLOGY INTERVAL HPI/OVERNIGHT EVENTS:    interim noted   OOB to chair   Hgb stable at 11/.2   Good UO on Torsemide , 2.5 L   Vascular planning on OR 4-22    MEDICATIONS  (STANDING):  aspirin enteric coated 81 milliGRAM(s) Oral daily  atorvastatin 80 milliGRAM(s) Oral at bedtime  carvedilol 25 milliGRAM(s) Oral every 12 hours  clindamycin IVPB 600 milliGRAM(s) IV Intermittent every 8 hours  clopidogrel Tablet 75 milliGRAM(s) Oral daily  colchicine 0.6 milliGRAM(s) Oral daily  digoxin     Tablet 0.125 milliGRAM(s) Oral daily  epoetin-adrian-epbx (RETACRIT) Injectable 94690 Unit(s) SubCutaneous every 7 days  finasteride 5 milliGRAM(s) Oral daily  folic acid 1 milliGRAM(s) Oral daily  heparin  Injectable 5000 Unit(s) SubCutaneous every 8 hours  insulin glargine Injectable (LANTUS) 40 Unit(s) SubCutaneous at bedtime  insulin lispro (HumaLOG) corrective regimen sliding scale   SubCutaneous at bedtime  insulin lispro Injectable (HumaLOG) 20 Unit(s) SubCutaneous three times a day before meals  lactobacillus acidophilus 1 Tablet(s) Oral three times a day with meals  meropenem  IVPB 500 milliGRAM(s) IV Intermittent every 12 hours  pantoprazole    Tablet 40 milliGRAM(s) Oral before breakfast  senna 2 Tablet(s) Oral at bedtime  simethicone 80 milliGRAM(s) Chew every 6 hours  sodium chloride 0.9%. 1000 milliLiter(s) (75 mL/Hr) IV Continuous <Continuous>    MEDICATIONS  (PRN):  acetaminophen   Tablet .. 650 milliGRAM(s) Oral every 6 hours PRN Temp greater or equal to 38C (100.4F)  dextrose 40% Gel 15 Gram(s) Oral once PRN Blood Glucose LESS THAN 70 milliGRAM(s)/deciLiter  diphenhydrAMINE 25 milliGRAM(s) Oral every 4 hours PRN Rash and/or Itching  fentaNYL    Injectable 25 MICROGram(s) IV Push every 5 minutes PRN Moderate Pain (4 - 6)  fentaNYL    Injectable 50 MICROGram(s) IV Push every 5 minutes PRN Severe Pain (7 - 10)  ondansetron Injectable 4 milliGRAM(s) IV Push every 6 hours PRN Nausea and/or Vomiting      Allergies    No Known Allergies    Intolerances        Vital Signs Last 24 Hrs  T(C): 36.9 (22 Apr 2020 07:23), Max: 37.2 (22 Apr 2020 05:00)  T(F): 98.4 (22 Apr 2020 07:23), Max: 98.9 (22 Apr 2020 05:00)  HR: 72 (22 Apr 2020 07:23) (67 - 76)  BP: 166/72 (22 Apr 2020 07:23) (154/63 - 174/75)  BP(mean): --  RR: 15 (22 Apr 2020 07:23) (14 - 16)  SpO2: 94% (22 Apr 2020 07:23) (94% - 95%)  Daily     Daily   I&O's Detail    21 Apr 2020 07:01  -  22 Apr 2020 07:00  --------------------------------------------------------  IN:    Oral Fluid: 240 mL    Solution: 100 mL    Solution: 100 mL  Total IN: 440 mL    OUT:    Indwelling Catheter - Urethral: 2500 mL  Total OUT: 2500 mL    Total NET: -2060 mL        I&O's Summary    21 Apr 2020 07:01  -  22 Apr 2020 07:00  --------------------------------------------------------  IN: 440 mL / OUT: 2500 mL / NET: -2060 mL        PHYSICAL EXAM:  PHYSICAL EXAM:  NECK: Supple, neck  veins full  NERVOUS SYSTEM:  Alert & Oriented X3  CHEST/LUNG: Clear / EAE   HEART: Regular rate and rhythm  ABDOMEN: Soft, Nontender, Nondistended; Bowel sounds present  EXT - min edema ,L inguinal area dressed , R elbow dressed   + fernandez catheter     LABS:                        11.2   8.33  )-----------( 391      ( 22 Apr 2020 08:42 )             34.2     04-22    143  |  108<H>  |  45.0<H>  ----------------------------<  114<H>  4.7   |  22.0  |  1.98<H>    Ca    7.8<L>      22 Apr 2020 07:07  Mg     1.6     04-22          Magnesium, Serum: 1.6 mg/dL (04-22 @ 07:07)          RADIOLOGY & ADDITIONAL TESTS:

## 2020-04-22 NOTE — PHYSICAL THERAPY INITIAL EVALUATION ADULT - DIAGNOSIS, PT EVAL
Decreased mobilty/function; Right elbow septic joint and s/p Left groin debridement; generalized weakness

## 2020-04-22 NOTE — PROGRESS NOTE ADULT - ASSESSMENT
Improved MARSHALL on CKD stage III - baseline creat usually 2.2 - 2.5 due to DM/HTN   Follows w/ me  ---> Renal function now back to baseline   Has h/o CAD, S/P MI, CABG, ICM with AICD  Renal sono noted no hydronephrosis , recent CT normal kidneys   Added Torsemide 10 mg daily ---> good UO  and renal function stable   Uric acid 7.2     s/p OR on 4/10- Debridement, external genitalia, perineum  I&D abscess   L groin hematoma/ cellulitis on abx/ Leukocytosis  S/P R elbow drainage   Abx as per ID     Anemia - S/P transfusion   Vascular follow up noted   Added Epogen and folate   Transfused last 4-21    Low Mag - 2 Grams IV , add oral daily dose Improved MARSHALL on CKD stage III - baseline creat usually 2.2 - 2.5 due to DM/HTN   Follows w/ me  ---> Renal function now back to baseline   Has h/o CAD, S/P MI, CABG, ICM with AICD  Renal sono noted no hydronephrosis , recent CT normal kidneys   Added Torsemide 10 mg daily ---> good UO  and renal function stable   Uric acid 7.2     s/p OR on 4/10- Debridement, external genitalia, perineum  I&D abscess   L groin hematoma/ cellulitis on abx/ Leukocytosis  S/P R elbow drainage   Abx as per ID   OR 4-23 with Vascular , follow up noted     Anemia - S/P transfusion   Vascular follow up noted   Added Epogen and folate   Transfused last 4-21    Low Mag - 2 Grams IV , add oral daily dose

## 2020-04-22 NOTE — PROGRESS NOTE ADULT - SUBJECTIVE AND OBJECTIVE BOX
MARIA RIVERAAAN    [id number]    History:  The patient is status post left serial groin debridement.  Dressing changed overnight.  Currently Patient is doing well. The patient's pain is controlled using the prescribed pain medications. The patient is participating in physical therapy. Denies nausea, vomiting, chest pain, shortness of breath, abdominal pain or fever. No acute motor or sensory changes are reported. persistent blanchable rash to the patients back.    Vital Signs Last 24 Hrs  T(C): 36.9 (22 Apr 2020 07:23), Max: 37.2 (22 Apr 2020 05:00)  T(F): 98.4 (22 Apr 2020 07:23), Max: 98.9 (22 Apr 2020 05:00)  HR: 72 (22 Apr 2020 07:23) (67 - 80)  BP: 166/72 (22 Apr 2020 07:23) (136/61 - 174/75)  BP(mean): --  RR: 16 (22 Apr 2020 05:00) (14 - 16)  SpO2: 94% (22 Apr 2020 07:23) (94% - 95%)  I&O's Summary    21 Apr 2020 07:01  -  22 Apr 2020 07:00  --------------------------------------------------------  IN: 440 mL / OUT: 2500 mL / NET: -2060 mL                              7.6    8.63  )-----------( 386      ( 21 Apr 2020 06:33 )             23.2     04-22    143  |  108<H>  |  45.0<H>  ----------------------------<  114<H>  4.7   |  22.0  |  1.98<H>    Ca    7.8<L>      22 Apr 2020 07:07  Mg     1.6     04-22        MEDICATIONS  (STANDING):  aspirin enteric coated 81 milliGRAM(s) Oral daily  atorvastatin 80 milliGRAM(s) Oral at bedtime  carvedilol 25 milliGRAM(s) Oral every 12 hours  clindamycin IVPB 600 milliGRAM(s) IV Intermittent every 8 hours  clopidogrel Tablet 75 milliGRAM(s) Oral daily  colchicine 0.6 milliGRAM(s) Oral daily  digoxin     Tablet 0.125 milliGRAM(s) Oral daily  epoetin-adrian-epbx (RETACRIT) Injectable 27173 Unit(s) SubCutaneous every 7 days  finasteride 5 milliGRAM(s) Oral daily  folic acid 1 milliGRAM(s) Oral daily  heparin  Injectable 5000 Unit(s) SubCutaneous every 8 hours  insulin glargine Injectable (LANTUS) 40 Unit(s) SubCutaneous at bedtime  insulin lispro (HumaLOG) corrective regimen sliding scale   SubCutaneous at bedtime  insulin lispro Injectable (HumaLOG) 20 Unit(s) SubCutaneous three times a day before meals  lactobacillus acidophilus 1 Tablet(s) Oral three times a day with meals  meropenem  IVPB 500 milliGRAM(s) IV Intermittent every 12 hours  pantoprazole    Tablet 40 milliGRAM(s) Oral before breakfast  senna 2 Tablet(s) Oral at bedtime  simethicone 80 milliGRAM(s) Chew every 6 hours  sodium chloride 0.9%. 1000 milliLiter(s) (75 mL/Hr) IV Continuous <Continuous>    MEDICATIONS  (PRN):  acetaminophen   Tablet .. 650 milliGRAM(s) Oral every 6 hours PRN Temp greater or equal to 38C (100.4F)  dextrose 40% Gel 15 Gram(s) Oral once PRN Blood Glucose LESS THAN 70 milliGRAM(s)/deciLiter  diphenhydrAMINE 25 milliGRAM(s) Oral every 4 hours PRN Rash and/or Itching  fentaNYL    Injectable 25 MICROGram(s) IV Push every 5 minutes PRN Moderate Pain (4 - 6)  fentaNYL    Injectable 50 MICROGram(s) IV Push every 5 minutes PRN Severe Pain (7 - 10)  ondansetron Injectable 4 milliGRAM(s) IV Push every 6 hours PRN Nausea and/or Vomiting      Physical exam:     No distress  left groin, The dressing is clean, dry and intact.   No lizzeth wound erythema, discharge, drainage is noted.   rash to the back , non tender to palpation     Primary  Assessment:  • S/p serial groin debridement   - patient currently stable, with clean open wound   - persistent rash to back, drug vs contact ..     •   Plan:     - plan for OR tomorrow   • continue benadryl, change bed sheets to green sheets   - may d/c iv abx tomorrow if no improvement in rash

## 2020-04-23 LAB
ANION GAP SERPL CALC-SCNC: 12 MMOL/L — SIGNIFICANT CHANGE UP (ref 5–17)
BUN SERPL-MCNC: 38 MG/DL — HIGH (ref 8–20)
CALCIUM SERPL-MCNC: 8 MG/DL — LOW (ref 8.6–10.2)
CHLORIDE SERPL-SCNC: 108 MMOL/L — HIGH (ref 98–107)
CO2 SERPL-SCNC: 23 MMOL/L — SIGNIFICANT CHANGE UP (ref 22–29)
CREAT SERPL-MCNC: 1.76 MG/DL — HIGH (ref 0.5–1.3)
GLUCOSE BLDC GLUCOMTR-MCNC: 151 MG/DL — HIGH (ref 70–99)
GLUCOSE BLDC GLUCOMTR-MCNC: 189 MG/DL — HIGH (ref 70–99)
GLUCOSE BLDC GLUCOMTR-MCNC: 202 MG/DL — HIGH (ref 70–99)
GLUCOSE SERPL-MCNC: 166 MG/DL — HIGH (ref 70–99)
HCT VFR BLD CALC: 34 % — LOW (ref 39–50)
HGB BLD-MCNC: 10.9 G/DL — LOW (ref 13–17)
MAGNESIUM SERPL-MCNC: 1.7 MG/DL — SIGNIFICANT CHANGE UP (ref 1.6–2.6)
MCHC RBC-ENTMCNC: 29.3 PG — SIGNIFICANT CHANGE UP (ref 27–34)
MCHC RBC-ENTMCNC: 32.1 GM/DL — SIGNIFICANT CHANGE UP (ref 32–36)
MCV RBC AUTO: 91.4 FL — SIGNIFICANT CHANGE UP (ref 80–100)
PLATELET # BLD AUTO: 403 K/UL — HIGH (ref 150–400)
POTASSIUM SERPL-MCNC: 4.9 MMOL/L — SIGNIFICANT CHANGE UP (ref 3.5–5.3)
POTASSIUM SERPL-SCNC: 4.9 MMOL/L — SIGNIFICANT CHANGE UP (ref 3.5–5.3)
RBC # BLD: 3.72 M/UL — LOW (ref 4.2–5.8)
RBC # FLD: 15.5 % — HIGH (ref 10.3–14.5)
SARS-COV-2 RNA SPEC QL NAA+PROBE: DETECTED
SODIUM SERPL-SCNC: 143 MMOL/L — SIGNIFICANT CHANGE UP (ref 135–145)
WBC # BLD: 7.83 K/UL — SIGNIFICANT CHANGE UP (ref 3.8–10.5)
WBC # FLD AUTO: 7.83 K/UL — SIGNIFICANT CHANGE UP (ref 3.8–10.5)

## 2020-04-23 PROCEDURE — 99231 SBSQ HOSP IP/OBS SF/LOW 25: CPT

## 2020-04-23 PROCEDURE — 99232 SBSQ HOSP IP/OBS MODERATE 35: CPT

## 2020-04-23 RX ORDER — SODIUM CHLORIDE 9 MG/ML
1000 INJECTION, SOLUTION INTRAVENOUS
Refills: 0 | Status: DISCONTINUED | OUTPATIENT
Start: 2020-04-23 | End: 2020-04-23

## 2020-04-23 RX ORDER — FENTANYL CITRATE 50 UG/ML
25 INJECTION INTRAVENOUS
Refills: 0 | Status: DISCONTINUED | OUTPATIENT
Start: 2020-04-23 | End: 2020-04-27

## 2020-04-23 RX ORDER — INSULIN GLARGINE 100 [IU]/ML
45 INJECTION, SOLUTION SUBCUTANEOUS AT BEDTIME
Refills: 0 | Status: DISCONTINUED | OUTPATIENT
Start: 2020-04-23 | End: 2020-04-24

## 2020-04-23 RX ORDER — MAGNESIUM SULFATE 500 MG/ML
2 VIAL (ML) INJECTION ONCE
Refills: 0 | Status: COMPLETED | OUTPATIENT
Start: 2020-04-23 | End: 2020-04-23

## 2020-04-23 RX ORDER — HYDROMORPHONE HYDROCHLORIDE 2 MG/ML
0.5 INJECTION INTRAMUSCULAR; INTRAVENOUS; SUBCUTANEOUS
Refills: 0 | Status: DISCONTINUED | OUTPATIENT
Start: 2020-04-23 | End: 2020-04-27

## 2020-04-23 RX ADMIN — INSULIN GLARGINE 45 UNIT(S): 100 INJECTION, SOLUTION SUBCUTANEOUS at 20:38

## 2020-04-23 RX ADMIN — SODIUM CHLORIDE 75 MILLILITER(S): 9 INJECTION INTRAMUSCULAR; INTRAVENOUS; SUBCUTANEOUS at 08:30

## 2020-04-23 RX ADMIN — CLOPIDOGREL BISULFATE 75 MILLIGRAM(S): 75 TABLET, FILM COATED ORAL at 14:27

## 2020-04-23 RX ADMIN — MAGNESIUM OXIDE 400 MG ORAL TABLET 400 MILLIGRAM(S): 241.3 TABLET ORAL at 18:09

## 2020-04-23 RX ADMIN — FINASTERIDE 5 MILLIGRAM(S): 5 TABLET, FILM COATED ORAL at 14:27

## 2020-04-23 RX ADMIN — Medication 1 TABLET(S): at 05:54

## 2020-04-23 RX ADMIN — ATORVASTATIN CALCIUM 80 MILLIGRAM(S): 80 TABLET, FILM COATED ORAL at 20:37

## 2020-04-23 RX ADMIN — PANTOPRAZOLE SODIUM 40 MILLIGRAM(S): 20 TABLET, DELAYED RELEASE ORAL at 05:56

## 2020-04-23 RX ADMIN — Medication 50 GRAM(S): at 10:30

## 2020-04-23 RX ADMIN — HEPARIN SODIUM 5000 UNIT(S): 5000 INJECTION INTRAVENOUS; SUBCUTANEOUS at 20:37

## 2020-04-23 RX ADMIN — Medication 0.12 MILLIGRAM(S): at 05:54

## 2020-04-23 RX ADMIN — Medication 0.6 MILLIGRAM(S): at 14:27

## 2020-04-23 RX ADMIN — CARVEDILOL PHOSPHATE 25 MILLIGRAM(S): 80 CAPSULE, EXTENDED RELEASE ORAL at 18:08

## 2020-04-23 RX ADMIN — Medication 81 MILLIGRAM(S): at 14:27

## 2020-04-23 RX ADMIN — Medication 1 MILLIGRAM(S): at 14:27

## 2020-04-23 RX ADMIN — HEPARIN SODIUM 5000 UNIT(S): 5000 INJECTION INTRAVENOUS; SUBCUTANEOUS at 05:53

## 2020-04-23 RX ADMIN — SENNA PLUS 2 TABLET(S): 8.6 TABLET ORAL at 20:37

## 2020-04-23 RX ADMIN — SIMETHICONE 80 MILLIGRAM(S): 80 TABLET, CHEWABLE ORAL at 18:08

## 2020-04-23 RX ADMIN — CARVEDILOL PHOSPHATE 25 MILLIGRAM(S): 80 CAPSULE, EXTENDED RELEASE ORAL at 05:54

## 2020-04-23 NOTE — PROGRESS NOTE ADULT - ASSESSMENT
64 year old male with Type 2 DM with associated nephropathy, CAD s/p PCI, ischemic CM, HTN admitted with necrotizing fasciitis s/p debridement.       1.  Type 2 DM-   Will increase Lantus to 45 units qhs.  Once he is eating, may need slight increase in prandial insulin, but will follow.    2.  Necrotizing fasciitis-   management as per surgical team.

## 2020-04-23 NOTE — BRIEF OPERATIVE NOTE - OPERATION/FINDINGS
Pulse lavage used for wound washout. 4 inches of medial wound closed. Wound vac applied to rest of the wound with good seal. Wound of Left groin. Pulse lavage used for wound washout. 4 inches of medial wound closed. Wound vac applied to rest of the wound with good seal.

## 2020-04-23 NOTE — BRIEF OPERATIVE NOTE - SPECIMENS
None
cultures to micro, cell count, crystals
left groin NSTI, cultures.
necrotic skin/subcutaneous/muscle

## 2020-04-23 NOTE — PROGRESS NOTE ADULT - ASSESSMENT
CKD stage III: DM/HTN   MARSHALL resolved to baseline   h/o CAD, S/P MI, CABG, ICM with AICD  Renal sono noted no hydronephrosis   s/p OR on 4/10- Debridement, external genitalia, perineum  I&D abscess   L groin hematoma/ cellulitis on abx/ Leukocytosis  - avoid potential nephrotoxins  - diuretics as needed  - follow labs    Anemia:  S/P PRBCs   - cont LUISA as required to maintain Hgb > 10.0

## 2020-04-23 NOTE — PROGRESS NOTE ADULT - SUBJECTIVE AND OBJECTIVE BOX
CC:  follow up DM    Telephonic consult done due to COVID-19 Pandemic.  Chart reviewed.    Interval HPI:  Patient going to OR today for debridement.  BG today better controlled while NPO.    MEDICATIONS  (STANDING):  aspirin enteric coated 81 milliGRAM(s) Oral daily  atorvastatin 80 milliGRAM(s) Oral at bedtime  carvedilol 25 milliGRAM(s) Oral every 12 hours  clopidogrel Tablet 75 milliGRAM(s) Oral daily  colchicine 0.6 milliGRAM(s) Oral daily  digoxin     Tablet 0.125 milliGRAM(s) Oral daily  epoetin-adrian-epbx (RETACRIT) Injectable 20447 Unit(s) SubCutaneous every 7 days  finasteride 5 milliGRAM(s) Oral daily  folic acid 1 milliGRAM(s) Oral daily  heparin  Injectable 5000 Unit(s) SubCutaneous every 8 hours  insulin glargine Injectable (LANTUS) 40 Unit(s) SubCutaneous at bedtime  insulin lispro (HumaLOG) corrective regimen sliding scale   SubCutaneous at bedtime  insulin lispro Injectable (HumaLOG) 20 Unit(s) SubCutaneous three times a day before meals  lactobacillus acidophilus 1 Tablet(s) Oral three times a day with meals  magnesium oxide 400 milliGRAM(s) Oral daily  pantoprazole    Tablet 40 milliGRAM(s) Oral before breakfast  senna 2 Tablet(s) Oral at bedtime  simethicone 80 milliGRAM(s) Chew every 6 hours  sodium chloride 0.9%. 1000 milliLiter(s) (75 mL/Hr) IV Continuous <Continuous>    Vital Signs Last 24 Hrs  T(C): 36.8 (23 Apr 2020 08:03), Max: 36.8 (22 Apr 2020 21:45)  T(F): 98.2 (23 Apr 2020 08:03), Max: 98.3 (22 Apr 2020 21:45)  HR: 68 (23 Apr 2020 14:41) (64 - 74)  BP: 159/68 (23 Apr 2020 14:41) (153/69 - 170/74)  RR: 16 (23 Apr 2020 14:41) (15 - 16)  SpO2: 97% (23 Apr 2020 14:41) (94% - 97%)    PE:  Deferred due to telephonic consultation.      LABS:  CAPILLARY BLOOD GLUCOSE  POCT Blood Glucose.: 151 mg/dL (23 Apr 2020 11:23)  POCT Blood Glucose.: 189 mg/dL (23 Apr 2020 07:13)  POCT Blood Glucose.: 148 mg/dL (22 Apr 2020 21:44)  POCT Blood Glucose.: 275 mg/dL (22 Apr 2020 16:30)    04-23    143  |  108<H>  |  38.0<H>  ----------------------------<  166<H>  4.9   |  23.0  |  1.76<H>    Ca    8.0<L>      23 Apr 2020 06:19  Mg     1.7     04-23                          10.9   7.83  )-----------( 403      ( 23 Apr 2020 06:19 )             34.0

## 2020-04-23 NOTE — PROGRESS NOTE ADULT - SUBJECTIVE AND OBJECTIVE BOX
[facility]  INFECTIOUS DISEASES AND INTERNAL MEDICINE at [place]  =======================================================  Noe Navarro MD  Diplomates American Board of Internal Medicine and Infectious Diseases  Tel: [phone]      Fax: [phone]  =======================================================    [place] [id number]    Follow up: left groin necrotising infection  going back to OR today   Abx stopped due to new evolving rash over the past 2 days  denies any itching or pain, dyspnea  unknown history or allergy thinks he had hives from "myacin antibiotic many years ago"    Allergies:  niacin (Flushing; Rash)      Medications:  acetaminophen   Tablet .. 650 milliGRAM(s) Oral every 6 hours PRN  aspirin enteric coated 81 milliGRAM(s) Oral daily  atorvastatin 80 milliGRAM(s) Oral at bedtime  carvedilol 25 milliGRAM(s) Oral every 12 hours  clopidogrel Tablet 75 milliGRAM(s) Oral daily  colchicine 0.6 milliGRAM(s) Oral daily  dextrose 40% Gel 15 Gram(s) Oral once PRN  digoxin     Tablet 0.125 milliGRAM(s) Oral daily  diphenhydrAMINE 25 milliGRAM(s) Oral every 4 hours PRN  epoetin-adrian-epbx (RETACRIT) Injectable 18265 Unit(s) SubCutaneous every 7 days  finasteride 5 milliGRAM(s) Oral daily  folic acid 1 milliGRAM(s) Oral daily  heparin  Injectable 5000 Unit(s) SubCutaneous every 8 hours  insulin glargine Injectable (LANTUS) 40 Unit(s) SubCutaneous at bedtime  insulin lispro (HumaLOG) corrective regimen sliding scale   SubCutaneous at bedtime  insulin lispro Injectable (HumaLOG) 20 Unit(s) SubCutaneous three times a day before meals  lactobacillus acidophilus 1 Tablet(s) Oral three times a day with meals  magnesium oxide 400 milliGRAM(s) Oral daily  ondansetron Injectable 4 milliGRAM(s) IV Push every 6 hours PRN  pantoprazole    Tablet 40 milliGRAM(s) Oral before breakfast  senna 2 Tablet(s) Oral at bedtime  simethicone 80 milliGRAM(s) Chew every 6 hours  sodium chloride 0.9%. 1000 milliLiter(s) IV Continuous <Continuous>            REVIEW OF SYSTEMS:  CONSTITUTIONAL:  No Fever or chills  HEENT:   No diplopia or blurred vision.  No earache, sore throat or runny nose.  CARDIOVASCULAR:  No pressure, squeezing, strangling, tightness, heaviness or aching about the chest, neck, axilla or epigastrium.  RESPIRATORY:  No cough, shortness of breath  GASTROINTESTINAL:  No nausea, vomiting or diarrhea.  GENITOURINARY:  No dysuria, frequency or urgency. No Blood in urine  MUSCULOSKELETAL:  no joint aches, no muscle pain  SKIN:  No change in skin, hair or nails.  NEUROLOGIC:  No Headaches, seizures or weakness.  PSYCHIATRIC:  No disorder of thought or mood.  ENDOCRINE:  No heat or cold intolerance  HEMATOLOGICAL:  No easy bruising or bleeding.            Physical Exam:  ICU Vital Signs Last 24 Hrs  T(C): 36.8 (23 Apr 2020 08:03), Max: 36.8 (22 Apr 2020 21:45)  T(F): 98.2 (23 Apr 2020 08:03), Max: 98.3 (22 Apr 2020 21:45)  HR: 68 (23 Apr 2020 14:41) (64 - 74)  BP: 159/68 (23 Apr 2020 14:41) (153/69 - 170/74)  BP(mean): --  ABP: --  ABP(mean): --  RR: 16 (23 Apr 2020 14:41) (15 - 16)  SpO2: 97% (23 Apr 2020 14:41) (94% - 97%)      GEN: NAD, pleasant  HEENT: normocephalic and atraumatic. EOMI. PERRL.  Anicteric   NECK: Supple.   LUNGS: Clear to auscultation.  HEART: Regular rate and rhythm without murmur.  ABDOMEN: Soft, nontender, and nondistended.  Positive bowel sounds.    : No CVA tenderness  EXTREMITIES: Without any edema.  MSK: no joint swelling  NEUROLOGIC: Cranial nerves II through XII are grossly intact. No focal deficits  PSYCHIATRIC: Appropriate affect .  SKIN: blanching rash over UE and chest      Labs:                          10.9   7.83  )-----------( 403      ( 23 Apr 2020 06:19 )             34.0   04-23    143  |  108<H>  |  38.0<H>  ----------------------------<  166<H>  4.9   |  23.0  |  1.76<H>    Ca    8.0<L>      23 Apr 2020 06:19  Mg     1.7     04-23

## 2020-04-23 NOTE — BRIEF OPERATIVE NOTE - NSICDXBRIEFPROCEDURE_GEN_ALL_CORE_FT
PROCEDURES:  Debridement, external genitalia, perineum, and abdominal wall, for necrotizing soft tissue infection 10-Apr-2020 15:22:37 repeat debridement Pedro Curry
PROCEDURES:  Debridement, external genitalia, perineum, and abdominal wall, for necrotizing soft tissue infection 10-Apr-2020 15:22:37 repeat debridement Pedro Curry
PROCEDURES:  Incision and drainage of right elbow 15-Apr-2020 14:20:58  Beck Booker
PROCEDURES:  Exploration of left groin 10-Apr-2020 15:21:23  Pedro Curry  Debridement, external genitalia, perineum, and abdominal wall, for necrotizing soft tissue infection 10-Apr-2020 15:22:37  Pedro Curry

## 2020-04-23 NOTE — PROGRESS NOTE ADULT - SUBJECTIVE AND OBJECTIVE BOX
History:  The patient is status post serial left  groin debridements.  Currently patient is doing well. The patient's pain is controlled using the prescribed pain medications. The patient is participating in physical therapy. Denies nausea, vomiting, chest pain, shortness of breath, abdominal pain or fever. No acute motor or sensory changes are reported. Persistent blanchable rash to the patients back c/w possible drug reaction. ABX dc'd yesterday with some improvement. Pt is NPO p MN for OR today with Dr Johnson for wound closure    Vital Signs Last 24 Hrs  T(C): 36.8 (23 Apr 2020 08:03), Max: 36.8 (22 Apr 2020 21:45)  T(F): 98.2 (23 Apr 2020 08:03), Max: 98.3 (22 Apr 2020 21:45)  HR: 64 (23 Apr 2020 08:03) (64 - 74)  BP: 153/69 (23 Apr 2020 08:03) (153/69 - 170/74)  BP(mean): --  RR: 16 (23 Apr 2020 08:03) (15 - 16)  SpO2: 96% (23 Apr 2020 08:03) (94% - 96%)  I&O's Summary    22 Apr 2020 07:01  -  23 Apr 2020 07:00  --------------------------------------------------------  IN: 815 mL / OUT: 3400 mL / NET: -2585 mL                        10.9   7.83  )-----------( 403      ( 23 Apr 2020 06:19 )             34.0   04-23    143  |  108<H>  |  38.0<H>  ----------------------------<  166<H>  4.9   |  23.0  |  1.76<H>    Ca    8.0<L>      23 Apr 2020 06:19  Mg     1.7     04-23    CAPILLARY BLOOD GLUCOSE  POCT Blood Glucose.: 189 mg/dL (23 Apr 2020 07:13)  POCT Blood Glucose.: 148 mg/dL (22 Apr 2020 21:44)  POCT Blood Glucose.: 275 mg/dL (22 Apr 2020 16:30)  POCT Blood Glucose.: 325 mg/dL (22 Apr 2020 11:19)      MEDICATIONS  (STANDING):  aspirin enteric coated 81 milliGRAM(s) Oral daily  atorvastatin 80 milliGRAM(s) Oral at bedtime  carvedilol 25 milliGRAM(s) Oral every 12 hours  clopidogrel Tablet 75 milliGRAM(s) Oral daily  colchicine 0.6 milliGRAM(s) Oral daily  digoxin     Tablet 0.125 milliGRAM(s) Oral daily  epoetin-adrian-epbx (RETACRIT) Injectable 92088 Unit(s) SubCutaneous every 7 days  finasteride 5 milliGRAM(s) Oral daily  folic acid 1 milliGRAM(s) Oral daily  heparin  Injectable 5000 Unit(s) SubCutaneous every 8 hours  insulin glargine Injectable (LANTUS) 40 Unit(s) SubCutaneous at bedtime  insulin lispro (HumaLOG) corrective regimen sliding scale   SubCutaneous at bedtime  insulin lispro Injectable (HumaLOG) 20 Unit(s) SubCutaneous three times a day before meals  lactobacillus acidophilus 1 Tablet(s) Oral three times a day with meals  magnesium oxide 400 milliGRAM(s) Oral daily  magnesium sulfate  IVPB 2 Gram(s) IV Intermittent once  pantoprazole    Tablet 40 milliGRAM(s) Oral before breakfast  senna 2 Tablet(s) Oral at bedtime  simethicone 80 milliGRAM(s) Chew every 6 hours  sodium chloride 0.9%. 1000 milliLiter(s) (75 mL/Hr) IV Continuous <Continuous>    MEDICATIONS  (PRN):  acetaminophen   Tablet .. 650 milliGRAM(s) Oral every 6 hours PRN Temp greater or equal to 38C (100.4F)  dextrose 40% Gel 15 Gram(s) Oral once PRN Blood Glucose LESS THAN 70 milliGRAM(s)/deciLiter  diphenhydrAMINE 25 milliGRAM(s) Oral every 4 hours PRN Rash and/or Itching  ondansetron Injectable 4 milliGRAM(s) IV Push every 6 hours PRN Nausea and/or Vomiting      Physical exam:   No distress  left groin, The dressing with serous drainage. Dressing changed and wound bed clean with old hematoma noted lat prox wound. No lizzeth wound erythema, or malodor is noted.   rash to the trunk, nonraised, pruritic. Improved since yesterday    Primary  Assessment:  - S/p serial groin debridements  - patient currently stable, with clean open wound   - persistent rash to back, drug vs contact  improved    Plan:     - maintain NPO for OR today  - ABX off for now. Discussed with Dr Navarro  - continue benadryl as needed and green sheets

## 2020-04-23 NOTE — PROGRESS NOTE ADULT - SUBJECTIVE AND OBJECTIVE BOX
Pt with open wound of left groin s/p debridement for nec fasciitis. SOme necrotic tissue present with undermining and area of bleeding superiorly.   Plan today for debridement, parital closure, and VAC placement  Plan for discharge with home VAC to allow wound to contract over time.    Will need skin grafting in the future.  R/B/A explianed including delayed healing, infection, need for more complex flap reconstruction.  He understands and agrees to proceed.

## 2020-04-23 NOTE — PROGRESS NOTE ADULT - SUBJECTIVE AND OBJECTIVE BOX
NEPHROLOGY INTERVAL HPI/OVERNIGHT EVENTS:  pt comfortable when seen earlier  no cp, sob, n/v/d  Very weak    MEDICATIONS  (STANDING):  aspirin enteric coated 81 milliGRAM(s) Oral daily  atorvastatin 80 milliGRAM(s) Oral at bedtime  carvedilol 25 milliGRAM(s) Oral every 12 hours  clopidogrel Tablet 75 milliGRAM(s) Oral daily  colchicine 0.6 milliGRAM(s) Oral daily  digoxin     Tablet 0.125 milliGRAM(s) Oral daily  epoetin-adrian-epbx (RETACRIT) Injectable 43750 Unit(s) SubCutaneous every 7 days  finasteride 5 milliGRAM(s) Oral daily  folic acid 1 milliGRAM(s) Oral daily  heparin  Injectable 5000 Unit(s) SubCutaneous every 8 hours  insulin glargine Injectable (LANTUS) 40 Unit(s) SubCutaneous at bedtime  insulin lispro (HumaLOG) corrective regimen sliding scale   SubCutaneous at bedtime  insulin lispro Injectable (HumaLOG) 20 Unit(s) SubCutaneous three times a day before meals  lactobacillus acidophilus 1 Tablet(s) Oral three times a day with meals  magnesium oxide 400 milliGRAM(s) Oral daily  magnesium sulfate  IVPB 2 Gram(s) IV Intermittent once  pantoprazole    Tablet 40 milliGRAM(s) Oral before breakfast  senna 2 Tablet(s) Oral at bedtime  simethicone 80 milliGRAM(s) Chew every 6 hours  sodium chloride 0.9%. 1000 milliLiter(s) (75 mL/Hr) IV Continuous <Continuous>    MEDICATIONS  (PRN):  acetaminophen   Tablet .. 650 milliGRAM(s) Oral every 6 hours PRN Temp greater or equal to 38C (100.4F)  dextrose 40% Gel 15 Gram(s) Oral once PRN Blood Glucose LESS THAN 70 milliGRAM(s)/deciLiter  diphenhydrAMINE 25 milliGRAM(s) Oral every 4 hours PRN Rash and/or Itching  ondansetron Injectable 4 milliGRAM(s) IV Push every 6 hours PRN Nausea and/or Vomiting      Allergies    niacin (Flushing; Rash)          Vital Signs Last 24 Hrs  T(C): 36.8 (23 Apr 2020 08:03), Max: 36.8 (22 Apr 2020 21:45)  T(F): 98.2 (23 Apr 2020 08:03), Max: 98.3 (22 Apr 2020 21:45)  HR: 64 (23 Apr 2020 08:03) (64 - 74)  BP: 153/69 (23 Apr 2020 08:03) (153/69 - 170/74)  BP(mean): --  RR: 16 (23 Apr 2020 08:03) (15 - 16)  SpO2: 96% (23 Apr 2020 08:03) (94% - 96%)    PHYSICAL EXAM:  Appears chronically ill  NECK: Supple, +JVD  NERVOUS SYSTEM: AAOx3  CHEST/LUNG: Clear with diminished BS at bases  HEART: No rub  ABDOMEN: Soft, Nontender, Nondistended; +BS  EXT: Tr LE edema    LABS:                        10.9   7.83  )-----------( 403      ( 23 Apr 2020 06:19 )             34.0     04-23    143  |  108<H>  |  38.0<H>  ----------------------------<  166<H>  4.9   |  23.0  |  1.76<H>    Creatinine, Serum: 1.98 mg/dL (04.22.20 @ 07:07)        Ca    8.0<L>      23 Apr 2020 06:19  Mg     1.7     04-23          Magnesium, Serum: 1.7 mg/dL (04-23 @ 06:19)      RADIOLOGY & ADDITIONAL TESTS:  < from: US Renal (04.09.20 @ 18:07) >   EXAM:  US KIDNEY(S)                          PROCEDURE DATE:  04/09/2020          INTERPRETATION:  CLINICAL INFORMATION: Chronic kidney disease, acute kidney injury    COMPARISON: None available.    TECHNIQUE: Sonography of the kidneys.     FINDINGS:    Right kidney:  10.8 cm. No hydronephrosis.    Left kidney:  11.4 cm. No hydronephrosis.    IMPRESSION:     Normal renal ultrasound.    < end of copied text >

## 2020-04-23 NOTE — CHART NOTE - NSCHARTNOTEFT_GEN_A_CORE
POST-OPERATIVE NOTE    Patient is now several hours post-op s/p debridement, external genitalia, perineum, and abdominal wall, for necrotizing soft tissue infection left groin. Wound vac in place with good seal. No acute complaints at this time. Pain is well controlled. No CP or SOB.      Vital Signs Last 24 Hrs  T(C): 36.6 (23 Apr 2020 18:21), Max: 36.8 (22 Apr 2020 21:45)  T(F): 97.9 (23 Apr 2020 18:21), Max: 98.3 (22 Apr 2020 21:45)  HR: 65 (23 Apr 2020 18:21) (64 - 74)  BP: 186/76 (23 Apr 2020 18:21) (151/68 - 186/76)  BP(mean): --  RR: 18 (23 Apr 2020 18:21) (14 - 18)  SpO2: 99% (23 Apr 2020 18:21) (96% - 100%)  I&O's Detail    22 Apr 2020 07:01  -  23 Apr 2020 07:00  --------------------------------------------------------  IN:    Oral Fluid: 240 mL    sodium chloride 0.9%.: 525 mL    Solution: 50 mL  Total IN: 815 mL    OUT:    Indwelling Catheter - Urethral: 3400 mL  Total OUT: 3400 mL    Total NET: -2585 mL      23 Apr 2020 07:01  -  23 Apr 2020 19:36  --------------------------------------------------------  IN:  Total IN: 0 mL    OUT:    Ureteral Catheter: 1000 mL  Total OUT: 1000 mL    Total NET: -1000 mL        aspirin enteric coated 81  carvedilol 25  clopidogrel Tablet 75  digoxin     Tablet 0.125  heparin  Injectable 5000    PAST MEDICAL & SURGICAL HISTORY:  Insulin resistance  Fatty liver  CKD (chronic kidney disease)  BPH (benign prostatic hyperplasia)  Dyslipidemia  Hypertension  CAD (coronary artery disease)  DVT of leg (deep venous thrombosis), right  Diabetes: Peripheral vascular disease  CHF- EF-19% AICD ([facility])  MI  December 2013   HTN  Pulmonary edema  High Cholesterol  Chronic renal insufficency  S/P thyroid biopsy  S/P colonoscopy with polypectomy  AICD (automatic cardioverter/defibrillator) present  S/P angioplasty with stent: right leg 08/14, L femoral 7/2014  S/P coronary artery bypass graft x 3: 2014    AICD ([employer]) 2014        Physical Exam:  General: NAD, resting comfortably in bed  Pulmonary: Nonlabored breathing, no respiratory distress  Cardiovascular: NSR  Abdominal: soft, NT/ND  Extremities: WWP      LABS:                        10.9   7.83  )-----------( 403      ( 23 Apr 2020 06:19 )             34.0     04-23    143  |  108<H>  |  38.0<H>  ----------------------------<  166<H>  4.9   |  23.0  |  1.76<H>    Ca    8.0<L>      23 Apr 2020 06:19  Mg     1.7     04-23        CAPILLARY BLOOD GLUCOSE      POCT Blood Glucose.: 151 mg/dL (23 Apr 2020 11:23)  POCT Blood Glucose.: 189 mg/dL (23 Apr 2020 07:13)  POCT Blood Glucose.: 148 mg/dL (22 Apr 2020 21:44)        Assessment:  The patient is a 64y Male who is now several hours post-op from a debridement, external genitalia, perineum, and abdominal wall, for necrotizing soft tissue infection left groin. Found to be COVID pre-op, transferred to COVID unit for continued management. Recovering appropriately.      Plan:  - Pain control as needed  - DVT ppx  - Wound Vac at 75mmHg, increase to 125mmHg in AM  - F/u AM labs  - Max  -PT/OT

## 2020-04-23 NOTE — PROGRESS NOTE ADULT - SUBJECTIVE AND OBJECTIVE BOX
[facility], [facility]                                   [address] [place] [address]                                                      PHONE: [phone]                                                         FAX: [phone]  [email or web address]  ---------------------------------------------------------------------------------------------------------------------------------    Overnight events/patient complaints: No events overnight.  Patient in holding pre surgery.  No dyspnea or chest pain.       PAST MEDICAL & SURGICAL HISTORY:  Insulin resistance  Fatty liver  CKD (chronic kidney disease)  BPH (benign prostatic hyperplasia)  Dyslipidemia  Hypertension  CAD (coronary artery disease)  DVT of leg (deep venous thrombosis), right  Diabetes: Peripheral vascular disease  CHF- EF-19% AICD ([employer])  MI December 2013   HTN  Pulmonary edema  High Cholesterol  Chronic renal insufficency  S/P thyroid biopsy  S/P colonoscopy with polypectomy  AICD (automatic cardioverter/defibrillator) present  S/P angioplasty with stent: right leg 08/14, L femoral 7/2014  S/P coronary artery bypass graft x 3: 2014    AICD (b[employer] scientific) 2014      niacin (Flushing; Rash)    MEDICATIONS  (STANDING):  aspirin enteric coated 81 milliGRAM(s) Oral daily  atorvastatin 80 milliGRAM(s) Oral at bedtime  carvedilol 25 milliGRAM(s) Oral every 12 hours  clopidogrel Tablet 75 milliGRAM(s) Oral daily  colchicine 0.6 milliGRAM(s) Oral daily  digoxin     Tablet 0.125 milliGRAM(s) Oral daily  epoetin-adrian-epbx (RETACRIT) Injectable 36961 Unit(s) SubCutaneous every 7 days  finasteride 5 milliGRAM(s) Oral daily  folic acid 1 milliGRAM(s) Oral daily  heparin  Injectable 5000 Unit(s) SubCutaneous every 8 hours  insulin glargine Injectable (LANTUS) 40 Unit(s) SubCutaneous at bedtime  insulin lispro (HumaLOG) corrective regimen sliding scale   SubCutaneous at bedtime  insulin lispro Injectable (HumaLOG) 20 Unit(s) SubCutaneous three times a day before meals  lactobacillus acidophilus 1 Tablet(s) Oral three times a day with meals  magnesium oxide 400 milliGRAM(s) Oral daily  magnesium sulfate  IVPB 2 Gram(s) IV Intermittent once  pantoprazole    Tablet 40 milliGRAM(s) Oral before breakfast  senna 2 Tablet(s) Oral at bedtime  simethicone 80 milliGRAM(s) Chew every 6 hours  sodium chloride 0.9%. 1000 milliLiter(s) (75 mL/Hr) IV Continuous <Continuous>    MEDICATIONS  (PRN):  acetaminophen   Tablet .. 650 milliGRAM(s) Oral every 6 hours PRN Temp greater or equal to 38C (100.4F)  dextrose 40% Gel 15 Gram(s) Oral once PRN Blood Glucose LESS THAN 70 milliGRAM(s)/deciLiter  diphenhydrAMINE 25 milliGRAM(s) Oral every 4 hours PRN Rash and/or Itching  ondansetron Injectable 4 milliGRAM(s) IV Push every 6 hours PRN Nausea and/or Vomiting      Vital Signs Last 24 Hrs  T(C): 36.8 (23 Apr 2020 08:03), Max: 36.8 (22 Apr 2020 21:45)  T(F): 98.2 (23 Apr 2020 08:03), Max: 98.3 (22 Apr 2020 21:45)  HR: 64 (23 Apr 2020 08:03) (64 - 74)  BP: 153/69 (23 Apr 2020 08:03) (153/69 - 170/74)  BP(mean): --  RR: 16 (23 Apr 2020 08:03) (15 - 16)  SpO2: 96% (23 Apr 2020 08:03) (94% - 96%)  ICU Vital Signs Last 24 Hrs  MARIA ROSS  I&O's Detail    22 Apr 2020 07:01  -  23 Apr 2020 07:00  --------------------------------------------------------  IN:    Oral Fluid: 240 mL    sodium chloride 0.9%.: 525 mL    Solution: 50 mL  Total IN: 815 mL    OUT:    Indwelling Catheter - Urethral: 3400 mL  Total OUT: 3400 mL    Total NET: -2585 mL        I&O's Summary    22 Apr 2020 07:01  -  23 Apr 2020 07:00  --------------------------------------------------------  IN: 815 mL / OUT: 3400 mL / NET: -2585 mL      Drug Dosing Weight  MARIA ROSS      PHYSICAL EXAM:  General: Appears well developed, well nourished alert and cooperative.  HEENT: Head; normocephalic, atraumatic.  Eyes: Pupils reactive, cornea wnl.  Neck: Supple, no nodes adenopathy, no NVD or carotid bruit or thyromegaly.  CARDIOVASCULAR: Normal S1 and S2, No murmur, rub, gallop or lift.   LUNGS: No rales, rhonchi or wheeze. Normal breath sounds bilaterally.  ABDOMEN: Soft, nontender without mass or organomegaly. bowel sounds normoactive.  EXTREMITIES: No clubbing, cyanosis or edema. R prox forearm incision clean and dry; l groin packed  SKIN: warm and dry with normal turgor.  NEURO: Alert/oriented x 3/normal motor exam. No pathologic reflexes.    PSYCH: normal affect.        LABS:                        10.9   7.83  )-----------( 403      ( 23 Apr 2020 06:19 )             34.0     04-23    143  |  108<H>  |  38.0<H>  ----------------------------<  166<H>  4.9   |  23.0  |  1.76<H>    Ca    8.0<L>      23 Apr 2020 06:19  Mg     1.7     04-23      MARIA ROSS            VENTRICLES: LV not done Cr 1.7 Recent YOLANDA 55%  CORONARY VESSELS: The coronary circulation is right dominant.  LM:   --  LM: Normal.  LAD:   --  Mid LAD: There was a 100 % stenosis. There was good blood supply  to the distal myocardium from a graft.  --  D1: There was a 90 % stenosis.  CX:   --  Proximal circumflex: There was a 100 % stenosis. There was good  blood supply to the distal myocardium from a graft.  RCA:   -- Mid RCA: There was a 100 % stenosis. There was good blood supply  to the distal myocardium from a graft.  GRAFTS:   --  Graft to the mid LAD: The graft was a LIMA. It was normal.  --  Graft to the 1st obtuse marginal: The graft was a saphenous vein graft  from the aorta. It was normal.  --  Graft to the RPDA: The graft was a saphenous vein graft from the aorta.  It was normal.  COMPLICATIONS: There were no complications. No complications occurred  during the cath lab visit.  DIAGNOSTIC IMPRESSIONS: Prior LIMA to LAD and SVG to OM and RPDA are  patent. Severe LAD-maria e disease  DIAGNOSTIC RECOMMENDATIONS: Plan for LAD-maria e PCI  INTERVENTIONAL IMPRESSIONS: Prior LIMA to LAD and SVG to OM and RPDA are  patent. Severe LAD-maria e disease  INTERVENTIONAL RECOMMENDATIONS: Plan for LAD-maria e PCI  Prepared and signed by  Julio Blanco MD  Signed 03/11/2020 15:24:54    < end of copied text >      ASSESSMENT AND PLAN:  In summary, MARIA ROSS is an 64y Male with past medical history significant for CAD sp PCI 3/2020, ICM currently euvolemic, ICD, DM, sp right elbow I/D and left groin debridement.     CAD s/p PCI-  cw DAPT event throughout surgery.        -optimized for surgery       -No Cardiovascular Contraindication to surgery       -Patient is elevated risk for a high risk surgery  2) Transfuse as necessary PRBC, trend Hb continues to drop  3) Vascular following. Plan for OR today 4/23  4) cw  lipitor, coreg  5) cw torsemide

## 2020-04-23 NOTE — BRIEF OPERATIVE NOTE - NSICDXBRIEFPREOP_GEN_ALL_CORE_FT
PRE-OP DIAGNOSIS:  Necrotizing subcutaneous infection 10-Apr-2020 15:23:52 expansion to the medial anterior thigh Pedro Curry
PRE-OP DIAGNOSIS:  Necrotizing subcutaneous infection 10-Apr-2020 15:23:52 expansion to the medial anterior thigh Pedro Curry
PRE-OP DIAGNOSIS:  Septic joint of right elbow 15-Apr-2020 14:21:53  Beck Booker
PRE-OP DIAGNOSIS:  Necrotizing subcutaneous infection 10-Apr-2020 15:23:52  Pedro Curry

## 2020-04-23 NOTE — PROGRESS NOTE ADULT - ASSESSMENT
63 y/o male with extensive cardiac history, htn, DM came to the ER for left groin area infection which started 1 week ago after he had angiography through left groin area 2 weeks ago, got 3 stents ( done at [facility] by ok [facility] ). Pt stated that he has been on levaquin by his doctor but getting worse.       Left groin necrotizing soft tissue infectoin  Leukocytosis  MARSHALL on CKD  Right elbow gout  Anemia ? blood loss  ?allergic reaction    - s/p left groin exploration washout and wide excisional debridement of necrotizing soft tissue infection on 4/10, redebridement 4/16  - s/p right elbow washout- cultures negative, MSU crystals noted  - Blood cultures NGTD  - OR cultures- staph lugdunensis, finegoldia magna  - WBC normal  -  on Zosyn since 4/9- abx broadened to meropenem + clindamycin on 4/16/20-all abx stopped due to rash and possible allergic reaction. completed 14 days of abx  - plan for redebridement and vac placement    please call with questions 65 y/o male with extensive cardiac history, htn, DM came to the ER for left groin area infection which started 1 week ago after he had angiography through left groin area 2 weeks ago, got 3 stents ( done at [facility] by ok marsh ). Pt stated that he has been on levaquin by his doctor but getting worse.       Left groin necrotizing soft tissue infectoin  Leukocytosis  MARSHALL on CKD  Right elbow gout  Anemia ? blood loss  ?allergic reaction    - s/p left groin exploration washout and wide excisional debridement of necrotizing soft tissue infection on 4/10, redebridement 4/16  - s/p right elbow washout- cultures negative, MSU crystals noted  - Blood cultures NGTD  - OR cultures- staph lugdunensis, finegoldia magna  - WBC normal  -  on Zosyn since 4/9- abx broadened to meropenem + clindamycin on 4/16/20-all abx stopped due to rash and possible allergic reaction. completed 14 days of abx. Rash may have been secondary to clindamycin give history-would exercise caution if requires yaya or clinda in the cuture  - plan for redebridement and vac placement  - Outpatietn allergy eval to clarify allergy history post pandemic    please call with questions

## 2020-04-24 LAB
ANION GAP SERPL CALC-SCNC: 10 MMOL/L — SIGNIFICANT CHANGE UP (ref 5–17)
BASOPHILS # BLD AUTO: 0.02 K/UL — SIGNIFICANT CHANGE UP (ref 0–0.2)
BASOPHILS NFR BLD AUTO: 0.2 % — SIGNIFICANT CHANGE UP (ref 0–2)
BUN SERPL-MCNC: 38 MG/DL — HIGH (ref 8–20)
CALCIUM SERPL-MCNC: 7.7 MG/DL — LOW (ref 8.6–10.2)
CHLORIDE SERPL-SCNC: 105 MMOL/L — SIGNIFICANT CHANGE UP (ref 98–107)
CO2 SERPL-SCNC: 23 MMOL/L — SIGNIFICANT CHANGE UP (ref 22–29)
CREAT SERPL-MCNC: 1.68 MG/DL — HIGH (ref 0.5–1.3)
EOSINOPHIL # BLD AUTO: 0.7 K/UL — HIGH (ref 0–0.5)
EOSINOPHIL NFR BLD AUTO: 8.6 % — HIGH (ref 0–6)
GLUCOSE BLDC GLUCOMTR-MCNC: 199 MG/DL — HIGH (ref 70–99)
GLUCOSE BLDC GLUCOMTR-MCNC: 285 MG/DL — HIGH (ref 70–99)
GLUCOSE BLDC GLUCOMTR-MCNC: 297 MG/DL — HIGH (ref 70–99)
GLUCOSE BLDC GLUCOMTR-MCNC: 307 MG/DL — HIGH (ref 70–99)
GLUCOSE SERPL-MCNC: 334 MG/DL — HIGH (ref 70–99)
HCT VFR BLD CALC: 34.1 % — LOW (ref 39–50)
HGB BLD-MCNC: 10.8 G/DL — LOW (ref 13–17)
IMM GRANULOCYTES NFR BLD AUTO: 0.6 % — SIGNIFICANT CHANGE UP (ref 0–1.5)
LYMPHOCYTES # BLD AUTO: 1.07 K/UL — SIGNIFICANT CHANGE UP (ref 1–3.3)
LYMPHOCYTES # BLD AUTO: 13.2 % — SIGNIFICANT CHANGE UP (ref 13–44)
MAGNESIUM SERPL-MCNC: 1.6 MG/DL — LOW (ref 1.8–2.6)
MCHC RBC-ENTMCNC: 29.2 PG — SIGNIFICANT CHANGE UP (ref 27–34)
MCHC RBC-ENTMCNC: 31.7 GM/DL — LOW (ref 32–36)
MCV RBC AUTO: 92.2 FL — SIGNIFICANT CHANGE UP (ref 80–100)
MONOCYTES # BLD AUTO: 0.43 K/UL — SIGNIFICANT CHANGE UP (ref 0–0.9)
MONOCYTES NFR BLD AUTO: 5.3 % — SIGNIFICANT CHANGE UP (ref 2–14)
NEUTROPHILS # BLD AUTO: 5.83 K/UL — SIGNIFICANT CHANGE UP (ref 1.8–7.4)
NEUTROPHILS NFR BLD AUTO: 72.1 % — SIGNIFICANT CHANGE UP (ref 43–77)
PHOSPHATE SERPL-MCNC: 2.9 MG/DL — SIGNIFICANT CHANGE UP (ref 2.4–4.7)
PLATELET # BLD AUTO: 394 K/UL — SIGNIFICANT CHANGE UP (ref 150–400)
POTASSIUM SERPL-MCNC: 5.9 MMOL/L — HIGH (ref 3.5–5.3)
POTASSIUM SERPL-SCNC: 5.9 MMOL/L — HIGH (ref 3.5–5.3)
RBC # BLD: 3.7 M/UL — LOW (ref 4.2–5.8)
RBC # FLD: 15.3 % — HIGH (ref 10.3–14.5)
SODIUM SERPL-SCNC: 138 MMOL/L — SIGNIFICANT CHANGE UP (ref 135–145)
WBC # BLD: 8.1 K/UL — SIGNIFICANT CHANGE UP (ref 3.8–10.5)
WBC # FLD AUTO: 8.1 K/UL — SIGNIFICANT CHANGE UP (ref 3.8–10.5)

## 2020-04-24 PROCEDURE — 93010 ELECTROCARDIOGRAM REPORT: CPT

## 2020-04-24 PROCEDURE — 99222 1ST HOSP IP/OBS MODERATE 55: CPT

## 2020-04-24 RX ORDER — ENOXAPARIN SODIUM 100 MG/ML
40 INJECTION SUBCUTANEOUS DAILY
Refills: 0 | Status: DISCONTINUED | OUTPATIENT
Start: 2020-04-24 | End: 2020-04-27

## 2020-04-24 RX ORDER — INSULIN LISPRO 100/ML
VIAL (ML) SUBCUTANEOUS
Refills: 0 | Status: DISCONTINUED | OUTPATIENT
Start: 2020-04-24 | End: 2020-04-27

## 2020-04-24 RX ORDER — SODIUM POLYSTYRENE SULFONATE 4.1 MEQ/G
30 POWDER, FOR SUSPENSION ORAL ONCE
Refills: 0 | Status: COMPLETED | OUTPATIENT
Start: 2020-04-24 | End: 2020-04-24

## 2020-04-24 RX ORDER — INSULIN LISPRO 100/ML
26 VIAL (ML) SUBCUTANEOUS
Refills: 0 | Status: DISCONTINUED | OUTPATIENT
Start: 2020-04-24 | End: 2020-04-24

## 2020-04-24 RX ORDER — SODIUM ZIRCONIUM CYCLOSILICATE 10 G/10G
5 POWDER, FOR SUSPENSION ORAL ONCE
Refills: 0 | Status: COMPLETED | OUTPATIENT
Start: 2020-04-24 | End: 2020-04-24

## 2020-04-24 RX ORDER — ACETAMINOPHEN 500 MG
650 TABLET ORAL EVERY 6 HOURS
Refills: 0 | Status: DISCONTINUED | OUTPATIENT
Start: 2020-04-24 | End: 2020-04-27

## 2020-04-24 RX ORDER — MAGNESIUM SULFATE 500 MG/ML
2 VIAL (ML) INJECTION ONCE
Refills: 0 | Status: COMPLETED | OUTPATIENT
Start: 2020-04-24 | End: 2020-04-24

## 2020-04-24 RX ORDER — INSULIN LISPRO 100/ML
VIAL (ML) SUBCUTANEOUS AT BEDTIME
Refills: 0 | Status: DISCONTINUED | OUTPATIENT
Start: 2020-04-24 | End: 2020-04-27

## 2020-04-24 RX ORDER — INSULIN LISPRO 100/ML
20 VIAL (ML) SUBCUTANEOUS
Refills: 0 | Status: DISCONTINUED | OUTPATIENT
Start: 2020-04-24 | End: 2020-04-27

## 2020-04-24 RX ORDER — TAMSULOSIN HYDROCHLORIDE 0.4 MG/1
0.4 CAPSULE ORAL AT BEDTIME
Refills: 0 | Status: DISCONTINUED | OUTPATIENT
Start: 2020-04-24 | End: 2020-04-27

## 2020-04-24 RX ORDER — INSULIN GLARGINE 100 [IU]/ML
60 INJECTION, SOLUTION SUBCUTANEOUS AT BEDTIME
Refills: 0 | Status: DISCONTINUED | OUTPATIENT
Start: 2020-04-24 | End: 2020-04-25

## 2020-04-24 RX ADMIN — CLOPIDOGREL BISULFATE 75 MILLIGRAM(S): 75 TABLET, FILM COATED ORAL at 12:53

## 2020-04-24 RX ADMIN — Medication 26 UNIT(S): at 12:43

## 2020-04-24 RX ADMIN — Medication 6: at 20:52

## 2020-04-24 RX ADMIN — PANTOPRAZOLE SODIUM 40 MILLIGRAM(S): 20 TABLET, DELAYED RELEASE ORAL at 05:04

## 2020-04-24 RX ADMIN — Medication 1 TABLET(S): at 12:54

## 2020-04-24 RX ADMIN — HEPARIN SODIUM 5000 UNIT(S): 5000 INJECTION INTRAVENOUS; SUBCUTANEOUS at 12:53

## 2020-04-24 RX ADMIN — CARVEDILOL PHOSPHATE 25 MILLIGRAM(S): 80 CAPSULE, EXTENDED RELEASE ORAL at 17:04

## 2020-04-24 RX ADMIN — INSULIN GLARGINE 60 UNIT(S): 100 INJECTION, SOLUTION SUBCUTANEOUS at 20:50

## 2020-04-24 RX ADMIN — SIMETHICONE 80 MILLIGRAM(S): 80 TABLET, CHEWABLE ORAL at 17:05

## 2020-04-24 RX ADMIN — FINASTERIDE 5 MILLIGRAM(S): 5 TABLET, FILM COATED ORAL at 12:53

## 2020-04-24 RX ADMIN — Medication 0.6 MILLIGRAM(S): at 12:53

## 2020-04-24 RX ADMIN — CARVEDILOL PHOSPHATE 25 MILLIGRAM(S): 80 CAPSULE, EXTENDED RELEASE ORAL at 05:04

## 2020-04-24 RX ADMIN — Medication 20 UNIT(S): at 09:07

## 2020-04-24 RX ADMIN — Medication 1 TABLET(S): at 09:07

## 2020-04-24 RX ADMIN — Medication 1 MILLIGRAM(S): at 12:53

## 2020-04-24 RX ADMIN — Medication 1 TABLET(S): at 17:05

## 2020-04-24 RX ADMIN — ATORVASTATIN CALCIUM 80 MILLIGRAM(S): 80 TABLET, FILM COATED ORAL at 20:50

## 2020-04-24 RX ADMIN — TAMSULOSIN HYDROCHLORIDE 0.4 MILLIGRAM(S): 0.4 CAPSULE ORAL at 23:06

## 2020-04-24 RX ADMIN — SODIUM POLYSTYRENE SULFONATE 30 GRAM(S): 4.1 POWDER, FOR SUSPENSION ORAL at 12:41

## 2020-04-24 RX ADMIN — Medication 0.12 MILLIGRAM(S): at 05:04

## 2020-04-24 RX ADMIN — SIMETHICONE 80 MILLIGRAM(S): 80 TABLET, CHEWABLE ORAL at 20:50

## 2020-04-24 RX ADMIN — HEPARIN SODIUM 5000 UNIT(S): 5000 INJECTION INTRAVENOUS; SUBCUTANEOUS at 05:04

## 2020-04-24 RX ADMIN — MAGNESIUM OXIDE 400 MG ORAL TABLET 400 MILLIGRAM(S): 241.3 TABLET ORAL at 12:54

## 2020-04-24 RX ADMIN — SIMETHICONE 80 MILLIGRAM(S): 80 TABLET, CHEWABLE ORAL at 12:54

## 2020-04-24 RX ADMIN — Medication 81 MILLIGRAM(S): at 12:53

## 2020-04-24 RX ADMIN — Medication 50 GRAM(S): at 17:04

## 2020-04-24 RX ADMIN — SIMETHICONE 80 MILLIGRAM(S): 80 TABLET, CHEWABLE ORAL at 05:04

## 2020-04-24 RX ADMIN — Medication 20 UNIT(S): at 17:55

## 2020-04-24 RX ADMIN — SODIUM ZIRCONIUM CYCLOSILICATE 5 GRAM(S): 10 POWDER, FOR SUSPENSION ORAL at 22:50

## 2020-04-24 NOTE — PROGRESS NOTE ADULT - ASSESSMENT
Assessment  Left groin infected hematoma s/p debridement  Stable CAD s/p recent PCI several weeks ago asx on DAPT  Ischemic CM with improved EF on meds (45%) no evidence of CHF  ICD  CRI with stable creat   drug eruption  right elbow gout  Covid + afebrile w/o resp sx  DM      Rec  cont DAPT/coreg and statin  will discuss treating with ID use of plaquenil given pt in extremely high risk group for morbidity/mortality given underyling cardiac disease  resume telemetry if plaquenil started

## 2020-04-24 NOTE — PROGRESS NOTE ADULT - SUBJECTIVE AND OBJECTIVE BOX
Remote consultation done due to COVID 19 Pandemic.  Chart reviewed.    Interval HPI:  Dose of premeal insulin was increased to 26 units starting with lunch today.  Had debridement done yesterday.  Had fasting hyperglycemia after 45 units of Lantus was administered.      MEDICATIONS  (STANDING):  aspirin enteric coated 81 milliGRAM(s) Oral daily  atorvastatin 80 milliGRAM(s) Oral at bedtime  carvedilol 25 milliGRAM(s) Oral every 12 hours  clopidogrel Tablet 75 milliGRAM(s) Oral daily  colchicine 0.6 milliGRAM(s) Oral daily  digoxin     Tablet 0.125 milliGRAM(s) Oral daily  enoxaparin Injectable 40 milliGRAM(s) SubCutaneous daily  epoetin-adrian-epbx (RETACRIT) Injectable 09176 Unit(s) SubCutaneous every 7 days  finasteride 5 milliGRAM(s) Oral daily  folic acid 1 milliGRAM(s) Oral daily  insulin glargine Injectable (LANTUS) 45 Unit(s) SubCutaneous at bedtime  insulin lispro (HumaLOG) corrective regimen sliding scale   SubCutaneous at bedtime  insulin lispro Injectable (HumaLOG) 26 Unit(s) SubCutaneous three times a day before meals  lactobacillus acidophilus 1 Tablet(s) Oral three times a day with meals  magnesium oxide 400 milliGRAM(s) Oral daily  magnesium sulfate  IVPB 2 Gram(s) IV Intermittent once  pantoprazole    Tablet 40 milliGRAM(s) Oral before breakfast  senna 2 Tablet(s) Oral at bedtime  simethicone 80 milliGRAM(s) Chew every 6 hours  sodium zirconium cyclosilicate 5 Gram(s) Oral once    Vital Signs Last 24 Hrs  T(C): 37.3 (24 Apr 2020 07:48), Max: 37.3 (24 Apr 2020 07:48)  T(F): 99.2 (24 Apr 2020 07:48), Max: 99.2 (24 Apr 2020 07:48)  HR: 76 (24 Apr 2020 07:48) (65 - 79)  BP: 150/78 (24 Apr 2020 07:48) (147/76 - 186/76)  RR: 18 (24 Apr 2020 07:48) (14 - 18)  SpO2: 95% (24 Apr 2020 07:48) (95% - 100%)    PE:  deferred due to remote consultation    LABS:  CAPILLARY BLOOD GLUCOSE  POCT Blood Glucose.: 199 mg/dL (24 Apr 2020 16:42)  POCT Blood Glucose.: 307 mg/dL (24 Apr 2020 11:32)  POCT Blood Glucose.: 297 mg/dL (24 Apr 2020 08:22)  POCT Blood Glucose.: 202 mg/dL (23 Apr 2020 20:35)    04-24    138  |  105  |  38.0<H>  ----------------------------<  334<H>  5.9<H>   |  23.0  |  1.68<H>    Ca    7.7<L>      24 Apr 2020 06:59  Phos  2.9     04-24  Mg     1.6     04-24

## 2020-04-24 NOTE — PROGRESS NOTE ADULT - ASSESSMENT
CKD stage III: DM/HTN;  h/o CAD, S/P MI, CABG, ICM with AICD  MARSHALL resolved and now at baseline  Renal sono noted no hydronephrosis   s/p OR on 4/10- Debridement, external genitalia, perineum  I&D abscess   L groin hematoma/ cellulitis on abx/ Leukocytosis  COVID+ (asymptomatic)  Hyperkalemia  - avoid potential nephrotoxins  - low K+ diet; treat K+ medically now  - follow labs    Anemia:  S/P PRBCs   - cont LUISA as required to maintain Hgb > 10.0

## 2020-04-24 NOTE — PROGRESS NOTE ADULT - SUBJECTIVE AND OBJECTIVE BOX
NEPHROLOGY INTERVAL HPI/OVERNIGHT EVENTS:  pt comfortable when seen earlier  no cp, sob, n/v/d  pt too weak to mobilize significantly    MEDICATIONS  (STANDING):  aspirin enteric coated 81 milliGRAM(s) Oral daily  atorvastatin 80 milliGRAM(s) Oral at bedtime  carvedilol 25 milliGRAM(s) Oral every 12 hours  clopidogrel Tablet 75 milliGRAM(s) Oral daily  colchicine 0.6 milliGRAM(s) Oral daily  digoxin     Tablet 0.125 milliGRAM(s) Oral daily  epoetin-adrian-epbx (RETACRIT) Injectable 04323 Unit(s) SubCutaneous every 7 days  finasteride 5 milliGRAM(s) Oral daily  folic acid 1 milliGRAM(s) Oral daily  heparin  Injectable 5000 Unit(s) SubCutaneous every 8 hours  insulin glargine Injectable (LANTUS) 45 Unit(s) SubCutaneous at bedtime  insulin lispro (HumaLOG) corrective regimen sliding scale   SubCutaneous at bedtime  insulin lispro Injectable (HumaLOG) 26 Unit(s) SubCutaneous three times a day before meals  lactobacillus acidophilus 1 Tablet(s) Oral three times a day with meals  magnesium oxide 400 milliGRAM(s) Oral daily  magnesium sulfate  IVPB 2 Gram(s) IV Intermittent once  pantoprazole    Tablet 40 milliGRAM(s) Oral before breakfast  senna 2 Tablet(s) Oral at bedtime  simethicone 80 milliGRAM(s) Chew every 6 hours  sodium polystyrene sulfonate Suspension 30 Gram(s) Oral once    MEDICATIONS  (PRN):  acetaminophen   Tablet .. 650 milliGRAM(s) Oral every 6 hours PRN Temp greater or equal to 38C (100.4F)  acetaminophen   Tablet .. 650 milliGRAM(s) Oral every 6 hours PRN Mild Pain (1 - 3)  dextrose 40% Gel 15 Gram(s) Oral once PRN Blood Glucose LESS THAN 70 milliGRAM(s)/deciLiter  diphenhydrAMINE 25 milliGRAM(s) Oral every 4 hours PRN Rash and/or Itching  fentaNYL    Injectable 25 MICROGram(s) IV Push every 5 minutes PRN Moderate Pain (4 - 6)  HYDROmorphone  Injectable 0.5 milliGRAM(s) IV Push every 10 minutes PRN Moderate Pain (4 - 6)  ondansetron Injectable 4 milliGRAM(s) IV Push every 6 hours PRN Nausea and/or Vomiting      Allergies    clindamycin (Rash)  niacin (Flushing; Rash)    Intolerances            Vital Signs Last 24 Hrs  T(C): 37.3 (24 Apr 2020 07:48), Max: 37.3 (24 Apr 2020 07:48)  T(F): 99.2 (24 Apr 2020 07:48), Max: 99.2 (24 Apr 2020 07:48)  HR: 76 (24 Apr 2020 07:48) (65 - 79)  BP: 150/78 (24 Apr 2020 07:48) (147/76 - 186/76)  BP(mean): --  RR: 18 (24 Apr 2020 07:48) (14 - 18)  SpO2: 95% (24 Apr 2020 07:48) (95% - 100%)    PHYSICAL EXAM:  Weak, tired  NERVOUS SYSTEM: AAOx3  CHEST/LUNG: Clear with diminished BS at bases  EXT: Tr LE edema  Further exam deferred to limit COVID exposure    LABS:                        10.8   8.10  )-----------( 394      ( 24 Apr 2020 07:03 )             34.1     04-24    138  |  105  |  38.0<H>  ----------------------------<  334<H>  5.9<H>   |  23.0  |  1.68<H>    Ca    7.7<L>      24 Apr 2020 06:59  Phos  2.9     04-24  Mg     1.6     04-24    Albumin, Serum: 1.5 g/dL (04.17.20 @ 06:37)          Magnesium, Serum: 1.6 mg/dL (04-24 @ 06:59)  Phosphorus Level, Serum: 2.9 mg/dL (04-24 @ 06:59)      RADIOLOGY & ADDITIONAL TESTS:  < from: US Renal (04.09.20 @ 18:07) >     EXAM:  US KIDNEY(S)                          PROCEDURE DATE:  04/09/2020          INTERPRETATION:  CLINICAL INFORMATION: Chronic kidney disease, acute kidney injury    COMPARISON: None available.    TECHNIQUE: Sonography of the kidneys.     FINDINGS:    Right kidney:  10.8 cm. No hydronephrosis.    Left kidney:  11.4 cm. No hydronephrosis.    IMPRESSION:     Normal renal ultrasound.    < end of copied text >

## 2020-04-24 NOTE — PROGRESS NOTE ADULT - PROVIDER SPECIALTY LIST ADULT
----- Message from She Liang sent at 3/31/2020  7:32 AM CDT -----  Contact: Self [phone]  Patient is requesting an excuse doctors note in regards to bring to his employer about his COVI19 test came back positive.   Endocrinology

## 2020-04-24 NOTE — CONSULT NOTE ADULT - SUBJECTIVE AND OBJECTIVE BOX
64yM was admitted on 04-09    Patient is a 64y old  Male who presents with a chief complaint of left groin area infection (24 Apr 2020 09:39)    HPI: 63 y/o male with extensive cardiac history, HTN, DM came to the ER for left groin area infection which started 1 week ago after he had angiography through left groin area 2 weeks ago, got 3 stents   ( done at [facility] by [facility] ). Pt stated that he has been on levaquin by his doctor but getting worse. some pain in the left groin area, denies fever but had chills. no cp, no sob. no dizziness, no abd. pain. no n/v/d. As per pt. his cardiologist is aware that his groin area is not healing, was contacted via phone. (09 Apr 2020 01:25).  COVID +       Imaging showed (reviewed):  HEAD CT - No acute findings  CAP CT - No acute findings  C SPINE CT - No acute findings    REVIEW OF SYSTEMS  Constitutional - + fever, No weight loss, No fatigue  HEENT - No eye pain, No visual disturbances, No difficulty hearing, No tinnitus, No vertigo, No neck pain  Respiratory - No cough, No wheezing, No shortness of breath  Cardiovascular - No chest pain, No palpitations  Gastrointestinal - No abdominal pain, No nausea, No vomiting, No diarrhea, No constipation  Genitourinary - No dysuria, No frequency, No hematuria, No incontinence  Neurological - No headaches, No memory loss, No loss of strength, No numbness, No tremors  Skin - No itching, No rashes, No lesions   Endocrine - No temperature intolerance  Musculoskeletal - No joint pain, No joint swelling, No muscle pain  Psychiatric - No depression, No anxiety    VITALS  T(C): 37.3 (04-24-20 @ 07:48), Max: 37.3 (04-24-20 @ 07:48)  HR: 76 (04-24-20 @ 07:48) (65 - 79)  BP: 150/78 (04-24-20 @ 07:48) (147/76 - 186/76)  RR: 18 (04-24-20 @ 07:48) (14 - 18)  SpO2: 95% (04-24-20 @ 07:48) (95% - 100%)  Wt(kg): --    PAST MEDICAL & SURGICAL HISTORY  Insulin resistance  Fatty liver  CKD (chronic kidney disease)  BPH (benign prostatic hyperplasia)  Dyslipidemia  Hypertension  Renal insufficiency  CAD (coronary artery disease)  DVT of leg (deep venous thrombosis), right  Diabetes  S/P thyroid biopsy  S/P colonoscopy with polypectomy  AICD (automatic cardioverter/defibrillator) present  S/P angioplasty with stent  S/P coronary artery bypass graft x 3      SOCIAL HISTORY  Smoking - Denied  EtOH - Denied   Drugs - Denied    FUNCTIONAL HISTORY  Lives   Independent    CURRENT FUNCTIONAL STATUS      FAMILY HISTORY   Family history of heart disease      RECENT LABS/IMAGING  CBC Full  -  ( 24 Apr 2020 07:03 )  WBC Count : 8.10 K/uL  RBC Count : 3.70 M/uL  Hemoglobin : 10.8 g/dL  Hematocrit : 34.1 %  Platelet Count - Automated : 394 K/uL  Mean Cell Volume : 92.2 fl  Mean Cell Hemoglobin : 29.2 pg  Mean Cell Hemoglobin Concentration : 31.7 gm/dL  Auto Neutrophil # : 5.83 K/uL  Auto Lymphocyte # : 1.07 K/uL  Auto Monocyte # : 0.43 K/uL  Auto Eosinophil # : 0.70 K/uL  Auto Basophil # : 0.02 K/uL  Auto Neutrophil % : 72.1 %  Auto Lymphocyte % : 13.2 %  Auto Monocyte % : 5.3 %  Auto Eosinophil % : 8.6 %  Auto Basophil % : 0.2 %    04-24    138  |  105  |  38.0<H>  ----------------------------<  334<H>  5.9<H>   |  23.0  |  1.68<H>    Ca    7.7<L>      24 Apr 2020 06:59  Phos  2.9     04-24  Mg     1.6     04-24          ALLERGIES  clindamycin (Rash)  niacin (Flushing; Rash)      MEDICATIONS   acetaminophen   Tablet .. 650 milliGRAM(s) Oral every 6 hours PRN  acetaminophen   Tablet .. 650 milliGRAM(s) Oral every 6 hours PRN  aspirin enteric coated 81 milliGRAM(s) Oral daily  atorvastatin 80 milliGRAM(s) Oral at bedtime  carvedilol 25 milliGRAM(s) Oral every 12 hours  clopidogrel Tablet 75 milliGRAM(s) Oral daily  colchicine 0.6 milliGRAM(s) Oral daily  dextrose 40% Gel 15 Gram(s) Oral once PRN  digoxin     Tablet 0.125 milliGRAM(s) Oral daily  diphenhydrAMINE 25 milliGRAM(s) Oral every 4 hours PRN  epoetin-adrian-epbx (RETACRIT) Injectable 07153 Unit(s) SubCutaneous every 7 days  fentaNYL    Injectable 25 MICROGram(s) IV Push every 5 minutes PRN  finasteride 5 milliGRAM(s) Oral daily  folic acid 1 milliGRAM(s) Oral daily  heparin  Injectable 5000 Unit(s) SubCutaneous every 8 hours  HYDROmorphone  Injectable 0.5 milliGRAM(s) IV Push every 10 minutes PRN  insulin glargine Injectable (LANTUS) 45 Unit(s) SubCutaneous at bedtime  insulin lispro (HumaLOG) corrective regimen sliding scale   SubCutaneous at bedtime  insulin lispro Injectable (HumaLOG) 26 Unit(s) SubCutaneous three times a day before meals  lactobacillus acidophilus 1 Tablet(s) Oral three times a day with meals  magnesium oxide 400 milliGRAM(s) Oral daily  magnesium sulfate  IVPB 2 Gram(s) IV Intermittent once  ondansetron Injectable 4 milliGRAM(s) IV Push every 6 hours PRN  pantoprazole    Tablet 40 milliGRAM(s) Oral before breakfast  senna 2 Tablet(s) Oral at bedtime  simethicone 80 milliGRAM(s) Chew every 6 hours  sodium polystyrene sulfonate Suspension 30 Gram(s) Oral once      ----------------------------------------------------------------------------------------  PHYSICAL EXAM  Constitutional - NAD, Comfortable  HEENT - NCAT, EOMI  Neck - Supple, No limited ROM  Chest - Breathing comfortably, No wheezing  Cardiovascular - S1S2   Abdomen - Soft   Extremities - No C/C/E, No calf tenderness   Neurologic Exam -                    Cognitive - Awake, Alert, AAO to self, place, date, year, situation     Communication - Fluent, No dysarthria     Cranial Nerves - CN 2-12 intact     Motor - No focal deficits                    LEFT    UE - ShAB 5/5, EF 5/5, EE 5/5, WE 5/5,  5/5                    RIGHT UE - ShAB 5/5, EF 5/5, EE 5/5, WE 5/5,  5/5                    LEFT    LE - HF 5/5, KE 5/5, DF 5/5, PF 5/5                    RIGHT LE - HF 5/5, KE 5/5, DF 5/5, PF 5/5        Sensory - Intact to LT     Reflexes - DTR Intact, No primitive reflexive     Coordination - FTN intact     OculoVestibular - No saccades, No nystagmus, VOR         Balance - WNL Static  Psychiatric - Mood stable, Affect WNL  ----------------------------------------------------------------------------------------  ASSESSMENT/PLAN  64yMale with functional deficits s/p LEFT GROIN DEBRIDEMENT FOR WOUND INFECTION AFTER CARDIAC ANGIOGRAPHY.    Pain - per primary medical team  DVT PPX - heparin  Injectable 5000 Unit(s) SubCutaneous every 8 hours  Rehab - Will continue to follow for ongoing rehab needs and recommendations.    Recommend ACUTE inpatient rehabilitation for the functional deficits consisting of 3 hours of therapy/day & 24 hour RN/daily PMR physician for comorbid medical management. Patient will be able to tolerate 3 hours a day.   Recommend ELIZ, patient DOES NOT meet acute inpatient rehabilitation criteria 64yM was admitted on 04-09    Patient is a 64y old  Male who presents with a chief complaint of left groin area infection (24 Apr 2020 09:39)    HPI: 65 y/o male with extensive cardiac history, HTN, DM came to the ER for left groin area infection which started 1 week ago after he had angiography through left groin area 2 weeks ago, got 3 stent ( done at [facility] by [facility] ). Pt stated that he has been on levaquin by his doctor but getting worse. some pain in the left groin area, denies fever but had chills. no cp, no sob. no dizziness, no abd. pain. no n/v/d. As per pt. his cardiologist is aware that his groin area is not healing, was contacted via phone. (09 Apr 2020 01:25).  COVID +       Imaging showed (reviewed):  HEAD CT - No acute findings  CAP CT - No acute findings  C SPINE CT - No acute findings    REVIEW OF SYSTEMS  Constitutional - + FEVER + SIGNIFICANT FATIGUE  HEENT - No eye pain, No visual disturbances, No difficulty hearing, No tinnitus, No vertigo, No neck pain  Respiratory - + COUGH, No wheezing, + CONNOLLY  Cardiovascular - No chest pain, No palpitations  Gastrointestinal - No abdominal pain, No nausea, No vomiting, No diarrhea, No constipation  Genitourinary - No dysuria, No frequency, No hematuria, No incontinence  Neurological - No headaches, No memory loss,  No numbness, No tremors  Skin - No itching, No rashes, No lesions   Endocrine - No temperature intolerance  Musculoskeletal - No joint pain, No joint swelling, No muscle pain  Psychiatric - No depression, No anxiety    VITALS  T(C): 37.3 (04-24-20 @ 07:48), Max: 37.3 (04-24-20 @ 07:48)  HR: 76 (04-24-20 @ 07:48) (65 - 79)  BP: 150/78 (04-24-20 @ 07:48) (147/76 - 186/76)  RR: 18 (04-24-20 @ 07:48) (14 - 18)  SpO2: 95% (04-24-20 @ 07:48) (95% - 100%)  Wt(kg): --    PAST MEDICAL & SURGICAL HISTORY  Insulin resistance  Fatty liver  CKD (chronic kidney disease)  BPH (benign prostatic hyperplasia)  Dyslipidemia  Hypertension  Renal insufficiency  CAD (coronary artery disease)  DVT of leg (deep venous thrombosis), right  Diabetes  S/P thyroid biopsy  S/P colonoscopy with polypectomy  AICD (automatic cardioverter/defibrillator) present  S/P angioplasty with stent  S/P coronary artery bypass graft x 3      SOCIAL HISTORY  Smoking - Denied  EtOH - Denied   Drugs - Denied    FUNCTIONAL HISTORY  Lives w/ wife in private house 2 steps to enter  Previously Independent    CURRENT FUNCTIONAL STATUS: MAX ASSIST FOR MOST ADLs      FAMILY HISTORY   Family history of heart disease      RECENT LABS/IMAGING  CBC Full  -  ( 24 Apr 2020 07:03 )  WBC Count : 8.10 K/uL  RBC Count : 3.70 M/uL  Hemoglobin : 10.8 g/dL  Hematocrit : 34.1 %  Platelet Count - Automated : 394 K/uL  Mean Cell Volume : 92.2 fl  Mean Cell Hemoglobin : 29.2 pg  Mean Cell Hemoglobin Concentration : 31.7 gm/dL  Auto Neutrophil # : 5.83 K/uL  Auto Lymphocyte # : 1.07 K/uL  Auto Monocyte # : 0.43 K/uL  Auto Eosinophil # : 0.70 K/uL  Auto Basophil # : 0.02 K/uL  Auto Neutrophil % : 72.1 %  Auto Lymphocyte % : 13.2 %  Auto Monocyte % : 5.3 %  Auto Eosinophil % : 8.6 %  Auto Basophil % : 0.2 %    04-24    138  |  105  |  38.0<H>  ----------------------------<  334<H>  5.9<H>   |  23.0  |  1.68<H>    Ca    7.7<L>      24 Apr 2020 06:59  Phos  2.9     04-24  Mg     1.6     04-24          ALLERGIES  clindamycin (Rash)  niacin (Flushing; Rash)      MEDICATIONS   acetaminophen   Tablet .. 650 milliGRAM(s) Oral every 6 hours PRN  acetaminophen   Tablet .. 650 milliGRAM(s) Oral every 6 hours PRN  aspirin enteric coated 81 milliGRAM(s) Oral daily  atorvastatin 80 milliGRAM(s) Oral at bedtime  carvedilol 25 milliGRAM(s) Oral every 12 hours  clopidogrel Tablet 75 milliGRAM(s) Oral daily  colchicine 0.6 milliGRAM(s) Oral daily  dextrose 40% Gel 15 Gram(s) Oral once PRN  digoxin     Tablet 0.125 milliGRAM(s) Oral daily  diphenhydrAMINE 25 milliGRAM(s) Oral every 4 hours PRN  epoetin-adrian-epbx (RETACRIT) Injectable 23182 Unit(s) SubCutaneous every 7 days  fentaNYL    Injectable 25 MICROGram(s) IV Push every 5 minutes PRN  finasteride 5 milliGRAM(s) Oral daily  folic acid 1 milliGRAM(s) Oral daily  heparin  Injectable 5000 Unit(s) SubCutaneous every 8 hours  HYDROmorphone  Injectable 0.5 milliGRAM(s) IV Push every 10 minutes PRN  insulin glargine Injectable (LANTUS) 45 Unit(s) SubCutaneous at bedtime  insulin lispro (HumaLOG) corrective regimen sliding scale   SubCutaneous at bedtime  insulin lispro Injectable (HumaLOG) 26 Unit(s) SubCutaneous three times a day before meals  lactobacillus acidophilus 1 Tablet(s) Oral three times a day with meals  magnesium oxide 400 milliGRAM(s) Oral daily  magnesium sulfate  IVPB 2 Gram(s) IV Intermittent once  ondansetron Injectable 4 milliGRAM(s) IV Push every 6 hours PRN  pantoprazole    Tablet 40 milliGRAM(s) Oral before breakfast  senna 2 Tablet(s) Oral at bedtime  simethicone 80 milliGRAM(s) Chew every 6 hours  sodium polystyrene sulfonate Suspension 30 Gram(s) Oral once      ----------------------------------------------------------------------------------------  PHYSICAL EXAM  Constitutional - NAD, Comfortable  HEENT - NCAT, EOMI  Neck - Supple, No limited ROM  Chest - Breathing comfortably, No wheezing  Cardiovascular - S1S2   Abdomen - OBESE, Soft   Extremities - No C/C/E, No calf tenderness   Neurologic Exam -                    Cognitive - Awake, Alert, AAO to self, place, date, year, situation     Communication - Fluent, No dysarthria     Cranial Nerves - CN 2-12 intact     Motor - No focal deficits                    LEFT    UE - ShAB 5/5, EF 5/5, EE 5/5, WE 5/5,  5/5                    RIGHT UE - ShAB 5/5, EF 5/5, EE 5/5, WE 5/5,  5/5                    LEFT    LE - HF 5/5, KE 5/5, DF 5/5, PF 5/5                    RIGHT LE - HF 5/5, KE 5/5, DF 5/5, PF 5/5        Sensory - Intact to LT     Reflexes - DTR Intact, No primitive reflexive     Coordination - FTN intact     OculoVestibular - No saccades, No nystagmus, VOR         Balance - WNL Static  Psychiatric - Mood stable, Affect WNL  ----------------------------------------------------------------------------------------  ASSESSMENT/PLAN  64yMale with functional deficits s/p LEFT GROIN DEBRIDEMENT FOR WOUND INFECTION AFTER CARDIAC ANGIOGRAPHY.    Pain - per primary medical team  DVT PPX - heparin  Injectable 5000 Unit(s) SubCutaneous every 8 hours  Rehab -  RECOMMEND ACUTE REHABILITATION TRANSFER WHEN MEDICALLY STABLE.  PT. WILL BENEFIT FROM 3 HRS/DAY COMPREHENSIVE, MULTIDISCIPLINARY REHAB PROGRAM.    Recommend ACUTE inpatient rehabilitation for the functional deficits consisting of 3 hours of therapy/day & 24 hour RN/daily PMR physician for comorbid medical management. Patient will be able to tolerate 3 hours a day.   Recommend ELIZ, patient DOES NOT meet acute inpatient rehabilitation criteria

## 2020-04-24 NOTE — PROGRESS NOTE ADULT - SUBJECTIVE AND OBJECTIVE BOX
[facility]                                   [address] [place] [address]                                                      PHONE: [phone]                                                         FAX: [phone]  [email or web address] beds at the [email or web address]  ---------------------------------------------------------------------------------------------------------------------------------    Overnight events/patient complaints: no cough or fever, Covid + reported yesterday, s/p debridement groin      clindamycin (Rash)  niacin (Flushing; Rash)    MEDICATIONS  (STANDING):  aspirin enteric coated 81 milliGRAM(s) Oral daily  atorvastatin 80 milliGRAM(s) Oral at bedtime  carvedilol 25 milliGRAM(s) Oral every 12 hours  clopidogrel Tablet 75 milliGRAM(s) Oral daily  colchicine 0.6 milliGRAM(s) Oral daily  digoxin     Tablet 0.125 milliGRAM(s) Oral daily  epoetin-adrian-epbx (RETACRIT) Injectable 35597 Unit(s) SubCutaneous every 7 days  finasteride 5 milliGRAM(s) Oral daily  folic acid 1 milliGRAM(s) Oral daily  heparin  Injectable 5000 Unit(s) SubCutaneous every 8 hours  insulin glargine Injectable (LANTUS) 45 Unit(s) SubCutaneous at bedtime  insulin lispro (HumaLOG) corrective regimen sliding scale   SubCutaneous at bedtime  lactobacillus acidophilus 1 Tablet(s) Oral three times a day with meals  magnesium oxide 400 milliGRAM(s) Oral daily  pantoprazole    Tablet 40 milliGRAM(s) Oral before breakfast  senna 2 Tablet(s) Oral at bedtime  simethicone 80 milliGRAM(s) Chew every 6 hours    MEDICATIONS  (PRN):  acetaminophen   Tablet .. 650 milliGRAM(s) Oral every 6 hours PRN Temp greater or equal to 38C (100.4F)  acetaminophen   Tablet .. 650 milliGRAM(s) Oral every 6 hours PRN Mild Pain (1 - 3)  dextrose 40% Gel 15 Gram(s) Oral once PRN Blood Glucose LESS THAN 70 milliGRAM(s)/deciLiter  diphenhydrAMINE 25 milliGRAM(s) Oral every 4 hours PRN Rash and/or Itching  fentaNYL    Injectable 25 MICROGram(s) IV Push every 5 minutes PRN Moderate Pain (4 - 6)  HYDROmorphone  Injectable 0.5 milliGRAM(s) IV Push every 10 minutes PRN Moderate Pain (4 - 6)  ondansetron Injectable 4 milliGRAM(s) IV Push every 6 hours PRN Nausea and/or Vomiting      Vital Signs Last 24 Hrs  T(C): 37.3 (24 Apr 2020 07:48), Max: 37.3 (24 Apr 2020 07:48)  T(F): 99.2 (24 Apr 2020 07:48), Max: 99.2 (24 Apr 2020 07:48)  HR: 76 (24 Apr 2020 07:48) (65 - 79)  BP: 150/78 (24 Apr 2020 07:48) (147/76 - 186/76)  BP(mean): --  RR: 18 (24 Apr 2020 07:48) (14 - 18)  SpO2: 95% (24 Apr 2020 07:48) (95% - 100%)  Daily     Daily   ICU Vital Signs Last 24 Hrs  MARIA ROSS  I&O's Detail    23 Apr 2020 07:01  -  24 Apr 2020 07:00  --------------------------------------------------------  IN:  Total IN: 0 mL    OUT:    Ureteral Catheter: 1000 mL    Voided: 1100 mL  Total OUT: 2100 mL    Total NET: -2100 mL        I&O's Summary    23 Apr 2020 07:01  -  24 Apr 2020 07:00  --------------------------------------------------------  IN: 0 mL / OUT: 2100 mL / NET: -2100 mL      Drug Dosing Weight  MARIA ROSS      PHYSICAL EXAM:  General: Appears well developed, well nourished alert and cooperative.  HEENT: Head; normocephalic, atraumatic.  Eyes: Pupils reactive, cornea wnl.  Neck: Supple, no nodes adenopathy, no NVD or carotid bruit or thyromegaly.  CARDIOVASCULAR: Normal S1 and S2, No murmur, rub, gallop or lift.   LUNGS: No rales, rhonchi or wheeze. Normal breath sounds bilaterally.  ABDOMEN: Soft, nontender without mass or organomegaly. bowel sounds normoactive.  EXTREMITIES: left groin vac in place  SKIN: warm and dry with normal turgor.  NEURO: Alert/oriented x 3/normal motor exam. No pathologic reflexes.    PSYCH: normal affect.        LABS:                        10.8   8.10  )-----------( 394      ( 24 Apr 2020 07:03 )             34.1     04-24    138  |  105  |  38.0<H>  ----------------------------<  334<H>  5.9<H>   |  23.0  |  1.68<H>    Ca    7.7<L>      24 Apr 2020 06:59  Phos  2.9     04-24  Mg     1.6     04-24      MARIA ROSS            RADIOLOGY & ADDITIONAL STUDIES:    INTERPRETATION OF TELEMETRY (personally reviewed):    ECG:< from: 12 Lead ECG (04.09.20 @ 12:52) >  Diagnosis Line Normal sinus rhythm  Possible Left atrial enlargement  Septal infarct , age undetermined  T wave abnormality, consider inferior ischemia    Confirmed by NICHOLAS REYNA ([id number]) on 4/10/2020 9:57:29 AM    < end of copied text >

## 2020-04-24 NOTE — PROGRESS NOTE ADULT - SUBJECTIVE AND OBJECTIVE BOX
History:  The patient is status post serial left  groin debridements.    Returned to OR 4/23 with Dr Johnson and is S/P debridement/washout and partial closure of L groin wound with wound vac placement   POD#1  Currently patient is doing well. The patient's pain is controlled using the prescribed pain medications. The patient is willing to participate in physical therapy this am. Denies nausea, vomiting, chest pain, shortness of breath, abdominal pain or fever. No acute motor or sensory changes are reported. Persistent blanchable rash c/w possible drug reaction improving.     Vital Signs Last 24 Hrs  T(C): 37.3 (24 Apr 2020 07:48), Max: 37.3 (24 Apr 2020 07:48)  T(F): 99.2 (24 Apr 2020 07:48), Max: 99.2 (24 Apr 2020 07:48)  HR: 76 (24 Apr 2020 07:48) (65 - 79)  BP: 150/78 (24 Apr 2020 07:48) (147/76 - 186/76)  BP(mean): --  RR: 18 (24 Apr 2020 07:48) (14 - 18)  SpO2: 95% (24 Apr 2020 07:48) (95% - 100%)                                   10.8   8.10  )-----------( 394      ( 24 Apr 2020 07:03 )             34.1   04-24    138  |  105  |  38.0<H>  ----------------------------<  334<H>  5.9<H>   |  23.0  |  1.68<H>    Ca    7.7<L>      24 Apr 2020 06:59  Phos  2.9     04-24  Mg     1.6     04-24    CAPILLARY BLOOD GLUCOSE  POCT Blood Glucose.: 297 mg/dL (24 Apr 2020 08:22)  POCT Blood Glucose.: 202 mg/dL (23 Apr 2020 20:35)  POCT Blood Glucose.: 151 mg/dL (23 Apr 2020 11:23)    MEDICATIONS  (STANDING):  aspirin enteric coated 81 milliGRAM(s) Oral daily  atorvastatin 80 milliGRAM(s) Oral at bedtime  carvedilol 25 milliGRAM(s) Oral every 12 hours  clopidogrel Tablet 75 milliGRAM(s) Oral daily  colchicine 0.6 milliGRAM(s) Oral daily  digoxin     Tablet 0.125 milliGRAM(s) Oral daily  epoetin-adrian-epbx (RETACRIT) Injectable 90713 Unit(s) SubCutaneous every 7 days  finasteride 5 milliGRAM(s) Oral daily  folic acid 1 milliGRAM(s) Oral daily  heparin  Injectable 5000 Unit(s) SubCutaneous every 8 hours  insulin glargine Injectable (LANTUS) 45 Unit(s) SubCutaneous at bedtime  insulin lispro (HumaLOG) corrective regimen sliding scale   SubCutaneous at bedtime  insulin lispro Injectable (HumaLOG) 20 Unit(s) SubCutaneous three times a day before meals  lactobacillus acidophilus 1 Tablet(s) Oral three times a day with meals  magnesium oxide 400 milliGRAM(s) Oral daily  pantoprazole    Tablet 40 milliGRAM(s) Oral before breakfast  senna 2 Tablet(s) Oral at bedtime  simethicone 80 milliGRAM(s) Chew every 6 hours    MEDICATIONS  (PRN):  acetaminophen   Tablet .. 650 milliGRAM(s) Oral every 6 hours PRN Temp greater or equal to 38C (100.4F)  acetaminophen   Tablet .. 650 milliGRAM(s) Oral every 6 hours PRN Mild Pain (1 - 3)  dextrose 40% Gel 15 Gram(s) Oral once PRN Blood Glucose LESS THAN 70 milliGRAM(s)/deciLiter  diphenhydrAMINE 25 milliGRAM(s) Oral every 4 hours PRN Rash and/or Itching  fentaNYL    Injectable 25 MICROGram(s) IV Push every 5 minutes PRN Moderate Pain (4 - 6)  HYDROmorphone  Injectable 0.5 milliGRAM(s) IV Push every 10 minutes PRN Moderate Pain (4 - 6)  ondansetron Injectable 4 milliGRAM(s) IV Push every 6 hours PRN Nausea and/or Vomiting    Physical exam:   No distress  left groin with wound vac dressing with good seal. Diffuse rash, nonraised, non pruritic. Improved   R elbow incision CDI with min edema, no erythem or drainage.    Primary  Assessment:  - S/p serial groin debridements  - S/P partial closure/debridement/washout/wound vac placement   POD #1  - patient currently stable  - persistent rash to back, drug vs contact  improved    Plan:     - maintain wound vac at current settings(increased to 125mmHg). Next vac change 4/26  - ABX off for now. Monitor fever trend  - continue benadryl as needed and green sheets   -PT/OOB. May need AR for dispo. PM&R consulted  - NADINE baires-cont Lantus and Humalog  -CORIE Briceno History:  The patient is status post serial left  groin debridements.    Found to be COVID + 4/23 but asymptomatic  Returned to OR 4/23 with Dr Johnson and is S/P debridement/washout and partial closure of L groin wound with wound vac placement   POD#1  Currently patient is doing well. The patient's pain is controlled using the prescribed pain medications. The patient is willing to participate in physical therapy this am. Denies nausea, vomiting, chest pain, shortness of breath, abdominal pain or fever. No acute motor or sensory changes are reported. Persistent blanchable rash c/w possible drug reaction improving.     Vital Signs Last 24 Hrs  T(C): 37.3 (24 Apr 2020 07:48), Max: 37.3 (24 Apr 2020 07:48)  T(F): 99.2 (24 Apr 2020 07:48), Max: 99.2 (24 Apr 2020 07:48)  HR: 76 (24 Apr 2020 07:48) (65 - 79)  BP: 150/78 (24 Apr 2020 07:48) (147/76 - 186/76)  BP(mean): --  RR: 18 (24 Apr 2020 07:48) (14 - 18)  SpO2: 95% (24 Apr 2020 07:48) (95% - 100%)                                   10.8   8.10  )-----------( 394      ( 24 Apr 2020 07:03 )             34.1   04-24    138  |  105  |  38.0<H>  ----------------------------<  334<H>  5.9<H>   |  23.0  |  1.68<H>    Ca    7.7<L>      24 Apr 2020 06:59  Phos  2.9     04-24  Mg     1.6     04-24    CAPILLARY BLOOD GLUCOSE  POCT Blood Glucose.: 297 mg/dL (24 Apr 2020 08:22)  POCT Blood Glucose.: 202 mg/dL (23 Apr 2020 20:35)  POCT Blood Glucose.: 151 mg/dL (23 Apr 2020 11:23)    MEDICATIONS  (STANDING):  aspirin enteric coated 81 milliGRAM(s) Oral daily  atorvastatin 80 milliGRAM(s) Oral at bedtime  carvedilol 25 milliGRAM(s) Oral every 12 hours  clopidogrel Tablet 75 milliGRAM(s) Oral daily  colchicine 0.6 milliGRAM(s) Oral daily  digoxin     Tablet 0.125 milliGRAM(s) Oral daily  epoetin-adrian-epbx (RETACRIT) Injectable 66208 Unit(s) SubCutaneous every 7 days  finasteride 5 milliGRAM(s) Oral daily  folic acid 1 milliGRAM(s) Oral daily  heparin  Injectable 5000 Unit(s) SubCutaneous every 8 hours  insulin glargine Injectable (LANTUS) 45 Unit(s) SubCutaneous at bedtime  insulin lispro (HumaLOG) corrective regimen sliding scale   SubCutaneous at bedtime  insulin lispro Injectable (HumaLOG) 20 Unit(s) SubCutaneous three times a day before meals  lactobacillus acidophilus 1 Tablet(s) Oral three times a day with meals  magnesium oxide 400 milliGRAM(s) Oral daily  pantoprazole    Tablet 40 milliGRAM(s) Oral before breakfast  senna 2 Tablet(s) Oral at bedtime  simethicone 80 milliGRAM(s) Chew every 6 hours    MEDICATIONS  (PRN):  acetaminophen   Tablet .. 650 milliGRAM(s) Oral every 6 hours PRN Temp greater or equal to 38C (100.4F)  acetaminophen   Tablet .. 650 milliGRAM(s) Oral every 6 hours PRN Mild Pain (1 - 3)  dextrose 40% Gel 15 Gram(s) Oral once PRN Blood Glucose LESS THAN 70 milliGRAM(s)/deciLiter  diphenhydrAMINE 25 milliGRAM(s) Oral every 4 hours PRN Rash and/or Itching  fentaNYL    Injectable 25 MICROGram(s) IV Push every 5 minutes PRN Moderate Pain (4 - 6)  HYDROmorphone  Injectable 0.5 milliGRAM(s) IV Push every 10 minutes PRN Moderate Pain (4 - 6)  ondansetron Injectable 4 milliGRAM(s) IV Push every 6 hours PRN Nausea and/or Vomiting    Physical exam:   No distress  left groin with wound vac dressing with good seal. Diffuse rash, nonraised, non pruritic. Improved   R elbow incision CDI with min edema, no erythem or drainage.    Primary  Assessment:  - S/p serial groin debridements  - S/P partial closure/debridement/washout/wound vac placement   POD #1  - patient currently stable  - persistent rash to back, drug vs contact  improved    Plan:     - maintain wound vac at current settings(increased to 125mmHg). Next vac change 4/26  - ABX off for now. Monitor fever trend  - continue benadryl as needed and green sheets   -PT/OOB. May need AR for dispo. PM&R consulted  - NADINE baires-cont Lantus and Humalog  -CORIE Briceno History:  The patient is status post serial left  groin debridements.    Found to be COVID + 4/23 but asymptomatic  Returned to OR 4/23 with Dr Johnson and is S/P debridement/washout and partial closure of L groin wound with wound vac placement   POD#1  Currently patient is doing well. The patient's pain is controlled using the prescribed pain medications. The patient is willing to participate in physical therapy this am. Denies nausea, vomiting, chest pain, shortness of breath, abdominal pain or fever. No acute motor or sensory changes are reported. Persistent blanchable rash c/w possible drug reaction improving.     Vital Signs Last 24 Hrs  T(C): 37.3 (24 Apr 2020 07:48), Max: 37.3 (24 Apr 2020 07:48)  T(F): 99.2 (24 Apr 2020 07:48), Max: 99.2 (24 Apr 2020 07:48)  HR: 76 (24 Apr 2020 07:48) (65 - 79)  BP: 150/78 (24 Apr 2020 07:48) (147/76 - 186/76)  BP(mean): --  RR: 18 (24 Apr 2020 07:48) (14 - 18)  SpO2: 95% (24 Apr 2020 07:48) (95% - 100%)                                   10.8   8.10  )-----------( 394      ( 24 Apr 2020 07:03 )             34.1   04-24    138  |  105  |  38.0<H>  ----------------------------<  334<H>  5.9<H>   |  23.0  |  1.68<H>    Ca    7.7<L>      24 Apr 2020 06:59  Phos  2.9     04-24  Mg     1.6     04-24    CAPILLARY BLOOD GLUCOSE  POCT Blood Glucose.: 297 mg/dL (24 Apr 2020 08:22)  POCT Blood Glucose.: 202 mg/dL (23 Apr 2020 20:35)  POCT Blood Glucose.: 151 mg/dL (23 Apr 2020 11:23)    MEDICATIONS  (STANDING):  aspirin enteric coated 81 milliGRAM(s) Oral daily  atorvastatin 80 milliGRAM(s) Oral at bedtime  carvedilol 25 milliGRAM(s) Oral every 12 hours  clopidogrel Tablet 75 milliGRAM(s) Oral daily  colchicine 0.6 milliGRAM(s) Oral daily  digoxin     Tablet 0.125 milliGRAM(s) Oral daily  epoetin-adrian-epbx (RETACRIT) Injectable 84559 Unit(s) SubCutaneous every 7 days  finasteride 5 milliGRAM(s) Oral daily  folic acid 1 milliGRAM(s) Oral daily  heparin  Injectable 5000 Unit(s) SubCutaneous every 8 hours  insulin glargine Injectable (LANTUS) 45 Unit(s) SubCutaneous at bedtime  insulin lispro (HumaLOG) corrective regimen sliding scale   SubCutaneous at bedtime  insulin lispro Injectable (HumaLOG) 20 Unit(s) SubCutaneous three times a day before meals  lactobacillus acidophilus 1 Tablet(s) Oral three times a day with meals  magnesium oxide 400 milliGRAM(s) Oral daily  pantoprazole    Tablet 40 milliGRAM(s) Oral before breakfast  senna 2 Tablet(s) Oral at bedtime  simethicone 80 milliGRAM(s) Chew every 6 hours    MEDICATIONS  (PRN):  acetaminophen   Tablet .. 650 milliGRAM(s) Oral every 6 hours PRN Temp greater or equal to 38C (100.4F)  acetaminophen   Tablet .. 650 milliGRAM(s) Oral every 6 hours PRN Mild Pain (1 - 3)  dextrose 40% Gel 15 Gram(s) Oral once PRN Blood Glucose LESS THAN 70 milliGRAM(s)/deciLiter  diphenhydrAMINE 25 milliGRAM(s) Oral every 4 hours PRN Rash and/or Itching  fentaNYL    Injectable 25 MICROGram(s) IV Push every 5 minutes PRN Moderate Pain (4 - 6)  HYDROmorphone  Injectable 0.5 milliGRAM(s) IV Push every 10 minutes PRN Moderate Pain (4 - 6)  ondansetron Injectable 4 milliGRAM(s) IV Push every 6 hours PRN Nausea and/or Vomiting    Physical exam:   No distress  left groin with wound vac dressing with good seal. Diffuse rash, nonraised, non pruritic. Improved   R elbow incision CDI with min edema, no erythem or drainage.    Primary  Assessment:  - S/p serial groin debridements  - S/P partial closure/debridement/washout/wound vac placement   POD #1  - patient currently stable  - persistent rash to back, drug vs contact  improved  - hyperkalemia    Plan:     - maintain wound vac at current settings(increased to 125mmHg). Next vac change 4/26  - ABX off for now. Monitor fever trend  - continue benadryl as needed and green sheets   -PT/OOB. May need AR for dispo. PM&R consulted  - DM magement-cont Lantus and increase premeal Humalog  -DC fernandez-TOV  - Kayexalate for hyperkalemia

## 2020-04-24 NOTE — PROGRESS NOTE ADULT - ASSESSMENT
64 year old male with Type 2 DM with associated nephropathy, CAD s/p PCI, ischemic CM, HTN admitted with necrotizing fasciitis s/p debridement.    His glycemic control has worsened.      1.  Type 2 DM-   Will increase Lantus to 60 units qhs due to fasting hyperglycemia.  Will reduce premeal humalog back to 20 units given downward trend in daytime BG.    2.  Necrotizing fasciitis-   management as per surgical team.    3.  Hyperkalemia- repeat BMP.

## 2020-04-24 NOTE — CONSULT NOTE ADULT - REASON FOR ADMISSION
left groin area infection

## 2020-04-25 LAB
ALBUMIN SERPL ELPH-MCNC: 2.1 G/DL — LOW (ref 3.3–5.2)
ALP SERPL-CCNC: 263 U/L — HIGH (ref 40–120)
ALT FLD-CCNC: 35 U/L — SIGNIFICANT CHANGE UP
ANION GAP SERPL CALC-SCNC: 10 MMOL/L — SIGNIFICANT CHANGE UP (ref 5–17)
AST SERPL-CCNC: 36 U/L — SIGNIFICANT CHANGE UP
BILIRUB SERPL-MCNC: 0.4 MG/DL — SIGNIFICANT CHANGE UP (ref 0.4–2)
BUN SERPL-MCNC: 34 MG/DL — HIGH (ref 8–20)
CALCIUM SERPL-MCNC: 8.2 MG/DL — LOW (ref 8.6–10.2)
CHLORIDE SERPL-SCNC: 109 MMOL/L — HIGH (ref 98–107)
CO2 SERPL-SCNC: 23 MMOL/L — SIGNIFICANT CHANGE UP (ref 22–29)
CREAT SERPL-MCNC: 1.59 MG/DL — HIGH (ref 0.5–1.3)
GLUCOSE BLDC GLUCOMTR-MCNC: 159 MG/DL — HIGH (ref 70–99)
GLUCOSE BLDC GLUCOMTR-MCNC: 201 MG/DL — HIGH (ref 70–99)
GLUCOSE BLDC GLUCOMTR-MCNC: 268 MG/DL — HIGH (ref 70–99)
GLUCOSE BLDC GLUCOMTR-MCNC: 322 MG/DL — HIGH (ref 70–99)
GLUCOSE BLDC GLUCOMTR-MCNC: 52 MG/DL — LOW (ref 70–99)
GLUCOSE BLDC GLUCOMTR-MCNC: 53 MG/DL — LOW (ref 70–99)
GLUCOSE BLDC GLUCOMTR-MCNC: 89 MG/DL — SIGNIFICANT CHANGE UP (ref 70–99)
GLUCOSE SERPL-MCNC: 69 MG/DL — LOW (ref 70–99)
HCT VFR BLD CALC: 33.7 % — LOW (ref 39–50)
HGB BLD-MCNC: 10.7 G/DL — LOW (ref 13–17)
MAGNESIUM SERPL-MCNC: 1.9 MG/DL — SIGNIFICANT CHANGE UP (ref 1.8–2.6)
MCHC RBC-ENTMCNC: 29 PG — SIGNIFICANT CHANGE UP (ref 27–34)
MCHC RBC-ENTMCNC: 31.8 GM/DL — LOW (ref 32–36)
MCV RBC AUTO: 91.3 FL — SIGNIFICANT CHANGE UP (ref 80–100)
PLATELET # BLD AUTO: 384 K/UL — SIGNIFICANT CHANGE UP (ref 150–400)
POTASSIUM SERPL-MCNC: 4.3 MMOL/L — SIGNIFICANT CHANGE UP (ref 3.5–5.3)
POTASSIUM SERPL-SCNC: 4.3 MMOL/L — SIGNIFICANT CHANGE UP (ref 3.5–5.3)
PROT SERPL-MCNC: 5.6 G/DL — LOW (ref 6.6–8.7)
RBC # BLD: 3.69 M/UL — LOW (ref 4.2–5.8)
RBC # FLD: 14.9 % — HIGH (ref 10.3–14.5)
SODIUM SERPL-SCNC: 142 MMOL/L — SIGNIFICANT CHANGE UP (ref 135–145)
WBC # BLD: 6.2 K/UL — SIGNIFICANT CHANGE UP (ref 3.8–10.5)
WBC # FLD AUTO: 6.2 K/UL — SIGNIFICANT CHANGE UP (ref 3.8–10.5)

## 2020-04-25 PROCEDURE — 99232 SBSQ HOSP IP/OBS MODERATE 35: CPT

## 2020-04-25 RX ORDER — INSULIN GLARGINE 100 [IU]/ML
50 INJECTION, SOLUTION SUBCUTANEOUS AT BEDTIME
Refills: 0 | Status: DISCONTINUED | OUTPATIENT
Start: 2020-04-25 | End: 2020-04-27

## 2020-04-25 RX ORDER — HYDRALAZINE HCL 50 MG
5 TABLET ORAL
Refills: 0 | Status: COMPLETED | OUTPATIENT
Start: 2020-04-25 | End: 2020-04-26

## 2020-04-25 RX ORDER — MAGNESIUM SULFATE 500 MG/ML
1 VIAL (ML) INJECTION ONCE
Refills: 0 | Status: COMPLETED | OUTPATIENT
Start: 2020-04-25 | End: 2020-04-25

## 2020-04-25 RX ADMIN — Medication 4: at 17:37

## 2020-04-25 RX ADMIN — MAGNESIUM OXIDE 400 MG ORAL TABLET 400 MILLIGRAM(S): 241.3 TABLET ORAL at 12:32

## 2020-04-25 RX ADMIN — ENOXAPARIN SODIUM 40 MILLIGRAM(S): 100 INJECTION SUBCUTANEOUS at 12:32

## 2020-04-25 RX ADMIN — Medication 62.5 MILLIMOLE(S): at 17:11

## 2020-04-25 RX ADMIN — SIMETHICONE 80 MILLIGRAM(S): 80 TABLET, CHEWABLE ORAL at 04:36

## 2020-04-25 RX ADMIN — Medication 1 TABLET(S): at 15:50

## 2020-04-25 RX ADMIN — INSULIN GLARGINE 50 UNIT(S): 100 INJECTION, SOLUTION SUBCUTANEOUS at 22:04

## 2020-04-25 RX ADMIN — SIMETHICONE 80 MILLIGRAM(S): 80 TABLET, CHEWABLE ORAL at 12:32

## 2020-04-25 RX ADMIN — CLOPIDOGREL BISULFATE 75 MILLIGRAM(S): 75 TABLET, FILM COATED ORAL at 12:31

## 2020-04-25 RX ADMIN — Medication 20 UNIT(S): at 17:37

## 2020-04-25 RX ADMIN — Medication 0.12 MILLIGRAM(S): at 04:36

## 2020-04-25 RX ADMIN — FINASTERIDE 5 MILLIGRAM(S): 5 TABLET, FILM COATED ORAL at 12:31

## 2020-04-25 RX ADMIN — CARVEDILOL PHOSPHATE 25 MILLIGRAM(S): 80 CAPSULE, EXTENDED RELEASE ORAL at 04:36

## 2020-04-25 RX ADMIN — CARVEDILOL PHOSPHATE 25 MILLIGRAM(S): 80 CAPSULE, EXTENDED RELEASE ORAL at 17:40

## 2020-04-25 RX ADMIN — SENNA PLUS 2 TABLET(S): 8.6 TABLET ORAL at 22:04

## 2020-04-25 RX ADMIN — Medication 1 MILLIGRAM(S): at 12:31

## 2020-04-25 RX ADMIN — Medication 0.6 MILLIGRAM(S): at 12:31

## 2020-04-25 RX ADMIN — TAMSULOSIN HYDROCHLORIDE 0.4 MILLIGRAM(S): 0.4 CAPSULE ORAL at 22:04

## 2020-04-25 RX ADMIN — PANTOPRAZOLE SODIUM 40 MILLIGRAM(S): 20 TABLET, DELAYED RELEASE ORAL at 04:36

## 2020-04-25 RX ADMIN — Medication 2: at 22:05

## 2020-04-25 RX ADMIN — SIMETHICONE 80 MILLIGRAM(S): 80 TABLET, CHEWABLE ORAL at 17:40

## 2020-04-25 RX ADMIN — ATORVASTATIN CALCIUM 80 MILLIGRAM(S): 80 TABLET, FILM COATED ORAL at 22:04

## 2020-04-25 RX ADMIN — Medication 2: at 12:28

## 2020-04-25 RX ADMIN — Medication 100 GRAM(S): at 15:49

## 2020-04-25 RX ADMIN — Medication 81 MILLIGRAM(S): at 12:30

## 2020-04-25 RX ADMIN — Medication 1 TABLET(S): at 10:27

## 2020-04-25 RX ADMIN — Medication 5 MILLIGRAM(S): at 23:23

## 2020-04-25 NOTE — PROGRESS NOTE ADULT - ASSESSMENT
CKD stage III: DM/HTN;  h/o CAD, S/P MI, CABG, ICM with AICD  MARSHALL resolved and now at baseline  Renal sono noted no hydronephrosis   s/p OR on 4/10- Debridement, external genitalia, perineum  I&D abscess   L groin hematoma/ cellulitis on abx/ Leukocytosis  COVID+   Torsemide held for now   - avoid potential nephrotoxins  - follow labs  -Watch with removal of fernandez , bladder scan in am     Anemia:  S/P PRBCs   - cont LUISA as required to maintain Hgb > 10.0

## 2020-04-25 NOTE — PROGRESS NOTE ADULT - SUBJECTIVE AND OBJECTIVE BOX
HPI/OVERNIGHT EVENTS:  Patient passed TOV. Wound vac in place with good seal. Pain well controlled. Tolerating a diet without nausea or vomiting. Believes skin rash is improving. No CP, fever, SOB.     MEDICATIONS  (STANDING):  aspirin enteric coated 81 milliGRAM(s) Oral daily  atorvastatin 80 milliGRAM(s) Oral at bedtime  carvedilol 25 milliGRAM(s) Oral every 12 hours  clopidogrel Tablet 75 milliGRAM(s) Oral daily  colchicine 0.6 milliGRAM(s) Oral daily  digoxin     Tablet 0.125 milliGRAM(s) Oral daily  enoxaparin Injectable 40 milliGRAM(s) SubCutaneous daily  epoetin-adrian-epbx (RETACRIT) Injectable 57732 Unit(s) SubCutaneous every 7 days  finasteride 5 milliGRAM(s) Oral daily  folic acid 1 milliGRAM(s) Oral daily  insulin glargine Injectable (LANTUS) 60 Unit(s) SubCutaneous at bedtime  insulin lispro (HumaLOG) corrective regimen sliding scale   SubCutaneous three times a day before meals  insulin lispro (HumaLOG) corrective regimen sliding scale   SubCutaneous at bedtime  insulin lispro Injectable (HumaLOG) 20 Unit(s) SubCutaneous three times a day before meals  lactobacillus acidophilus 1 Tablet(s) Oral three times a day with meals  magnesium oxide 400 milliGRAM(s) Oral daily  pantoprazole    Tablet 40 milliGRAM(s) Oral before breakfast  senna 2 Tablet(s) Oral at bedtime  simethicone 80 milliGRAM(s) Chew every 6 hours  tamsulosin 0.4 milliGRAM(s) Oral at bedtime    MEDICATIONS  (PRN):  acetaminophen   Tablet .. 650 milliGRAM(s) Oral every 6 hours PRN Temp greater or equal to 38C (100.4F)  acetaminophen   Tablet .. 650 milliGRAM(s) Oral every 6 hours PRN Mild Pain (1 - 3)  dextrose 40% Gel 15 Gram(s) Oral once PRN Blood Glucose LESS THAN 70 milliGRAM(s)/deciLiter  diphenhydrAMINE 25 milliGRAM(s) Oral every 4 hours PRN Rash and/or Itching  fentaNYL    Injectable 25 MICROGram(s) IV Push every 5 minutes PRN Moderate Pain (4 - 6)  HYDROmorphone  Injectable 0.5 milliGRAM(s) IV Push every 10 minutes PRN Moderate Pain (4 - 6)  ondansetron Injectable 4 milliGRAM(s) IV Push every 6 hours PRN Nausea and/or Vomiting      Vital Signs Last 24 Hrs  T(C): 36.8 (24 Apr 2020 23:54), Max: 37.3 (24 Apr 2020 07:48)  T(F): 98.3 (24 Apr 2020 23:54), Max: 99.2 (24 Apr 2020 07:48)  HR: 76 (24 Apr 2020 07:48) (76 - 79)  BP: 150/78 (24 Apr 2020 07:48) (150/78 - 159/72)  BP(mean): --  RR: 18 (24 Apr 2020 23:54) (18 - 18)  SpO2: 96% (24 Apr 2020 23:54) (95% - 96%)    Physical exam:   Gen: No distress  Extremities: Left groin with wound vac dressing with good seal. Diffuse rash, nonraised, non pruritic. Improved   R elbow incision CDI with min edema, no erythema or drainage.          I&O's Detail    23 Apr 2020 07:01  -  24 Apr 2020 07:00  --------------------------------------------------------  IN:  Total IN: 0 mL    OUT:    Ureteral Catheter: 1000 mL    Voided: 1100 mL  Total OUT: 2100 mL    Total NET: -2100 mL          LABS:                        10.8   8.10  )-----------( 394      ( 24 Apr 2020 07:03 )             34.1     04-24    138  |  105  |  38.0<H>  ----------------------------<  334<H>  5.9<H>   |  23.0  |  1.68<H>    Ca    7.7<L>      24 Apr 2020 06:59  Phos  2.9     04-24  Mg     1.6     04-24

## 2020-04-25 NOTE — PROGRESS NOTE ADULT - ASSESSMENT
64M S/p serial left groin debridements, S/P partial closure/debridement/washout/wound vac placement POD#2. COVID positive on supplemental NC. No JESSICA.     - Patient currently stable  - Persistent rash to back, drug vs contact  improved  - Hyperkalemia, last EKG 4/8  - Monitor FS  - Passed TOV  -Dispo: ELIZ VS AR

## 2020-04-25 NOTE — PROGRESS NOTE ADULT - SUBJECTIVE AND OBJECTIVE BOX
[facility]                                   [address] [place] [address]                                                      PHONE: [phone]                                                         FAX: [phone]  [email or web address]  ---------------------------------------------------------------------------------------------------------------------------------    Overnight events/patient complaints: patient seen at bedside.       clindamycin (Rash)  niacin (Flushing; Rash)    MEDICATIONS  (STANDING):  aspirin enteric coated 81 milliGRAM(s) Oral daily  atorvastatin 80 milliGRAM(s) Oral at bedtime  carvedilol 25 milliGRAM(s) Oral every 12 hours  clopidogrel Tablet 75 milliGRAM(s) Oral daily  colchicine 0.6 milliGRAM(s) Oral daily  digoxin     Tablet 0.125 milliGRAM(s) Oral daily  enoxaparin Injectable 40 milliGRAM(s) SubCutaneous daily  epoetin-adrian-epbx (RETACRIT) Injectable 55753 Unit(s) SubCutaneous every 7 days  finasteride 5 milliGRAM(s) Oral daily  folic acid 1 milliGRAM(s) Oral daily  insulin glargine Injectable (LANTUS) 60 Unit(s) SubCutaneous at bedtime  insulin lispro (HumaLOG) corrective regimen sliding scale   SubCutaneous three times a day before meals  insulin lispro (HumaLOG) corrective regimen sliding scale   SubCutaneous at bedtime  insulin lispro Injectable (HumaLOG) 20 Unit(s) SubCutaneous three times a day before meals  lactobacillus acidophilus 1 Tablet(s) Oral three times a day with meals  magnesium oxide 400 milliGRAM(s) Oral daily  pantoprazole    Tablet 40 milliGRAM(s) Oral before breakfast  senna 2 Tablet(s) Oral at bedtime  simethicone 80 milliGRAM(s) Chew every 6 hours  tamsulosin 0.4 milliGRAM(s) Oral at bedtime    MEDICATIONS  (PRN):  acetaminophen   Tablet .. 650 milliGRAM(s) Oral every 6 hours PRN Temp greater or equal to 38C (100.4F)  acetaminophen   Tablet .. 650 milliGRAM(s) Oral every 6 hours PRN Mild Pain (1 - 3)  dextrose 40% Gel 15 Gram(s) Oral once PRN Blood Glucose LESS THAN 70 milliGRAM(s)/deciLiter  diphenhydrAMINE 25 milliGRAM(s) Oral every 4 hours PRN Rash and/or Itching  fentaNYL    Injectable 25 MICROGram(s) IV Push every 5 minutes PRN Moderate Pain (4 - 6)  HYDROmorphone  Injectable 0.5 milliGRAM(s) IV Push every 10 minutes PRN Moderate Pain (4 - 6)  ondansetron Injectable 4 milliGRAM(s) IV Push every 6 hours PRN Nausea and/or Vomiting      Vital Signs Last 24 Hrs  T(C): 36.8 (24 Apr 2020 23:54), Max: 36.8 (24 Apr 2020 23:54)  T(F): 98.3 (24 Apr 2020 23:54), Max: 98.3 (24 Apr 2020 23:54)  HR: 88 (25 Apr 2020 04:34) (78 - 88)  BP: 155/77 (25 Apr 2020 04:34) (155/77 - 176/80)  BP(mean): --  RR: 18 (24 Apr 2020 23:54) (18 - 18)  SpO2: 96% (24 Apr 2020 23:54) (96% - 96%)  ICU Vital Signs Last 24 Hrs  MARIA ROSS  I&O's Detail    Drug Dosing Weight  MARIAEDILSON ROSS      PHYSICAL EXAM:  General: Appears well developed, well nourished alert and cooperative.  HEENT: Head; normocephalic, atraumatic.  Eyes: Pupils reactive, cornea wnl.  Neck: Supple, no nodes adenopathy, no NVD or carotid bruit or thyromegaly.  CARDIOVASCULAR: Normal S1 and S2, No murmur, rub, gallop or lift.   LUNGS: No rales, rhonchi or wheeze. Normal breath sounds bilaterally.  ABDOMEN: Soft, nontender without mass or organomegaly. bowel sounds normoactive.  EXTREMITIES: No clubbing, cyanosis or edema. Distal pulses wnl.   SKIN: warm and dry with normal turgor.  NEURO: Alert/oriented x 3/normal motor exam. No pathologic reflexes.    PSYCH: normal affect.        LABS:                        10.7   6.20  )-----------( 384      ( 25 Apr 2020 05:57 )             33.7     04-25    142  |  109<H>  |  34.0<H>  ----------------------------<  69<L>  4.3   |  23.0  |  1.59<H>    Ca    8.2<L>      25 Apr 2020 05:57  Phos  2.9     04-24  Mg     1.9     04-25    TPro  5.6<L>  /  Alb  2.1<L>  /  TBili  0.4  /  DBili  x   /  AST  36  /  ALT  35  /  AlkPhos  263<H>  04-25    MARIA ROSS    ASSESSMENT AND PLAN:  In summary, MARIA ROSS is an 64y Male with past medical history significant for CAD s/p recent PCI, ischemic cardiomyopathy EF 45%, ICD, DM, CKD admitted with left groin infected hematoma and found to be positive for COVID 19.    Left Groin Infected Hematoma, CAD s/p Recent PCI, COVID 19+  - continue DAPT with ASA and Plavix  - continue Coreg  - supportive care for underlying groin infection and COVID 19+    Will follow with you.

## 2020-04-25 NOTE — PROGRESS NOTE ADULT - SUBJECTIVE AND OBJECTIVE BOX
NEPHROLOGY INTERVAL HPI/OVERNIGHT EVENTS:    Cardio follow up noted   Off Torsemide   Max out     MEDICATIONS  (STANDING):  aspirin enteric coated 81 milliGRAM(s) Oral daily  atorvastatin 80 milliGRAM(s) Oral at bedtime  carvedilol 25 milliGRAM(s) Oral every 12 hours  clopidogrel Tablet 75 milliGRAM(s) Oral daily  colchicine 0.6 milliGRAM(s) Oral daily  digoxin     Tablet 0.125 milliGRAM(s) Oral daily  enoxaparin Injectable 40 milliGRAM(s) SubCutaneous daily  epoetin-adrian-epbx (RETACRIT) Injectable 82598 Unit(s) SubCutaneous every 7 days  finasteride 5 milliGRAM(s) Oral daily  folic acid 1 milliGRAM(s) Oral daily  insulin glargine Injectable (LANTUS) 60 Unit(s) SubCutaneous at bedtime  insulin lispro (HumaLOG) corrective regimen sliding scale   SubCutaneous three times a day before meals  insulin lispro (HumaLOG) corrective regimen sliding scale   SubCutaneous at bedtime  insulin lispro Injectable (HumaLOG) 20 Unit(s) SubCutaneous three times a day before meals  lactobacillus acidophilus 1 Tablet(s) Oral three times a day with meals  magnesium oxide 400 milliGRAM(s) Oral daily  pantoprazole    Tablet 40 milliGRAM(s) Oral before breakfast  senna 2 Tablet(s) Oral at bedtime  simethicone 80 milliGRAM(s) Chew every 6 hours  tamsulosin 0.4 milliGRAM(s) Oral at bedtime    MEDICATIONS  (PRN):  acetaminophen   Tablet .. 650 milliGRAM(s) Oral every 6 hours PRN Temp greater or equal to 38C (100.4F)  acetaminophen   Tablet .. 650 milliGRAM(s) Oral every 6 hours PRN Mild Pain (1 - 3)  dextrose 40% Gel 15 Gram(s) Oral once PRN Blood Glucose LESS THAN 70 milliGRAM(s)/deciLiter  diphenhydrAMINE 25 milliGRAM(s) Oral every 4 hours PRN Rash and/or Itching  fentaNYL    Injectable 25 MICROGram(s) IV Push every 5 minutes PRN Moderate Pain (4 - 6)  HYDROmorphone  Injectable 0.5 milliGRAM(s) IV Push every 10 minutes PRN Moderate Pain (4 - 6)  ondansetron Injectable 4 milliGRAM(s) IV Push every 6 hours PRN Nausea and/or Vomiting      Allergies    clindamycin (Rash)  niacin (Flushing; Rash)    Intolerances          Vital Signs Last 24 Hrs  T(C): 36.6 (25 Apr 2020 07:30), Max: 36.8 (24 Apr 2020 23:54)  T(F): 97.8 (25 Apr 2020 07:30), Max: 98.3 (24 Apr 2020 23:54)  HR: 79 (25 Apr 2020 07:30) (78 - 88)  BP: 149/70 (25 Apr 2020 07:30) (149/70 - 176/80)  BP(mean): --  RR: 18 (24 Apr 2020 23:54) (18 - 18)  SpO2: 96% (24 Apr 2020 23:54) (96% - 96%)  Daily     Daily   I&O's Detail    I&O's Summary      PHYSICAL EXAM:  Weak, tired  NERVOUS SYSTEM: AAOx3  CHEST/LUNG: Clear with diminished BS at bases  EXT: Tr LE edema  Further exam deferred to limit COVID exposure    LABS:                        10.7   6.20  )-----------( 384      ( 25 Apr 2020 05:57 )             33.7     04-25    142  |  109<H>  |  34.0<H>  ----------------------------<  69<L>  4.3   |  23.0  |  1.59<H>    Ca    8.2<L>      25 Apr 2020 05:57  Phos  2.9     04-24  Mg     1.9     04-25    TPro  5.6<L>  /  Alb  2.1<L>  /  TBili  0.4  /  DBili  x   /  AST  36  /  ALT  35  /  AlkPhos  263<H>  04-25        Magnesium, Serum: 1.9 mg/dL (04-25 @ 05:57)          RADIOLOGY & ADDITIONAL TESTS:

## 2020-04-26 LAB
ANION GAP SERPL CALC-SCNC: 13 MMOL/L — SIGNIFICANT CHANGE UP (ref 5–17)
BASOPHILS # BLD AUTO: 0.02 K/UL — SIGNIFICANT CHANGE UP (ref 0–0.2)
BASOPHILS NFR BLD AUTO: 0.4 % — SIGNIFICANT CHANGE UP (ref 0–2)
BUN SERPL-MCNC: 30 MG/DL — HIGH (ref 8–20)
CALCIUM SERPL-MCNC: 7.6 MG/DL — LOW (ref 8.6–10.2)
CHLORIDE SERPL-SCNC: 103 MMOL/L — SIGNIFICANT CHANGE UP (ref 98–107)
CO2 SERPL-SCNC: 21 MMOL/L — LOW (ref 22–29)
CREAT SERPL-MCNC: 1.5 MG/DL — HIGH (ref 0.5–1.3)
EOSINOPHIL # BLD AUTO: 0.66 K/UL — HIGH (ref 0–0.5)
EOSINOPHIL NFR BLD AUTO: 13.6 % — HIGH (ref 0–6)
GLUCOSE BLDC GLUCOMTR-MCNC: 113 MG/DL — HIGH (ref 70–99)
GLUCOSE BLDC GLUCOMTR-MCNC: 167 MG/DL — HIGH (ref 70–99)
GLUCOSE BLDC GLUCOMTR-MCNC: 189 MG/DL — HIGH (ref 70–99)
GLUCOSE BLDC GLUCOMTR-MCNC: 86 MG/DL — SIGNIFICANT CHANGE UP (ref 70–99)
GLUCOSE SERPL-MCNC: 243 MG/DL — HIGH (ref 70–99)
HCT VFR BLD CALC: 31.1 % — LOW (ref 39–50)
HGB BLD-MCNC: 10 G/DL — LOW (ref 13–17)
IMM GRANULOCYTES NFR BLD AUTO: 0.6 % — SIGNIFICANT CHANGE UP (ref 0–1.5)
LYMPHOCYTES # BLD AUTO: 1.06 K/UL — SIGNIFICANT CHANGE UP (ref 1–3.3)
LYMPHOCYTES # BLD AUTO: 21.9 % — SIGNIFICANT CHANGE UP (ref 13–44)
MAGNESIUM SERPL-MCNC: 1.8 MG/DL — SIGNIFICANT CHANGE UP (ref 1.6–2.6)
MCHC RBC-ENTMCNC: 29.5 PG — SIGNIFICANT CHANGE UP (ref 27–34)
MCHC RBC-ENTMCNC: 32.2 GM/DL — SIGNIFICANT CHANGE UP (ref 32–36)
MCV RBC AUTO: 91.7 FL — SIGNIFICANT CHANGE UP (ref 80–100)
MONOCYTES # BLD AUTO: 0.44 K/UL — SIGNIFICANT CHANGE UP (ref 0–0.9)
MONOCYTES NFR BLD AUTO: 9.1 % — SIGNIFICANT CHANGE UP (ref 2–14)
NEUTROPHILS # BLD AUTO: 2.64 K/UL — SIGNIFICANT CHANGE UP (ref 1.8–7.4)
NEUTROPHILS NFR BLD AUTO: 54.4 % — SIGNIFICANT CHANGE UP (ref 43–77)
PHOSPHATE SERPL-MCNC: 3.1 MG/DL — SIGNIFICANT CHANGE UP (ref 2.4–4.7)
PLATELET # BLD AUTO: 347 K/UL — SIGNIFICANT CHANGE UP (ref 150–400)
POTASSIUM SERPL-MCNC: 4.5 MMOL/L — SIGNIFICANT CHANGE UP (ref 3.5–5.3)
POTASSIUM SERPL-SCNC: 4.5 MMOL/L — SIGNIFICANT CHANGE UP (ref 3.5–5.3)
RBC # BLD: 3.39 M/UL — LOW (ref 4.2–5.8)
RBC # FLD: 14.7 % — HIGH (ref 10.3–14.5)
SODIUM SERPL-SCNC: 137 MMOL/L — SIGNIFICANT CHANGE UP (ref 135–145)
WBC # BLD: 4.85 K/UL — SIGNIFICANT CHANGE UP (ref 3.8–10.5)
WBC # FLD AUTO: 4.85 K/UL — SIGNIFICANT CHANGE UP (ref 3.8–10.5)

## 2020-04-26 PROCEDURE — 99232 SBSQ HOSP IP/OBS MODERATE 35: CPT

## 2020-04-26 RX ORDER — LACTULOSE 10 G/15ML
20 SOLUTION ORAL DAILY
Refills: 0 | Status: DISCONTINUED | OUTPATIENT
Start: 2020-04-26 | End: 2020-04-27

## 2020-04-26 RX ADMIN — SIMETHICONE 80 MILLIGRAM(S): 80 TABLET, CHEWABLE ORAL at 04:26

## 2020-04-26 RX ADMIN — SIMETHICONE 80 MILLIGRAM(S): 80 TABLET, CHEWABLE ORAL at 16:57

## 2020-04-26 RX ADMIN — Medication 1 MILLIGRAM(S): at 12:34

## 2020-04-26 RX ADMIN — INSULIN GLARGINE 50 UNIT(S): 100 INJECTION, SOLUTION SUBCUTANEOUS at 21:30

## 2020-04-26 RX ADMIN — Medication 20 UNIT(S): at 12:34

## 2020-04-26 RX ADMIN — TAMSULOSIN HYDROCHLORIDE 0.4 MILLIGRAM(S): 0.4 CAPSULE ORAL at 21:30

## 2020-04-26 RX ADMIN — Medication 2: at 08:59

## 2020-04-26 RX ADMIN — Medication 81 MILLIGRAM(S): at 12:33

## 2020-04-26 RX ADMIN — ENOXAPARIN SODIUM 40 MILLIGRAM(S): 100 INJECTION SUBCUTANEOUS at 12:34

## 2020-04-26 RX ADMIN — Medication 20 UNIT(S): at 16:56

## 2020-04-26 RX ADMIN — CLOPIDOGREL BISULFATE 75 MILLIGRAM(S): 75 TABLET, FILM COATED ORAL at 12:33

## 2020-04-26 RX ADMIN — SIMETHICONE 80 MILLIGRAM(S): 80 TABLET, CHEWABLE ORAL at 23:08

## 2020-04-26 RX ADMIN — Medication 0.6 MILLIGRAM(S): at 12:33

## 2020-04-26 RX ADMIN — Medication 20 UNIT(S): at 09:00

## 2020-04-26 RX ADMIN — SENNA PLUS 2 TABLET(S): 8.6 TABLET ORAL at 21:30

## 2020-04-26 RX ADMIN — MAGNESIUM OXIDE 400 MG ORAL TABLET 400 MILLIGRAM(S): 241.3 TABLET ORAL at 12:35

## 2020-04-26 RX ADMIN — PANTOPRAZOLE SODIUM 40 MILLIGRAM(S): 20 TABLET, DELAYED RELEASE ORAL at 04:26

## 2020-04-26 RX ADMIN — ATORVASTATIN CALCIUM 80 MILLIGRAM(S): 80 TABLET, FILM COATED ORAL at 21:30

## 2020-04-26 RX ADMIN — Medication 2: at 12:22

## 2020-04-26 RX ADMIN — Medication 1 TABLET(S): at 09:00

## 2020-04-26 RX ADMIN — LACTULOSE 20 GRAM(S): 10 SOLUTION ORAL at 23:08

## 2020-04-26 RX ADMIN — CARVEDILOL PHOSPHATE 25 MILLIGRAM(S): 80 CAPSULE, EXTENDED RELEASE ORAL at 04:26

## 2020-04-26 RX ADMIN — Medication 1 TABLET(S): at 16:57

## 2020-04-26 RX ADMIN — Medication 0.12 MILLIGRAM(S): at 04:26

## 2020-04-26 RX ADMIN — SIMETHICONE 80 MILLIGRAM(S): 80 TABLET, CHEWABLE ORAL at 12:35

## 2020-04-26 RX ADMIN — FINASTERIDE 5 MILLIGRAM(S): 5 TABLET, FILM COATED ORAL at 12:34

## 2020-04-26 RX ADMIN — Medication 1 TABLET(S): at 12:34

## 2020-04-26 RX ADMIN — CARVEDILOL PHOSPHATE 25 MILLIGRAM(S): 80 CAPSULE, EXTENDED RELEASE ORAL at 16:56

## 2020-04-26 NOTE — PROGRESS NOTE ADULT - SUBJECTIVE AND OBJECTIVE BOX
NEPHROLOGY INTERVAL HPI/OVERNIGHT EVENTS:  pt clinically stable  no acute distress    MEDICATIONS  (STANDING):  aspirin enteric coated 81 milliGRAM(s) Oral daily  atorvastatin 80 milliGRAM(s) Oral at bedtime  carvedilol 25 milliGRAM(s) Oral every 12 hours  clopidogrel Tablet 75 milliGRAM(s) Oral daily  colchicine 0.6 milliGRAM(s) Oral daily  digoxin     Tablet 0.125 milliGRAM(s) Oral daily  enoxaparin Injectable 40 milliGRAM(s) SubCutaneous daily  epoetin-adrian-epbx (RETACRIT) Injectable 16752 Unit(s) SubCutaneous every 7 days  finasteride 5 milliGRAM(s) Oral daily  folic acid 1 milliGRAM(s) Oral daily  insulin glargine Injectable (LANTUS) 50 Unit(s) SubCutaneous at bedtime  insulin lispro (HumaLOG) corrective regimen sliding scale   SubCutaneous three times a day before meals  insulin lispro (HumaLOG) corrective regimen sliding scale   SubCutaneous at bedtime  insulin lispro Injectable (HumaLOG) 20 Unit(s) SubCutaneous three times a day before meals  lactobacillus acidophilus 1 Tablet(s) Oral three times a day with meals  magnesium oxide 400 milliGRAM(s) Oral daily  pantoprazole    Tablet 40 milliGRAM(s) Oral before breakfast  senna 2 Tablet(s) Oral at bedtime  simethicone 80 milliGRAM(s) Chew every 6 hours  tamsulosin 0.4 milliGRAM(s) Oral at bedtime    MEDICATIONS  (PRN):  acetaminophen   Tablet .. 650 milliGRAM(s) Oral every 6 hours PRN Temp greater or equal to 38C (100.4F)  acetaminophen   Tablet .. 650 milliGRAM(s) Oral every 6 hours PRN Mild Pain (1 - 3)  dextrose 40% Gel 15 Gram(s) Oral once PRN Blood Glucose LESS THAN 70 milliGRAM(s)/deciLiter  diphenhydrAMINE 25 milliGRAM(s) Oral every 4 hours PRN Rash and/or Itching  fentaNYL    Injectable 25 MICROGram(s) IV Push every 5 minutes PRN Moderate Pain (4 - 6)  HYDROmorphone  Injectable 0.5 milliGRAM(s) IV Push every 10 minutes PRN Moderate Pain (4 - 6)  ondansetron Injectable 4 milliGRAM(s) IV Push every 6 hours PRN Nausea and/or Vomiting      Allergies    clindamycin (Rash)  niacin (Flushing; Rash)          Vital Signs Last 24 Hrs  T(C): 36.7 (26 Apr 2020 07:30), Max: 37.2 (25 Apr 2020 16:21)  T(F): 98 (26 Apr 2020 07:30), Max: 99 (25 Apr 2020 16:21)  HR: 81 (26 Apr 2020 07:30) (73 - 81)  BP: 165/78 (26 Apr 2020 07:30) (150/70 - 176/79)  BP(mean): --  RR: 18 (26 Apr 2020 00:24) (18 - 26)  SpO2: 95% (26 Apr 2020 00:24) (94% - 95%)    PHYSICAL EXAM:  Debilitated  NERVOUS SYSTEM: AAOx3  CHEST/LUNG: Clear with diminished BS at bases  EXT: Tr LE edema  Further exam deferred to limit COVID exposure    LABS:                        10.0   4.85  )-----------( 347      ( 26 Apr 2020 06:28 )             31.1     04-26    137  |  103  |  30.0<H>  ----------------------------<  243<H>  4.5   |  21.0<L>  |  1.50<H>        Ca    7.6<L>      26 Apr 2020 06:28  Phos  3.1     04-26  Mg     1.8     04-26    TPro  5.6<L>  /  Alb  2.1<L>  /  TBili  0.4  /  DBili  x   /  AST  36  /  ALT  35  /  AlkPhos  263<H>  04-25        Magnesium, Serum: 1.8 mg/dL (04-26 @ 06:28)  Phosphorus Level, Serum: 3.1 mg/dL (04-26 @ 06:28)      RADIOLOGY & ADDITIONAL TESTS:

## 2020-04-26 NOTE — PROGRESS NOTE ADULT - SUBJECTIVE AND OBJECTIVE BOX
[facility]                                   [address] [place] [address]                                                      PHONE: [phone]                                                         FAX: [phone]  [email or web address]  ---------------------------------------------------------------------------------------------------------------------------------    Overnight events/patient complaints:  No cp/sob.    PAST MEDICAL & SURGICAL HISTORY:  Insulin resistance  Fatty liver  CKD (chronic kidney disease)  BPH (benign prostatic hyperplasia)  Dyslipidemia  Hypertension  CAD (coronary artery disease)  DVT of leg (deep venous thrombosis), right  Diabetes: Peripheral vascular disease  CHF- EF-19% AICD ([employer])  MI  December 2013   HTN  Pulmonary edema  High Cholesterol  Chronic renal insufficency  S/P thyroid biopsy  S/P colonoscopy with polypectomy  AICD (automatic cardioverter/defibrillator) present  S/P angioplasty with stent: right leg 08/14, L femoral 7/2014  S/P coronary artery bypass graft x 3: 2014    AICD ([employer]) 2014      clindamycin (Rash)  niacin (Flushing; Rash)    MEDICATIONS  (STANDING):  aspirin enteric coated 81 milliGRAM(s) Oral daily  atorvastatin 80 milliGRAM(s) Oral at bedtime  carvedilol 25 milliGRAM(s) Oral every 12 hours  clopidogrel Tablet 75 milliGRAM(s) Oral daily  colchicine 0.6 milliGRAM(s) Oral daily  digoxin     Tablet 0.125 milliGRAM(s) Oral daily  enoxaparin Injectable 40 milliGRAM(s) SubCutaneous daily  epoetin-adrian-epbx (RETACRIT) Injectable 75009 Unit(s) SubCutaneous every 7 days  finasteride 5 milliGRAM(s) Oral daily  folic acid 1 milliGRAM(s) Oral daily  insulin glargine Injectable (LANTUS) 50 Unit(s) SubCutaneous at bedtime  insulin lispro (HumaLOG) corrective regimen sliding scale   SubCutaneous three times a day before meals  insulin lispro (HumaLOG) corrective regimen sliding scale   SubCutaneous at bedtime  insulin lispro Injectable (HumaLOG) 20 Unit(s) SubCutaneous three times a day before meals  lactobacillus acidophilus 1 Tablet(s) Oral three times a day with meals  magnesium oxide 400 milliGRAM(s) Oral daily  pantoprazole    Tablet 40 milliGRAM(s) Oral before breakfast  senna 2 Tablet(s) Oral at bedtime  simethicone 80 milliGRAM(s) Chew every 6 hours  tamsulosin 0.4 milliGRAM(s) Oral at bedtime    MEDICATIONS  (PRN):  acetaminophen   Tablet .. 650 milliGRAM(s) Oral every 6 hours PRN Temp greater or equal to 38C (100.4F)  acetaminophen   Tablet .. 650 milliGRAM(s) Oral every 6 hours PRN Mild Pain (1 - 3)  dextrose 40% Gel 15 Gram(s) Oral once PRN Blood Glucose LESS THAN 70 milliGRAM(s)/deciLiter  diphenhydrAMINE 25 milliGRAM(s) Oral every 4 hours PRN Rash and/or Itching  fentaNYL    Injectable 25 MICROGram(s) IV Push every 5 minutes PRN Moderate Pain (4 - 6)  HYDROmorphone  Injectable 0.5 milliGRAM(s) IV Push every 10 minutes PRN Moderate Pain (4 - 6)  ondansetron Injectable 4 milliGRAM(s) IV Push every 6 hours PRN Nausea and/or Vomiting      Vital Signs Last 24 Hrs  T(C): 36.7 (26 Apr 2020 07:30), Max: 37.2 (25 Apr 2020 16:21)  T(F): 98 (26 Apr 2020 07:30), Max: 99 (25 Apr 2020 16:21)  HR: 81 (26 Apr 2020 07:30) (73 - 81)  BP: 165/78 (26 Apr 2020 07:30) (150/70 - 176/79)  BP(mean): --  RR: 18 (26 Apr 2020 00:24) (18 - 26)  SpO2: 95% (26 Apr 2020 00:24) (94% - 95%)  ICU Vital Signs Last 24 Hrs  MARIA ROSS  I&O's Detail    I&O's Summary    Drug Dosing Weight  MARIA ROSS      PHYSICAL EXAM:  General: Appears well developed, well nourished alert and cooperative.  HEENT: Head; normocephalic, atraumatic.  Eyes: Pupils reactive, cornea wnl.  Neck: Supple, no nodes adenopathy, no NVD or carotid bruit or thyromegaly.  CARDIOVASCULAR: Normal S1 and S2, No murmur, rub, gallop or lift.   LUNGS: No rales, rhonchi or wheeze. Normal breath sounds bilaterally.  ABDOMEN: Soft, nontender without mass or organomegaly. bowel sounds normoactive.  EXTREMITIES: L groin w/ Vac.  SKIN: warm and dry with normal turgor.  NEURO: Alert/oriented x 3/normal motor exam. No pathologic reflexes.    PSYCH: normal affect.        LABS:                        10.0   4.85  )-----------( 347      ( 26 Apr 2020 06:28 )             31.1     04-26    137  |  103  |  30.0<H>  ----------------------------<  243<H>  4.5   |  21.0<L>  |  1.50<H>    Ca    7.6<L>      26 Apr 2020 06:28  Phos  3.1     04-26  Mg     1.8     04-26    TPro  5.6<L>  /  Alb  2.1<L>  /  TBili  0.4  /  DBili  x   /  AST  36  /  ALT  35  /  AlkPhos  263<H>  04-25    MARIA ROSS            RADIOLOGY & ADDITIONAL STUDIES:    INTERPRETATION OF TELEMETRY (personally reviewed):    ECG:    ECHO:    STRESS TEST:    CARDIAC CATHETERIZATION:    ASSESSMENT AND PLAN:  In summary, MARIA ROSS is an 64y Male with past medical history significant for L groin hematoma/to suction.  WBC normal. No CHF. BP elevated/on meds.    Thank you for allowing [facility] to participate in the care of this patient.  Please feel free to call with any questions or concerns.

## 2020-04-26 NOTE — PROGRESS NOTE ADULT - SUBJECTIVE AND OBJECTIVE BOX
CC:  follow up DM    Interval HPI/ Overnight Events:  Patient seen and examined.    Denies any CP or SOB.  Mild cough.  Basal insulin was reduced yesterday.    MEDICATIONS  (STANDING):  aspirin enteric coated 81 milliGRAM(s) Oral daily  atorvastatin 80 milliGRAM(s) Oral at bedtime  carvedilol 25 milliGRAM(s) Oral every 12 hours  clopidogrel Tablet 75 milliGRAM(s) Oral daily  colchicine 0.6 milliGRAM(s) Oral daily  digoxin     Tablet 0.125 milliGRAM(s) Oral daily  enoxaparin Injectable 40 milliGRAM(s) SubCutaneous daily  epoetin-adrian-epbx (RETACRIT) Injectable 01619 Unit(s) SubCutaneous every 7 days  finasteride 5 milliGRAM(s) Oral daily  folic acid 1 milliGRAM(s) Oral daily  insulin glargine Injectable (LANTUS) 50 Unit(s) SubCutaneous at bedtime  insulin lispro (HumaLOG) corrective regimen sliding scale   SubCutaneous three times a day before meals  insulin lispro (HumaLOG) corrective regimen sliding scale   SubCutaneous at bedtime  insulin lispro Injectable (HumaLOG) 20 Unit(s) SubCutaneous three times a day before meals  lactobacillus acidophilus 1 Tablet(s) Oral three times a day with meals  magnesium oxide 400 milliGRAM(s) Oral daily  pantoprazole    Tablet 40 milliGRAM(s) Oral before breakfast  senna 2 Tablet(s) Oral at bedtime  simethicone 80 milliGRAM(s) Chew every 6 hours  tamsulosin 0.4 milliGRAM(s) Oral at bedtime    Vital Signs Last 24 Hrs  T(C): 36.7 (26 Apr 2020 07:30), Max: 37.2 (25 Apr 2020 16:21)  T(F): 98 (26 Apr 2020 07:30), Max: 99 (25 Apr 2020 16:21)  HR: 81 (26 Apr 2020 07:30) (73 - 81)  BP: 165/78 (26 Apr 2020 07:30) (150/70 - 176/79)  RR: 18 (26 Apr 2020 00:24) (18 - 26)  SpO2: 95% (26 Apr 2020 00:24) (94% - 95%)    PE:  Gen: Obese male, NAD  HEENT:  sclera anicteric, MMM  Neck:  no thyromegaly, no LAD  CV:  nl S1 + S2, RRR, no m/r/g  Resp:  nl respiratory effort, CTA b/l  GI/ Abd: soft, NT/ ND, BS +  Neuro:  No tremor  MS:  no c/c/e   Skin:  no acanthosis  Psych:  affect appropriate    LABS:  04-26    137  |  103  |  30.0<H>  ----------------------------<  243<H>  4.5   |  21.0<L>  |  1.50<H>    Ca    7.6<L>      26 Apr 2020 06:28  Phos  3.1     04-26  Mg     1.8     04-26    TPro  5.6<L>  /  Alb  2.1<L>  /  TBili  0.4  /  DBili  x   /  AST  36  /  ALT  35  /  AlkPhos  263<H>  04-25                          10.0   4.85  )-----------( 347      ( 26 Apr 2020 06:28 )             31.1     CAPILLARY BLOOD GLUCOSE  POCT Blood Glucose.: 167 mg/dL (26 Apr 2020 12:08)  POCT Blood Glucose.: 189 mg/dL (26 Apr 2020 08:05)  POCT Blood Glucose.: 268 mg/dL (25 Apr 2020 22:02)  POCT Blood Glucose.: 322 mg/dL (25 Apr 2020 22:01)  POCT Blood Glucose.: 201 mg/dL (25 Apr 2020 16:50)

## 2020-04-26 NOTE — PROGRESS NOTE ADULT - ASSESSMENT
64M S/p serial left groin debridements, S/P partial closure/debridement/washout/wound vac placement POD#2. COVID positive on supplemental NC. No JESSICA.     - Patient currently stable  - Persistent rash to back, drug vs contact  improved  - Monitor FS  -Dispo: [facility] VS [facility]

## 2020-04-26 NOTE — PROGRESS NOTE ADULT - ASSESSMENT
CKD stage III: DM/HTN;  h/o CAD, S/P MI, CABG, ICM with AICD  MARSHALL resolved and now at baseline  Renal sono noted no hydronephrosis   s/p OR on 4/10- Debridement, external genitalia, perineum  I&D abscess   L groin hematoma/ cellulitis on abx/ Leukocytosis  COVID+ (asymptomatic)  - avoid potential nephrotoxins  - low K+ diet  - follow labs    Anemia:  S/P PRBCs   - cont LUISA as required to maintain Hgb > 10.0

## 2020-04-26 NOTE — PROGRESS NOTE ADULT - ASSESSMENT
64 year old male with Type 2 DM with associated nephropathy, CAD s/p PCI, ischemic CM, HTN admitted with necrotizing fasciitis s/p debridement.    His glycemic control is improving.      1.  Type 2 DM-    Continue current insulin regimen.  Patient can follow up with his own endocrinologist (Dr. Paez) after discharger.     2.  Necrotizing fasciitis-   management as per surgical team.    3.  HLD-  continue statin. 64 year old male with Type 2 DM with associated nephropathy, CAD s/p PCI, ischemic CM, HTN admitted with necrotizing fasciitis s/p debridement.    His glycemic control is improving.      1.  Type 2 DM-    Continue current insulin regimen.  Patient can follow up with his own endocrinologist (Dr. Poe) after discharge.     2.  Necrotizing fasciitis-   management as per surgical team.    3.  HLD-  continue statin.

## 2020-04-27 ENCOUNTER — TRANSCRIPTION ENCOUNTER (OUTPATIENT)
Age: 65
End: 2020-04-27

## 2020-04-27 LAB
GLUCOSE BLDC GLUCOMTR-MCNC: 122 MG/DL — HIGH (ref 70–99)
GLUCOSE BLDC GLUCOMTR-MCNC: 138 MG/DL — HIGH (ref 70–99)
GLUCOSE BLDC GLUCOMTR-MCNC: 94 MG/DL — SIGNIFICANT CHANGE UP (ref 70–99)

## 2020-04-27 PROCEDURE — 86923 COMPATIBILITY TEST ELECTRIC: CPT

## 2020-04-27 PROCEDURE — 71045 X-RAY EXAM CHEST 1 VIEW: CPT

## 2020-04-27 PROCEDURE — 87635 SARS-COV-2 COVID-19 AMP PRB: CPT

## 2020-04-27 PROCEDURE — 80162 ASSAY OF DIGOXIN TOTAL: CPT

## 2020-04-27 PROCEDURE — 87186 SC STD MICRODIL/AGAR DIL: CPT

## 2020-04-27 PROCEDURE — 82306 VITAMIN D 25 HYDROXY: CPT

## 2020-04-27 PROCEDURE — 99285 EMERGENCY DEPT VISIT HI MDM: CPT | Mod: 25

## 2020-04-27 PROCEDURE — 85652 RBC SED RATE AUTOMATED: CPT

## 2020-04-27 PROCEDURE — 73070 X-RAY EXAM OF ELBOW: CPT

## 2020-04-27 PROCEDURE — P9016: CPT

## 2020-04-27 PROCEDURE — 81003 URINALYSIS AUTO W/O SCOPE: CPT

## 2020-04-27 PROCEDURE — 76775 US EXAM ABDO BACK WALL LIM: CPT

## 2020-04-27 PROCEDURE — 89051 BODY FLUID CELL COUNT: CPT

## 2020-04-27 PROCEDURE — 84484 ASSAY OF TROPONIN QUANT: CPT

## 2020-04-27 PROCEDURE — 87075 CULTR BACTERIA EXCEPT BLOOD: CPT

## 2020-04-27 PROCEDURE — 85730 THROMBOPLASTIN TIME PARTIAL: CPT

## 2020-04-27 PROCEDURE — 88304 TISSUE EXAM BY PATHOLOGIST: CPT

## 2020-04-27 PROCEDURE — 93005 ELECTROCARDIOGRAM TRACING: CPT

## 2020-04-27 PROCEDURE — 96375 TX/PRO/DX INJ NEW DRUG ADDON: CPT

## 2020-04-27 PROCEDURE — 87070 CULTURE OTHR SPECIMN AEROBIC: CPT

## 2020-04-27 PROCEDURE — 97163 PT EVAL HIGH COMPLEX 45 MIN: CPT

## 2020-04-27 PROCEDURE — 36430 TRANSFUSION BLD/BLD COMPNT: CPT

## 2020-04-27 PROCEDURE — 80053 COMPREHEN METABOLIC PANEL: CPT

## 2020-04-27 PROCEDURE — 85610 PROTHROMBIN TIME: CPT

## 2020-04-27 PROCEDURE — 74018 RADEX ABDOMEN 1 VIEW: CPT

## 2020-04-27 PROCEDURE — 93926 LOWER EXTREMITY STUDY: CPT

## 2020-04-27 PROCEDURE — 83970 ASSAY OF PARATHORMONE: CPT

## 2020-04-27 PROCEDURE — 83605 ASSAY OF LACTIC ACID: CPT

## 2020-04-27 PROCEDURE — 99232 SBSQ HOSP IP/OBS MODERATE 35: CPT

## 2020-04-27 PROCEDURE — 84156 ASSAY OF PROTEIN URINE: CPT

## 2020-04-27 PROCEDURE — 89060 EXAM SYNOVIAL FLUID CRYSTALS: CPT

## 2020-04-27 PROCEDURE — 86900 BLOOD TYPING SEROLOGIC ABO: CPT

## 2020-04-27 PROCEDURE — 87040 BLOOD CULTURE FOR BACTERIA: CPT

## 2020-04-27 PROCEDURE — 84100 ASSAY OF PHOSPHORUS: CPT

## 2020-04-27 PROCEDURE — 87205 SMEAR GRAM STAIN: CPT

## 2020-04-27 PROCEDURE — 86901 BLOOD TYPING SEROLOGIC RH(D): CPT

## 2020-04-27 PROCEDURE — 82962 GLUCOSE BLOOD TEST: CPT

## 2020-04-27 PROCEDURE — 80202 ASSAY OF VANCOMYCIN: CPT

## 2020-04-27 PROCEDURE — 85027 COMPLETE CBC AUTOMATED: CPT

## 2020-04-27 PROCEDURE — 36415 COLL VENOUS BLD VENIPUNCTURE: CPT

## 2020-04-27 PROCEDURE — 83036 HEMOGLOBIN GLYCOSYLATED A1C: CPT

## 2020-04-27 PROCEDURE — 82040 ASSAY OF SERUM ALBUMIN: CPT

## 2020-04-27 PROCEDURE — 84550 ASSAY OF BLOOD/URIC ACID: CPT

## 2020-04-27 PROCEDURE — 72192 CT PELVIS W/O DYE: CPT

## 2020-04-27 PROCEDURE — 93971 EXTREMITY STUDY: CPT

## 2020-04-27 PROCEDURE — 83735 ASSAY OF MAGNESIUM: CPT

## 2020-04-27 PROCEDURE — 99232 SBSQ HOSP IP/OBS MODERATE 35: CPT | Mod: GC

## 2020-04-27 PROCEDURE — 80048 BASIC METABOLIC PNL TOTAL CA: CPT

## 2020-04-27 PROCEDURE — 86850 RBC ANTIBODY SCREEN: CPT

## 2020-04-27 PROCEDURE — 96374 THER/PROPH/DIAG INJ IV PUSH: CPT

## 2020-04-27 PROCEDURE — 82310 ASSAY OF CALCIUM: CPT

## 2020-04-27 PROCEDURE — 83880 ASSAY OF NATRIURETIC PEPTIDE: CPT

## 2020-04-27 RX ORDER — AMLODIPINE BESYLATE 2.5 MG/1
1 TABLET ORAL
Qty: 0 | Refills: 0 | DISCHARGE
Start: 2020-04-27

## 2020-04-27 RX ORDER — AMLODIPINE BESYLATE 2.5 MG/1
1 TABLET ORAL
Qty: 30 | Refills: 0
Start: 2020-04-27

## 2020-04-27 RX ORDER — COLCHICINE 0.6 MG
1 TABLET ORAL
Qty: 30 | Refills: 0
Start: 2020-04-27

## 2020-04-27 RX ORDER — AMLODIPINE BESYLATE 2.5 MG/1
5 TABLET ORAL DAILY
Refills: 0 | Status: DISCONTINUED | OUTPATIENT
Start: 2020-04-27 | End: 2020-04-27

## 2020-04-27 RX ORDER — TAMSULOSIN HYDROCHLORIDE 0.4 MG/1
1 CAPSULE ORAL
Qty: 30 | Refills: 0
Start: 2020-04-27

## 2020-04-27 RX ORDER — AMLODIPINE BESYLATE 2.5 MG/1
5 TABLET ORAL ONCE
Refills: 0 | Status: COMPLETED | OUTPATIENT
Start: 2020-04-27 | End: 2020-04-27

## 2020-04-27 RX ADMIN — SIMETHICONE 80 MILLIGRAM(S): 80 TABLET, CHEWABLE ORAL at 05:22

## 2020-04-27 RX ADMIN — Medication 20 UNIT(S): at 09:35

## 2020-04-27 RX ADMIN — MAGNESIUM OXIDE 400 MG ORAL TABLET 400 MILLIGRAM(S): 241.3 TABLET ORAL at 12:57

## 2020-04-27 RX ADMIN — Medication 0.12 MILLIGRAM(S): at 05:22

## 2020-04-27 RX ADMIN — Medication 20 UNIT(S): at 17:06

## 2020-04-27 RX ADMIN — AMLODIPINE BESYLATE 5 MILLIGRAM(S): 2.5 TABLET ORAL at 16:49

## 2020-04-27 RX ADMIN — SIMETHICONE 80 MILLIGRAM(S): 80 TABLET, CHEWABLE ORAL at 12:56

## 2020-04-27 RX ADMIN — Medication 1 TABLET(S): at 16:50

## 2020-04-27 RX ADMIN — ENOXAPARIN SODIUM 40 MILLIGRAM(S): 100 INJECTION SUBCUTANEOUS at 12:57

## 2020-04-27 RX ADMIN — Medication 20 UNIT(S): at 12:54

## 2020-04-27 RX ADMIN — Medication 0.6 MILLIGRAM(S): at 12:56

## 2020-04-27 RX ADMIN — CLOPIDOGREL BISULFATE 75 MILLIGRAM(S): 75 TABLET, FILM COATED ORAL at 12:56

## 2020-04-27 RX ADMIN — FINASTERIDE 5 MILLIGRAM(S): 5 TABLET, FILM COATED ORAL at 12:57

## 2020-04-27 RX ADMIN — Medication 81 MILLIGRAM(S): at 13:30

## 2020-04-27 RX ADMIN — CARVEDILOL PHOSPHATE 25 MILLIGRAM(S): 80 CAPSULE, EXTENDED RELEASE ORAL at 17:57

## 2020-04-27 RX ADMIN — Medication 1 TABLET(S): at 05:21

## 2020-04-27 RX ADMIN — PANTOPRAZOLE SODIUM 40 MILLIGRAM(S): 20 TABLET, DELAYED RELEASE ORAL at 05:22

## 2020-04-27 RX ADMIN — Medication 1 TABLET(S): at 12:56

## 2020-04-27 RX ADMIN — Medication 1 MILLIGRAM(S): at 12:56

## 2020-04-27 RX ADMIN — CARVEDILOL PHOSPHATE 25 MILLIGRAM(S): 80 CAPSULE, EXTENDED RELEASE ORAL at 05:21

## 2020-04-27 NOTE — PROGRESS NOTE ADULT - ASSESSMENT
65 yo male s/p left groin infected hematoma debridements and now with vac in place  - PT  - Vac change tomorrow  - d/c planning to rehab  - case management f/u for placement choices

## 2020-04-27 NOTE — PROGRESS NOTE ADULT - SUBJECTIVE AND OBJECTIVE BOX
HPI: 65 y/o male with extensive cardiac history, HTN, DM came to the ER for left groin area infection which started 1 week ago after he had angiography through left groin area 2 weeks ago, got 3 stent ( done at [facility] by [facility] ). Pt stated that he has been on levaquin by his doctor but getting worse. some pain in the left groin area, denies fever but had chills. no cp, no sob. no dizziness, no abd. pain. no n/v/d. As per pt. his cardiologist is aware that his groin area is not healing, was contacted via phone. (09 Apr 2020 01:25).  COVID +     Imaging showed (reviewed):  HEAD CT - No acute findings  CAP CT - No acute findings  C SPINE CT - No acute findings    Patient is s/p serial left groin debridements, S/P partial closure/debridement/washout/wound vac. Cardiology following due to recent PCI, recommended DAPT with ASA and plavix. Nephrology following for CKD stage III, MARSHALL resolved and now at baseline, renal sono with no hydronephrosis     REVIEW OF SYSTEMS  Constitutional - No fevers, +fatigue, +weakness  HEENT - No eye pain, No visual disturbances, No difficulty hearing,Respiratory - + cough, No wheezing, + CONNOLLY  Cardiovascular - No chest pain, No palpitations  Gastrointestinal - No abdominal pain, No nausea, No vomiting, No diarrhea, No constipation  Genitourinary - No dysuria, No frequency, No hematuria, No incontinence  Neurological - No headaches, No memory loss,  No numbness, No tremors, +weakness  Skin - No itching, No rashes, No lesions   Musculoskeletal - No joint pain, No joint swelling, No muscle pain  Psychiatric - No depression, No anxiety    CURRENT FUNCTIONAL STATUS      VITALS  Vital Signs (24 Hrs):  T(C): 36.9 (04-27-20 @ 09:35), Max: 37.1 (04-27-20 @ 05:19)  HR: 62 (04-27-20 @ 09:35) (62 - 82)  BP: 167/79 (04-27-20 @ 09:35) (151/72 - 174/75)  RR: 22 (04-27-20 @ 09:35) (21 - 22)  SpO2: --  Wt(kg): --      PAST MEDICAL & SURGICAL HISTORY  Insulin resistance  Fatty liver  CKD (chronic kidney disease)  BPH (benign prostatic hyperplasia)  Dyslipidemia  Hypertension  Renal insufficiency  CAD (coronary artery disease)  DVT of leg (deep venous thrombosis), right  Diabetes  S/P thyroid biopsy  S/P colonoscopy with polypectomy  AICD (automatic cardioverter/defibrillator) present  S/P angioplasty with stent  S/P coronary artery bypass graft x 3      SOCIAL HISTORY  Smoking - Denied  EtOH - Denied   Drugs - Denied    FUNCTIONAL HISTORY  Lives w/ wife in private house 2 steps to enter  Previously Independent    CURRENT FUNCTIONAL STATUS: MAX ASSIST FOR MOST ADLs      FAMILY HISTORY   Family history of heart disease      RECENT LABS/IMAGING  CBC Full  -  ( 24 Apr 2020 07:03 )  WBC Count : 8.10 K/uL  RBC Count : 3.70 M/uL  Hemoglobin : 10.8 g/dL  Hematocrit : 34.1 %  Platelet Count - Automated : 394 K/uL  Mean Cell Volume : 92.2 fl  Mean Cell Hemoglobin : 29.2 pg  Mean Cell Hemoglobin Concentration : 31.7 gm/dL  Auto Neutrophil # : 5.83 K/uL  Auto Lymphocyte # : 1.07 K/uL  Auto Monocyte # : 0.43 K/uL  Auto Eosinophil # : 0.70 K/uL  Auto Basophil # : 0.02 K/uL  Auto Neutrophil % : 72.1 %  Auto Lymphocyte % : 13.2 %  Auto Monocyte % : 5.3 %  Auto Eosinophil % : 8.6 %  Auto Basophil % : 0.2 %    04-24    138  |  105  |  38.0<H>  ----------------------------<  334<H>  5.9<H>   |  23.0  |  1.68<H>    Ca    7.7<L>      24 Apr 2020 06:59  Phos  2.9     04-24  Mg     1.6     04-24          ALLERGIES  clindamycin (Rash)  niacin (Flushing; Rash)      MEDICATIONS   acetaminophen   Tablet .. 650 milliGRAM(s) Oral every 6 hours PRN  acetaminophen   Tablet .. 650 milliGRAM(s) Oral every 6 hours PRN  aspirin enteric coated 81 milliGRAM(s) Oral daily  atorvastatin 80 milliGRAM(s) Oral at bedtime  carvedilol 25 milliGRAM(s) Oral every 12 hours  clopidogrel Tablet 75 milliGRAM(s) Oral daily  colchicine 0.6 milliGRAM(s) Oral daily  dextrose 40% Gel 15 Gram(s) Oral once PRN  digoxin     Tablet 0.125 milliGRAM(s) Oral daily  diphenhydrAMINE 25 milliGRAM(s) Oral every 4 hours PRN  epoetin-adrian-epbx (RETACRIT) Injectable 51526 Unit(s) SubCutaneous every 7 days  fentaNYL    Injectable 25 MICROGram(s) IV Push every 5 minutes PRN  finasteride 5 milliGRAM(s) Oral daily  folic acid 1 milliGRAM(s) Oral daily  heparin  Injectable 5000 Unit(s) SubCutaneous every 8 hours  HYDROmorphone  Injectable 0.5 milliGRAM(s) IV Push every 10 minutes PRN  insulin glargine Injectable (LANTUS) 45 Unit(s) SubCutaneous at bedtime  insulin lispro (HumaLOG) corrective regimen sliding scale   SubCutaneous at bedtime  insulin lispro Injectable (HumaLOG) 26 Unit(s) SubCutaneous three times a day before meals  lactobacillus acidophilus 1 Tablet(s) Oral three times a day with meals  magnesium oxide 400 milliGRAM(s) Oral daily  magnesium sulfate  IVPB 2 Gram(s) IV Intermittent once  ondansetron Injectable 4 milliGRAM(s) IV Push every 6 hours PRN  pantoprazole    Tablet 40 milliGRAM(s) Oral before breakfast  senna 2 Tablet(s) Oral at bedtime  simethicone 80 milliGRAM(s) Chew every 6 hours  sodium polystyrene sulfonate Suspension 30 Gram(s) Oral once      ----------------------------------------------------------------------------------------  PHYSICAL EXAM  Constitutional - NAD, Comfortable  HEENT - NCAT, EOMI  Neck - Supple, No limited ROM  Chest - Breathing comfortably, No wheezing  Cardiovascular - S1S2   Abdomen - OBESE, Soft   Extremities - No C/C/E, No calf tenderness   Neurologic Exam -                    Cognitive - Awake, Alert, AAO to self, place, date, year, situation     Communication - Fluent, No dysarthria     Cranial Nerves - CN 2-12 intact     Motor - No focal deficits                    LEFT    UE - ShAB 5/5, EF 5/5, EE 5/5, WE 5/5,  5/5                    RIGHT UE - ShAB 5/5, EF 5/5, EE 5/5, WE 5/5,  5/5                    LEFT    LE - HF 5/5, KE 5/5, DF 5/5, PF 5/5                    RIGHT LE - HF 5/5, KE 5/5, DF 5/5, PF 5/5        Sensory - Intact to LT     Reflexes - DTR Intact, No primitive reflexive     Coordination - FTN intact     OculoVestibular - No saccades, No nystagmus, VOR         Balance - WNL Static  Psychiatric - Mood stable, Affect WNL  ----------------------------------------------------------------------------------------  ASSESSMENT/PLAN  64yMale with functional deficits s/p LEFT GROIN DEBRIDEMENT FOR WOUND INFECTION AFTER CARDIAC ANGIOGRAPHY.    Pain - per primary medical team  DVT PPX - heparin  Injectable 5000 Unit(s) SubCutaneous every 8 hours  Rehab -  RECOMMEND ACUTE REHABILITATION TRANSFER WHEN MEDICALLY STABLE.  PT. WILL BENEFIT FROM 3 HRS/DAY COMPREHENSIVE, MULTIDISCIPLINARY REHAB PROGRAM.    Recommend ACUTE inpatient rehabilitation for the functional deficits consisting of 3 hours of therapy/day & 24 hour RN/daily PMR physician for comorbid medical management. Patient will be able to tolerate 3 hours a day.   Recommend ELIZ, patient DOES NOT meet acute inpatient rehabilitation criteria HPI: 65 y/o male with extensive cardiac history, HTN, DM came to the ER for left groin area infection which started 1 week ago after he had angiography through left groin area 2 weeks ago, got 3 stent ( done at [facility] by [facility] ). Pt stated that he has been on levaquin by his doctor but getting worse. some pain in the left groin area, denies fever but had chills. no cp, no sob. no dizziness, no abd. pain. no n/v/d. As per pt. his cardiologist is aware that his groin area is not healing, was contacted via phone.   COVID + Detected (23 Apr 2020 14:07)    Imaging showed (reviewed):  HEAD CT - No acute findings  CAP CT - No acute findings  C SPINE CT - No acute findings    Patient is s/p serial left groin debridements, S/P partial closure/debridement/washout/wound vac. Cardiology following due to recent PCI, recommended DAPT with ASA and plavix. Nephrology following for CKD stage III, MARSHALL resolved and now at baseline, renal sono with no hydronephrosis     REVIEW OF SYSTEMS  Constitutional - No fevers, +fatigue, +weakness  HEENT - No eye pain, No visual disturbances, No difficulty hearing,Respiratory - + cough, No wheezing, + CONNOLLY  Cardiovascular - No chest pain, No palpitations  Gastrointestinal - No abdominal pain, No nausea, No vomiting, No diarrhea, No constipation  Genitourinary - No dysuria, No frequency, No hematuria, No incontinence  Neurological - No headaches, No memory loss,  No numbness, No tremors, +weakness  Skin -+left groin wound with wound vac  Musculoskeletal - No joint pain, No joint swelling, No muscle pain  Psychiatric - No depression, No anxiety    CURRENT FUNCTIONAL STATUS  PT 4/22  Bed Mobility: Rolling/Turning:     · Level of Graves	moderate assist (50% patients effort)	  · Physical Assist/Nonphysical Assist	1 person assist; verbal cues	  · Assistive Device	bed rails	    Bed Mobility: Supine to Sit:     · Level of Graves	moderate assist (50% patients effort)	  · Physical Assist/Nonphysical Assist	1 person assist; verbal cues	  · Assistive Device	bed rails	    Transfer: Bed to Chair:     Transfer Skill: Bed to Chair   · Level of Graves	moderate assist (50% patients effort)	  · Physical Assist/Nonphysical Assist	1 person assist	  · Assistive Device	rolling walker	    Bed/Chair Transfer Safety Analysis:     · Impairments Contributing to Impaired Transfers	impaired balance; decreased strength	  · Transfer Safety Concerns Noted	losing balance	    Transfer: Sit to Stand:     · Level of Graves	moderate assist (50% patients effort)	  · Physical Assist/Nonphysical Assist	1 person assist; verbal cues	  · Assistive Device	rolling walker	    Transfer: Stand to Sit:     · Level of Graves	moderate assist (50% patients effort)	  · Physical Assist/Nonphysical Assist	1 person assist; verbal cues	  · Assistive Device	rolling walker	    Sit/Stand Transfer Safety Analysis:     · Impairments Contributing to Impaired Transfers	impaired balance; decreased strength	    Gait Skills:     · Level of Graves	moderate assist (50% patients effort)	  · Physical Assist/Nonphysical Assist	1 person assist; verbal cues	  · Assistive Device	rolling walker	  · Gait Distance	bed to chair; 5 feet	    Gait Analysis:     · Gait Pattern Used	shuffling	  · Impairments Contributing to Gait Deviations	impaired balance; decreased strength	    Sensory Examination:    Sensory Examination:    Grossly Intact:   · Gross Sensory Examination	Grossly Intact	        VITALS  Vital Signs (24 Hrs):  T(C): 36.9 (04-27-20 @ 09:35), Max: 37.1 (04-27-20 @ 05:19)  HR: 62 (04-27-20 @ 09:35) (62 - 82)  BP: 167/79 (04-27-20 @ 09:35) (151/72 - 174/75)  RR: 22 (04-27-20 @ 09:35) (21 - 22)  SpO2: --  Wt(kg): --      RECENT LABS/IMAGING                        10.0   4.85  )-----------( 347      ( 26 Apr 2020 06:28 )             31.1     04-26    137  |  103  |  30.0<H>  ----------------------------<  243<H>  4.5   |  21.0<L>  |  1.50<H>    Ca    7.6<L>      26 Apr 2020 06:28  Phos  3.1     04-26  Mg     1.8     04-26    COVID-19 PCR: Detected (23 Apr 2020 14:07)      ALLERGIES  clindamycin (Rash)  niacin (Flushing; Rash)      MEDICATIONS   acetaminophen   Tablet .. 650 milliGRAM(s) Oral every 6 hours PRN  acetaminophen   Tablet .. 650 milliGRAM(s) Oral every 6 hours PRN  aspirin enteric coated 81 milliGRAM(s) Oral daily  atorvastatin 80 milliGRAM(s) Oral at bedtime  carvedilol 25 milliGRAM(s) Oral every 12 hours  clopidogrel Tablet 75 milliGRAM(s) Oral daily  colchicine 0.6 milliGRAM(s) Oral daily  dextrose 40% Gel 15 Gram(s) Oral once PRN  digoxin     Tablet 0.125 milliGRAM(s) Oral daily  diphenhydrAMINE 25 milliGRAM(s) Oral every 4 hours PRN  epoetin-adrian-epbx (RETACRIT) Injectable 45002 Unit(s) SubCutaneous every 7 days  fentaNYL    Injectable 25 MICROGram(s) IV Push every 5 minutes PRN  finasteride 5 milliGRAM(s) Oral daily  folic acid 1 milliGRAM(s) Oral daily  heparin  Injectable 5000 Unit(s) SubCutaneous every 8 hours  HYDROmorphone  Injectable 0.5 milliGRAM(s) IV Push every 10 minutes PRN  insulin glargine Injectable (LANTUS) 45 Unit(s) SubCutaneous at bedtime  insulin lispro (HumaLOG) corrective regimen sliding scale   SubCutaneous at bedtime  insulin lispro Injectable (HumaLOG) 26 Unit(s) SubCutaneous three times a day before meals  lactobacillus acidophilus 1 Tablet(s) Oral three times a day with meals  magnesium oxide 400 milliGRAM(s) Oral daily  magnesium sulfate  IVPB 2 Gram(s) IV Intermittent once  ondansetron Injectable 4 milliGRAM(s) IV Push every 6 hours PRN  pantoprazole    Tablet 40 milliGRAM(s) Oral before breakfast  senna 2 Tablet(s) Oral at bedtime  simethicone 80 milliGRAM(s) Chew every 6 hours  sodium polystyrene sulfonate Suspension 30 Gram(s) Oral once    ----------------------------------------------------------------------------------------  PHYSICAL EXAM  Constitutional - NAD, Comfortable  HEENT - NCAT, EOMI  Neck - Supple, No limited ROM  Chest - Breathing comfortably, No wheezing  Abdomen - OBESE, Soft   Extremities - No edema, No calf tenderness   Neurologic Exam -                    Cognitive - Awake, Alert, AAO to self, place, date, year, situation     Communication - Fluent, No dysarthria     Cranial Nerves - CN 2-12 intact     Motor -                     LEFT    UE - ShAB 5/5, EF 5/5, EE 5/5, WE 5/5,  5/5                    RIGHT UE - ShAB 5/5, EF 5/5, EE 5/5, WE 5/5,  5/5                    LEFT    LE - HF 4/5, KE 4/5, DF 5/5, PF 5/5, + left groin wound vac in place                    RIGHT LE - HF 5/5, KE 5/5, DF 5/5, PF 5/5        Sensory - Intact to LT     Coordination - FTN intact     OculoVestibular - No saccades, No nystagmus,   Psychiatric - Mood stable, Affect WNL  ----------------------------------------------------------------------------------------  ASSESSMENT/PLAN  64yMale with functional deficits s/p left groin debridement for wound infection after cardiac angiography,     COVID+ - on RA, folic acid,  HTN/Ischemic CM with AICD/CAD s/p recent PCI - Plavix, ASA81, Coreg, Digoxin  HLD - Lipitor  Urinary Retention - Flomax, Proscar  CKD3  Anemia - epoetin  Infected left groin hematoma s/p debridement - wound vac  DM - ISS, Humalog, Lantus  Pain - Tylenol prn, Fentanyl prn, Dilaudid prn   GI ppx/Constipation - Lactulose, Senna, protonix, simethicone  Diet - Carb consistent renal  DVT PPX - Lovenox 40mg subq daily,   Rehab - Continue bedside therapy as well as OOB throughout the day with mobilization by staff to maintain cardiopulmonary function and prevention of secondary complications related to debility.     Recommend ACUTE inpatient rehabilitation for the functional deficits consisting of 3 hours of therapy/day & 24 hour RN/daily PMR physician for comorbid medical management. Patient will be able to tolerate 3 hours a day.    Will continue to follow for ongoing rehab needs and recommendations. Functional progress will determine ongoing rehab dispo recommendations, which may change.

## 2020-04-27 NOTE — CHART NOTE - NSCHARTNOTEFT_GEN_A_CORE
Wound vac removed and replaced with a wet to dry dressing/kerlix in saline. ABD pad placed on top. Wound measures [id number].

## 2020-04-27 NOTE — PROGRESS NOTE ADULT - SUBJECTIVE AND OBJECTIVE BOX
[facility]                                   [address] [place] [address]                                                      PHONE: [phone]                                                         FAX: [phone]  [email or web address]  ---------------------------------------------------------------------------------------------------------------------------------    Overnight events/patient complaints: no chest pain or fever, denies cough, tele SR QTc 388 msec      clindamycin (Rash)  niacin (Flushing; Rash)    MEDICATIONS  (STANDING):  aspirin enteric coated 81 milliGRAM(s) Oral daily  atorvastatin 80 milliGRAM(s) Oral at bedtime  carvedilol 25 milliGRAM(s) Oral every 12 hours  clopidogrel Tablet 75 milliGRAM(s) Oral daily  colchicine 0.6 milliGRAM(s) Oral daily  digoxin     Tablet 0.125 milliGRAM(s) Oral daily  enoxaparin Injectable 40 milliGRAM(s) SubCutaneous daily  epoetin-adrian-epbx (RETACRIT) Injectable 43609 Unit(s) SubCutaneous every 7 days  finasteride 5 milliGRAM(s) Oral daily  folic acid 1 milliGRAM(s) Oral daily  insulin glargine Injectable (LANTUS) 50 Unit(s) SubCutaneous at bedtime  insulin lispro (HumaLOG) corrective regimen sliding scale   SubCutaneous three times a day before meals  insulin lispro (HumaLOG) corrective regimen sliding scale   SubCutaneous at bedtime  insulin lispro Injectable (HumaLOG) 20 Unit(s) SubCutaneous three times a day before meals  lactobacillus acidophilus 1 Tablet(s) Oral three times a day with meals  lactulose Syrup 20 Gram(s) Oral daily  magnesium oxide 400 milliGRAM(s) Oral daily  pantoprazole    Tablet 40 milliGRAM(s) Oral before breakfast  senna 2 Tablet(s) Oral at bedtime  simethicone 80 milliGRAM(s) Chew every 6 hours  tamsulosin 0.4 milliGRAM(s) Oral at bedtime    MEDICATIONS  (PRN):  acetaminophen   Tablet .. 650 milliGRAM(s) Oral every 6 hours PRN Temp greater or equal to 38C (100.4F)  acetaminophen   Tablet .. 650 milliGRAM(s) Oral every 6 hours PRN Mild Pain (1 - 3)  dextrose 40% Gel 15 Gram(s) Oral once PRN Blood Glucose LESS THAN 70 milliGRAM(s)/deciLiter  diphenhydrAMINE 25 milliGRAM(s) Oral every 4 hours PRN Rash and/or Itching  fentaNYL    Injectable 25 MICROGram(s) IV Push every 5 minutes PRN Moderate Pain (4 - 6)  HYDROmorphone  Injectable 0.5 milliGRAM(s) IV Push every 10 minutes PRN Moderate Pain (4 - 6)  ondansetron Injectable 4 milliGRAM(s) IV Push every 6 hours PRN Nausea and/or Vomiting      Vital Signs Last 24 Hrs  T(C): 37.1 (27 Apr 2020 05:19), Max: 37.1 (27 Apr 2020 05:19)  T(F): 98.7 (27 Apr 2020 05:19), Max: 98.7 (27 Apr 2020 05:19)  HR: 81 (27 Apr 2020 05:19) (80 - 82)  BP: 155/81 (27 Apr 2020 05:19) (151/72 - 174/75)  BP(mean): --  RR: 22 (27 Apr 2020 05:19) (21 - 22)  SpO2: --  Daily     Daily   ICU Vital Signs Last 24 Hrs  MARIA ROSS  I&O's Detail    I&O's Summary    Drug Dosing Weight  MARIA ROSS      PHYSICAL EXAM:  General: Appears well developed, well nourished alert and cooperative.  HEENT: Head; normocephalic, atraumatic.  Eyes: Pupils reactive, cornea wnl.  Neck: Supple, no nodes adenopathy, no NVD or carotid bruit or thyromegaly.  CARDIOVASCULAR: Normal S1 and S2, No murmur, rub, gallop or lift.   LUNGS: No rales, rhonchi or wheeze. Normal breath sounds bilaterally.  ABDOMEN: Soft, nontender without mass or organomegaly. bowel sounds normoactive.  EXTREMITIES: No clubbing, cyanosis or edema. Distal pulses wnl.   SKIN: warm and dry with normal turgor.  NEURO: Alert/oriented x 3/normal motor exam. No pathologic reflexes.    PSYCH: normal affect.        LABS:                        10.0   4.85  )-----------( 347      ( 26 Apr 2020 06:28 )             31.1     04-26    137  |  103  |  30.0<H>  ----------------------------<  243<H>  4.5   |  21.0<L>  |  1.50<H>    Ca    7.6<L>      26 Apr 2020 06:28  Phos  3.1     04-26  Mg     1.8     04-26      [facility]            RADIOLOGY & ADDITIONAL STUDIES:    INTERPRETATION OF TELEMETRY (personally reviewed):    ECG:< from: 12 Lead ECG (04.24.20 @ 10:45) >  Diagnosis Line Normal sinus rhythm  ST & T wave abnormality, consider inferolateral ischemia  Abnormal ECG    Confirmed by Scotty Bailey ([id number]) on 4/24/2020 5:05:34 PM    < end of copied text >

## 2020-04-27 NOTE — PROGRESS NOTE ADULT - SUBJECTIVE AND OBJECTIVE BOX
SUBJECTIVE:64yMale s/p debridements and vac application to left groin for infected hematoma.  Pt resting comfortably at this time, minimal c/o pain in left groin.  Vac in place, on suction, good seal. minimal output from vac.    MEDICATIONS  (STANDING):  amLODIPine   Tablet 5 milliGRAM(s) Oral daily  aspirin enteric coated 81 milliGRAM(s) Oral daily  atorvastatin 80 milliGRAM(s) Oral at bedtime  carvedilol 25 milliGRAM(s) Oral every 12 hours  clopidogrel Tablet 75 milliGRAM(s) Oral daily  colchicine 0.6 milliGRAM(s) Oral daily  digoxin     Tablet 0.125 milliGRAM(s) Oral daily  enoxaparin Injectable 40 milliGRAM(s) SubCutaneous daily  epoetin-adrian-epbx (RETACRIT) Injectable 81892 Unit(s) SubCutaneous every 7 days  finasteride 5 milliGRAM(s) Oral daily  folic acid 1 milliGRAM(s) Oral daily  insulin glargine Injectable (LANTUS) 50 Unit(s) SubCutaneous at bedtime  insulin lispro (HumaLOG) corrective regimen sliding scale   SubCutaneous three times a day before meals  insulin lispro (HumaLOG) corrective regimen sliding scale   SubCutaneous at bedtime  insulin lispro Injectable (HumaLOG) 20 Unit(s) SubCutaneous three times a day before meals  lactobacillus acidophilus 1 Tablet(s) Oral three times a day with meals  lactulose Syrup 20 Gram(s) Oral daily  magnesium oxide 400 milliGRAM(s) Oral daily  pantoprazole    Tablet 40 milliGRAM(s) Oral before breakfast  senna 2 Tablet(s) Oral at bedtime  simethicone 80 milliGRAM(s) Chew every 6 hours  tamsulosin 0.4 milliGRAM(s) Oral at bedtime    MEDICATIONS  (PRN):  acetaminophen   Tablet .. 650 milliGRAM(s) Oral every 6 hours PRN Temp greater or equal to 38C (100.4F)  acetaminophen   Tablet .. 650 milliGRAM(s) Oral every 6 hours PRN Mild Pain (1 - 3)  dextrose 40% Gel 15 Gram(s) Oral once PRN Blood Glucose LESS THAN 70 milliGRAM(s)/deciLiter  diphenhydrAMINE 25 milliGRAM(s) Oral every 4 hours PRN Rash and/or Itching  fentaNYL    Injectable 25 MICROGram(s) IV Push every 5 minutes PRN Moderate Pain (4 - 6)  HYDROmorphone  Injectable 0.5 milliGRAM(s) IV Push every 10 minutes PRN Moderate Pain (4 - 6)  ondansetron Injectable 4 milliGRAM(s) IV Push every 6 hours PRN Nausea and/or Vomiting      OBJECTIVE:  Vital Signs Last 24 Hrs  T(C): 36.9 (27 Apr 2020 09:35), Max: 37.1 (27 Apr 2020 05:19)  T(F): 98.4 (27 Apr 2020 09:35), Max: 98.7 (27 Apr 2020 05:19)  HR: 62 (27 Apr 2020 09:35) (62 - 82)  BP: 167/79 (27 Apr 2020 09:35) (151/72 - 174/75)  BP(mean): --  RR: 22 (27 Apr 2020 09:35) (21 - 22)  SpO2: --  I&O's Detail                            10.0   4.85  )-----------( 347      ( 26 Apr 2020 06:28 )             31.1

## 2020-04-27 NOTE — PROGRESS NOTE ADULT - SUBJECTIVE AND OBJECTIVE BOX
NEPHROLOGY INTERVAL HPI/OVERNIGHT EVENTS:  pt continues to do well  no acute distress  appetite ok    MEDICATIONS  (STANDING):  amLODIPine   Tablet 5 milliGRAM(s) Oral daily  aspirin enteric coated 81 milliGRAM(s) Oral daily  atorvastatin 80 milliGRAM(s) Oral at bedtime  carvedilol 25 milliGRAM(s) Oral every 12 hours  clopidogrel Tablet 75 milliGRAM(s) Oral daily  colchicine 0.6 milliGRAM(s) Oral daily  digoxin     Tablet 0.125 milliGRAM(s) Oral daily  enoxaparin Injectable 40 milliGRAM(s) SubCutaneous daily  epoetin-adrian-epbx (RETACRIT) Injectable 02206 Unit(s) SubCutaneous every 7 days  finasteride 5 milliGRAM(s) Oral daily  folic acid 1 milliGRAM(s) Oral daily  insulin glargine Injectable (LANTUS) 50 Unit(s) SubCutaneous at bedtime  insulin lispro (HumaLOG) corrective regimen sliding scale   SubCutaneous three times a day before meals  insulin lispro (HumaLOG) corrective regimen sliding scale   SubCutaneous at bedtime  insulin lispro Injectable (HumaLOG) 20 Unit(s) SubCutaneous three times a day before meals  lactobacillus acidophilus 1 Tablet(s) Oral three times a day with meals  lactulose Syrup 20 Gram(s) Oral daily  magnesium oxide 400 milliGRAM(s) Oral daily  pantoprazole    Tablet 40 milliGRAM(s) Oral before breakfast  senna 2 Tablet(s) Oral at bedtime  simethicone 80 milliGRAM(s) Chew every 6 hours  tamsulosin 0.4 milliGRAM(s) Oral at bedtime    MEDICATIONS  (PRN):  acetaminophen   Tablet .. 650 milliGRAM(s) Oral every 6 hours PRN Temp greater or equal to 38C (100.4F)  acetaminophen   Tablet .. 650 milliGRAM(s) Oral every 6 hours PRN Mild Pain (1 - 3)  dextrose 40% Gel 15 Gram(s) Oral once PRN Blood Glucose LESS THAN 70 milliGRAM(s)/deciLiter  diphenhydrAMINE 25 milliGRAM(s) Oral every 4 hours PRN Rash and/or Itching  fentaNYL    Injectable 25 MICROGram(s) IV Push every 5 minutes PRN Moderate Pain (4 - 6)  HYDROmorphone  Injectable 0.5 milliGRAM(s) IV Push every 10 minutes PRN Moderate Pain (4 - 6)  ondansetron Injectable 4 milliGRAM(s) IV Push every 6 hours PRN Nausea and/or Vomiting      Allergies    clindamycin (Rash)  niacin (Flushing; Rash)          Vital Signs Last 24 Hrs  T(C): 36.9 (27 Apr 2020 09:35), Max: 37.1 (27 Apr 2020 05:19)  T(F): 98.4 (27 Apr 2020 09:35), Max: 98.7 (27 Apr 2020 05:19)  HR: 62 (27 Apr 2020 09:35) (62 - 82)  BP: 167/79 (27 Apr 2020 09:35) (151/72 - 174/75)  BP(mean): --  RR: 22 (27 Apr 2020 09:35) (21 - 22)  SpO2: --    PHYSICAL EXAM:  Pt weak and frail  NERVOUS SYSTEM: AAOx3  CHEST/LUNG: Clear with diminished BS at bases  EXT: Tr LE edema  Further exam deferred to limit COVID exposure    LABS:                        10.0   4.85  )-----------( 347      ( 26 Apr 2020 06:28 )             31.1     04-26    137  |  103  |  30.0<H>  ----------------------------<  243<H>  4.5   |  21.0<L>  |  1.50<H>    Ca    7.6<L>      26 Apr 2020 06:28  Phos  3.1     04-26  Mg     1.8     04-26              RADIOLOGY & ADDITIONAL TESTS:

## 2020-04-27 NOTE — DISCHARGE NOTE NURSING/CASE MANAGEMENT/SOCIAL WORK - PATIENT PORTAL LINK FT
You can access the FollowMyHealth Patient Portal offered by [facility] by registering at the following website: [email or web address]. By joining [facility]’s FollowMyHealth portal, you will also be able to view your health information using other applications (apps) compatible with our system.

## 2020-04-27 NOTE — PROGRESS NOTE ADULT - ASSESSMENT
63 y/o male with extensive cardiac history, htn, DM came to the ER for left groin area infection which started 1 week ago after he had angiography through left groin area 2 weeks ago, got 3 stents ( done at [facility] by ok [facility] ). Pt stated that he has been on levaquin by his doctor but getting worse.       Left groin necrotizing soft tissue infectoin  Leukocytosis  MARSHALL on CKD  Right elbow gout  Anemia ? blood loss  ?allergic reaction  COVID +      - stable off O2, afebrile-supportive care for COVID 19. counselled to monitor for worsening symptoms. Plan is for Dc to [facility]- repeat COVID PCR as per rehab specific policy  - s/p left groin exploration washout and wide excisional debridement of necrotizing soft tissue infection on 4/10, redebridement 4/16, s/p redebridement and vac on 4/23  - s/p right elbow washout- cultures negative, MSU crystals noted  - Blood cultures NGTD  - OR cultures- staph lugdunensis, finegoldia magna  -  on Zosyn since 4/9- abx broadened to meropenem + clindamycin on 4/16/20-all abx stopped due to rash and possible allergic reaction. completed 14 days of abx. Rash may have been secondary to clindamycin give history-would exercise caution if requires yaya or clinda in the future  - Outpatient allergy eval to clarify allergy history post pandemic      d/w team  please call with questions 65 y/o male with extensive cardiac history, htn, DM came to the ER for left groin area infection which started 1 week ago after he had angiography through left groin area 2 weeks ago, got 3 stents ( done at [facility] by ok marsh ). Pt stated that he has been on levaquin by his doctor but getting worse.       Left groin necrotizing soft tissue infectoin  Leukocytosis  MARSHALL on CKD  Right elbow gout  Anemia ? blood loss  ?allergic reaction  COVID +      - stable off O2, afebrile-supportive care for COVID 19. counselled to monitor for worsening symptoms, do not recommend HCQ or steroids at this time. Plan is for DC to [facility]- repeat COVID PCR as per rehab specific policy  - s/p left groin exploration washout and wide excisional debridement of necrotizing soft tissue infection on 4/10, redebridement 4/16, s/p redebridement and vac on 4/23  - s/p right elbow washout- cultures negative, MSU crystals noted  - Blood cultures NGTD  - OR cultures- staph lugdunensis, finegoldia magna  -  on Zosyn since 4/9- abx broadened to meropenem + clindamycin on 4/16/20-all abx stopped due to rash and possible allergic reaction. completed 14 days of abx. Rash may have been secondary to clindamycin give history-would exercise caution if requires yaya or clinda in the future  - Outpatient allergy eval to clarify allergy history post pandemic      d/w team  please call with questions

## 2020-04-27 NOTE — PROGRESS NOTE ADULT - ATTENDING COMMENTS
Discussed with Dr. Cardona.    Plan for AR.    COntinue OOB and bedside therapy.
Ortho Trauma Attending:  Agree with above PA note.  Note edited where necessary.  Likely pull drain later today or tomorrow as no output. Will continue to follow.    Beck Booker MD  Orthopaedic Trauma Surgery
Ortho Trauma Attending:  Agree with above PA note.  Note edited where necessary.  Will plan on aspiration of elbow while patient in OR with vascular surgery tomorrow. Depending on aspiration, may I&D elbow at same time.    Beck Booker MD  Orthopaedic Trauma Surgery
VAC change tomorrow
I have seen and examined the patient and agree with the above assessment and plan. No overnight events. NPO for OR today. Underwent wide local excisional debridement of necrotizing soft tissue infection performed. Must continue DAPT post-op. Transferred to vascular surgery service. Discussed with vascular resident and Dr. Freeman.     Hospitalist team to sign off; please re-call as needed.
I have seen and examined the patient and agree with the above assessment and plan. Patient with infected left groin hematoma/cellulitis; continue IV abx. NPO after midnight for evacuation. Cardiac clearance appreciated. Patient is optimized for planned procedure. NPO after midnight. Hold Heparin; but can not hold DAPT per cardio given recent PCI. Rest of management as above.

## 2020-04-27 NOTE — PROGRESS NOTE ADULT - NSTELEHEALTH_GEN_ALL_CORE
No

## 2020-04-27 NOTE — PROGRESS NOTE ADULT - SUBJECTIVE AND OBJECTIVE BOX
[facility]  INFECTIOUS DISEASES AND INTERNAL MEDICINE at [place]  =======================================================  Noe Navarro MD  Diplomates American Board of Internal Medicine and Infectious Diseases  Tel: [phone]      Fax: [phone]  =======================================================    CODYM[place]AMARIA [id number]    Follow up: left groin necrotising infection completed abx and s/p debridement with vac in place  found to be covid + 4/23 and transferred to [facility] floor  denies any dyspnea cough or sore throat,, denies chest pain, leg pain, no change win taste or smell, had some mild congestion and some eye irritation, stable off O2    Allergies:  clindamycin (Rash)  niacin (Flushing; Rash)      Medications:  acetaminophen   Tablet .. 650 milliGRAM(s) Oral every 6 hours PRN  acetaminophen   Tablet .. 650 milliGRAM(s) Oral every 6 hours PRN  amLODIPine   Tablet 5 milliGRAM(s) Oral daily  aspirin enteric coated 81 milliGRAM(s) Oral daily  atorvastatin 80 milliGRAM(s) Oral at bedtime  carvedilol 25 milliGRAM(s) Oral every 12 hours  clopidogrel Tablet 75 milliGRAM(s) Oral daily  colchicine 0.6 milliGRAM(s) Oral daily  dextrose 40% Gel 15 Gram(s) Oral once PRN  digoxin     Tablet 0.125 milliGRAM(s) Oral daily  diphenhydrAMINE 25 milliGRAM(s) Oral every 4 hours PRN  enoxaparin Injectable 40 milliGRAM(s) SubCutaneous daily  epoetin-adrian-epbx (RETACRIT) Injectable 90607 Unit(s) SubCutaneous every 7 days  fentaNYL    Injectable 25 MICROGram(s) IV Push every 5 minutes PRN  finasteride 5 milliGRAM(s) Oral daily  folic acid 1 milliGRAM(s) Oral daily  HYDROmorphone  Injectable 0.5 milliGRAM(s) IV Push every 10 minutes PRN  insulin glargine Injectable (LANTUS) 50 Unit(s) SubCutaneous at bedtime  insulin lispro (HumaLOG) corrective regimen sliding scale   SubCutaneous three times a day before meals  insulin lispro (HumaLOG) corrective regimen sliding scale   SubCutaneous at bedtime  insulin lispro Injectable (HumaLOG) 20 Unit(s) SubCutaneous three times a day before meals  lactobacillus acidophilus 1 Tablet(s) Oral three times a day with meals  lactulose Syrup 20 Gram(s) Oral daily  magnesium oxide 400 milliGRAM(s) Oral daily  ondansetron Injectable 4 milliGRAM(s) IV Push every 6 hours PRN  pantoprazole    Tablet 40 milliGRAM(s) Oral before breakfast  senna 2 Tablet(s) Oral at bedtime  simethicone 80 milliGRAM(s) Chew every 6 hours  tamsulosin 0.4 milliGRAM(s) Oral at bedtime            REVIEW OF SYSTEMS:  CONSTITUTIONAL:  No Fever or chills  HEENT:   No diplopia or blurred vision.  No earache, sore throat or runny nose.  CARDIOVASCULAR:  No pressure, squeezing, strangling, tightness, heaviness or aching about the chest, neck, axilla or epigastrium.  RESPIRATORY:  No cough, shortness of breath  GASTROINTESTINAL:  No nausea, vomiting or diarrhea.  GENITOURINARY:  No dysuria, frequency or urgency. No Blood in urine  MUSCULOSKELETAL:  no joint aches, no muscle pain  SKIN:  No change in skin, hair or nails.  NEUROLOGIC:  No Headaches, seizures or weakness.  PSYCHIATRIC:  No disorder of thought or mood.  ENDOCRINE:  No heat or cold intolerance  HEMATOLOGICAL:  No easy bruising or bleeding.            Physical Exam:  ICU Vital Signs Last 24 Hrs  T(C): 36.9 (27 Apr 2020 09:35), Max: 37.1 (27 Apr 2020 05:19)  T(F): 98.4 (27 Apr 2020 09:35), Max: 98.7 (27 Apr 2020 05:19)  HR: 62 (27 Apr 2020 09:35) (62 - 82)  BP: 167/79 (27 Apr 2020 09:35) (151/72 - 174/75)  BP(mean): --  ABP: --  ABP(mean): --  RR: 22 (27 Apr 2020 09:35) (21 - 22)  SpO2: --      GEN: NAD, pleasant  HEENT: normocephalic and atraumatic. EOMI. PERRL.  Anicteric   NECK: Supple.   LUNGS: Clear to auscultation.  HEART: Regular rate and rhythm without murmur.  ABDOMEN: Soft, nontender, and nondistended.  Positive bowel sounds.    : No CVA tenderness  EXTREMITIES: Without any edema.  MSK: no joint swelling  NEUROLOGIC: Cranial nerves II through XII are grossly intact. No focal deficits  PSYCHIATRIC: Appropriate affect .  SKIN: No Rash      Labs:

## 2020-04-27 NOTE — PROGRESS NOTE ADULT - REASON FOR ADMISSION
left groin area infection

## 2020-04-27 NOTE — PROGRESS NOTE ADULT - ASSESSMENT
Assessment  Stable CAD s/p recent PCI complicated by hematoma  Infected hematoma s/p debridement wound vac in place  Ischemic CM EF improved 45-50%  ICD in place  Covid + without fever or cough  HTN  DM      Rec  cont coreg DAPT statin  add low dose norvasc for BP  defer treatment and repeat Covid testing to ID  aggressive PT

## 2020-04-27 NOTE — PROGRESS NOTE ADULT - ASSESSMENT
CKD stage III: DM/HTN;  h/o CAD, S/P MI, CABG, ICM with AICD  MARSHALL recovered and renal function now at baseline  Renal sono: no hydronephrosis   s/p OR on 4/10- Debridement, external genitalia, perineum  I&D abscess   L groin hematoma/ cellulitis on abx/ Leukocytosis  COVID+ (asymptomatic)  - avoid potential nephrotoxins  - low K+ diet  - follow labs    Anemia:  S/P PRBCs   - cont LUISA as required to maintain Hgb > 10.0

## 2020-04-28 VITALS
HEART RATE: 112 BPM | TEMPERATURE: 99 F | DIASTOLIC BLOOD PRESSURE: 74 MMHG | RESPIRATION RATE: 20 BRPM | SYSTOLIC BLOOD PRESSURE: 117 MMHG

## 2020-04-28 LAB
CULTURE RESULTS: SIGNIFICANT CHANGE UP
SPECIMEN SOURCE: SIGNIFICANT CHANGE UP

## 2020-06-17 ENCOUNTER — APPOINTMENT (OUTPATIENT)
Dept: VASCULAR SURGERY | Facility: CLINIC | Age: 65
End: 2020-06-17
Payer: MEDICARE

## 2020-06-17 VITALS
HEIGHT: 67 IN | TEMPERATURE: 98.3 F | WEIGHT: 177 LBS | DIASTOLIC BLOOD PRESSURE: 63 MMHG | OXYGEN SATURATION: 98 % | HEART RATE: 96 BPM | SYSTOLIC BLOOD PRESSURE: 115 MMHG | BODY MASS INDEX: 27.78 KG/M2

## 2020-06-17 DIAGNOSIS — I25.10 ATHEROSCLEROTIC HEART DISEASE OF NATIVE CORONARY ARTERY W/OUT ANGINA PECTORIS: ICD-10-CM

## 2020-06-17 PROCEDURE — 99024 POSTOP FOLLOW-UP VISIT: CPT

## 2020-06-17 NOTE — ASSESSMENT
[FreeTextEntry1] : 65 yo M s/p L groin debridement of Nec fasciitis post cardiac cath. Large defect now smaller. Patient did not bring any VAC change dressings and does not want me to remove it since his nurse is supposed to go back to his house till Friday.

## 2020-06-17 NOTE — REASON FOR VISIT
[Post Op: _________] : a [unfilled] post op visit [FreeTextEntry1] : Patient of Dr Villarreal recently discharged form NH with VAC on L groin.

## 2020-06-17 NOTE — HISTORY OF PRESENT ILLNESS
[FreeTextEntry1] : Patient underwent surgery as a result of cardiac cath complication resulting in nec fascitis. Patient required multiple take backs and VAC changes at the bedside for a very large defect.  [de-identified] : He is here today for his first post op visit. He did not bring any VAC supplies. His dressing changed by a visiting nurse three times a week.

## 2020-07-15 ENCOUNTER — APPOINTMENT (OUTPATIENT)
Dept: VASCULAR SURGERY | Facility: CLINIC | Age: 65
End: 2020-07-15
Payer: MEDICARE

## 2020-07-15 VITALS
DIASTOLIC BLOOD PRESSURE: 68 MMHG | BODY MASS INDEX: 29.35 KG/M2 | SYSTOLIC BLOOD PRESSURE: 121 MMHG | HEART RATE: 79 BPM | WEIGHT: 187 LBS | TEMPERATURE: 97.4 F | OXYGEN SATURATION: 99 % | HEIGHT: 67 IN

## 2020-07-15 DIAGNOSIS — T81.89XD OTHER COMPLICATIONS OF PROCEDURES, NOT ELSEWHERE CLASSIFIED, SUBSEQUENT ENCOUNTER: ICD-10-CM

## 2020-07-15 PROCEDURE — 99024 POSTOP FOLLOW-UP VISIT: CPT

## 2020-07-15 NOTE — PROCEDURE
[FreeTextEntry1] : L groin was prepped with Betadine, Nitro sticks were used over hypergranular tissue. Stitches were removed.

## 2020-07-15 NOTE — HISTORY OF PRESENT ILLNESS
[de-identified] : He is here today to eval wound and remove old stithe. Visiting nurse has been noticing more hypergranulation tissue.  [FreeTextEntry1] : Patient underwent surgery as a result of cardiac cath complication resulting in nec fascitis. Patient required multiple take backs and VAC changes at the bedside for a very large defect. \par \par During last visit, VAC was not removed due to patient not bringing supplies for change (he chose not to have it removed).

## 2020-07-15 NOTE — PHYSICAL EXAM
[de-identified] : L groin: linear hypergranulation tissue growth 1x4 cm, stitches in place [FreeTextEntry1] : L groin VAC sponge in place, no cellulitis or tenderness

## 2020-07-15 NOTE — REASON FOR VISIT
[FreeTextEntry1] : Patient of Dr Villarreal recently discharged form NH with VAC on L groin.  [Post Op: _________] : a [unfilled] post op visit

## 2020-07-15 NOTE — ASSESSMENT
[FreeTextEntry1] : 65 yo M s/p L groin debridement of Nec fasciitis post cardiac cath. Healing adequately. Areas of hypergranulation tissue. No evidence of infection.

## 2020-07-15 NOTE — REVIEW OF SYSTEMS
[Skin Wound] : skin wound [As Noted in HPI] : as noted in HPI [de-identified] : L groin [Negative] : Endocrine

## 2020-09-08 ENCOUNTER — TRANSCRIPTION ENCOUNTER (OUTPATIENT)
Age: 65
End: 2020-09-08

## 2020-10-08 NOTE — PROCEDURE NOTE - NSPOSTCAREGUIDE_GEN_A_CORE
Steroid Induced hyperglycemia Instructed patient/caregiver to follow-up with primary care physician/Instructed patient/caregiver regarding signs and symptoms of infection/Care for catheter as per unit/ICU protocols/Verbal/written post procedure instructions were given to patient/caregiver/Keep the cast/splint/dressing clean and dry

## 2021-06-20 NOTE — PHYSICAL THERAPY INITIAL EVALUATION ADULT - NEUROVASCULAR ASSESSMENT LLE
Pt resting comfortably on cart. Age appropriate. Report given to Jana pt to transferred to [facility] for observation. Bed 4062.    no tingling/no discoloration/warm/no numbness

## 2021-08-02 ENCOUNTER — APPOINTMENT (OUTPATIENT)
Dept: ENDOCRINOLOGY | Facility: CLINIC | Age: 66
End: 2021-08-02

## 2022-06-29 NOTE — DIETITIAN INITIAL EVALUATION ADULT. - PROBLEM SELECTOR PLAN 2
Products Recommended: Pt to put on Eucerin SPF lotion every day. will keep on lantus and humalog scale. Detail Level: Detailed General Sunscreen Counseling: I recommended a broad spectrum sunscreen with a SPF of 30 or higher.  I explained that SPF 30 sunscreens block approximately 97 percent of the sun's harmful rays.  Sunscreens should be applied at least 15 minutes prior to expected sun exposure and then every 2 hours after that as long as sun exposure continues. If swimming or exercising sunscreen should be reapplied every 45 minutes to an hour after getting wet or sweating.  One ounce, or the equivalent of a shot glass full of sunscreen, is adequate to protect the skin not covered by a bathing suit. I also recommended a lip balm with a sunscreen as well. Sun protective clothing can be used in lieu of sunscreen but must be worn the entire time you are exposed to the sun's rays.

## 2022-10-10 NOTE — H&P PST ADULT - CONSTITUTIONAL
RN team - Please do a post-op call on patient and make sure doing ok.  Let me know if there are any problems, questions or concerns.  Make sure patient has a follow up appointment scheduled. detailed exam

## 2023-03-07 ENCOUNTER — EMERGENCY (EMERGENCY)
Facility: HOSPITAL | Age: 68
LOS: 1 days | Discharge: HOME | End: 2023-03-07
Attending: STUDENT IN AN ORGANIZED HEALTH CARE EDUCATION/TRAINING PROGRAM
Payer: MEDICARE

## 2023-03-07 VITALS
WEIGHT: 169.98 LBS | SYSTOLIC BLOOD PRESSURE: 201 MMHG | OXYGEN SATURATION: 99 % | DIASTOLIC BLOOD PRESSURE: 99 MMHG | HEIGHT: 66 IN | HEART RATE: 85 BPM | RESPIRATION RATE: 16 BRPM

## 2023-03-07 DIAGNOSIS — Z95.1 PRESENCE OF AORTOCORONARY BYPASS GRAFT: Chronic | ICD-10-CM

## 2023-03-07 DIAGNOSIS — Z98.89 OTHER SPECIFIED POSTPROCEDURAL STATES: Chronic | ICD-10-CM

## 2023-03-07 DIAGNOSIS — Z98.890 OTHER SPECIFIED POSTPROCEDURAL STATES: Chronic | ICD-10-CM

## 2023-03-07 DIAGNOSIS — Z95.810 PRESENCE OF AUTOMATIC (IMPLANTABLE) CARDIAC DEFIBRILLATOR: Chronic | ICD-10-CM

## 2023-03-07 LAB
ALBUMIN SERPL ELPH-MCNC: 4.4 G/DL — SIGNIFICANT CHANGE UP (ref 3.3–5.2)
ALP SERPL-CCNC: 136 U/L — HIGH (ref 40–120)
ALT FLD-CCNC: 19 U/L — SIGNIFICANT CHANGE UP
ANION GAP SERPL CALC-SCNC: 13 MMOL/L — SIGNIFICANT CHANGE UP (ref 5–17)
APPEARANCE UR: CLEAR — SIGNIFICANT CHANGE UP
AST SERPL-CCNC: 26 U/L — SIGNIFICANT CHANGE UP
BASE EXCESS BLDV CALC-SCNC: -0.3 MMOL/L — SIGNIFICANT CHANGE UP (ref -2–3)
BASOPHILS # BLD AUTO: 0.04 K/UL — SIGNIFICANT CHANGE UP (ref 0–0.2)
BASOPHILS NFR BLD AUTO: 0.3 % — SIGNIFICANT CHANGE UP (ref 0–2)
BILIRUB SERPL-MCNC: 0.8 MG/DL — SIGNIFICANT CHANGE UP (ref 0.4–2)
BILIRUB UR-MCNC: NEGATIVE — SIGNIFICANT CHANGE UP
BUN SERPL-MCNC: 44.7 MG/DL — HIGH (ref 8–20)
CA-I SERPL-SCNC: 1.12 MMOL/L — LOW (ref 1.15–1.33)
CALCIUM SERPL-MCNC: 10 MG/DL — SIGNIFICANT CHANGE UP (ref 8.4–10.5)
CHLORIDE BLDV-SCNC: 102 MMOL/L — SIGNIFICANT CHANGE UP (ref 96–108)
CHLORIDE SERPL-SCNC: 101 MMOL/L — SIGNIFICANT CHANGE UP (ref 96–108)
CO2 SERPL-SCNC: 26 MMOL/L — SIGNIFICANT CHANGE UP (ref 22–29)
COLOR SPEC: YELLOW — SIGNIFICANT CHANGE UP
CREAT SERPL-MCNC: 2.2 MG/DL — HIGH (ref 0.5–1.3)
DIFF PNL FLD: NEGATIVE — SIGNIFICANT CHANGE UP
EGFR: 32 ML/MIN/1.73M2 — LOW
EOSINOPHIL # BLD AUTO: 0.2 K/UL — SIGNIFICANT CHANGE UP (ref 0–0.5)
EOSINOPHIL NFR BLD AUTO: 1.4 % — SIGNIFICANT CHANGE UP (ref 0–6)
GAS PNL BLDV: 134 MMOL/L — LOW (ref 136–145)
GAS PNL BLDV: SIGNIFICANT CHANGE UP
GAS PNL BLDV: SIGNIFICANT CHANGE UP
GLUCOSE BLDV-MCNC: 154 MG/DL — HIGH (ref 70–99)
GLUCOSE SERPL-MCNC: 54 MG/DL — CRITICAL LOW (ref 70–99)
GLUCOSE UR QL: NEGATIVE MG/DL — SIGNIFICANT CHANGE UP
HCO3 BLDV-SCNC: 27 MMOL/L — SIGNIFICANT CHANGE UP (ref 22–29)
HCT VFR BLD CALC: 52 % — HIGH (ref 39–50)
HCT VFR BLDA CALC: 51 % — SIGNIFICANT CHANGE UP
HGB BLD CALC-MCNC: 17.1 G/DL — SIGNIFICANT CHANGE UP (ref 12.6–17.4)
HGB BLD-MCNC: 16.9 G/DL — SIGNIFICANT CHANGE UP (ref 13–17)
IMM GRANULOCYTES NFR BLD AUTO: 0.5 % — SIGNIFICANT CHANGE UP (ref 0–0.9)
KETONES UR-MCNC: NEGATIVE — SIGNIFICANT CHANGE UP
LACTATE BLDV-MCNC: 2 MMOL/L — SIGNIFICANT CHANGE UP (ref 0.5–2)
LEUKOCYTE ESTERASE UR-ACNC: NEGATIVE — SIGNIFICANT CHANGE UP
LYMPHOCYTES # BLD AUTO: 1.37 K/UL — SIGNIFICANT CHANGE UP (ref 1–3.3)
LYMPHOCYTES # BLD AUTO: 9.8 % — LOW (ref 13–44)
MCHC RBC-ENTMCNC: 29.6 PG — SIGNIFICANT CHANGE UP (ref 27–34)
MCHC RBC-ENTMCNC: 32.5 GM/DL — SIGNIFICANT CHANGE UP (ref 32–36)
MCV RBC AUTO: 91.2 FL — SIGNIFICANT CHANGE UP (ref 80–100)
MONOCYTES # BLD AUTO: 0.86 K/UL — SIGNIFICANT CHANGE UP (ref 0–0.9)
MONOCYTES NFR BLD AUTO: 6.2 % — SIGNIFICANT CHANGE UP (ref 2–14)
NEUTROPHILS # BLD AUTO: 11.41 K/UL — HIGH (ref 1.8–7.4)
NEUTROPHILS NFR BLD AUTO: 81.8 % — HIGH (ref 43–77)
NITRITE UR-MCNC: NEGATIVE — SIGNIFICANT CHANGE UP
PCO2 BLDV: 58 MMHG — HIGH (ref 42–55)
PH BLDV: 7.27 — LOW (ref 7.32–7.43)
PH UR: 6 — SIGNIFICANT CHANGE UP (ref 5–8)
PLATELET # BLD AUTO: 260 K/UL — SIGNIFICANT CHANGE UP (ref 150–400)
PO2 BLDV: 63 MMHG — HIGH (ref 25–45)
POTASSIUM BLDV-SCNC: 4.9 MMOL/L — SIGNIFICANT CHANGE UP (ref 3.5–5.1)
POTASSIUM SERPL-MCNC: 4.6 MMOL/L — SIGNIFICANT CHANGE UP (ref 3.5–5.3)
POTASSIUM SERPL-SCNC: 4.6 MMOL/L — SIGNIFICANT CHANGE UP (ref 3.5–5.3)
PROT SERPL-MCNC: 7.8 G/DL — SIGNIFICANT CHANGE UP (ref 6.6–8.7)
PROT UR-MCNC: NEGATIVE — SIGNIFICANT CHANGE UP
RAPID RVP RESULT: SIGNIFICANT CHANGE UP
RBC # BLD: 5.7 M/UL — SIGNIFICANT CHANGE UP (ref 4.2–5.8)
RBC # FLD: 17.1 % — HIGH (ref 10.3–14.5)
SAO2 % BLDV: 89.4 % — SIGNIFICANT CHANGE UP
SARS-COV-2 RNA SPEC QL NAA+PROBE: SIGNIFICANT CHANGE UP
SODIUM SERPL-SCNC: 140 MMOL/L — SIGNIFICANT CHANGE UP (ref 135–145)
SP GR SPEC: 1.01 — SIGNIFICANT CHANGE UP (ref 1.01–1.02)
TROPONIN T SERPL-MCNC: 0.05 NG/ML — SIGNIFICANT CHANGE UP (ref 0–0.06)
UROBILINOGEN FLD QL: NEGATIVE MG/DL — SIGNIFICANT CHANGE UP
WBC # BLD: 13.95 K/UL — HIGH (ref 3.8–10.5)
WBC # FLD AUTO: 13.95 K/UL — HIGH (ref 3.8–10.5)

## 2023-03-07 PROCEDURE — 93010 ELECTROCARDIOGRAM REPORT: CPT

## 2023-03-07 PROCEDURE — 99291 CRITICAL CARE FIRST HOUR: CPT

## 2023-03-07 RX ORDER — FUROSEMIDE 40 MG
1 TABLET ORAL
Qty: 0 | Refills: 0 | DISCHARGE

## 2023-03-07 RX ORDER — LIRAGLUTIDE 6 MG/ML
1.8 INJECTION SUBCUTANEOUS
Qty: 0 | Refills: 0 | DISCHARGE

## 2023-03-07 RX ORDER — DIGOXIN 250 MCG
1 TABLET ORAL
Qty: 0 | Refills: 0 | DISCHARGE

## 2023-03-07 RX ORDER — DEXTROSE 50 % IN WATER 50 %
50 SYRINGE (ML) INTRAVENOUS ONCE
Refills: 0 | Status: COMPLETED | OUTPATIENT
Start: 2023-03-07 | End: 2023-03-07

## 2023-03-07 RX ORDER — INSULIN DEGLUDEC 100 U/ML
50 INJECTION, SOLUTION SUBCUTANEOUS
Qty: 0 | Refills: 0 | DISCHARGE

## 2023-03-07 RX ORDER — CARVEDILOL PHOSPHATE 80 MG/1
1 CAPSULE, EXTENDED RELEASE ORAL
Qty: 0 | Refills: 0 | DISCHARGE

## 2023-03-07 RX ORDER — ICOSAPENT ETHYL 500 MG/1
2 CAPSULE, LIQUID FILLED ORAL
Qty: 0 | Refills: 0 | DISCHARGE

## 2023-03-07 RX ORDER — INSULIN LISPRO 100/ML
30 VIAL (ML) SUBCUTANEOUS
Qty: 0 | Refills: 0 | DISCHARGE

## 2023-03-07 RX ORDER — DEXTROSE 50 % IN WATER 50 %
15 SYRINGE (ML) INTRAVENOUS ONCE
Refills: 0 | Status: ACTIVE | OUTPATIENT
Start: 2023-03-07

## 2023-03-07 RX ADMIN — Medication 50 MILLILITER(S): at 18:30

## 2023-03-07 NOTE — ED PROVIDER NOTE - CLINICAL SUMMARY MEDICAL DECISION MAKING FREE TEXT BOX
Patient given food.  EKG labs and imaging performed to evaluate the patient. PAtient observed for over 6 hours in ED, well appearing, novolog should be out of his system by now.  PAtient able to be closely observed by berkley tomorrow by family with glucometer,  The pt has demonstrated concrete thinking/reasoning, has maintained an orderly/reasonable conversation, appears to have intact insight/judgment/reason and therefore in our opinion has capacity to make decisions.      Patient/family was given full return precautions, counseled on red flag symptoms such as LOC, fever, severe pain, or focal deficits and advised to return to the ED for these reasons or any reason that was concerning to them. Patient/family was informed of all significant and incidental findings found on this workup today and all results were reviewed.   All questions were answered, advised to make close follow up with their primary care provider and specialty clinics (as applicable) to follow up with this visit and continue investigation/treatment.   Patient/family has shown adequate understanding and is agreeable to the plan. 67-year-old male with history of gout, hypertension, diabetes, hypercholesterolemia, coronary artery disease who presents today after an episode of hypoglycemia.  Evaluation found that patient a dose of NovoLog this morning without adequate oral intake.  In addition patient is not typically on NovoLog but use medication to address significant glucose intake yesterday evening to a dose of lemon meringue pie.  patient also admits to limiting his oral intake this morning due to concerns that his overall serum glucose would be elevated.  On initial evaluation patient is found to be awake alert oriented with no focal deficits.  Patient given glucose load and oral intake and labs will be monitored. Patient given food.  EKG labs and imaging performed to evaluate the patient. PAtient observed for over 6 hours in ED, well appearing, novolog should be out of his system by now.  PAtient able to be closely observed by family tomorrow by family with glucometer,  The pt has demonstrated concrete thinking/reasoning, has maintained an orderly/reasonable conversation, appears to have intact insight/judgment/reason and therefore in our opinion has capacity to make decisions.      Patient/family was given full return precautions, counseled on red flag symptoms such as LOC, fever, severe pain, or focal deficits and advised to return to the ED for these reasons or any reason that was concerning to them. Patient/family was informed of all significant and incidental findings found on this workup today and all results were reviewed.   All questions were answered, advised to make close follow up with their primary care provider and specialty clinics (as applicable) to follow up with this visit and continue investigation/treatment.   Patient/family has shown adequate understanding and is agreeable to the plan.

## 2023-03-07 NOTE — ED PROVIDER NOTE - NSCAREINITIATED _GEN_ER
Milton Gonzales(Attending) Xenograft Text: The defect edges were debeveled with a #15 scalpel blade.  Given the location of the defect, shape of the defect and the proximity to free margins a xenograft was deemed most appropriate.  The graft was then trimmed to fit the size of the defect.  The graft was then placed in the primary defect and oriented appropriately.

## 2023-03-07 NOTE — ED ADULT NURSE REASSESSMENT NOTE - NS ED NURSE REASSESS COMMENT FT1
recvd pt from day RN, Pt in no apparent distress at this time. Airway patent, breathing spontaneous and nonlabored. Pt A&Ox3 resting in stretcher. Pt on monitor,  . pt given sandwich and eating

## 2023-03-07 NOTE — ED ADULT NURSE NOTE - NSFALLRSKHARMRISK_ED_ALL_ED
Patient has been instructed to seek emergency help via the emergency and/or calling 911 should symptoms become severe, worsen, or with other concerning symptoms. Patient instructed to go immediately to the emergency room and/or call 911 with any suicidal or homicidal ideations or if audio/visual hallucinations develop. Patient given crisis center information. no

## 2023-03-07 NOTE — ED PROVIDER NOTE - PATIENT PORTAL LINK FT
You can access the FollowMyHealth Patient Portal offered by [facility] by registering at the following website: [email or web address]. By joining [employer]’s FollowMyHealth portal, you will also be able to view your health information using other applications (apps) compatible with our system.

## 2023-03-07 NOTE — ED PROVIDER NOTE - NSFOLLOWUPINSTRUCTIONS_ED_ALL_ED_FT
Hypoglycemia    Hypoglycemia occurs when the glucose (sugar) level in your blood is too low. Symptoms include confusion, weakness, or fainting. You may even appear to be having a stroke. Take medications exactly as prescribed by your health care professional. Maintain a healthy lifestyle and follow up with your primary care physician.    SEEK IMMEDIATE MEDICAL CARE IF YOU HAVE ANY OF THE FOLLOWING SYMPTOMS: weakness, fainting, change in mental status, nausea or vomiting, fruity smell to your breath, or any signs of dehydration. Hypoglycemia      Hypoglycemia occurs when the level of sugar (glucose) in the blood is too low. Hypoglycemia can happen in people who have or do not have diabetes. It can develop quickly, and it can be a medical emergency. For most people, a blood glucose level below 70 mg/dL (3.9 mmol/L) is considered hypoglycemia.    Glucose is a type of sugar that provides the body's main source of energy. Certain hormones (insulin and glucagon) control the level of glucose in the blood. Insulin lowers blood glucose, and glucagon raises blood glucose. Hypoglycemia can result from having too much insulin in the bloodstream, or from not eating enough food that contains glucose. You may also have reactive hypoglycemia, which happens within 4 hours after eating a meal.      What are the causes?    Hypoglycemia occurs most often in people who have diabetes and may be caused by:  •Diabetes medicine.      •Not eating enough, or not eating often enough.      •Increased physical activity.      •Drinking alcohol on an empty stomach.      If you do not have diabetes, hypoglycemia may be caused by:  •A tumor in the pancreas.      •Not eating enough, or not eating for long periods at a time (fasting).      •A severe infection or illness.      •Problems after having bariatric surgery.      •Organ failure, such as kidney or liver failure.      •Certain medicines.        What increases the risk?    Hypoglycemia is more likely to develop in people who:  •Have diabetes and take medicines to lower blood glucose.      •Abuse alcohol.      •Have a severe illness.        What are the signs or symptoms?    Symptoms vary depending on whether the condition is mild, moderate, or severe.    Mild hypoglycemia     •Hunger.      •Sweating and feeling clammy.      •Dizziness or feeling light-headed.      •Sleepiness or restless sleep.      •Nausea.      •Increased heart rate.      •Headache.      •Blurry vision.      •Mood changes, such as irritability or anxiety.      •Tingling or numbness around the mouth, lips, or tongue.      Moderate hypoglycemia     •Confusion and poor judgment.      •Behavior changes.      •Weakness.      •Irregular heartbeat.      •A change in coordination.      Severe hypoglycemia     Severe hypoglycemia is a medical emergency. It can cause:  •Fainting.      •Seizures.      •Loss of consciousness (coma).      •Death.        How is this diagnosed?    Hypoglycemia is diagnosed with a blood test to measure your blood glucose level. This blood test is done while you are having symptoms. Your health care provider may also do a physical exam and review your medical history.      How is this treated?    This condition can be treated by immediately eating or drinking something that contains sugar with 15 grams of fast-acting carbohydrate, such as:  •4 oz (120 mL) of fruit juice.      •4 oz (120 mL) of regular soda (not diet soda).      •Several pieces of hard candy. Check food labels to find out how many pieces to eat for 15 grams.      •1 Tbsp (15 mL) of sugar or honey.      •4 glucose tablets.      •1 tube of glucose gel.        Treating hypoglycemia if you have diabetes  Hands showing right hand using lancet pen on left ring finger, with glucometer in background.  If you are alert and able to swallow safely, follow the 15:15 rule:•Take 15 grams of a fast-acting carbohydrate. Talk with your health care provider about how much you should take. Options for getting 15 grams of fast-acting carbohydrate include:  •Glucose tablets (take 4 tablets).      •Several pieces of hard candy. Check food labels to find out how many pieces to eat for 15 grams.      •4 oz (120 mL) of fruit juice.      •4 oz (120 mL) of regular soda (not diet soda).      •1 Tbsp (15 mL) of sugar or honey.      •1 tube of glucose gel.        •Check your blood glucose 15 minutes after you take the carbohydrate.      •If the repeat blood glucose level is still at or below 70 mg/dL (3.9 mmol/L), take 15 grams of a carbohydrate again.      •If your blood glucose level does not increase above 70 mg/dL (3.9 mmol/L) after 3 tries, seek emergency medical care.      •After your blood glucose level returns to normal, eat a meal or a snack within 1 hour.      Treating severe hypoglycemia    Severe hypoglycemia is when your blood glucose level is below 54 mg/dL (3 mmol/L). Severe hypoglycemia is a medical emergency. Get medical help right away.    If you have severe hypoglycemia and you cannot eat or drink, you will need to be given glucagon. A family member or close friend should learn how to check your blood glucose and how to give you glucagon. Ask your health care provider if you need to have an emergency glucagon kit available.    Severe hypoglycemia may need to be treated in a hospital. The treatment may include getting glucose through an IV. You may also need treatment for the cause of your hypoglycemia.      Follow these instructions at home:  Medical alert bracelet.   General instructions     •Take over-the-counter and prescription medicines only as told by your health care provider.      •Monitor your blood glucose as told by your health care provider.    •If you drink alcohol:•Limit how much you have to:  •0–1 drink a day for women who are not pregnant.      •0–2 drinks a day for men.         •Know how much alcohol is in your drink. In the [place] one drink equals one 12 oz bottle of beer (355 mL), one 5 oz glass of wine (148 mL), or one 1½ oz glass of hard liquor (44 mL).      •Be sure to eat food along with drinking alcohol.      •Be aware that alcohol is absorbed quickly and may have lingering effects that may result in hypoglycemia later. Be sure to do ongoing glucose monitoring.        •Keep all follow-up visits. This is important.      If you have diabetes:     •Always have a fast-acting carbohydrate (15 grams) option with you to treat low blood glucose.    •Follow your diabetes management plan as directed by your health care provider. Make sure you:  •Know the symptoms of hypoglycemia. It is important to treat it right away to prevent it from becoming severe.      •Check your blood glucose as often as told. Always check before and after exercise.      •Always check your blood glucose before you drive a motorized vehicle.      •Take your medicines as told.      •Follow your meal plan. Eat on time, and do not skip meals.        •Share your diabetes management plan with people in your workplace, school, and household.      •Carry a medical alert card or wear medical alert jewelry.        Where to find more information    •American Diabetes Association: www.diabetes.org        Contact a health care provider if:    •You have problems keeping your blood glucose in your target range.      •You have frequent episodes of hypoglycemia.        Get help right away if:    •You continue to have hypoglycemia symptoms after eating or drinking something that contains 15 grams of fast-acting carbohydrate, and you cannot get your blood glucose above 70 mg/dL (3.9 mmol/L) while following the 15:15 rule.      •Your blood glucose is below 54 mg/dL (3 mmol/L).      •You have a seizure.      •You faint.      These symptoms may represent a serious problem that is an emergency. Do not wait to see if the symptoms will go away. Get medical help right away. Call your local emergency services (911 in the [place] Do not drive yourself to the hospital.       Summary    •Hypoglycemia occurs when the level of sugar (glucose) in the blood is too low.      •Hypoglycemia can happen in people who have or do not have diabetes. It can develop quickly, and it can be a medical emergency.      •Make sure you know the symptoms of hypoglycemia and how to treat it.      •Always have a fast-acting carbohydrate option with you to treat low blood sugar.      This information is not intended to replace advice given to you by your health care provider. Make sure you discuss any questions you have with your health care provider.      Document Revised: 11/18/2021 Document Reviewed: 11/18/2021

## 2023-03-07 NOTE — ED ADULT NURSE NOTE - NSICDXPASTSURGICALHX_GEN_ALL_CORE_FT
PAST SURGICAL HISTORY:  AICD (automatic cardioverter/defibrillator) present     S/P angioplasty with stent right leg 08/14, L femoral 7/2014    S/P colonoscopy with polypectomy     S/P coronary artery bypass graft x 3 2014    AICD ([employer]) 2014    S/P thyroid biopsy

## 2023-03-07 NOTE — ED PROVIDER NOTE - OBJECTIVE STATEMENT
67M pmHx DVT, CHF, CAD w/AICD, CKD, DM2 on insulin presents with hypoglycemia.  Patient found unresponsive at library with BG of 40.  Patient started taking Glipizide last week.  Later he admits to eating cake for breakfast, saw his BG over 300, then taking wife's novolog to compensate, then not eating to compensate further.  Patient currently feeling fine [already given dextrose before encounter].

## 2023-03-07 NOTE — ED PROVIDER NOTE - NSICDXPASTMEDICALHX_GEN_ALL_CORE_FT
PAST MEDICAL HISTORY:  BPH (benign prostatic hyperplasia)     CAD (coronary artery disease)     CKD (chronic kidney disease)     Diabetes Peripheral vascular disease  CHF- EF-19% AICD ([employer])  [place]  December 2013   HTN  Pulmonary edema  High Cholesterol  Chronic renal insufficency    DVT of leg (deep venous thrombosis), right     Dyslipidemia     Fatty liver     Hypertension     Insulin resistance

## 2023-03-07 NOTE — ED ADULT NURSE NOTE - OBJECTIVE STATEMENT
Patient became minimally responsive at the library, found to be hypoglycemia, given glucose, brought to ED, pt still a little confused on arrival about details of what happened, Pt medicated as ordered.

## 2023-03-07 NOTE — ED ADULT TRIAGE NOTE - CHIEF COMPLAINT QUOTE
Pt was at the library and became disoriented. Family checked his blood sugar and it was 40. Was given snacks. BG is triage is 67. Pt reports sill feeling shaky.

## 2023-03-07 NOTE — ED ADULT NURSE NOTE - NSICDXPASTMEDICALHX_GEN_ALL_CORE_FT
PAST MEDICAL HISTORY:  BPH (benign prostatic hyperplasia)     CAD (coronary artery disease)     CKD (chronic kidney disease)     Diabetes Peripheral vascular disease  CHF- EF-19% AICD ([facility])  MI  December 2013   HTN  Pulmonary edema  High Cholesterol  Chronic renal insufficency    DVT of leg (deep venous thrombosis), right     Dyslipidemia     Fatty liver     Hypertension     Insulin resistance

## 2023-03-07 NOTE — ED PROVIDER NOTE - NSICDXPASTSURGICALHX_GEN_ALL_CORE_FT
PAST SURGICAL HISTORY:  AICD (automatic cardioverter/defibrillator) present     S/P angioplasty with stent right leg 08/14, L femoral 7/2014    S/P colonoscopy with polypectomy     S/P coronary artery bypass graft x 3 2014    AICD ([employer]) 2014    S/P thyroid biopsy

## 2023-03-07 NOTE — ED PROVIDER NOTE - ATTENDING CONTRIBUTION TO CARE
67-year-old male with history of gout, hypertension, diabetes, hypercholesterolemia, coronary artery disease who presents today after an episode of hypoglycemia.  Evaluation found that patient a dose of NovoLog this morning without adequate oral intake.  In addition patient is not typically on NovoLog but use medication to address significant glucose intake yesterday evening to a dose of lemon meringue pie.  patient also admits to limiting his oral intake this morning due to concerns that his overall serum glucose would be elevated.  On initial evaluation patient is found to be awake alert oriented with no focal deficits.  Patient given glucose load and oral intake and labs will be monitored.  If stable patient should be able to stable for outpatient management and will follow-up with his physician    I personally saw the patient with the resident, and completed the key components of the history and physical exam. I then discussed the management plan with the resident.     Due to the a high probability of clinically significant, life threatening deterioration, the patient required high level of preparedness to intervene emergently. I personally spent critical care time directly and personally managing the patient. This included (but not limited too reviewing  vitals, managing orders, and determining and adjusting plan depending on response to interventions.

## 2023-03-07 NOTE — PHARMACOTHERAPY INTERVENTION NOTE - COMMENTS
Spoke to patient to obtain medication list. As per patient, had hyperglycemia this morning, took Admelog 60 units x 1 at 10am, usually prescribed to wife. Outpatient Medication Review updated.    HOME MEDICATIONS:  allopurinol 300 mg oral tablet: 1 tab(s) orally once a day (07 Mar 2023 19:35)  aspirin 81 mg oral tablet: 1 tab(s) orally once a day (07 Mar 2023 19:35)  atorvastatin 80 mg oral tablet: 1 tab(s) orally once a day (07 Mar 2023 19:35)  carvedilol 25 mg oral tablet: 1 tab(s) orally 2 times a day (07 Mar 2023 19:35)  Entresto 49 mg-51 mg oral tablet: 1 tab(s) orally 2 times a day (07 Mar 2023 19:35)  ergocalciferol 50,000 intl units (1.25 mg) oral capsule: 1 cap(s) orally once a week (Sundays) (07 Mar 2023 19:35)  finasteride 5 mg oral tablet: 1 tab(s) orally once a day (07 Mar 2023 19:35)  furosemide 20 mg oral tablet: 1 tab(s) orally once a day (07 Mar 2023 19:35)  gemfibrozil 600 mg oral tablet: 1 tab(s) orally 2 times a day (07 Mar 2023 19:35)  glimepiride 1 mg oral tablet: 1 tab(s) orally once a day (07 Mar 2023 19:35)  Lantus 100 units/mL subcutaneous solution: 80 unit(s) subcutaneous 2 times a day (07 Mar 2023 19:35)  Plavix 75 mg oral tablet: 1 tab(s) orally once a day (07 Mar 2023 19:35)  Trulicity Pen 0.75 mg/0.5 mL subcutaneous solution: 0.75 milligram(s) subcutaneous once a week (Thursdays) (07 Mar 2023 19:35)  
Hypoglycemia

## 2023-03-08 VITALS
OXYGEN SATURATION: 96 % | HEART RATE: 99 BPM | SYSTOLIC BLOOD PRESSURE: 172 MMHG | RESPIRATION RATE: 16 BRPM | DIASTOLIC BLOOD PRESSURE: 96 MMHG

## 2023-03-08 PROCEDURE — 80053 COMPREHEN METABOLIC PANEL: CPT

## 2023-03-08 PROCEDURE — 82803 BLOOD GASES ANY COMBINATION: CPT

## 2023-03-08 PROCEDURE — 84484 ASSAY OF TROPONIN QUANT: CPT

## 2023-03-08 PROCEDURE — 83605 ASSAY OF LACTIC ACID: CPT

## 2023-03-08 PROCEDURE — 83036 HEMOGLOBIN GLYCOSYLATED A1C: CPT

## 2023-03-08 PROCEDURE — 0225U NFCT DS DNA&RNA 21 SARSCOV2: CPT

## 2023-03-08 PROCEDURE — 36415 COLL VENOUS BLD VENIPUNCTURE: CPT

## 2023-03-08 PROCEDURE — 82435 ASSAY OF BLOOD CHLORIDE: CPT

## 2023-03-08 PROCEDURE — 84443 ASSAY THYROID STIM HORMONE: CPT

## 2023-03-08 PROCEDURE — 84295 ASSAY OF SERUM SODIUM: CPT

## 2023-03-08 PROCEDURE — 85025 COMPLETE CBC W/AUTO DIFF WBC: CPT

## 2023-03-08 PROCEDURE — 82962 GLUCOSE BLOOD TEST: CPT

## 2023-03-08 PROCEDURE — 82330 ASSAY OF CALCIUM: CPT

## 2023-03-08 PROCEDURE — 93005 ELECTROCARDIOGRAM TRACING: CPT

## 2023-03-08 PROCEDURE — 81003 URINALYSIS AUTO W/O SCOPE: CPT

## 2023-03-08 PROCEDURE — 84132 ASSAY OF SERUM POTASSIUM: CPT

## 2023-03-08 PROCEDURE — 82947 ASSAY GLUCOSE BLOOD QUANT: CPT

## 2023-03-08 PROCEDURE — 99291 CRITICAL CARE FIRST HOUR: CPT | Mod: 25

## 2023-03-08 PROCEDURE — 85014 HEMATOCRIT: CPT

## 2023-03-08 PROCEDURE — 96374 THER/PROPH/DIAG INJ IV PUSH: CPT

## 2023-03-08 PROCEDURE — 85018 HEMOGLOBIN: CPT

## 2023-10-27 ENCOUNTER — INPATIENT (INPATIENT)
Facility: HOSPITAL | Age: 68
LOS: 0 days | Discharge: HOME | DRG: 279 | End: 2023-10-28
Attending: HOSPITALIST | Admitting: INTERNAL MEDICINE
Payer: MEDICARE

## 2023-10-27 ENCOUNTER — TRANSCRIPTION ENCOUNTER (OUTPATIENT)
Age: 68
End: 2023-10-27

## 2023-10-27 VITALS
HEART RATE: 76 BPM | WEIGHT: 214.95 LBS | TEMPERATURE: 98 F | OXYGEN SATURATION: 95 % | RESPIRATION RATE: 17 BRPM | SYSTOLIC BLOOD PRESSURE: 119 MMHG | HEIGHT: 67 IN | DIASTOLIC BLOOD PRESSURE: 68 MMHG

## 2023-10-27 DIAGNOSIS — Z95.810 PRESENCE OF AUTOMATIC (IMPLANTABLE) CARDIAC DEFIBRILLATOR: Chronic | ICD-10-CM

## 2023-10-27 DIAGNOSIS — Z98.890 OTHER SPECIFIED POSTPROCEDURAL STATES: Chronic | ICD-10-CM

## 2023-10-27 DIAGNOSIS — Z98.89 OTHER SPECIFIED POSTPROCEDURAL STATES: Chronic | ICD-10-CM

## 2023-10-27 DIAGNOSIS — I73.9 PERIPHERAL VASCULAR DISEASE, UNSPECIFIED: ICD-10-CM

## 2023-10-27 DIAGNOSIS — Z95.1 PRESENCE OF AORTOCORONARY BYPASS GRAFT: Chronic | ICD-10-CM

## 2023-10-27 LAB
ANION GAP SERPL CALC-SCNC: 14 MMOL/L — SIGNIFICANT CHANGE UP (ref 5–17)
ANION GAP SERPL CALC-SCNC: 15 MMOL/L — SIGNIFICANT CHANGE UP (ref 5–17)
ANION GAP SERPL CALC-SCNC: 15 MMOL/L — SIGNIFICANT CHANGE UP (ref 5–17)
BASOPHILS # BLD AUTO: 0.03 K/UL — SIGNIFICANT CHANGE UP (ref 0–0.2)
BASOPHILS NFR BLD AUTO: 0.3 % — SIGNIFICANT CHANGE UP (ref 0–2)
BASOPHILS NFR BLD AUTO: 0.3 % — SIGNIFICANT CHANGE UP (ref 0–2)
BASOPHILS NFR BLD AUTO: 0.4 % — SIGNIFICANT CHANGE UP (ref 0–2)
BASOPHILS NFR BLD AUTO: 0.4 % — SIGNIFICANT CHANGE UP (ref 0–2)
BLD GP AB SCN SERPL QL: SIGNIFICANT CHANGE UP
BLD GP AB SCN SERPL QL: SIGNIFICANT CHANGE UP
BUN SERPL-MCNC: 37.6 MG/DL — HIGH (ref 8–20)
BUN SERPL-MCNC: 37.6 MG/DL — HIGH (ref 8–20)
BUN SERPL-MCNC: 40 MG/DL — HIGH (ref 8–20)
BUN SERPL-MCNC: 40 MG/DL — HIGH (ref 8–20)
BUN SERPL-MCNC: 40.2 MG/DL — HIGH (ref 8–20)
BUN SERPL-MCNC: 40.2 MG/DL — HIGH (ref 8–20)
CALCIUM SERPL-MCNC: 9 MG/DL — SIGNIFICANT CHANGE UP (ref 8.4–10.5)
CALCIUM SERPL-MCNC: 9 MG/DL — SIGNIFICANT CHANGE UP (ref 8.4–10.5)
CALCIUM SERPL-MCNC: 9.3 MG/DL — SIGNIFICANT CHANGE UP (ref 8.4–10.5)
CALCIUM SERPL-MCNC: 9.3 MG/DL — SIGNIFICANT CHANGE UP (ref 8.4–10.5)
CALCIUM SERPL-MCNC: 9.4 MG/DL — SIGNIFICANT CHANGE UP (ref 8.4–10.5)
CALCIUM SERPL-MCNC: 9.4 MG/DL — SIGNIFICANT CHANGE UP (ref 8.4–10.5)
CHLORIDE SERPL-SCNC: 103 MMOL/L — SIGNIFICANT CHANGE UP (ref 96–108)
CHLORIDE SERPL-SCNC: 103 MMOL/L — SIGNIFICANT CHANGE UP (ref 96–108)
CHLORIDE SERPL-SCNC: 104 MMOL/L — SIGNIFICANT CHANGE UP (ref 96–108)
CHLORIDE SERPL-SCNC: 104 MMOL/L — SIGNIFICANT CHANGE UP (ref 96–108)
CHLORIDE SERPL-SCNC: 106 MMOL/L — SIGNIFICANT CHANGE UP (ref 96–108)
CHLORIDE SERPL-SCNC: 106 MMOL/L — SIGNIFICANT CHANGE UP (ref 96–108)
CO2 SERPL-SCNC: 20 MMOL/L — LOW (ref 22–29)
CO2 SERPL-SCNC: 23 MMOL/L — SIGNIFICANT CHANGE UP (ref 22–29)
CO2 SERPL-SCNC: 23 MMOL/L — SIGNIFICANT CHANGE UP (ref 22–29)
CREAT SERPL-MCNC: 2.22 MG/DL — HIGH (ref 0.5–1.3)
CREAT SERPL-MCNC: 2.22 MG/DL — HIGH (ref 0.5–1.3)
CREAT SERPL-MCNC: 2.57 MG/DL — HIGH (ref 0.5–1.3)
CREAT SERPL-MCNC: 2.57 MG/DL — HIGH (ref 0.5–1.3)
CREAT SERPL-MCNC: 2.61 MG/DL — HIGH (ref 0.5–1.3)
CREAT SERPL-MCNC: 2.61 MG/DL — HIGH (ref 0.5–1.3)
EGFR: 26 ML/MIN/1.73M2 — LOW
EGFR: 26 ML/MIN/1.73M2 — LOW
EGFR: 27 ML/MIN/1.73M2 — LOW
EGFR: 27 ML/MIN/1.73M2 — LOW
EGFR: 32 ML/MIN/1.73M2 — LOW
EGFR: 32 ML/MIN/1.73M2 — LOW
EOSINOPHIL # BLD AUTO: 0.07 K/UL — SIGNIFICANT CHANGE UP (ref 0–0.5)
EOSINOPHIL # BLD AUTO: 0.07 K/UL — SIGNIFICANT CHANGE UP (ref 0–0.5)
EOSINOPHIL # BLD AUTO: 0.12 K/UL — SIGNIFICANT CHANGE UP (ref 0–0.5)
EOSINOPHIL # BLD AUTO: 0.12 K/UL — SIGNIFICANT CHANGE UP (ref 0–0.5)
EOSINOPHIL NFR BLD AUTO: 0.7 % — SIGNIFICANT CHANGE UP (ref 0–6)
EOSINOPHIL NFR BLD AUTO: 0.7 % — SIGNIFICANT CHANGE UP (ref 0–6)
EOSINOPHIL NFR BLD AUTO: 1.5 % — SIGNIFICANT CHANGE UP (ref 0–6)
EOSINOPHIL NFR BLD AUTO: 1.5 % — SIGNIFICANT CHANGE UP (ref 0–6)
GLUCOSE BLDC GLUCOMTR-MCNC: 136 MG/DL — HIGH (ref 70–99)
GLUCOSE BLDC GLUCOMTR-MCNC: 136 MG/DL — HIGH (ref 70–99)
GLUCOSE BLDC GLUCOMTR-MCNC: 228 MG/DL — HIGH (ref 70–99)
GLUCOSE BLDC GLUCOMTR-MCNC: 228 MG/DL — HIGH (ref 70–99)
GLUCOSE BLDC GLUCOMTR-MCNC: 261 MG/DL — HIGH (ref 70–99)
GLUCOSE BLDC GLUCOMTR-MCNC: 261 MG/DL — HIGH (ref 70–99)
GLUCOSE BLDC GLUCOMTR-MCNC: 62 MG/DL — LOW (ref 70–99)
GLUCOSE BLDC GLUCOMTR-MCNC: 62 MG/DL — LOW (ref 70–99)
GLUCOSE BLDC GLUCOMTR-MCNC: 63 MG/DL — LOW (ref 70–99)
GLUCOSE BLDC GLUCOMTR-MCNC: 63 MG/DL — LOW (ref 70–99)
GLUCOSE BLDC GLUCOMTR-MCNC: 67 MG/DL — LOW (ref 70–99)
GLUCOSE BLDC GLUCOMTR-MCNC: 67 MG/DL — LOW (ref 70–99)
GLUCOSE SERPL-MCNC: 201 MG/DL — HIGH (ref 70–99)
GLUCOSE SERPL-MCNC: 201 MG/DL — HIGH (ref 70–99)
GLUCOSE SERPL-MCNC: 237 MG/DL — HIGH (ref 70–99)
GLUCOSE SERPL-MCNC: 237 MG/DL — HIGH (ref 70–99)
GLUCOSE SERPL-MCNC: 77 MG/DL — SIGNIFICANT CHANGE UP (ref 70–99)
GLUCOSE SERPL-MCNC: 77 MG/DL — SIGNIFICANT CHANGE UP (ref 70–99)
HCT VFR BLD CALC: 45.5 % — SIGNIFICANT CHANGE UP (ref 39–50)
HCT VFR BLD CALC: 45.5 % — SIGNIFICANT CHANGE UP (ref 39–50)
HCT VFR BLD CALC: 46.5 % — SIGNIFICANT CHANGE UP (ref 39–50)
HCT VFR BLD CALC: 46.5 % — SIGNIFICANT CHANGE UP (ref 39–50)
HGB BLD-MCNC: 14.7 G/DL — SIGNIFICANT CHANGE UP (ref 13–17)
HGB BLD-MCNC: 14.7 G/DL — SIGNIFICANT CHANGE UP (ref 13–17)
HGB BLD-MCNC: 14.8 G/DL — SIGNIFICANT CHANGE UP (ref 13–17)
HGB BLD-MCNC: 14.8 G/DL — SIGNIFICANT CHANGE UP (ref 13–17)
IMM GRANULOCYTES NFR BLD AUTO: 0.6 % — SIGNIFICANT CHANGE UP (ref 0–0.9)
IMM GRANULOCYTES NFR BLD AUTO: 0.6 % — SIGNIFICANT CHANGE UP (ref 0–0.9)
IMM GRANULOCYTES NFR BLD AUTO: 0.7 % — SIGNIFICANT CHANGE UP (ref 0–0.9)
IMM GRANULOCYTES NFR BLD AUTO: 0.7 % — SIGNIFICANT CHANGE UP (ref 0–0.9)
LYMPHOCYTES # BLD AUTO: 0.8 K/UL — LOW (ref 1–3.3)
LYMPHOCYTES # BLD AUTO: 0.8 K/UL — LOW (ref 1–3.3)
LYMPHOCYTES # BLD AUTO: 0.95 K/UL — LOW (ref 1–3.3)
LYMPHOCYTES # BLD AUTO: 0.95 K/UL — LOW (ref 1–3.3)
LYMPHOCYTES # BLD AUTO: 11.8 % — LOW (ref 13–44)
LYMPHOCYTES # BLD AUTO: 11.8 % — LOW (ref 13–44)
LYMPHOCYTES # BLD AUTO: 7.6 % — LOW (ref 13–44)
LYMPHOCYTES # BLD AUTO: 7.6 % — LOW (ref 13–44)
MCHC RBC-ENTMCNC: 29.6 PG — SIGNIFICANT CHANGE UP (ref 27–34)
MCHC RBC-ENTMCNC: 29.6 PG — SIGNIFICANT CHANGE UP (ref 27–34)
MCHC RBC-ENTMCNC: 30 PG — SIGNIFICANT CHANGE UP (ref 27–34)
MCHC RBC-ENTMCNC: 30 PG — SIGNIFICANT CHANGE UP (ref 27–34)
MCHC RBC-ENTMCNC: 31.8 GM/DL — LOW (ref 32–36)
MCHC RBC-ENTMCNC: 31.8 GM/DL — LOW (ref 32–36)
MCHC RBC-ENTMCNC: 32.3 GM/DL — SIGNIFICANT CHANGE UP (ref 32–36)
MCHC RBC-ENTMCNC: 32.3 GM/DL — SIGNIFICANT CHANGE UP (ref 32–36)
MCV RBC AUTO: 91.7 FL — SIGNIFICANT CHANGE UP (ref 80–100)
MCV RBC AUTO: 91.7 FL — SIGNIFICANT CHANGE UP (ref 80–100)
MCV RBC AUTO: 94.1 FL — SIGNIFICANT CHANGE UP (ref 80–100)
MCV RBC AUTO: 94.1 FL — SIGNIFICANT CHANGE UP (ref 80–100)
MONOCYTES # BLD AUTO: 0.79 K/UL — SIGNIFICANT CHANGE UP (ref 0–0.9)
MONOCYTES NFR BLD AUTO: 7.5 % — SIGNIFICANT CHANGE UP (ref 2–14)
MONOCYTES NFR BLD AUTO: 7.5 % — SIGNIFICANT CHANGE UP (ref 2–14)
MONOCYTES NFR BLD AUTO: 9.8 % — SIGNIFICANT CHANGE UP (ref 2–14)
MONOCYTES NFR BLD AUTO: 9.8 % — SIGNIFICANT CHANGE UP (ref 2–14)
NEUTROPHILS # BLD AUTO: 6.1 K/UL — SIGNIFICANT CHANGE UP (ref 1.8–7.4)
NEUTROPHILS # BLD AUTO: 6.1 K/UL — SIGNIFICANT CHANGE UP (ref 1.8–7.4)
NEUTROPHILS # BLD AUTO: 8.78 K/UL — HIGH (ref 1.8–7.4)
NEUTROPHILS # BLD AUTO: 8.78 K/UL — HIGH (ref 1.8–7.4)
NEUTROPHILS NFR BLD AUTO: 75.9 % — SIGNIFICANT CHANGE UP (ref 43–77)
NEUTROPHILS NFR BLD AUTO: 75.9 % — SIGNIFICANT CHANGE UP (ref 43–77)
NEUTROPHILS NFR BLD AUTO: 83.2 % — HIGH (ref 43–77)
NEUTROPHILS NFR BLD AUTO: 83.2 % — HIGH (ref 43–77)
PLATELET # BLD AUTO: 211 K/UL — SIGNIFICANT CHANGE UP (ref 150–400)
PLATELET # BLD AUTO: 211 K/UL — SIGNIFICANT CHANGE UP (ref 150–400)
PLATELET # BLD AUTO: 229 K/UL — SIGNIFICANT CHANGE UP (ref 150–400)
PLATELET # BLD AUTO: 229 K/UL — SIGNIFICANT CHANGE UP (ref 150–400)
POTASSIUM SERPL-MCNC: 4.4 MMOL/L — SIGNIFICANT CHANGE UP (ref 3.5–5.3)
POTASSIUM SERPL-MCNC: 4.4 MMOL/L — SIGNIFICANT CHANGE UP (ref 3.5–5.3)
POTASSIUM SERPL-MCNC: 5.1 MMOL/L — SIGNIFICANT CHANGE UP (ref 3.5–5.3)
POTASSIUM SERPL-MCNC: 5.1 MMOL/L — SIGNIFICANT CHANGE UP (ref 3.5–5.3)
POTASSIUM SERPL-MCNC: 5.7 MMOL/L — HIGH (ref 3.5–5.3)
POTASSIUM SERPL-MCNC: 5.7 MMOL/L — HIGH (ref 3.5–5.3)
POTASSIUM SERPL-SCNC: 4.4 MMOL/L — SIGNIFICANT CHANGE UP (ref 3.5–5.3)
POTASSIUM SERPL-SCNC: 4.4 MMOL/L — SIGNIFICANT CHANGE UP (ref 3.5–5.3)
POTASSIUM SERPL-SCNC: 5.1 MMOL/L — SIGNIFICANT CHANGE UP (ref 3.5–5.3)
POTASSIUM SERPL-SCNC: 5.1 MMOL/L — SIGNIFICANT CHANGE UP (ref 3.5–5.3)
POTASSIUM SERPL-SCNC: 5.7 MMOL/L — HIGH (ref 3.5–5.3)
POTASSIUM SERPL-SCNC: 5.7 MMOL/L — HIGH (ref 3.5–5.3)
RBC # BLD: 4.94 M/UL — SIGNIFICANT CHANGE UP (ref 4.2–5.8)
RBC # BLD: 4.94 M/UL — SIGNIFICANT CHANGE UP (ref 4.2–5.8)
RBC # BLD: 4.96 M/UL — SIGNIFICANT CHANGE UP (ref 4.2–5.8)
RBC # BLD: 4.96 M/UL — SIGNIFICANT CHANGE UP (ref 4.2–5.8)
RBC # FLD: 16.3 % — HIGH (ref 10.3–14.5)
RBC # FLD: 16.3 % — HIGH (ref 10.3–14.5)
RBC # FLD: 16.4 % — HIGH (ref 10.3–14.5)
RBC # FLD: 16.4 % — HIGH (ref 10.3–14.5)
SODIUM SERPL-SCNC: 138 MMOL/L — SIGNIFICANT CHANGE UP (ref 135–145)
SODIUM SERPL-SCNC: 142 MMOL/L — SIGNIFICANT CHANGE UP (ref 135–145)
SODIUM SERPL-SCNC: 142 MMOL/L — SIGNIFICANT CHANGE UP (ref 135–145)
WBC # BLD: 10.54 K/UL — HIGH (ref 3.8–10.5)
WBC # BLD: 10.54 K/UL — HIGH (ref 3.8–10.5)
WBC # BLD: 8.04 K/UL — SIGNIFICANT CHANGE UP (ref 3.8–10.5)
WBC # BLD: 8.04 K/UL — SIGNIFICANT CHANGE UP (ref 3.8–10.5)
WBC # FLD AUTO: 10.54 K/UL — HIGH (ref 3.8–10.5)
WBC # FLD AUTO: 10.54 K/UL — HIGH (ref 3.8–10.5)
WBC # FLD AUTO: 8.04 K/UL — SIGNIFICANT CHANGE UP (ref 3.8–10.5)
WBC # FLD AUTO: 8.04 K/UL — SIGNIFICANT CHANGE UP (ref 3.8–10.5)

## 2023-10-27 PROCEDURE — 99222 1ST HOSP IP/OBS MODERATE 55: CPT

## 2023-10-27 PROCEDURE — 93010 ELECTROCARDIOGRAM REPORT: CPT

## 2023-10-27 RX ORDER — INSULIN GLARGINE 100 [IU]/ML
10 INJECTION, SOLUTION SUBCUTANEOUS ONCE
Refills: 0 | Status: COMPLETED | OUTPATIENT
Start: 2023-10-27 | End: 2023-10-27

## 2023-10-27 RX ORDER — INSULIN LISPRO 100/ML
VIAL (ML) SUBCUTANEOUS AT BEDTIME
Refills: 0 | Status: DISCONTINUED | OUTPATIENT
Start: 2023-10-27 | End: 2023-10-28

## 2023-10-27 RX ORDER — DEXTROSE 50 % IN WATER 50 %
12.5 SYRINGE (ML) INTRAVENOUS ONCE
Refills: 0 | Status: DISCONTINUED | OUTPATIENT
Start: 2023-10-27 | End: 2023-10-28

## 2023-10-27 RX ORDER — DULAGLUTIDE 4.5 MG/.5ML
0.75 INJECTION, SOLUTION SUBCUTANEOUS
Qty: 0 | Refills: 0 | DISCHARGE

## 2023-10-27 RX ORDER — FINASTERIDE 5 MG/1
5 TABLET, FILM COATED ORAL DAILY
Refills: 0 | Status: DISCONTINUED | OUTPATIENT
Start: 2023-10-27 | End: 2023-10-28

## 2023-10-27 RX ORDER — FUROSEMIDE 40 MG
20 TABLET ORAL DAILY
Refills: 0 | Status: DISCONTINUED | OUTPATIENT
Start: 2023-10-28 | End: 2023-10-28

## 2023-10-27 RX ORDER — SODIUM CHLORIDE 9 MG/ML
1000 INJECTION INTRAMUSCULAR; INTRAVENOUS; SUBCUTANEOUS ONCE
Refills: 0 | Status: COMPLETED | OUTPATIENT
Start: 2023-10-27 | End: 2023-10-27

## 2023-10-27 RX ORDER — ATORVASTATIN CALCIUM 80 MG/1
80 TABLET, FILM COATED ORAL AT BEDTIME
Refills: 0 | Status: DISCONTINUED | OUTPATIENT
Start: 2023-10-27 | End: 2023-10-28

## 2023-10-27 RX ORDER — DAPAGLIFLOZIN 10 MG/1
1 TABLET, FILM COATED ORAL
Refills: 0 | DISCHARGE

## 2023-10-27 RX ORDER — ALLOPURINOL 300 MG
300 TABLET ORAL DAILY
Refills: 0 | Status: DISCONTINUED | OUTPATIENT
Start: 2023-10-28 | End: 2023-10-28

## 2023-10-27 RX ORDER — DEXTROSE 50 % IN WATER 50 %
25 SYRINGE (ML) INTRAVENOUS ONCE
Refills: 0 | Status: COMPLETED | OUTPATIENT
Start: 2023-10-27 | End: 2023-10-27

## 2023-10-27 RX ORDER — CHLORHEXIDINE GLUCONATE 213 G/1000ML
1 SOLUTION TOPICAL ONCE
Refills: 0 | Status: DISCONTINUED | OUTPATIENT
Start: 2023-10-27 | End: 2023-10-28

## 2023-10-27 RX ORDER — CLOPIDOGREL BISULFATE 75 MG/1
75 TABLET, FILM COATED ORAL DAILY
Refills: 0 | Status: DISCONTINUED | OUTPATIENT
Start: 2023-10-28 | End: 2023-10-28

## 2023-10-27 RX ORDER — FINASTERIDE 5 MG/1
1 TABLET, FILM COATED ORAL
Qty: 0 | Refills: 0 | DISCHARGE

## 2023-10-27 RX ORDER — CARVEDILOL PHOSPHATE 80 MG/1
25 CAPSULE, EXTENDED RELEASE ORAL EVERY 12 HOURS
Refills: 0 | Status: DISCONTINUED | OUTPATIENT
Start: 2023-10-27 | End: 2023-10-28

## 2023-10-27 RX ORDER — CLOPIDOGREL BISULFATE 75 MG/1
75 TABLET, FILM COATED ORAL ONCE
Refills: 0 | Status: COMPLETED | OUTPATIENT
Start: 2023-10-27 | End: 2023-10-27

## 2023-10-27 RX ORDER — DAPAGLIFLOZIN 10 MG/1
10 TABLET, FILM COATED ORAL EVERY 24 HOURS
Refills: 0 | Status: DISCONTINUED | OUTPATIENT
Start: 2023-10-28 | End: 2023-10-28

## 2023-10-27 RX ORDER — INSULIN LISPRO 100/ML
VIAL (ML) SUBCUTANEOUS
Refills: 0 | Status: DISCONTINUED | OUTPATIENT
Start: 2023-10-27 | End: 2023-10-28

## 2023-10-27 RX ORDER — ASPIRIN/CALCIUM CARB/MAGNESIUM 324 MG
81 TABLET ORAL DAILY
Refills: 0 | Status: DISCONTINUED | OUTPATIENT
Start: 2023-10-28 | End: 2023-10-28

## 2023-10-27 RX ORDER — DEXTROSE 50 % IN WATER 50 %
15 SYRINGE (ML) INTRAVENOUS ONCE
Refills: 0 | Status: DISCONTINUED | OUTPATIENT
Start: 2023-10-27 | End: 2023-10-28

## 2023-10-27 RX ORDER — GEMFIBROZIL 600 MG
600 TABLET ORAL
Refills: 0 | Status: DISCONTINUED | OUTPATIENT
Start: 2023-10-28 | End: 2023-10-28

## 2023-10-27 RX ORDER — SACUBITRIL AND VALSARTAN 24; 26 MG/1; MG/1
1 TABLET, FILM COATED ORAL
Refills: 0 | Status: DISCONTINUED | OUTPATIENT
Start: 2023-10-27 | End: 2023-10-28

## 2023-10-27 RX ORDER — SODIUM CHLORIDE 9 MG/ML
1000 INJECTION, SOLUTION INTRAVENOUS
Refills: 0 | Status: DISCONTINUED | OUTPATIENT
Start: 2023-10-27 | End: 2023-10-28

## 2023-10-27 RX ORDER — INSULIN GLARGINE 100 [IU]/ML
80 INJECTION, SOLUTION SUBCUTANEOUS
Refills: 0 | DISCHARGE

## 2023-10-27 RX ORDER — GEMFIBROZIL 600 MG
1 TABLET ORAL
Qty: 0 | Refills: 0 | DISCHARGE

## 2023-10-27 RX ORDER — SODIUM CHLORIDE 9 MG/ML
250 INJECTION INTRAMUSCULAR; INTRAVENOUS; SUBCUTANEOUS ONCE
Refills: 0 | Status: DISCONTINUED | OUTPATIENT
Start: 2023-10-27 | End: 2023-10-27

## 2023-10-27 RX ORDER — ATORVASTATIN CALCIUM 80 MG/1
1 TABLET, FILM COATED ORAL
Qty: 0 | Refills: 0 | DISCHARGE

## 2023-10-27 RX ORDER — SACUBITRIL AND VALSARTAN 24; 26 MG/1; MG/1
1 TABLET, FILM COATED ORAL
Qty: 0 | Refills: 0 | DISCHARGE

## 2023-10-27 RX ORDER — CARVEDILOL PHOSPHATE 80 MG/1
1 CAPSULE, EXTENDED RELEASE ORAL
Qty: 0 | Refills: 0 | DISCHARGE

## 2023-10-27 RX ORDER — ERGOCALCIFEROL 1.25 MG/1
1 CAPSULE ORAL
Qty: 0 | Refills: 0 | DISCHARGE

## 2023-10-27 RX ORDER — DEXTROSE 50 % IN WATER 50 %
25 SYRINGE (ML) INTRAVENOUS ONCE
Refills: 0 | Status: DISCONTINUED | OUTPATIENT
Start: 2023-10-27 | End: 2023-10-28

## 2023-10-27 RX ORDER — GLIMEPIRIDE 1 MG
1 TABLET ORAL
Qty: 0 | Refills: 0 | DISCHARGE

## 2023-10-27 RX ORDER — EZETIMIBE 10 MG/1
1 TABLET ORAL
Refills: 0 | DISCHARGE

## 2023-10-27 RX ORDER — GLUCAGON INJECTION, SOLUTION 0.5 MG/.1ML
1 INJECTION, SOLUTION SUBCUTANEOUS ONCE
Refills: 0 | Status: DISCONTINUED | OUTPATIENT
Start: 2023-10-27 | End: 2023-10-28

## 2023-10-27 RX ADMIN — SACUBITRIL AND VALSARTAN 1 TABLET(S): 24; 26 TABLET, FILM COATED ORAL at 19:36

## 2023-10-27 RX ADMIN — CLOPIDOGREL BISULFATE 75 MILLIGRAM(S): 75 TABLET, FILM COATED ORAL at 11:52

## 2023-10-27 RX ADMIN — Medication 1: at 22:58

## 2023-10-27 RX ADMIN — CARVEDILOL PHOSPHATE 25 MILLIGRAM(S): 80 CAPSULE, EXTENDED RELEASE ORAL at 19:35

## 2023-10-27 RX ADMIN — ATORVASTATIN CALCIUM 80 MILLIGRAM(S): 80 TABLET, FILM COATED ORAL at 21:09

## 2023-10-27 RX ADMIN — SODIUM CHLORIDE 1000 MILLILITER(S): 9 INJECTION INTRAMUSCULAR; INTRAVENOUS; SUBCUTANEOUS at 11:51

## 2023-10-27 RX ADMIN — INSULIN GLARGINE 10 UNIT(S): 100 INJECTION, SOLUTION SUBCUTANEOUS at 22:58

## 2023-10-27 RX ADMIN — Medication 25 GRAM(S): at 18:16

## 2023-10-27 NOTE — H&P PST ADULT - NSICDXPASTMEDICALHX_GEN_ALL_CORE_FT
PAST MEDICAL HISTORY:  BPH (benign prostatic hyperplasia)     CAD (coronary artery disease)     CKD (chronic kidney disease)     Diabetes Peripheral vascular disease  CHF- EF-19% AICD ([facility])  MI  December 2013   HTN  Pulmonary edema  High Cholesterol  Chronic renal insufficency    DVT of leg (deep venous thrombosis), right     Dyslipidemia     Fatty liver     Hypertension     Insulin resistance

## 2023-10-27 NOTE — DISCHARGE NOTE PROVIDER - ATTENDING DISCHARGE PHYSICAL EXAMINATION:
Vital Signs Last 24 Hrs  T(C): 36.5 (28 Oct 2023 07:55), Max: 36.9 (27 Oct 2023 23:34)  T(F): 97.7 (28 Oct 2023 07:55), Max: 98.4 (27 Oct 2023 23:34)  HR: 82 (28 Oct 2023 07:55) (65 - 89)  BP: 121/76 (28 Oct 2023 07:55) (112/68 - 158/66)  BP(mean): --  RR: 18 (28 Oct 2023 07:55) (16 - 18)  SpO2: 95% (28 Oct 2023 07:55) (91% - 97%)    Parameters below as of 28 Oct 2023 07:55  Patient On (Oxygen Delivery Method): room air      PHYSICAL EXAM:  Constitutional: No acute distress, alert and oriented by 3  HEENT: AT/NC, EOMI, PERRLA, Normal conjunctiva, no pharyngeal erythema, moist oral mucosa  Respiratory: CTA BL, equal breath sounds, no crackles or wheezing  Cardiovascular: RRR, no edema  Gastrointestinal: soft, Non-tender, Non-distended + Bowel sounds, no rebound or guarding  Genitourinary: no fernandez  Musculoskeletal: No joint edema  Neurological: CN 2-12 grossly intact, no focal deficits  Skin: warm, dry and intact  Psychiatric: normal mood and affect

## 2023-10-27 NOTE — DISCHARGE NOTE PROVIDER - NSDCMRMEDTOKEN_GEN_ALL_CORE_FT
allopurinol 300 mg oral tablet: 1 tab(s) orally once a day  aspirin 81 mg oral tablet: 1 tab(s) orally once a day  atorvastatin 80 mg oral tablet: 1 tab(s) orally once a day  carvedilol 25 mg oral tablet: 1 tab(s) orally 2 times a day  Entresto 49 mg-51 mg oral tablet: 1 tab(s) orally 2 times a day  ergocalciferol 50,000 intl units (1.25 mg) oral capsule: 1 cap(s) orally once a week (Sundays)  ezetimibe 10 mg oral tablet: 1 tab(s) orally once a day (at bedtime)  Farxiga 10 mg oral tablet: 1 tab(s) orally once a day  finasteride 5 mg oral tablet: 1 tab(s) orally once a day  furosemide 20 mg oral tablet: 1 tab(s) orally once a day  gemfibrozil 600 mg oral tablet: 1 tab(s) orally 2 times a day  Lantus 100 units/mL subcutaneous solution: 80 unit(s) subcutaneous 2 times a day only took  30  units this am  Plavix 75 mg oral tablet: 1 tab(s) orally once a day  Trulicity Pen 0.75 mg/0.5 mL subcutaneous solution: 0.75 milligram(s) subcutaneous once a week (Thursdays)   allopurinol 300 mg oral tablet: 1 tab(s) orally once a day  aspirin 81 mg oral tablet: 1 tab(s) orally once a day  atorvastatin 80 mg oral tablet: 1 tab(s) orally once a day  Basic metabolic panel: in 2-3 days MDD: 1  carvedilol 25 mg oral tablet: 1 tab(s) orally 2 times a day  Entresto 49 mg-51 mg oral tablet: 1 tab(s) orally 2 times a day  ergocalciferol 50,000 intl units (1.25 mg) oral capsule: 1 cap(s) orally once a week (Sundays)  ezetimibe 10 mg oral tablet: 1 tab(s) orally once a day (at bedtime)  Farxiga 10 mg oral tablet: 1 tab(s) orally once a day  finasteride 5 mg oral tablet: 1 tab(s) orally once a day  furosemide 20 mg oral tablet: 1 tab(s) orally once a day  gemfibrozil 600 mg oral tablet: 1 tab(s) orally 2 times a day  Lantus 100 units/mL subcutaneous solution: 80 unit(s) subcutaneous 2 times a day only took  30  units this am  Plavix 75 mg oral tablet: 1 tab(s) orally once a day  Trulicity Pen 0.75 mg/0.5 mL subcutaneous solution: 0.75 milligram(s) subcutaneous once a week (Thursdays)   aspirin 81 mg oral tablet: 1 tab(s) orally once a day  atorvastatin 80 mg oral tablet: 1 tab(s) orally once a day  Basic metabolic panel: in 2-3 days post MDD: 1  carvedilol 25 mg oral tablet: 1 tab(s) orally 2 times a day  Entresto 49 mg-51 mg oral tablet: 1 tab(s) orally 2 times a day  ergocalciferol 50,000 intl units (1.25 mg) oral capsule: 1 cap(s) orally once a week (Sundays)  ezetimibe 10 mg oral tablet: 1 tab(s) orally once a day (at bedtime)  Farxiga 10 mg oral tablet: 1 tab(s) orally once a day  finasteride 5 mg oral tablet: 1 tab(s) orally once a day  gemfibrozil 600 mg oral tablet: 1 tab(s) orally 2 times a day  Lantus 100 units/mL subcutaneous solution: 80 unit(s) subcutaneous 2 times a day only took  30  units this am  Plavix 75 mg oral tablet: 1 tab(s) orally once a day  Trulicity Pen 0.75 mg/0.5 mL subcutaneous solution: 0.75 milligram(s) subcutaneous once a week (Thursdays)

## 2023-10-27 NOTE — DISCHARGE NOTE PROVIDER - NSDCCPCAREPLAN_GEN_ALL_CORE_FT
PRINCIPAL DISCHARGE DIAGNOSIS  Diagnosis: PAD (peripheral artery disease)  Assessment and Plan of Treatment: Peripheral artery disease is the buildup of plaque in the arteries that supply oxygen-rich blood to your body including your lower exteremties. Plaque causes a narrowing or blockage that could result in reduced blood flow to your body. Symptoms include cramping, burning, sharp pain or discomfort, numbness, tingling, pale or cool extremities,  that may occur with ambulation and could progress to resting pain. Sometimes these blockages require interventions such as balloooning or placement of a stent to open the blockage and restore blood flow.   Managing risk factors will help keep your circulation open and prevent future blockages, risk factors may include: high blood pressure, high cholesterol, obesity, sedentary life style and smoking.    Your diet should be low in fat, cholesterol, salt and carbohydrates, increase fruits (caution if diabetic), vegetables and whole grains/fiber rich foods.   Take all your cardiac  medications as prescribed.    Exercise is a very important factor in cardiovascular health. Once your post procedure restrictions have passed, you should engage in heart healthy, aerobic exercise. Be sure to have clearance.    Follow up with your vascular doctor within 1-2 weeks after your procedure.   Call your physician or our unit ([phone]) with any questions or concerns that may arise.     PRINCIPAL DISCHARGE DIAGNOSIS  Diagnosis: PAD (peripheral artery disease)  Assessment and Plan of Treatment: Peripheral artery disease is the buildup of plaque in the arteries that supply oxygen-rich blood to your body including your lower exteremties. Plaque causes a narrowing or blockage that could result in reduced blood flow to your body. Symptoms include cramping, burning, sharp pain or discomfort, numbness, tingling, pale or cool extremities,  that may occur with ambulation and could progress to resting pain. Sometimes these blockages require interventions such as balloooning or placement of a stent to open the blockage and restore blood flow.   Managing risk factors will help keep your circulation open and prevent future blockages, risk factors may include: high blood pressure, high cholesterol, obesity, sedentary life style and smoking.    Your diet should be low in fat, cholesterol, salt and carbohydrates, increase fruits (caution if diabetic), vegetables and whole grains/fiber rich foods.   Take all your cardiac  medications as prescribed.    Exercise is a very important factor in cardiovascular health. Once your post procedure restrictions have passed, you should engage in heart healthy, aerobic exercise. Be sure to have clearance.    Follow up with your vascular doctor within 1-2 weeks after your procedure.   Call your physician or our unit ([phone]) with any questions or concerns that may arise.  -Lifestyle modifications discussed to reduce cardiovascular risk factors including weight reduction, smoking cessation, medication compliance, and routine follow up with Cardiologist to track your BMI, cholesterol, and glucose levels.   -follow up outpt in 1 week with Cardiologist DR Francis vargas   -Dual anti platelet therapy with aspirin/ plavix  reinforced importance of strict adherence to DAPT      SECONDARY DISCHARGE DIAGNOSES  Diagnosis: Chronic kidney disease, unspecified CKD stage  Assessment and Plan of Treatment: -Patient with CKD, Recheck BMP in 2 days, lab prescription  provided for BMP, result to be faxed to DR Blanco's office   -Follow up with your nephrologist Marcos Stearns in 3-4 days       Diagnosis: Diabetes mellitus  Assessment and Plan of Treatment: -patient had post procedure Hypoglycemia, likely from being NPO, Took lantus 30 units sq pre procedure, resolved after d50 1/2 amp and had dinner, F/U OP Endocrinology DR Kimble  blood glucose has been stable and patient tolerating diet

## 2023-10-27 NOTE — PATIENT PROFILE ADULT - FALL HARM RISK - HARM RISK INTERVENTIONS

## 2023-10-27 NOTE — H&P PST ADULT - ASSESSMENT
67M pmHx DVT, CHF, CAD w/AICD, CKD, DM2 on insulin, CABGX3, (LIMA TO LAD, SVG TO OM, AND RPDA in 2014, and s/p PCi of LAD in 2020, severe ischemic CM, S/P AICD, with subsequent normalization of EF, moderate left carotid stenosis, and significant PVD, S/P DEUCE  Angioplasty, and stenting (2020) left SFA Angioplasty and stenting, andf CRI was seen at cardiology office on 09/14'/23 for follow up. patient with c/o claudications on lower extremities which has progressed, although not quit life style limiting,   Arterial duplex on 3/22 suggests < 50% stenosis involving the prox left SFA > 70% stenosis involving the left prox popliteal artery, occlusion in right distal SFA with collateral visualized dampened monophasic, flow throughout low extremity distally  . Peripheral angio on 2020 with prior stent in right iliac artery patent with severe left iliac disease  treated  with LANDON.   Arterial duplex on 10/24/23 revealed  left external iliac artery, left SFA, AND LEFT POPLITEAL ARTERY stents are patent. greater than 50% stenosis noted in left proximal SFA, greater than 75% stenosis noted in left SFA and BERRY with heavily calcified plaque.  Patient now presents to [facility] for LE peripheral angio in setting of his severe PAD and symptoms.        Risk Assessments:  ASA: 3  Mallampati: 3  GFR: 26  Cr: 2.61  BRA: 1.2%      Impression: Severe PAD, Symptomatic,  For LE angio     Plan:    -plan for LLE Angio  -patient seen and examined  -confirmed appropriate NPO duration  -ECG and Labs reviewed  -Aspirin 81mg po pre-Angio  -Normal Saline 0.9%  250ml/hr IV: pre procedure JESÚS ppx   -procedure discussed with patient; risks and benefits explained, questions answered  -consent obtained by attending DR Blanco                67M pmHx DVT, CHF, CAD w/AICD, CKD, DM2 on insulin, CABGX3, (LIMA TO LAD, SVG TO OM, AND RPDA in 2014, and s/p PCi of LAD in 2020, severe ischemic CM, S/P AICD, with subsequent normalization of EF, moderate left carotid stenosis, and significant PVD, S/P DEUCE  Angioplasty, and stenting (2020) left SFA Angioplasty and stenting, andf CRI was seen at cardiology office on 09/14'/23 for follow up. patient with c/o claudications on lower extremities which has progressed, although not quit life style limiting,   Arterial duplex on 3/22 suggests < 50% stenosis involving the prox left SFA > 70% stenosis involving the left prox popliteal artery, occlusion in right distal SFA with collateral visualized dampened monophasic, flow throughout low extremity distally  . Peripheral angio on 2020 with prior stent in right iliac artery patent with severe left iliac disease  treated  with LANDON.   Arterial duplex on 10/24/23 revealed  left external iliac artery, left SFA, AND LEFT POPLITEAL ARTERY stents are patent. greater than 50% stenosis noted in left proximal SFA, greater than 75% stenosis noted in left SFA and BERRY with heavily calcified plaque.  Patient now presents to [facility] for LE peripheral angio in setting of his severe PAD and symptoms.    Patient with hx of CKD, cr: 2.6 now, baseline Cr: 2.2 - 2.4sh Follows with Renal DR Aranda   Will discuss with Attending     Risk Assessments:  ASA: 3  Mallampati: 3  GFR: 26  Cr: 2.61  BRA: 1.2%      Impression: Severe PAD, Symptomatic,  For LE angio     Plan:    -plan for LLE Angio  -patient seen and examined  -confirmed appropriate NPO duration  -ECG and Labs reviewed  -Aspirin 81mg po pre-Angio  -Normal Saline 0.9%  250ml/hr IV: pre procedure JESÚS ppx   -procedure discussed with patient; risks and benefits explained, questions answered  -consent obtained by attending DR Blanco                67M pmHx DVT, CHF, CAD w/AICD, CKD, DM2 on insulin, CABGX3, (LIMA TO LAD, SVG TO OM, AND RPDA in 2014, and s/p PCi of LAD in 2020, severe ischemic CM, S/P AICD, with subsequent normalization of EF, moderate left carotid stenosis, and significant PVD, S/P DEUCE  Angioplasty, and stenting (2020) left SFA Angioplasty and stenting, andf CRI was seen at cardiology office on 09/14'/23 for follow up. patient with c/o claudications on lower extremities which has progressed, although not quit life style limiting,   Arterial duplex on 3/22 suggests < 50% stenosis involving the prox left SFA > 70% stenosis involving the left prox popliteal artery, occlusion in right distal SFA with collateral visualized dampened monophasic, flow throughout low extremity distally  . Peripheral angio on 2020 with prior stent in right iliac artery patent with severe left iliac disease  treated  with LANDON.   Arterial duplex on 10/24/23 revealed  left external iliac artery, left SFA, AND LEFT POPLITEAL ARTERY stents are patent. greater than 50% stenosis noted in left proximal SFA, greater than 75% stenosis noted in left SFA and BERRY with heavily calcified plaque.  Patient now presents to [facility] for LE peripheral angio in setting of his severe PAD and symptoms.    Patient with hx of CKD, cr: 2.6 now, baseline Cr: 2.2 - 2.4sh Follows with Renal DR Aranda   Attending aware  Post procedure BMp in 3 days to trend Cr    Risk Assessments:  ASA: 3  Mallampati: 3  GFR: 26  Cr: 2.61  BRA: 1.2%      Impression: Severe PAD, Symptomatic,  For LE angio     Plan:    -plan for LLE Angio  -patient seen and examined  -confirmed appropriate NPO duration  -ECG and Labs reviewed  -Aspirin 81mg po pre-Angio  -Normal Saline 0.9%  250ml/hr IV: pre procedure JESÚS ppx   -procedure discussed with patient; risks and benefits explained, questions answered  -consent obtained by attending DR Blanco

## 2023-10-27 NOTE — PATIENT PROFILE ADULT - NSTRANSFERBELONGINGSRESP_GEN_A_NUR
yes

## 2023-10-27 NOTE — PROGRESS NOTE ADULT - ASSESSMENT
67M pmHx DVT, CHF, CAD w/AICD, CKD, DM2 on insulin, CABGX3, (LIMA TO LAD, SVG TO OM, AND RPDA in 2014, and s/p PCi of LAD in 2020, severe ischemic CM, S/P AICD, with subsequent normalization of EF, moderate left carotid stenosis, and significant PVD, S/P DEUCE  Angioplasty, and stenting (2020) left SFA Angioplasty and stenting, andf CRI was seen at cardiology office on 09/14'/23 for follow up. patient with c/o claudications on lower extremities which has progressed, although not quit life style limiting,   Arterial duplex on 3/22 suggests < 50% stenosis involving the prox left SFA > 70% stenosis involving the left prox popliteal artery, occlusion in right distal SFA with collateral visualized dampened monophasic, flow throughout low extremity distally  . Peripheral angio on 2020 with prior stent in right iliac artery patent with severe left iliac disease  treated  with LANDON.   Arterial duplex on 10/24/23 revealed  left external iliac artery, left SFA, AND LEFT POPLITEAL ARTERY stents are patent. greater than 50% stenosis noted in left proximal SFA, greater than 75% stenosis noted in left SFA and BERRY with heavily calcified plaque.  Patient now presents to [facility] for LE peripheral angio in setting of his severe PAD and symptoms.        Risk Assessments:  ASA: 3  Mallampati:  GFR:   Cr:  BRA:      Impression: Severe PAD, Symptomatic,  For LE angio     Plan:    -plan for LE Angio  -patient seen and examined  -confirmed appropriate NPO duration  -ECG and Labs reviewed  -Aspirin 81mg po pre-Angio  -Normal Saline 0.9%  250ml/hr IV: pre procedure JESÚS ppx   -procedure discussed with patient; risks and benefits explained, questions answered  -consent obtained by attending DR Blanco

## 2023-10-27 NOTE — ASU PATIENT PROFILE, ADULT - NSICDXPASTMEDICALHX_GEN_ALL_CORE_FT
PAST MEDICAL HISTORY:  BPH (benign prostatic hyperplasia)     CAD (coronary artery disease)     CKD (chronic kidney disease)     Diabetes Peripheral vascular disease  CHF- EF-19% AICD ([employer])  MI  December 2013   HTN  Pulmonary edema  High Cholesterol  Chronic renal insufficency    DVT of leg (deep venous thrombosis), right     Dyslipidemia     Fatty liver     Hypertension     Insulin resistance

## 2023-10-27 NOTE — PROGRESS NOTE ADULT - ASSESSMENT
67M pmHx DVT, CHF, CAD w/AICD, CKD, DM2 on insulin, CABGX3, (LIMA TO LAD, SVG TO OM, AND RPDA in 2014, and s/p PCi of LAD in 2020, severe ischemic CM, S/P AICD, with subsequent normalization of EF, moderate left carotid stenosis, and significant PVD, S/P DEUCE  Angioplasty, and stenting (2020) left SFA Angioplasty and stenting, andf CRI was seen at cardiology office on 09/14'/23 for follow up. patient with c/o claudications on lower extremities which has progressed, although not quit life style limiting,   Arterial duplex on 3/22 suggests < 50% stenosis involving the prox left SFA > 70% stenosis involving the left prox popliteal artery, occlusion in right distal SFA with collateral visualized dampened monophasic, flow throughout low extremity distally  . Peripheral angio on 2020 with prior stent in right iliac artery patent with severe left iliac disease  treated  with LANDON.   Arterial duplex on 10/24/23 revealed  left external iliac artery, left SFA, AND LEFT POPLITEAL ARTERY stents are patent. greater than 50% stenosis noted in left proximal SFA, greater than 75% stenosis noted in left SFA and BERRY with heavily calcified plaque.  Patient now presents to [facility] for LE peripheral angio in setting of his severe PAD and symptoms.    Patient  now s/p LLE Angiogram with intervention  via RFA approach (S/P RFA #5F Sheath, s/p failed mynxed, manual compression and hemostasis achieved), RLE with no bleeding/hematoma, distal pulses 2+    Intraprocedurally: Patient received IV sedation and IV Heparin 12,000 units    Visipaque: 70 ml    Findings:   Prelim LLE Angio report as below  Severe Left SFA Disease  S/P IVUS, Shock wave, lithotrophs S/P Drug coated ballooning    Official Angio report pending    # PAD S/P LLE Angio via RFA access/     -ADMIT due to: / Pt s/p LLE angio with intervention, Angiogram access site monitoring  -post peripheral  orders  -Right  groin precautions  -bedrest x 3  hours post procedure, OOB after 3 hours   -NS 0.9% 250ml/hr x 1 bolus: post procedure JESÚS ppx   -continue current medical therapy including   -Dual anti platelet therapy with aspirin/ plavix  reinforced importance of strict adherence to DAPT   -C/W rest of the home medication regimen  -follow up outpt in 2 weeks with Cardiologist  -[facility] following during hospitalization  -Lifestyle modifications discussed to reduce cardiovascular risk factors including weight reduction, smoking cessation, medication compliance, and routine follow up with Cardiologist to track your BMI, cholesterol, and glucose levels.   -Discharge in am if overnight tele, EKG, labs in am all remain WNL/ RLE Benign   67M pmHx DVT, CHF, CAD w/AICD, CKD, DM2 on insulin, CABGX3, (LIMA TO LAD, SVG TO OM, AND RPDA in 2014, and s/p PCi of LAD in 2020, severe ischemic CM, S/P AICD, with subsequent normalization of EF, moderate left carotid stenosis, and significant PVD, S/P DEUCE  Angioplasty, and stenting (2020) left SFA Angioplasty and stenting, andf CRI was seen at cardiology office on 09/14'/23 for follow up. patient with c/o claudications on lower extremities which has progressed, although not quit life style limiting,   Arterial duplex on 3/22 suggests < 50% stenosis involving the prox left SFA > 70% stenosis involving the left prox popliteal artery, occlusion in right distal SFA with collateral visualized dampened monophasic, flow throughout low extremity distally  . Peripheral angio on 2020 with prior stent in right iliac artery patent with severe left iliac disease  treated  with LANDON.   Arterial duplex on 10/24/23 revealed  left external iliac artery, left SFA, AND LEFT POPLITEAL ARTERY stents are patent. greater than 50% stenosis noted in left proximal SFA, greater than 75% stenosis noted in left SFA and BERRY with heavily calcified plaque.  Patient  presented to [facility] for LE peripheral angio in setting of his severe PAD and symptoms.    Patient  now s/p LLE Angiogram with intervention  via RFA approach (S/P RFA #5F Sheath, s/p failed mynxed, manual compression and hemostasis achieved), RLE with no bleeding/hematoma, distal pulses 2+    Intraprocedurally: Patient received IV sedation and IV Heparin 12,000 units    Visipaque: 70 ml    Findings:   Prelim LLE Angio report as below  Severe Left SFA Disease  S/P IVUS, Shock wave, lithotrophs S/P Drug coated ballooning    Official Angio report pending    # PAD S/P LLE Angio via RFA access/l S/P DCB (Failed Mynxed  (S/P Manual compression /benign groin)     -ADMIT due to: / Pt s/p LLE angio with intervention, Angiogram access site monitoring  -post peripheral  orders  -Right  groin precautions  -bedrest x 3  hours post procedure, OOB after 3 hours   -NS 0.9% 250ml/hr x 1 bolus: post procedure JESÚS ppx   -continue current medical therapy including   -Dual anti platelet therapy with aspirin/ plavix  reinforced importance of strict adherence to DAPT   -C/W rest of the home medication regimen  -follow up outpt in 1 week with Cardiologist DR Francis vargas   -[facility] following during hospitalization  -Lifestyle modifications discussed to reduce cardiovascular risk factors including weight reduction, smoking cessation, medication compliance, and routine follow up with Cardiologist to track your BMI, cholesterol, and glucose levels.   -Discharge in am if overnight tele, EKG, labs in am all remain WNL/ RLE Benign   67M pmHx DVT, CHF, CAD w/AICD, CKD, DM2 on insulin, CABGX3, (LIMA TO LAD, SVG TO OM, AND RPDA in 2014, and s/p PCi of LAD in 2020, severe ischemic CM, S/P AICD, with subsequent normalization of EF, moderate left carotid stenosis, and significant PVD, S/P EDUCE  Angioplasty, and stenting (2020) left SFA Angioplasty and stenting, andf CRI was seen at cardiology office on 09/14'/23 for follow up. patient with c/o claudications on lower extremities which has progressed, although not quit life style limiting,   Arterial duplex on 3/22 suggests < 50% stenosis involving the prox left SFA > 70% stenosis involving the left prox popliteal artery, occlusion in right distal SFA with collateral visualized dampened monophasic, flow throughout low extremity distally  . Peripheral angio on 2020 with prior stent in right iliac artery patent with severe left iliac disease  treated  with LANDON.   Arterial duplex on 10/24/23 revealed  left external iliac artery, left SFA, AND LEFT POPLITEAL ARTERY stents are patent. greater than 50% stenosis noted in left proximal SFA, greater than 75% stenosis noted in left SFA and BERRY with heavily calcified plaque.  Patient  presented to [facility] for LE peripheral angio in setting of his severe PAD and symptoms.    Patient  now s/p LLE Angiogram with intervention  via RFA approach (S/P RFA #5F Sheath, s/p failed mynxed, manual compression and hemostasis achieved), RLE with no bleeding/hematoma, distal pulses 2+    Intraprocedurally: Patient received IV sedation and IV Heparin 12,000 units    Visipaque: 70 ml    Findings:   Prelim LLE Angio report as below  Severe Left SFA Disease  S/P IVUS, Shock wave, lithotrophs S/P Drug coated ballooning    Official Angio report pending    # PAD S/P LLE Angio via RFA access/l S/P DCB (Failed Mynxed  (S/P Manual compression /benign groin)     -ADMIT due to: / Pt s/p LLE angio with intervention, Angiogram access site monitoring  -post peripheral  orders  -Right  groin precautions (S/P Failed RFA Mynxed, manula compression for 40 minutes and hemostasis achieved)   -bedrest x 3  hours post procedure, OOB after 3 hours   -NS 0.9% 250ml/hr x 1 bolus: post procedure JESÚS ppx   -continue current medical therapy including   -Dual anti platelet therapy with aspirin/ plavix  reinforced importance of strict adherence to DAPT   -C/W rest of the home medication regimen  -Patient with CKD, Rechcek BMP in 2-3 days and follow up with DR Pedro in 3-4 days   -follow up outpt in 1 week with Cardiologist DR Francis vargas   -[facility] following during hospitalization  -Lifestyle modifications discussed to reduce cardiovascular risk factors including weight reduction, smoking cessation, medication compliance, and routine follow up with Cardiologist to track your BMI, cholesterol, and glucose levels.   -Discharge in am if overnight tele, EKG, labs in am all remain WNL/ RLE Benign  -Discussed with DR Blanco    67M pmHx DVT, CHF, CAD w/AICD, CKD, DM2 on insulin, CABGX3, (LIMA TO LAD, SVG TO OM, AND RPDA in 2014, and s/p PCi of LAD in 2020, severe ischemic CM, S/P AICD, with subsequent normalization of EF, moderate left carotid stenosis, and significant PVD, S/P DEUCE  Angioplasty, and stenting (2020) left SFA Angioplasty and stenting, andf CRI was seen at cardiology office on 09/14'/23 for follow up. patient with c/o claudications on lower extremities which has progressed, although not quit life style limiting,   Arterial duplex on 3/22 suggests < 50% stenosis involving the prox left SFA > 70% stenosis involving the left prox popliteal artery, occlusion in right distal SFA with collateral visualized dampened monophasic, flow throughout low extremity distally  . Peripheral angio on 2020 with prior stent in right iliac artery patent with severe left iliac disease  treated  with LANDON.   Arterial duplex on 10/24/23 revealed  left external iliac artery, left SFA, AND LEFT POPLITEAL ARTERY stents are patent. greater than 50% stenosis noted in left proximal SFA, greater than 75% stenosis noted in left SFA and BERRY with heavily calcified plaque.  Patient  presented to [facility] for LE peripheral angio in setting of his severe PAD and symptoms.    Patient  now s/p LLE Angiogram with intervention  via RFA approach (S/P RFA #5F Sheath, s/p failed mynxed, manual compression and hemostasis achieved), RLE with no bleeding/hematoma, distal pulses 2+    Intraprocedurally: Patient received IV sedation and IV Heparin 12,000 units    Visipaque: 70 ml    Patient's  SBP dropped from 160;s to 100 mm of hg post procedure while in procedure room, asymptomatic,  Right groin benign, no bleeding or hematoma, no c/o low back/ flank pain,  Patient appears very dry, totally  received 1 Litre of NS   BP improved to 150's , no SOB, o2SAT high 90'S on RA     Findings:   Prelim LLE Angio report as below  Severe Left SFA Disease  S/P IVUS, Shock wave, lithotrophs S/P Drug coated ballooning    Official Angio report pending    # PAD S/P LLE Angio via RFA access/l S/P DCB (Failed Mynxed  (S/P Manual compression /benign groin)     -ADMIT due to: / Pt s/p LLE angio with intervention, Angiogram access site monitoring  -post peripheral ANGIO orders  -Right  groin precautions (S/P Failed RFA Mynxed, manula compression for 40 minutes and hemostasis achieved)   -bedrest x 3  hours post procedure, OOB after 3 hours   -patient with soft BP post procedure, appears very dry,  improved after NS iv bolus, Closely monitor for Fluid/Trend BP /Latest EF: 50-55%  -continue current medical therapy including   -Dual anti platelet therapy with aspirin/ plavix  reinforced importance of strict adherence to DAPT   -C/W rest of the home medication regimen  -Patient with CKD, Rechcek BMP in 2-3 days and follow up with DR Pedro in 3-4 days   -follow up outpt in 1 week with Cardiologist DR Francis vargas   -[facility] following during hospitalization  -Lifestyle modifications discussed to reduce cardiovascular risk factors including weight reduction, smoking cessation, medication compliance, and routine follow up with Cardiologist to track your BMI, cholesterol, and glucose levels.   -Discharge in am if overnight tele, EKG, labs in am all remain WNL/ RLE Benign  -Discussed with DR Blanco    67M pmHx DVT, CHF, CAD w/AICD, CKD, DM2 on insulin, CABGX3, (LIMA TO LAD, SVG TO OM, AND RPDA in 2014, and s/p PCi of LAD in 2020, severe ischemic CM, S/P AICD, with subsequent normalization of EF, moderate left carotid stenosis, and significant PVD, S/P DEUCE  Angioplasty, and stenting (2020) left SFA Angioplasty and stenting, andf CRI was seen at cardiology office on 09/14'/23 for follow up. patient with c/o claudications on lower extremities which has progressed, although not quit life style limiting,   Arterial duplex on 3/22 suggests < 50% stenosis involving the prox left SFA > 70% stenosis involving the left prox popliteal artery, occlusion in right distal SFA with collateral visualized dampened monophasic, flow throughout low extremity distally  . Peripheral angio on 2020 with prior stent in right iliac artery patent with severe left iliac disease  treated  with LANDON.   Arterial duplex on 10/24/23 revealed  left external iliac artery, left SFA, AND LEFT POPLITEAL ARTERY stents are patent. greater than 50% stenosis noted in left proximal SFA, greater than 75% stenosis noted in left SFA and BERRY with heavily calcified plaque.  Patient  presented to [facility] for LE peripheral angio in setting of his severe PAD and symptoms.    Patient  now s/p LLE Angiogram with intervention  via RFA approach (S/P RFA #5F Sheath, s/p failed mynxed, manual compression and hemostasis achieved), RLE with no bleeding/hematoma, distal pulses 2+    Intraprocedurally: Patient received IV sedation and IV Heparin 12,000 units    Visipaque: 70 ml    Patient's  SBP dropped from 160;s to 100 mm of hg post procedure while in procedure room, asymptomatic,  Right groin benign, no bleeding or hematoma, no c/o low back/ flank pain,  Patient appears very dry, totally  received 1 Litre of NS   BP improved to 150's , no SOB, o2SAT high 90'S on RA     Findings:   Prelim LLE Angio report as below  Severe Left SFA Disease  S/P IVUS, Shock wave, lithotrophs S/P Drug coated ballooning    Official Angio report pending    # PAD S/P LLE Angio via RFA access/l S/P DCB (Failed Mynxed  (S/P Manual compression /benign groin)     -ADMIT due to: / Pt s/p LLE angio with intervention, Angiogram access site monitoring  -post peripheral ANGIO orders  -Right  groin precautions (S/P Failed RFA Mynxed, manula compression for 40 minutes and hemostasis achieved)   -bedrest x 3  hours post procedure, OOB after 3 hours   -patient with soft BP post procedure, appears very dry,  Right groin benign, no bleeding or hematoma, no c/o low back/ flank pain, improved after NS iv bolus, Closely monitor for Fluid/Trend BP /Latest EF: 50-55%  -continue current medical therapy including   -Dual anti platelet therapy with aspirin/ plavix  reinforced importance of strict adherence to DAPT   -C/W rest of the home medication regimen  -Patient with CKD, Rechcek BMP in 2-3 days and follow up with DR Pedro in 3-4 days   -follow up outpt in 1 week with Cardiologist DR Francis vargas   -[facility] following during hospitalization  -Lifestyle modifications discussed to reduce cardiovascular risk factors including weight reduction, smoking cessation, medication compliance, and routine follow up with Cardiologist to track your BMI, cholesterol, and glucose levels.   -Discharge in am if overnight tele, EKG, labs in am all remain WNL/ RLE Benign  -Discussed with DR Blanco    67M pmHx DVT, CHF, CAD w/AICD, CKD, DM2 on insulin, CABGX3, (LIMA TO LAD, SVG TO OM, AND RPDA in 2014, and s/p PCi of LAD in 2020, severe ischemic CM, S/P AICD, with subsequent normalization of EF, moderate left carotid stenosis, and significant PVD, S/P DEUCE  Angioplasty, and stenting (2020) left SFA Angioplasty and stenting, andf CRI was seen at cardiology office on 09/14'/23 for follow up. patient with c/o claudications on lower extremities which has progressed, although not quit life style limiting,   Arterial duplex on 3/22 suggests < 50% stenosis involving the prox left SFA > 70% stenosis involving the left prox popliteal artery, occlusion in right distal SFA with collateral visualized dampened monophasic, flow throughout low extremity distally  . Peripheral angio on 2020 with prior stent in right iliac artery patent with severe left iliac disease  treated  with LANDON.   Arterial duplex on 10/24/23 revealed  left external iliac artery, left SFA, AND LEFT POPLITEAL ARTERY stents are patent. greater than 50% stenosis noted in left proximal SFA, greater than 75% stenosis noted in left SFA and BERRY with heavily calcified plaque.  Patient  presented to [facility] for LE peripheral angio in setting of his severe PAD and symptoms.    Patient  now s/p LLE Angiogram with intervention  via RFA approach (S/P RFA #5F Sheath, s/p failed mynxed, manual compression and hemostasis achieved), RLE with no bleeding/hematoma, distal pulses 2+    Intraprocedurally: Patient received IV sedation and IV Heparin 12,000 units    Visipaque: 70 ml    Patient's  SBP dropped from 160;s to 100 mm of hg post procedure while in procedure room, asymptomatic,  Right groin benign, no bleeding or hematoma, no c/o low back/ flank pain,  Patient appears very dry, totally  received 1 Litre of NS   BP improved to 150's , no SOB, o2SAT high 90'S on RA     Findings:   Prelim LLE Angio report as below  Severe Left SFA Disease  S/P IVUS, Shock wave, lithotrophs S/P Drug coated ballooning    Official Angio report pending    # PAD S/P LLE Angio via RFA access/l S/P DCB (Failed Mynxed  (S/P Manual compression /benign groin)     -ADMIT due to: / Pt s/p LLE angio with intervention, Angiogram access site monitoring  -post peripheral ANGIO orders  -Right  groin precautions (S/P Failed RFA Mynxed, manula compression for 40 minutes and hemostasis achieved)   -bedrest x 3  hours post procedure, OOB after 3 hours   -patient with soft BP post procedure, appears very dry,  Right groin benign, no bleeding or hematoma, no c/o low back/ flank pain, improved after NS iv bolus, Closely monitor for Fluid/Trend BP /Latest EF: 50-55%  -CBC STAT, will F/U RESULTS   -continue current medical therapy including   -Dual anti platelet therapy with aspirin/ plavix  reinforced importance of strict adherence to DAPT   -C/W rest of the home medication regimen  -Patient with CKD, Rechcek BMP in 2-3 days and follow up with DR Pedro in 3-4 days   -follow up outpt in 1 week with Cardiologist DR Francis vargas   -[facility] following during hospitalization  -Lifestyle modifications discussed to reduce cardiovascular risk factors including weight reduction, smoking cessation, medication compliance, and routine follow up with Cardiologist to track your BMI, cholesterol, and glucose levels.   -Discharge in am if overnight tele, EKG, labs in am all remain WNL/ RLE Benign  -Discussed with DR Blanco    67M pmHx DVT, CHF, CAD w/AICD, CKD, DM2 on insulin, CABGX3, (LIMA TO LAD, SVG TO OM, AND RPDA in 2014, and s/p PCi of LAD in 2020, severe ischemic CM, S/P AICD, with subsequent normalization of EF, moderate left carotid stenosis, and significant PVD, S/P DEUCE  Angioplasty, and stenting (2020) left SFA Angioplasty and stenting, andf CRI was seen at cardiology office on 09/14'/23 for follow up. patient with c/o claudications on lower extremities which has progressed, although not quit life style limiting,   Arterial duplex on 3/22 suggests < 50% stenosis involving the prox left SFA > 70% stenosis involving the left prox popliteal artery, occlusion in right distal SFA with collateral visualized dampened monophasic, flow throughout low extremity distally  . Peripheral angio on 2020 with prior stent in right iliac artery patent with severe left iliac disease  treated  with LANDON.   Arterial duplex on 10/24/23 revealed  left external iliac artery, left SFA, AND LEFT POPLITEAL ARTERY stents are patent. greater than 50% stenosis noted in left proximal SFA, greater than 75% stenosis noted in left SFA and BERRY with heavily calcified plaque.  Patient  presented to [facility] for LE peripheral angio in setting of his severe PAD and symptoms.    Patient  now s/p LLE Angiogram with intervention  via RFA approach (S/P RFA #5F Sheath, s/p failed mynxed, manual compression and hemostasis achieved), RLE with no bleeding/hematoma, distal pulses 2+    Intraprocedurally: Patient received IV sedation and IV Heparin 12,000 units    Visipaque: 70 ml    Patient's  SBP dropped from 160;s to 100 mm of hg post procedure while in procedure room, asymptomatic,  Right groin benign, no bleeding or hematoma, no c/o low back/ flank pain,  Patient appears very dry, totally  received 1 Litre of NS   BP improved to 150's , no SOB, o2SAT high 90'S on RA     Findings:   Prelim LLE Angio report as below  Severe Left SFA Disease  S/P IVUS, Shock wave, lithotrophs S/P Drug coated ballooning    Official Angio report pending    # PAD S/P LLE Angio via RFA access/l S/P DCB (Failed Mynxed  (S/P Manual compression /benign groin)     -ADMIT due to: / Pt s/p LLE angio with intervention, Angiogram access site monitoring  -post peripheral ANGIO orders  -Right  groin precautions (S/P Failed RFA Mynxed, manula compression for 40 minutes and hemostasis achieved)   -bedrest x 3  hours post procedure, OOB after 3 hours   -patient with soft BP post procedure, appears very dry,  Right groin benign, no bleeding or hematoma, no c/o low back/ flank pain, improved after NS iv bolus, Closely monitor for Fluid/Trend BP /Latest EF: 50-55%  -CBC STAT, will F/U RESULTS   -continue current medical therapy including   -Dual anti platelet therapy with aspirin/ plavix  reinforced importance of strict adherence to DAPT   -C/W rest of the home medication regimen  -Patient with CKD, Rechcek BMP in 2-3 days and follow up with DR Pedro in 3-4 days   -follow up outpt in 1 week with Cardiologist DR Francis vargas   -[facility] following during hospitalization  -Lifestyle modifications discussed to reduce cardiovascular risk factors including weight reduction, smoking cessation, medication compliance, and routine follow up with Cardiologist to track your BMI, cholesterol, and glucose levels.   -Discharge in am if overnight tele, EKG, labs in am all remain WNL/ RLE Benign  -Discussed with DR Blanco     Interventional cardiology NP addendum note  Patient had one hypoglycemic episode with F/S IN 60'S, Patient c/o feeling shaky, no mental status change  Patient currently easting dinner, had ginger ale, d50 1/2 amp iv x1, repeat F/S 130's  PATIENT ENDORSES FEELING BETTER  C/W F/S Q2 Hourly until F/S 180'S X2                  67M pmHx DVT, CHF, CAD w/AICD, CKD, DM2 on insulin, CABGX3, (LIMA TO LAD, SVG TO OM, AND RPDA in 2014, and s/p PCi of LAD in 2020, severe ischemic CM, S/P AICD, with subsequent normalization of EF, moderate left carotid stenosis, and significant PVD, S/P DEUCE  Angioplasty, and stenting (2020) left SFA Angioplasty and stenting, andf CRI was seen at cardiology office on 09/14'/23 for follow up. patient with c/o claudications on lower extremities which has progressed, although not quit life style limiting,   Arterial duplex on 3/22 suggests < 50% stenosis involving the prox left SFA > 70% stenosis involving the left prox popliteal artery, occlusion in right distal SFA with collateral visualized dampened monophasic, flow throughout low extremity distally  . Peripheral angio on 2020 with prior stent in right iliac artery patent with severe left iliac disease  treated  with LANDON.   Arterial duplex on 10/24/23 revealed  left external iliac artery, left SFA, AND LEFT POPLITEAL ARTERY stents are patent. greater than 50% stenosis noted in left proximal SFA, greater than 75% stenosis noted in left SFA and BERRY with heavily calcified plaque.  Patient  presented to [facility] for LE peripheral angio in setting of his severe PAD and symptoms.    Patient  now s/p LLE Angiogram with intervention  via RFA approach (S/P RFA #5F Sheath, s/p failed mynxed, manual compression and hemostasis achieved), RLE with no bleeding/hematoma, distal pulses 2+    Intraprocedurally: Patient received IV sedation and IV Heparin 12,000 units    Visipaque: 70 ml    Patient's  SBP dropped from 160;s to 100 mm of hg post procedure while in procedure room, asymptomatic,  Right groin benign, no bleeding or hematoma, no c/o low back/ flank pain,  Patient appears very dry, totally  received 1 Litre of NS   BP improved to 150's , no SOB, o2SAT high 90'S on RA     Findings:   Prelim LLE Angio report as below  Severe Left SFA Disease  S/P IVUS, Shock wave, lithotrophs S/P Drug coated ballooning    Official Angio report pending    # PAD S/P LLE Angio via RFA access/l S/P DCB (Failed Mynxed  (S/P Manual compression /benign groin)     -ADMIT due to: / Pt s/p LLE angio with intervention, Angiogram access site monitoring  -post peripheral ANGIO orders  -Right  groin precautions (S/P Failed RFA Mynxed, manula compression for 40 minutes and hemostasis achieved)   -bedrest x 3  hours post procedure, OOB after 3 hours   -patient with soft BP post procedure, appears very dry,  Right groin benign, no bleeding or hematoma, no c/o low back/ flank pain, improved after NS iv bolus, Closely monitor for Fluid/Trend BP /Latest EF: 50-55%  -CBC STAT, will F/U RESULTS   -continue current medical therapy including   -Dual anti platelet therapy with aspirin/ plavix  reinforced importance of strict adherence to DAPT   -C/W rest of the home medication regimen  -Patient with CKD, Rechcek BMP in 2-3 days and follow up with DR Pedro in 3-4 days   -follow up outpt in 1 week with Cardiologist DR Francis vargas   -[facility] following during hospitalization  Patient had post procedure hypoglycemic event, likley from being NPo and took lantus 30 units, F/U OP endocrinology Bianka Churchill  -Lifestyle modifications discussed to reduce cardiovascular risk factors including weight reduction, smoking cessation, medication compliance, and routine follow up with Cardiologist to track your BMI, cholesterol, and glucose levels.   -Discharge in am if overnight tele, EKG, labs in am all remain WNL/ RLE Benign  -Discussed with DR Blanco     Interventional cardiology NP addendum note  Patient had one hypoglycemic episode with F/S IN 60'S, Patient c/o feeling shaky, no mental status change  Patient currently easting dinner, had ginger ale, d50 1/2 amp iv x1, repeat F/S 130's  PATIENT ENDORSES FEELING BETTER  C/W F/S Q2 Hourly until F/S 180'S X2   , F/U OP endocrinology Bianka Churchill

## 2023-10-27 NOTE — H&P PST ADULT - NSICDXPASTSURGICALHX_GEN_ALL_CORE_FT
PAST SURGICAL HISTORY:  AICD (automatic cardioverter/defibrillator) present     S/P angioplasty with stent right leg 08/14, L femoral 7/2014    S/P colonoscopy with polypectomy     S/P coronary artery bypass graft x 3 2014    AICD ([employer]) 2014    S/P thyroid biopsy

## 2023-10-27 NOTE — PROGRESS NOTE ADULT - SUBJECTIVE AND OBJECTIVE BOX
Narrative:     67M pmHx DVT, CHF, CAD w/AICD, CKD, DM2 on insulin, CABGX3, (LIMA TO LAD, SVG TO OM, AND RPDA in 2014, and s/p PCi of LAD in 2020, severe ischemic CM, S/P AICD, with subsequent normalization of EF, moderate left carotid stenosis, and significant PVD, S/P DEUCE  Angioplasty, and stenting (2020) left SFA Angioplasty and stenting, andf CRI was seen at cardiology office on 09/14'/23 for follow up. patient with c/o claudications on lower extremities which has progressed, although not quit life style limiting,   Arterial duplex on 3/22 suggests < 50% stenosis involving the prox left SFA > 70% stenosis involving the left prox popliteal artery, occlusion in right distal SFA with collateral visualized dampened monophasic, flow throughout low extremity distally  . Peripheral angio on 2020 with prior stent in right iliac artery patent with severe left iliac disease  treated  with LANDON.   Arterial duplex on 10/24/23 revealed  left external iliac artery, left SFA, AND LEFT POPLITEAL ARTERY stents are patent. greater than 50% stenosis noted in left proximal SFA, greater than 75% stenosis noted in left SFA and BERRY with heavily calcified plaque.  Patient now presents to [facility] for LE peripheral angio in setting of his severe PAD and symptoms.        Shy Classification:   Class I: Asymptomatic   Class II a: Mild claudication  Walking > 200 meters (2.5 blocks)   Class IIb: Moderate to Severe claudication Walking < 200 meters (2.5 blocks)   Class III: Rest Pain   Classd IV: Ulceration or gangrene    Associated Risk Factors:     Age: 68    DM: Yes    Smoking history: N/A    Hyperlipidemia: Yes    Family history of PAD: No    Known Athlerosclerotic disease(coronary, carotid, subclavian, renal, mesenteric, AAA): HLD, CAD, PAD    Antiplatelets/Anticoagulants:        Plavix:  No       Cilostazol: No       pentoxifylinne: No       NOAC: No    Vascular testing:   LE physiologic study: right ankle bradchai index of 0.51 / Left: 0.60  Constent with modearte PAD         Arterial Dopplers:  Arterial duplex on 3/22 suggests < 50% stenosis involving the prox left SFA > 70% stenosis involving the left prox popliteal artery, occlusion in right distal SFA with collateral visulaizedm, dampened monophasic, flow throuhout low extremity distally.   10/24/23: The left external iliac artery, left SFA, AND LEFT POPLITEAL ARTERY stents are patent. greater than 50% stenosis noted in left proximal SFA, greater than 75% stenosis noted in left SFA and BERRY with heavily calcified plaque.      Peripheral angio on 2020 with prior stent in right iliac aretry patent with severe left iliac disease  treated  with LANDON.     Echo: 08/28/23 LV: Normal, no WMA, Hypokinesis of anteroseptal and inferoseptal myocardium copnsistent with post op changes EF: 50-55%Trace MR  Trace TR  Otherwise grossly normal TTE        Social History:        Marital:         Tobacco:        ETOH:        Caffeine:     EKG:

## 2023-10-27 NOTE — H&P PST ADULT - HISTORY OF PRESENT ILLNESS
Narrative:     67M pmHx DVT, CHF, CAD w/AICD, CKD, DM2 on insulin, CABGX3, (LIMA TO LAD, SVG TO OM, AND RPDA in 2014, and s/p PCi of LAD in 2020, severe ischemic CM, S/P AICD, with subsequent normalization of EF, moderate left carotid stenosis, and significant PVD, S/P DEUCE  Angioplasty, and stenting (2020) left SFA Angioplasty and stenting, andf CRI was seen at cardiology office on 09/14'/23 for follow up. patient with c/o claudications on lower extremities which has progressed, although not quit life style limiting,   Arterial duplex on 3/22 suggests < 50% stenosis involving the prox left SFA > 70% stenosis involving the left prox popliteal artery, occlusion in right distal SFA with collateral visualized dampened monophasic, flow throughout low extremity distally  . Peripheral angio on 2020 with prior stent in right iliac artery patent with severe left iliac disease  treated  with LANDON.   Arterial duplex on 10/24/23 revealed  left external iliac artery, left SFA, AND LEFT POPLITEAL ARTERY stents are patent. greater than 50% stenosis noted in left proximal SFA, greater than 75% stenosis noted in left SFA and BERRY with heavily calcified plaque.  Patient endorses pain on walking less than half a block, no pain at rest. Denies dizziness, SOB, CP, extremity weakness  Patient now presents to [facility] for LE peripheral angio of LLE  in setting of his severe PAD and symptoms         Shy Classification:   Class I: Asymptomatic   Class II a: Mild claudication  Walking > 200 meters (2.5 blocks)   Class IIb: Moderate to Severe claudication Walking < 200 meters (2.5 blocks)   Class III: Rest Pain   Classd IV: Ulceration or gangrene    Associated Risk Factors:     Age: 68    DM: Yes    Smoking history: N/A    Hyperlipidemia: Yes    Family history of PAD: No    Known Athlerosclerotic disease(coronary, carotid, subclavian, renal, mesenteric, AAA): HLD, CAD, PAD    Antiplatelets/Anticoagulants:        Plavix:  No       Cilostazol: No       pentoxifylinne: No       NOAC: No    Vascular testing:   LE physiologic study: right ankle bradchai index of 0.51 / Left: 0.60  Constent with modearte PAD         Arterial Dopplers:  Arterial duplex on 3/22 suggests < 50% stenosis involving the prox left SFA > 70% stenosis involving the left prox popliteal artery, occlusion in right distal SFA with collateral visulaizedm, dampened monophasic, flow throuhout low extremity distally.   10/24/23: The left external iliac artery, left SFA, AND LEFT POPLITEAL ARTERY stents are patent. greater than 50% stenosis noted in left proximal SFA, greater than 75% stenosis noted in left SFA and BERRY with heavily calcified plaque.      Peripheral angio on 2020 with prior stent in right iliac aretry patent with severe left iliac disease  treated  with LANDON.     Echo: 08/28/23 LV: Normal, no WMA, Hypokinesis of anteroseptal and inferoseptal myocardium copnsistent with post op changes EF: 50-55%Trace MR  Trace TR  Otherwise grossly normal TTE        Social History:        Marital:         Tobacco:        ETOH:        Caffeine:     EKG:           Assessment and Plan:   · Assessment	    67M pmHx DVT, CHF, CAD w/AICD, CKD, DM2 on insulin, CABGX3, (LIMA TO LAD, SVG TO OM, AND RPDA in 2014, and s/p PCi of LAD in 2020, severe ischemic CM, S/P AICD, with subsequent normalization of EF, moderate left carotid stenosis, and significant PVD, S/P DEUCE  Angioplasty, and stenting (2020) left SFA Angioplasty and stenting, andf CRI was seen at cardiology office on 09/14'/23 for follow up. patient with c/o claudications on lower extremities which has progressed, although not quit life style limiting,   Arterial duplex on 3/22 suggests < 50% stenosis involving the prox left SFA > 70% stenosis involving the left prox popliteal artery, occlusion in right distal SFA with collateral visualized dampened monophasic, flow throughout low extremity distally  . Peripheral angio on 2020 with prior stent in right iliac artery patent with severe left iliac disease  treated  with LANDON.   Arterial duplex on 10/24/23 revealed  left external iliac artery, left SFA, AND LEFT POPLITEAL ARTERY stents are patent. greater than 50% stenosis noted in left proximal SFA, greater than 75% stenosis noted in left SFA and BERRY with heavily calcified plaque.  Patient now presents to [facility] for LE peripheral angio in setting of his severe PAD and symptoms.        Risk Assessments:  ASA: 3  Mallampati:  GFR:   Cr:  BRA:      Impression: Severe PAD, Symptomatic,  For LE angio     Plan:    -plan for LE Angio  -patient seen and examined  -confirmed appropriate NPO duration  -ECG and Labs reviewed  -Aspirin 81mg po pre-Angio  -Normal Saline 0.9%  250ml/hr IV: pre procedure JESÚS ppx   -procedure discussed with patient; risks and benefits explained, questions answered  -consent obtained by attending DR Blanco               Narrative:     67M pmHx DVT, CHF, CAD w/AICD, CKD, DM2 on insulin, CABGX3, (LIMA TO LAD, SVG TO OM, AND RPDA in 2014, and s/p PCi of LAD in 2020, severe ischemic CM, S/P AICD, with subsequent normalization of EF, moderate left carotid stenosis, and significant PVD, S/P DEUCE  Angioplasty, and stenting (2020) left SFA Angioplasty and stenting, and CRI, IKE (not using CPAP due to intolerance), was seen at cardiology office on 09/14'/23 for follow up.   patient with c/o claudications on lower extremities which has progressed, although not quit life style limiting,   Arterial duplex on 3/22 suggests < 50% stenosis involving the prox left SFA > 70% stenosis involving the left prox popliteal artery, occlusion in right distal SFA with collateral visualized dampened monophasic, flow throughout low extremity distally  . Peripheral angio on 2020 with prior stent in right iliac artery patent with severe left iliac disease  treated  with LANDON.   Arterial duplex on 10/24/23 revealed  left external iliac artery, left SFA, AND LEFT POPLITEAL ARTERY stents are patent. greater than 50% stenosis noted in left proximal SFA, greater than 75% stenosis noted in left SFA and BERRY with heavily calcified plaque.  Patient endorses pain on walking less than half a block, no pain at rest. Denies dizziness, SOB, CP, extremity weakness  Patient now presents to [facility] for LE peripheral angio of LLE  in setting of his severe PAD and symptoms         Shy Classification:   Class I: Asymptomatic   Class II a: Mild claudication  Walking > 200 meters (2.5 blocks)   Class IIb: Moderate to Severe claudication Walking < 200 meters (2.5 blocks)   Class III: Rest Pain   Classd IV: Ulceration or gangrene    Associated Risk Factors:     Age: 68    DM: Yes    Smoking history: N/A    Hyperlipidemia: Yes    Family history of PAD: No    Known Athlerosclerotic disease(coronary, carotid, subclavian, renal, mesenteric, AAA): HLD, CAD, PAD    Antiplatelets/Anticoagulants:        Plavix:  No       Cilostazol: No       pentoxifylinne: No       NOAC: No    Vascular testing:   LE physiologic study: right ankle bradchai index of 0.51 / Left: 0.60  Constent with modearte PAD         Arterial Dopplers:  Arterial duplex on 3/22 suggests < 50% stenosis involving the prox left SFA > 70% stenosis involving the left prox popliteal artery, occlusion in right distal SFA with collateral visulaizedm, dampened monophasic, flow throuhout low extremity distally.   10/24/23: The left external iliac artery, left SFA, AND LEFT POPLITEAL ARTERY stents are patent. greater than 50% stenosis noted in left proximal SFA, greater than 75% stenosis noted in left SFA and BERRY with heavily calcified plaque.      Peripheral angio on 2020 with prior stent in right iliac aretry patent with severe left iliac disease  treated  with LANDON.     Echo: 08/28/23 LV: Normal, no WMA, Hypokinesis of anteroseptal and inferoseptal myocardium copnsistent with post op changes EF: 50-55%Trace MR  Trace TR  Otherwise grossly normal TTE        Social History:        Marital:        Tobacco: Former smoker        ETOH: Occassionally        Caffeine: Yes     EKG: SR, non specific ST/T abnormality     Labs:                          14.7   8.04  )-----------( 229      ( 27 Oct 2023 10:06 )             45.5         Basic Metabolic Panel (10.27.23 @ 10:06)    Sodium: 138 mmol/L   Potassium: 5.7: Hemolyzed. Interpret with caution mmol/L   Chloride: 104: Chloride reference range changed from ..10/26/2022 mmol/L   Carbon Dioxide: 20.0 mmol/L   Anion Gap: 14 mmol/L   Blood Urea Nitrogen: 40.0 mg/dL   Creatinine: 2.61 mg/dL   Glucose: 237 mg/dL   Calcium: 9.3 mg/dL

## 2023-10-27 NOTE — PROGRESS NOTE ADULT - SUBJECTIVE AND OBJECTIVE BOX
Department of Cardiology                                                                  [facility] [address] [address]                                                            Telephone: [phone]. Fax:[phone]                                                    Post- Procedure Note: Left Heart Cardiac Catheterization       Narrative:   68y  Male is now s/p LLE Angiogram with intervention  via RFA approach (S/P RFA #5F Sheath, s/p failed mynxed, manual compression and hemostasis achieved),  with Julio Singh,  tolerated procedure well without complications. Arrived to recovery room NAD and hemodynamically stable, distal pulse +, neurovascular intact          PAST MEDICAL & SURGICAL HISTORY:  Diabetes  Peripheral vascular disease  CHF- EF-19% AICD ([employer])  MI  December 2013   HTN  Pulmonary edema  High Cholesterol  Chronic renal insufficency      DVT of leg (deep venous thrombosis), right      CAD (coronary artery disease)      Hypertension      Dyslipidemia      BPH (benign prostatic hyperplasia)      CKD (chronic kidney disease)      Fatty liver      Insulin resistance      S/P coronary artery bypass graft x 3  2014    AICD ([employer]) 2014      S/P angioplasty with stent  right leg 08/14, L femoral 7/2014      AICD (automatic cardioverter/defibrillator) present      S/P colonoscopy with polypectomy      S/P thyroid biopsy        Home Medications:  allopurinol 300 mg oral tablet: 1 tab(s) orally once a day (27 Oct 2023 10:46)  aspirin 81 mg oral tablet: 1 tab(s) orally once a day (27 Oct 2023 10:46)  atorvastatin 80 mg oral tablet: 1 tab(s) orally once a day (27 Oct 2023 10:46)  carvedilol 25 mg oral tablet: 1 tab(s) orally 2 times a day (27 Oct 2023 10:46)  Entresto 49 mg-51 mg oral tablet: 1 tab(s) orally 2 times a day (27 Oct 2023 10:46)  ergocalciferol 50,000 intl units (1.25 mg) oral capsule: 1 cap(s) orally once a week (Sundays) (27 Oct 2023 10:46)  ezetimibe 10 mg oral tablet: 1 tab(s) orally once a day (at bedtime) (27 Oct 2023 10:46)  Farxiga 10 mg oral tablet: 1 tab(s) orally once a day (27 Oct 2023 10:45)  finasteride 5 mg oral tablet: 1 tab(s) orally once a day (27 Oct 2023 10:46)  furosemide 20 mg oral tablet: 1 tab(s) orally once a day (27 Oct 2023 10:46)  gemfibrozil 600 mg oral tablet: 1 tab(s) orally 2 times a day (27 Oct 2023 10:46)  Lantus 100 units/mL subcutaneous solution: 80 unit(s) subcutaneous 2 times a day only took  30  units this am (27 Oct 2023 10:43)  Plavix 75 mg oral tablet: 1 tab(s) orally once a day (27 Oct 2023 10:46)  Trulicity Pen 0.75 mg/0.5 mL subcutaneous solution: 0.75 milligram(s) subcutaneous once a week (Thursdays) (27 Oct 2023 10:46)        clindamycin (Rash)  niacin (Flushing; Rash)      Objective:  Vital Signs Last 24 Hrs  T(C): 36.7 (27 Oct 2023 10:49), Max: 36.7 (27 Oct 2023 10:49)  T(F): 98.1 (27 Oct 2023 10:49), Max: 98.1 (27 Oct 2023 10:49)  HR: 76 (27 Oct 2023 10:49) (76 - 76)  BP: 119/68 (27 Oct 2023 10:49) (119/68 - 119/68)  BP(mean): --  RR: 17 (27 Oct 2023 10:49) (17 - 17)  SpO2: 95% (27 Oct 2023 10:49) (95% - 95%)    Parameters below as of 27 Oct 2023 10:49  Patient On (Oxygen Delivery Method): room air        GENERAL: Pt lying comfortably, NAD.  ENMT: PERRL, +EOMI.  NECK: soft, Supple, No JVD,   CHEST/LUNG: Clear to auscultatation bilaterally; No wheezing.  HEART: S1S2+, Regular rate and rhythm; No murmurs.  ABDOMEN: Soft, Nontender, Nondistended; Bowel sounds present.  MUSCULOSKELETAL: Normal range of motion.  SKIN: No rashes or lesions.  NEURO: AAOX3, no focal deficits, no motor r sensory loss.  PSYCH: normal mood.  Procedure site: RFA  no signs of bleeding or hematoma, neurovascular intact   VASCULAR:   Radial +2 R/+2 L  Femoral +2 R/+2 L  PT +2 R/+2 L  DP +2 R/+2 L                          14.7   8.04  )-----------( 229      ( 27 Oct 2023 10:06 )             45.5     10-27    138  |  103  |  40.2<H>  ----------------------------<  201<H>  5.1   |  20.0<L>  |  2.57<H>    Ca    9.4      27 Oct 2023 11:05                                                                                    Department of Cardiology                                                                  [facility]/[place] [address]                                                            Telephone: [phone]. Fax:[phone]                                                    Post- Procedure Note: Left Heart Cardiac Catheterization       Narrative:   68y  Male is now s/p LLE Angiogram with intervention  via RFA approach (S/P RFA #5F Sheath, s/p failed mynxed, manual compression and hemostasis achieved),  with Julio Singh,  tolerated procedure well without complications. Arrived to recovery room NAD and hemodynamically stable, distal pulse +, neurovascular intact          PAST MEDICAL & SURGICAL HISTORY:  Diabetes  Peripheral vascular disease  CHF,  AICD ([employer]) LATEST ef: 50-55%  MI  December 2013   HTN  Pulmonary edema  High Cholesterol  Chronic renal insufficency      DVT of leg (deep venous thrombosis), right      CAD (coronary artery disease)      Hypertension      Dyslipidemia      BPH (benign prostatic hyperplasia)      CKD (chronic kidney disease)      Fatty liver      Insulin resistance      S/P coronary artery bypass graft x 3  2014    AICD ([employer]) 2014      S/P angioplasty with stent  right leg 08/14, L femoral 7/2014      AICD (automatic cardioverter/defibrillator) present      S/P colonoscopy with polypectomy      S/P thyroid biopsy        Home Medications:  allopurinol 300 mg oral tablet: 1 tab(s) orally once a day (27 Oct 2023 10:46)  aspirin 81 mg oral tablet: 1 tab(s) orally once a day (27 Oct 2023 10:46)  atorvastatin 80 mg oral tablet: 1 tab(s) orally once a day (27 Oct 2023 10:46)  carvedilol 25 mg oral tablet: 1 tab(s) orally 2 times a day (27 Oct 2023 10:46)  Entresto 49 mg-51 mg oral tablet: 1 tab(s) orally 2 times a day (27 Oct 2023 10:46)  ergocalciferol 50,000 intl units (1.25 mg) oral capsule: 1 cap(s) orally once a week (Sundays) (27 Oct 2023 10:46)  ezetimibe 10 mg oral tablet: 1 tab(s) orally once a day (at bedtime) (27 Oct 2023 10:46)  Farxiga 10 mg oral tablet: 1 tab(s) orally once a day (27 Oct 2023 10:45)  finasteride 5 mg oral tablet: 1 tab(s) orally once a day (27 Oct 2023 10:46)  furosemide 20 mg oral tablet: 1 tab(s) orally once a day (27 Oct 2023 10:46)  gemfibrozil 600 mg oral tablet: 1 tab(s) orally 2 times a day (27 Oct 2023 10:46)  Lantus 100 units/mL subcutaneous solution: 80 unit(s) subcutaneous 2 times a day only took  30  units this am (27 Oct 2023 10:43)  Plavix 75 mg oral tablet: 1 tab(s) orally once a day (27 Oct 2023 10:46)  Trulicity Pen 0.75 mg/0.5 mL subcutaneous solution: 0.75 milligram(s) subcutaneous once a week (Thursdays) (27 Oct 2023 10:46)        clindamycin (Rash)  niacin (Flushing; Rash)      Objective:  Vital Signs Last 24 Hrs  T(C): 36.7 (27 Oct 2023 10:49), Max: 36.7 (27 Oct 2023 10:49)  T(F): 98.1 (27 Oct 2023 10:49), Max: 98.1 (27 Oct 2023 10:49)  HR: 76 (27 Oct 2023 10:49) (76 - 76)  BP: 119/68 (27 Oct 2023 10:49) (119/68 - 119/68)  BP(mean): --  RR: 17 (27 Oct 2023 10:49) (17 - 17)  SpO2: 95% (27 Oct 2023 10:49) (95% - 95%)    Parameters below as of 27 Oct 2023 10:49  Patient On (Oxygen Delivery Method): room air        GENERAL: Pt lying comfortably, NAD.  ENMT: PERRL, +EOMI.  NECK: soft, Supple, No JVD,   CHEST/LUNG: Clear to auscultatation bilaterally; No wheezing.  HEART: S1S2+, Regular rate and rhythm; No murmurs.  ABDOMEN: Soft, Nontender, Nondistended; Bowel sounds present.  MUSCULOSKELETAL: Normal range of motion.  SKIN: No rashes or lesions.  NEURO: AAOX3, no focal deficits, no motor r sensory loss.  PSYCH: normal mood.  Procedure site: RFA  no signs of bleeding or hematoma, neurovascular intact   VASCULAR:   Radial +2 R/+2 L  Femoral +2 R/+2 L  PT +2 R/+2 L  DP +2 R/+2 L                          14.7   8.04  )-----------( 229      ( 27 Oct 2023 10:06 )             45.5     10-27    138  |  103  |  40.2<H>  ----------------------------<  201<H>  5.1   |  20.0<L>  |  2.57<H>    Ca    9.4      27 Oct 2023 11:05

## 2023-10-27 NOTE — DISCHARGE NOTE PROVIDER - CARE PROVIDER_API CALL
Julio Blanco  Cardiovascular Disease  [address] [place] [address]  Phone: [phone]  Fax: [phone]  Follow Up Time: 1 week   Julio Blanco  Cardiovascular Disease  [address] [place] [address]  Phone: [phone]  Fax: [phone]  Follow Up Time: 1 week    Marcos Pedro  Nephrology  [address] [place] [address]  Phone: [phone]  Fax: [phone]  Follow Up Time: 1-3 days

## 2023-10-27 NOTE — DISCHARGE NOTE PROVIDER - HOSPITAL COURSE
67M pmHx DVT, CHF, CAD w/AICD, CKD, DM2 on insulin, CABGX3, (LIMA TO LAD, SVG TO OM, AND RPDA in 2014, and s/p PCi of LAD in 2020, severe ischemic CM, S/P AICD, with subsequent normalization of EF, moderate left carotid stenosis, and significant PVD, S/P DEUCE  Angioplasty, and stenting (2020) left SFA Angioplasty and stenting, andf CRI was seen at cardiology office on 09/14'/23 for follow up. patient with c/o claudications on lower extremities which has progressed, although not quit life style limiting,   Arterial duplex on 3/22 suggests < 50% stenosis involving the prox left SFA > 70% stenosis involving the left prox popliteal artery, occlusion in right distal SFA with collateral visualized dampened monophasic, flow throughout low extremity distally  . Peripheral angio on 2020 with prior stent in right iliac artery patent with severe left iliac disease  treated  with LANDON.   Arterial duplex on 10/24/23 revealed  left external iliac artery, left SFA, AND LEFT POPLITEAL ARTERY stents are patent. greater than 50% stenosis noted in left proximal SFA, greater than 75% stenosis noted in left SFA and BERRY with heavily calcified plaque.  Patient  presented to [facility] for LE peripheral angio in setting of his severe PAD and symptoms.    Patient  now s/p LLE Angiogram with intervention  via RFA approach (S/P RFA #5F Sheath, s/p failed mynxed, manual compression and hemostasis achieved), RLE with no bleeding/hematoma, distal pulses 2+    Intraprocedurally: Patient received IV sedation and IV Heparin 12,000 units    Visipaque: 70 ml    Findings:   Prelim LLE Angio report as below  Severe Left SFA Disease  S/P IVUS, Shock wave, lithotrophs S/P Drug coated ballooning    Official Angio report pending    # PAD S/P LLE Angio via RFA access/l S/P DCB (Failed Mynxed  (S/P Manual compression /benign groin)     -ADMIT due to: / Pt s/p LLE angio with intervention, Angiogram access site monitoring  -post peripheral  orders  -Right  groin precautions  -bedrest x 3  hours post procedure, OOB after 3 hours   -NS 0.9% 250ml/hr x 1 bolus: post procedure JESÚS ppx   -continue current medical therapy including   -Dual anti platelet therapy with aspirin/ plavix  reinforced importance of strict adherence to DAPT   -C/W rest of the home medication regimen  -Patient with CKD, Recheck BMP in 3 days, lab prescription to be provided for BMP, result to be faxed to DR Blanco's office   -follow up outpt in 1 week with Cardiologist DR Francis vargas   -[facility] following during hospitalization  -Lifestyle modifications discussed to reduce cardiovascular risk factors including weight reduction, smoking cessation, medication compliance, and routine follow up with Cardiologist to track your BMI, cholesterol, and glucose levels.   -Discharge in am if overnight tele, EKG, labs in am all remain WNL/ RLE Benign           67M pmHx DVT, CHF, CAD w/AICD, CKD, DM2 on insulin, CABGX3, (LIMA TO LAD, SVG TO OM, AND RPDA in 2014, and s/p PCi of LAD in 2020, severe ischemic CM, S/P AICD, with subsequent normalization of EF, moderate left carotid stenosis, and significant PVD, S/P DEUCE  Angioplasty, and stenting (2020) left SFA Angioplasty and stenting, andf CRI was seen at cardiology office on 09/14'/23 for follow up. patient with c/o claudications on lower extremities which has progressed, although not quit life style limiting,   Arterial duplex on 3/22 suggests < 50% stenosis involving the prox left SFA > 70% stenosis involving the left prox popliteal artery, occlusion in right distal SFA with collateral visualized dampened monophasic, flow throughout low extremity distally  . Peripheral angio on 2020 with prior stent in right iliac artery patent with severe left iliac disease  treated  with LANDON.   Arterial duplex on 10/24/23 revealed  left external iliac artery, left SFA, AND LEFT POPLITEAL ARTERY stents are patent. greater than 50% stenosis noted in left proximal SFA, greater than 75% stenosis noted in left SFA and BERRY with heavily calcified plaque.  Patient  presented to [facility] for LE peripheral angio in setting of his severe PAD and symptoms.    Patient  now s/p LLE Angiogram with intervention  via RFA approach (S/P RFA #5F Sheath, s/p failed mynxed, manual compression and hemostasis achieved), RLE with no bleeding/hematoma, distal pulses 2+    Intraprocedurally: Patient received IV sedation and IV Heparin 12,000 units    Visipaque: 70 ml    Findings:   Prelim LLE Angio report as below  Severe Left SFA Disease  S/P IVUS, Shock wave, lithotrophs S/P Drug coated ballooning    Official Angio report pending    # PAD S/P LLE Angio via RFA access/l S/P DCB (Failed Mynxed  (S/P Manual compression /benign groin)     -ADMIT due to: / Pt s/p LLE angio with intervention, Angiogram access site monitoring  -post peripheral  orders  -Right  groin precautions  -bedrest x 3  hours post procedure, OOB after 3 hours   -NS 0.9% 250ml/hr x 1 bolus: post procedure JESÚS ppx   -continue current medical therapy including   -Dual anti platelet therapy with aspirin/ plavix  reinforced importance of strict adherence to DAPT   -C/W rest of the home medication regimen  -Patient with CKD, Recheck BMP in 2 days, lab prescription to be provided for BMP, result to be faxed to DR Blanco's office   -follow up outpt in 1 week with Cardiologist DR Francis vargas   -Follow up with your nephrologist Marcos Stearns in 3-4 days   -[facility] following during hospitalization  -Lifestyle modifications discussed to reduce cardiovascular risk factors including weight reduction, smoking cessation, medication compliance, and routine follow up with Cardiologist to track your BMI, cholesterol, and glucose levels.   -Discharge in am if overnight tele, EKG, labs in am all remain WNL/ RLE Benign           67M pmHx DVT, CHF, CAD w/AICD, CKD, DM2 on insulin, CABGX3, (LIMA TO LAD, SVG TO OM, AND RPDA in 2014, and s/p PCi of LAD in 2020, severe ischemic CM, S/P AICD, with subsequent normalization of EF, moderate left carotid stenosis, and significant PVD, S/P DEUCE  Angioplasty, and stenting (2020) left SFA Angioplasty and stenting, andf CRI was seen at cardiology office on 09/14'/23 for follow up. patient with c/o claudications on lower extremities which has progressed, although not quit life style limiting,   Arterial duplex on 3/22 suggests < 50% stenosis involving the prox left SFA > 70% stenosis involving the left prox popliteal artery, occlusion in right distal SFA with collateral visualized dampened monophasic, flow throughout low extremity distally  . Peripheral angio on 2020 with prior stent in right iliac artery patent with severe left iliac disease  treated  with LANDON.   Arterial duplex on 10/24/23 revealed  left external iliac artery, left SFA, AND LEFT POPLITEAL ARTERY stents are patent. greater than 50% stenosis noted in left proximal SFA, greater than 75% stenosis noted in left SFA and BERRY with heavily calcified plaque.  Patient  presented to [facility] for LE peripheral angio in setting of his severe PAD and symptoms.    Patient  now s/p LLE Angiogram with intervention  via RFA approach (S/P RFA #5F Sheath, s/p failed mynxed, manual compression and hemostasis achieved), RLE with no bleeding/hematoma, distal pulses 2+    Intraprocedurally: Patient received IV sedation and IV Heparin 12,000 units    Visipaque: 70 ml    Findings:   Prelim LLE Angio report as below  Severe Left SFA Disease  S/P IVUS, Shock wave, lithotrophs S/P Drug coated ballooning    Official Angio report pending    # PAD S/P LLE Angio via RFA access/l S/P DCB (Failed Mynxed  (S/P Manual compression /benign groin)     -ADMIT due to: / Pt s/p LLE angio with intervention, Angiogram access site monitoring  -post peripheral  orders  -Right  groin precautions  -bedrest x 3  hours post procedure, OOB after 3 hours   -NS 0.9% 250ml/hr x 1 bolus: post procedure JESÚS ppx   -continue current medical therapy including   -Dual anti platelet therapy with aspirin/ plavix  reinforced importance of strict adherence to DAPT   -C/W rest of the home medication regimen  -Patient with CKD, Recheck BMP in 2 days, lab prescription  provided for BMP, result to be faxed to DR Blanco's office   -follow up outpt in 1 week with Cardiologist DR Francis vargas   -Follow up with your nephrologist Marcos Stearns in 3-4 days   -[facility] following during hospitalization  -Lifestyle modifications discussed to reduce cardiovascular risk factors including weight reduction, smoking cessation, medication compliance, and routine follow up with Cardiologist to track your BMI, cholesterol, and glucose levels.   -Discharge in am if overnight tele, EKG, labs in am all remain WNL/ RLE Benign           67M pmHx DVT, CHF, CAD w/AICD, CKD, DM2 on insulin, CABGX3, (LIMA TO LAD, SVG TO OM, AND RPDA in 2014, and s/p PCi of LAD in 2020, severe ischemic CM, S/P AICD, with subsequent normalization of EF, moderate left carotid stenosis, and significant PVD, S/P DEUCE  Angioplasty, and stenting (2020) left SFA Angioplasty and stenting, andf CRI was seen at cardiology office on 09/14'/23 for follow up. patient with c/o claudications on lower extremities which has progressed, although not quit life style limiting,   Arterial duplex on 3/22 suggests < 50% stenosis involving the prox left SFA > 70% stenosis involving the left prox popliteal artery, occlusion in right distal SFA with collateral visualized dampened monophasic, flow throughout low extremity distally  . Peripheral angio on 2020 with prior stent in right iliac artery patent with severe left iliac disease  treated  with LANDON.   Arterial duplex on 10/24/23 revealed  left external iliac artery, left SFA, AND LEFT POPLITEAL ARTERY stents are patent. greater than 50% stenosis noted in left proximal SFA, greater than 75% stenosis noted in left SFA and BERRY with heavily calcified plaque.  Patient  presented to [facility] for LE peripheral angio in setting of his severe PAD and symptoms.    Patient  now s/p LLE Angiogram with intervention  via RFA approach (S/P RFA #5F Sheath, s/p failed mynxed, manual compression and hemostasis achieved), RLE with no bleeding/hematoma, distal pulses 2+    Intraprocedurally: Patient received IV sedation and IV Heparin 12,000 units    Visipaque: 70 ml    Findings:   Prelim LLE Angio report as below  Severe Left SFA Disease  S/P IVUS, Shock wave, lithotrophs S/P Drug coated ballooning    Official Angio report pending    # PAD S/P LLE Angio via RFA access/l S/P DCB (Failed Mynxed  (S/P Manual compression /benign groin)     -ADMIT due to: / Pt s/p LLE angio with intervention, Angiogram access site monitoring  -post peripheral  orders  -Right  groin precautions  -bedrest x 3  hours post procedure, OOB after 3 hours   --bedrest x 3  hours post procedure, OOB after 3 hours   -patient with soft BP post procedure  appears very dry, RT groin benign/soft, no bleeding/hematoma, NV status intact, no c/o back /flank pain, abdomen soft, improved after NS iv bolus, Closely monitor for Fluid/Trend BP /Latest EF: 50-55%  -continue current medical therapy including   -continue current medical therapy including   -Dual anti platelet therapy with aspirin/ plavix  reinforced importance of strict adherence to DAPT   -C/W rest of the home medication regimen  -Patient with CKD, Recheck BMP in 2 days, lab prescription  provided for BMP, result to be faxed to DR Blanco's office   -follow up outpt in 1 week with Cardiologist DR Francis vargas   -Follow up with your nephrologist Marcos Stearns in 3-4 days   -[facility] following during hospitalization  -Lifestyle modifications discussed to reduce cardiovascular risk factors including weight reduction, smoking cessation, medication compliance, and routine follow up with Cardiologist to track your BMI, cholesterol, and glucose levels.   -Discharge in am if overnight tele, EKG, labs in am all remain WNL/ RLE Benign           67M pmHx DVT, CHF, CAD w/AICD, CKD, DM2 on insulin, CABGX3, (LIMA TO LAD, SVG TO OM, AND RPDA in 2014, and s/p PCi of LAD in 2020, severe ischemic CM, S/P AICD, with subsequent normalization of EF, moderate left carotid stenosis, and significant PVD, S/P DEUCE  Angioplasty, and stenting (2020) left SFA Angioplasty and stenting, andf CRI was seen at cardiology office on 09/14'/23 for follow up. patient with c/o claudications on lower extremities which has progressed, although not quit life style limiting,   Arterial duplex on 3/22 suggests < 50% stenosis involving the prox left SFA > 70% stenosis involving the left prox popliteal artery, occlusion in right distal SFA with collateral visualized dampened monophasic, flow throughout low extremity distally  . Peripheral angio on 2020 with prior stent in right iliac artery patent with severe left iliac disease  treated  with LANDON.   Arterial duplex on 10/24/23 revealed  left external iliac artery, left SFA, AND LEFT POPLITEAL ARTERY stents are patent. greater than 50% stenosis noted in left proximal SFA, greater than 75% stenosis noted in left SFA and BERRY with heavily calcified plaque.  Patient  presented to [facility] for LE peripheral angio in setting of his severe PAD and symptoms.    Patient  now s/p LLE Angiogram with intervention  via RFA approach (S/P RFA #5F Sheath, s/p failed mynxed, manual compression and hemostasis achieved), RLE with no bleeding/hematoma, distal pulses 2+    Intraprocedurally: Patient received IV sedation and IV Heparin 12,000 units    Visipaque: 70 ml    Findings:   Prelim LLE Angio report as below  Severe Left SFA Disease  S/P IVUS, Shock wave, lithotrophs S/P Drug coated ballooning    Official Angio report pending    # PAD S/P LLE Angio via RFA access/l S/P DCB (Failed Mynxed  (S/P Manual compression /benign groin)     -ADMIT due to: / Pt s/p LLE angio with intervention, Angiogram access site monitoring  -post peripheral  orders  -Right  groin precautions  -bedrest x 3  hours post procedure, OOB after 3 hours   --bedrest x 3  hours post procedure, OOB after 3 hours   -patient with soft BP post procedure  appears very dry, RT groin benign/soft, no bleeding/hematoma, NV status intact, no c/o back /flank pain, abdomen soft, improved after NS iv bolus, Closely monitor for Fluid/Trend BP /Latest EF: 50-55%  -continue current medical therapy including   -continue current medical therapy including   -Dual anti platelet therapy with aspirin/ plavix  reinforced importance of strict adherence to DAPT   -C/W rest of the home medication regimen  -Patient with CKD, Recheck BMP in 2 days, lab prescription  provided for BMP, result to be faxed to DR Blanco's office   -patient had post procedure Hypoglycemia, likely from being NPO, Took lantus 30 units sq pre procedure, resolved after d50 1/2 amp and had dinner, F/U OP Endocrinology DR Kimble  -follow up outpt in 1 week with Cardiologist DR Francis vargas   -Follow up with your nephrologist Marcos Stearns in 3-4 days   -[facility] following during hospitalization  -Lifestyle modifications discussed to reduce cardiovascular risk factors including weight reduction, smoking cessation, medication compliance, and routine follow up with Cardiologist to track your BMI, cholesterol, and glucose levels.   -Discharge in am if overnight tele, EKG, labs in am all remain WNL/ RLE Benign           67M pmHx DVT, CHF, CAD w/AICD, CKD, DM2 on insulin, CABGX3, (LIMA TO LAD, SVG TO OM, AND RPDA in 2014, and s/p PCi of LAD in 2020, severe ischemic CM, S/P AICD, with subsequent normalization of EF, moderate left carotid stenosis, and significant PVD, S/P DEUCE  Angioplasty, and stenting (2020) left SFA Angioplasty and stenting, andf CRI was seen at cardiology office on 09/14'/23 for follow up. patient with c/o claudications on lower extremities which has progressed, although not quit life style limiting,   Arterial duplex on 3/22 suggests < 50% stenosis involving the prox left SFA > 70% stenosis involving the left prox popliteal artery, occlusion in right distal SFA with collateral visualized dampened monophasic, flow throughout low extremity distally  . Peripheral angio on 2020 with prior stent in right iliac artery patent with severe left iliac disease  treated  with LANDON.   Arterial duplex on 10/24/23 revealed  left external iliac artery, left SFA, AND LEFT POPLITEAL ARTERY stents are patent. greater than 50% stenosis noted in left proximal SFA, greater than 75% stenosis noted in left SFA and BERRY with heavily calcified plaque.  Patient  presented to [facility] for LE peripheral angio in setting of his severe PAD and symptoms.    Patient  now s/p LLE Angiogram with intervention  via RFA approach (S/P RFA #5F Sheath, s/p failed mynxed, manual compression and hemostasis achieved), RLE with no bleeding/hematoma, distal pulses 2+    Intraprocedurally: Patient received IV sedation and IV Heparin 12,000 units    Visipaque: 70 ml    Findings:   Prelim LLE Angio report as below  Severe Left SFA Disease  S/P IVUS, Shock wave, lithotrophs S/P Drug coated ballooning    Official Angio report pending    # PAD S/P LLE Angio via RFA access/l S/P DCB (Failed Mynxed  (S/P Manual compression /benign groin)     -ADMIT due to: / Pt s/p LLE angio with intervention, Angiogram access site monitoring  -post peripheral  orders  -Right  groin precautions  -bedrest x 3  hours post procedure, OOB after 3 hours   --bedrest x 3  hours post procedure, OOB after 3 hours   -patient with soft BP post procedure  appears very dry, RT groin benign/soft, no bleeding/hematoma, NV status intact, no c/o back /flank pain, abdomen soft, improved after NS iv bolus, Closely monitor for Fluid/Trend BP /Latest EF: 50-55%  -continue current medical therapy including   -continue current medical therapy including   -Dual anti platelet therapy with aspirin/ plavix  reinforced importance of strict adherence to DAPT   -C/W rest of the home medication regimen  -Patient with CKD, Recheck BMP in 2 days, lab prescription  provided for BMP, result to be faxed to DR Blanco's office   -patient had post procedure Hypoglycemia, likely from being NPO, Took lantus 30 units sq pre procedure, resolved after d50 1/2 amp and had dinner, F/U OP Endocrinology DR Kimble  -follow up outpt in 1 week with Cardiologist DR Francis vargas   -Follow up with your nephrologist Marcos Stearns in 3-4 days   -[facility] following during hospitalization  -Lifestyle modifications discussed to reduce cardiovascular risk factors including weight reduction, smoking cessation, medication compliance, and routine follow up with Cardiologist to track your BMI, cholesterol, and glucose levels.

## 2023-10-27 NOTE — DISCHARGE NOTE PROVIDER - NSDCCPTREATMENT_GEN_ALL_CORE_FT
PRINCIPAL PROCEDURE  Procedure: Angiogram, lower extremity, left  Findings and Treatment: No heavy lifting, driving, sex, tub baths, swimming, or any activity that submerges the lower half of the body in water for 48 hours.  Limited walking and stairs for 48 hours.    Change the bandaid after 24 hours and every 24 hours after that.  Keep the puncture site dry and covered with a bandaid until a scab forms.    Observe the site frequently.  If bleeding or a large lump (the size of a golf ball or bigger) occurs lie flat, apply continuous direct pressure just above the puncture site for at least 10 minutes, and notify your physician immediately.  If the bleeding cannot be controlled, call 911 immediately for assistance.  Notify your physician of pain, swelling or any drainage.    Notify your physician immediately if coldness, numbness, discoloration or pain in your foot occurs.  Continue with Aspirin and Plavix  Do not stop medications withour=t the instruction of your Cardiologist

## 2023-10-27 NOTE — ASU PATIENT PROFILE, ADULT - FALL HARM RISK - HARM RISK INTERVENTIONS

## 2023-10-27 NOTE — CHART NOTE - NSCHARTNOTEFT_GEN_A_CORE
66 y/o male with PMH of DVT, CHF, CAD w/AICD, CKD, DM-2 on insulin, CABGX3, (LIMA TO LAD, SVG TO OM, AND RPDA in 2014, and s/p PCi of LAD in 2020, severe ischemic CM, S/P AICD, with subsequent normalization of EF, moderate left carotid stenosis, and significant PVD, S/P DEUCE  Angioplasty, and stenting (2020) left SFA Angioplasty and stenting, and CRI who was seen by cardiology for claudication on lower extremities. Arterial duplex done on 10/24/23 revealed  left external iliac artery, left SFA, AND LEFT POPLITEAL ARTERY stents are patent. greater than 50% stenosis noted in left proximal SFA, greater than 75% stenosis noted in left SFA and BERRY with heavily calcified plaque. Patient was admitted to cardiology service today for LE peripheral angio in setting of his severe PAD and symptoms. He is s/p LLE Angiogram with intervention  via RFA approach. Medicine called to continue care for the patient.     Patient seen and examined at bedside, he has no acute complaint, not in acute distress.     PAD   S/p LLE Angio via RFA   Groin checked, dressing in place, no drainage seen through it   Continue Plavix, Aspirin, Atorvastatin     DM-2   Patient on Lantus 60units bid   Insulin sliding scale     HTN/CAD/HLD  Continue Coreg 25mg bid   Atorvastatin 80m g  Gemfibrozil 600mg bid   Zetia 10mg   Aspirin 81mg     CHF  Farxiga 10mg   Entresto 49-51mg bid   Lasix 20mg     CKD-3   Stable   Monitor renal function     BPH   Finasteride 5mg     Gout   Allopurinol 300mg     Supportive   DVT prophylaxis: CSD   Diet: DASH     Plan of care discussed with patient

## 2023-10-27 NOTE — DISCHARGE NOTE PROVIDER - PROVIDER TOKENS
PROVIDER:[TOKEN:[[id number]],FOLLOWUP:[1 week]] PROVIDER:[TOKEN:[[id number]],FOLLOWUP:[1 week]],PROVIDER:[TOKEN:[[id number]],FOLLOWUP:[1-3 days]]

## 2023-10-28 ENCOUNTER — TRANSCRIPTION ENCOUNTER (OUTPATIENT)
Age: 68
End: 2023-10-28

## 2023-10-28 VITALS
DIASTOLIC BLOOD PRESSURE: 73 MMHG | HEART RATE: 90 BPM | TEMPERATURE: 98 F | OXYGEN SATURATION: 98 % | RESPIRATION RATE: 18 BRPM | SYSTOLIC BLOOD PRESSURE: 147 MMHG

## 2023-10-28 LAB
A1C WITH ESTIMATED AVERAGE GLUCOSE RESULT: 9.9 % — HIGH (ref 4–5.6)
A1C WITH ESTIMATED AVERAGE GLUCOSE RESULT: 9.9 % — HIGH (ref 4–5.6)
ANION GAP SERPL CALC-SCNC: 13 MMOL/L — SIGNIFICANT CHANGE UP (ref 5–17)
ANION GAP SERPL CALC-SCNC: 13 MMOL/L — SIGNIFICANT CHANGE UP (ref 5–17)
BASOPHILS # BLD AUTO: 0.04 K/UL — SIGNIFICANT CHANGE UP (ref 0–0.2)
BASOPHILS # BLD AUTO: 0.04 K/UL — SIGNIFICANT CHANGE UP (ref 0–0.2)
BASOPHILS NFR BLD AUTO: 0.4 % — SIGNIFICANT CHANGE UP (ref 0–2)
BASOPHILS NFR BLD AUTO: 0.4 % — SIGNIFICANT CHANGE UP (ref 0–2)
BUN SERPL-MCNC: 39.5 MG/DL — HIGH (ref 8–20)
BUN SERPL-MCNC: 39.5 MG/DL — HIGH (ref 8–20)
CALCIUM SERPL-MCNC: 9 MG/DL — SIGNIFICANT CHANGE UP (ref 8.4–10.5)
CALCIUM SERPL-MCNC: 9 MG/DL — SIGNIFICANT CHANGE UP (ref 8.4–10.5)
CHLORIDE SERPL-SCNC: 106 MMOL/L — SIGNIFICANT CHANGE UP (ref 96–108)
CHLORIDE SERPL-SCNC: 106 MMOL/L — SIGNIFICANT CHANGE UP (ref 96–108)
CO2 SERPL-SCNC: 20 MMOL/L — LOW (ref 22–29)
CO2 SERPL-SCNC: 20 MMOL/L — LOW (ref 22–29)
CREAT SERPL-MCNC: 2.24 MG/DL — HIGH (ref 0.5–1.3)
CREAT SERPL-MCNC: 2.24 MG/DL — HIGH (ref 0.5–1.3)
EGFR: 31 ML/MIN/1.73M2 — LOW
EGFR: 31 ML/MIN/1.73M2 — LOW
EOSINOPHIL # BLD AUTO: 0.17 K/UL — SIGNIFICANT CHANGE UP (ref 0–0.5)
EOSINOPHIL # BLD AUTO: 0.17 K/UL — SIGNIFICANT CHANGE UP (ref 0–0.5)
EOSINOPHIL NFR BLD AUTO: 1.9 % — SIGNIFICANT CHANGE UP (ref 0–6)
EOSINOPHIL NFR BLD AUTO: 1.9 % — SIGNIFICANT CHANGE UP (ref 0–6)
ESTIMATED AVERAGE GLUCOSE: 237 MG/DL — HIGH (ref 68–114)
ESTIMATED AVERAGE GLUCOSE: 237 MG/DL — HIGH (ref 68–114)
GLUCOSE BLDC GLUCOMTR-MCNC: 104 MG/DL — HIGH (ref 70–99)
GLUCOSE BLDC GLUCOMTR-MCNC: 104 MG/DL — HIGH (ref 70–99)
GLUCOSE BLDC GLUCOMTR-MCNC: 237 MG/DL — HIGH (ref 70–99)
GLUCOSE BLDC GLUCOMTR-MCNC: 237 MG/DL — HIGH (ref 70–99)
GLUCOSE BLDC GLUCOMTR-MCNC: 297 MG/DL — HIGH (ref 70–99)
GLUCOSE BLDC GLUCOMTR-MCNC: 297 MG/DL — HIGH (ref 70–99)
GLUCOSE SERPL-MCNC: 120 MG/DL — HIGH (ref 70–99)
GLUCOSE SERPL-MCNC: 120 MG/DL — HIGH (ref 70–99)
HCT VFR BLD CALC: 46.6 % — SIGNIFICANT CHANGE UP (ref 39–50)
HCT VFR BLD CALC: 46.6 % — SIGNIFICANT CHANGE UP (ref 39–50)
HGB BLD-MCNC: 14.4 G/DL — SIGNIFICANT CHANGE UP (ref 13–17)
HGB BLD-MCNC: 14.4 G/DL — SIGNIFICANT CHANGE UP (ref 13–17)
IMM GRANULOCYTES NFR BLD AUTO: 0.7 % — SIGNIFICANT CHANGE UP (ref 0–0.9)
IMM GRANULOCYTES NFR BLD AUTO: 0.7 % — SIGNIFICANT CHANGE UP (ref 0–0.9)
LYMPHOCYTES # BLD AUTO: 0.69 K/UL — LOW (ref 1–3.3)
LYMPHOCYTES # BLD AUTO: 0.69 K/UL — LOW (ref 1–3.3)
LYMPHOCYTES # BLD AUTO: 7.6 % — LOW (ref 13–44)
LYMPHOCYTES # BLD AUTO: 7.6 % — LOW (ref 13–44)
MCHC RBC-ENTMCNC: 29.1 PG — SIGNIFICANT CHANGE UP (ref 27–34)
MCHC RBC-ENTMCNC: 29.1 PG — SIGNIFICANT CHANGE UP (ref 27–34)
MCHC RBC-ENTMCNC: 30.9 GM/DL — LOW (ref 32–36)
MCHC RBC-ENTMCNC: 30.9 GM/DL — LOW (ref 32–36)
MCV RBC AUTO: 94.3 FL — SIGNIFICANT CHANGE UP (ref 80–100)
MCV RBC AUTO: 94.3 FL — SIGNIFICANT CHANGE UP (ref 80–100)
MONOCYTES # BLD AUTO: 0.83 K/UL — SIGNIFICANT CHANGE UP (ref 0–0.9)
MONOCYTES # BLD AUTO: 0.83 K/UL — SIGNIFICANT CHANGE UP (ref 0–0.9)
MONOCYTES NFR BLD AUTO: 9.1 % — SIGNIFICANT CHANGE UP (ref 2–14)
MONOCYTES NFR BLD AUTO: 9.1 % — SIGNIFICANT CHANGE UP (ref 2–14)
NEUTROPHILS # BLD AUTO: 7.32 K/UL — SIGNIFICANT CHANGE UP (ref 1.8–7.4)
NEUTROPHILS # BLD AUTO: 7.32 K/UL — SIGNIFICANT CHANGE UP (ref 1.8–7.4)
NEUTROPHILS NFR BLD AUTO: 80.3 % — HIGH (ref 43–77)
NEUTROPHILS NFR BLD AUTO: 80.3 % — HIGH (ref 43–77)
PLATELET # BLD AUTO: 202 K/UL — SIGNIFICANT CHANGE UP (ref 150–400)
PLATELET # BLD AUTO: 202 K/UL — SIGNIFICANT CHANGE UP (ref 150–400)
POTASSIUM SERPL-MCNC: 5 MMOL/L — SIGNIFICANT CHANGE UP (ref 3.5–5.3)
POTASSIUM SERPL-MCNC: 5 MMOL/L — SIGNIFICANT CHANGE UP (ref 3.5–5.3)
POTASSIUM SERPL-SCNC: 5 MMOL/L — SIGNIFICANT CHANGE UP (ref 3.5–5.3)
POTASSIUM SERPL-SCNC: 5 MMOL/L — SIGNIFICANT CHANGE UP (ref 3.5–5.3)
RBC # BLD: 4.94 M/UL — SIGNIFICANT CHANGE UP (ref 4.2–5.8)
RBC # BLD: 4.94 M/UL — SIGNIFICANT CHANGE UP (ref 4.2–5.8)
RBC # FLD: 16.4 % — HIGH (ref 10.3–14.5)
RBC # FLD: 16.4 % — HIGH (ref 10.3–14.5)
SODIUM SERPL-SCNC: 139 MMOL/L — SIGNIFICANT CHANGE UP (ref 135–145)
SODIUM SERPL-SCNC: 139 MMOL/L — SIGNIFICANT CHANGE UP (ref 135–145)
WBC # BLD: 9.11 K/UL — SIGNIFICANT CHANGE UP (ref 3.8–10.5)
WBC # BLD: 9.11 K/UL — SIGNIFICANT CHANGE UP (ref 3.8–10.5)
WBC # FLD AUTO: 9.11 K/UL — SIGNIFICANT CHANGE UP (ref 3.8–10.5)
WBC # FLD AUTO: 9.11 K/UL — SIGNIFICANT CHANGE UP (ref 3.8–10.5)

## 2023-10-28 PROCEDURE — 86850 RBC ANTIBODY SCREEN: CPT

## 2023-10-28 PROCEDURE — 93005 ELECTROCARDIOGRAM TRACING: CPT

## 2023-10-28 PROCEDURE — C1753: CPT

## 2023-10-28 PROCEDURE — C1894: CPT

## 2023-10-28 PROCEDURE — 99239 HOSP IP/OBS DSCHRG MGMT >30: CPT

## 2023-10-28 PROCEDURE — 83036 HEMOGLOBIN GLYCOSYLATED A1C: CPT

## 2023-10-28 PROCEDURE — 86901 BLOOD TYPING SEROLOGIC RH(D): CPT

## 2023-10-28 PROCEDURE — C9764: CPT

## 2023-10-28 PROCEDURE — C1725: CPT

## 2023-10-28 PROCEDURE — C1887: CPT

## 2023-10-28 PROCEDURE — 36415 COLL VENOUS BLD VENIPUNCTURE: CPT

## 2023-10-28 PROCEDURE — 86900 BLOOD TYPING SEROLOGIC ABO: CPT

## 2023-10-28 PROCEDURE — C1760: CPT

## 2023-10-28 PROCEDURE — C2623: CPT

## 2023-10-28 PROCEDURE — 85025 COMPLETE CBC W/AUTO DIFF WBC: CPT

## 2023-10-28 PROCEDURE — 82962 GLUCOSE BLOOD TEST: CPT

## 2023-10-28 PROCEDURE — C1769: CPT

## 2023-10-28 PROCEDURE — 80048 BASIC METABOLIC PNL TOTAL CA: CPT

## 2023-10-28 RX ORDER — ALLOPURINOL 300 MG
1 TABLET ORAL
Qty: 0 | Refills: 0 | DISCHARGE

## 2023-10-28 RX ORDER — FUROSEMIDE 40 MG
1 TABLET ORAL
Qty: 0 | Refills: 0 | DISCHARGE

## 2023-10-28 RX ADMIN — CARVEDILOL PHOSPHATE 25 MILLIGRAM(S): 80 CAPSULE, EXTENDED RELEASE ORAL at 05:05

## 2023-10-28 RX ADMIN — Medication 81 MILLIGRAM(S): at 12:21

## 2023-10-28 RX ADMIN — Medication 300 MILLIGRAM(S): at 12:21

## 2023-10-28 RX ADMIN — Medication 2: at 12:21

## 2023-10-28 RX ADMIN — SACUBITRIL AND VALSARTAN 1 TABLET(S): 24; 26 TABLET, FILM COATED ORAL at 05:04

## 2023-10-28 RX ADMIN — DAPAGLIFLOZIN 10 MILLIGRAM(S): 10 TABLET, FILM COATED ORAL at 05:04

## 2023-10-28 RX ADMIN — Medication 20 MILLIGRAM(S): at 05:04

## 2023-10-28 RX ADMIN — FINASTERIDE 5 MILLIGRAM(S): 5 TABLET, FILM COATED ORAL at 12:21

## 2023-10-28 RX ADMIN — CLOPIDOGREL BISULFATE 75 MILLIGRAM(S): 75 TABLET, FILM COATED ORAL at 12:21

## 2023-10-28 RX ADMIN — Medication 600 MILLIGRAM(S): at 05:04

## 2023-10-28 NOTE — DISCHARGE NOTE NURSING/CASE MANAGEMENT/SOCIAL WORK - NSDCPEFALRISK_GEN_ALL_CORE
For information on Fall & Injury Prevention, visit: [email or web address] OR  [email or web address] OR  https://www.cdc.gov/steadi/patient.html

## 2023-10-28 NOTE — DISCHARGE NOTE NURSING/CASE MANAGEMENT/SOCIAL WORK - PATIENT PORTAL LINK FT
You can access the FollowMyHealth Patient Portal offered by [facility] by registering at the following website: [email or web address]. By joining [employer]’s FollowMyHealth portal, you will also be able to view your health information using other applications (apps) compatible with our system.

## 2023-10-28 NOTE — PROGRESS NOTE ADULT - ASSESSMENT
68y  Male is now s/p LLE Angiogram with intervention  via RFA approach (S/P RFA #5F Sheath, s/p failed mynxed, manual compression and hemostasis achieved),  with Julio Singh,  tolerated procedure well without complications.   PLAN

## 2023-10-28 NOTE — PROGRESS NOTE ADULT - SUBJECTIVE AND OBJECTIVE BOX
Pt doing well s/p uncomplicated   LLE peripheral intervention    . Denies complaint.       Exam:   VSS, NAD, A&O x 3  Access: LFA site benign   Card: S1/S2, RRR, no m/g/r  Resp: lungs CTA b/l  Abd: S/NT/ND  Ext: no edema, distal pulses intact + 2 LLE = 1 RLE     Assessment:   68y Male h/o PAD, now status post Left SFA intervention.   Plan:   Observation on telemetry per post op protocol.    Resume PO intake.   Ambulate w/ assist once fully awake & back to baseline mental status w/ VSS.  Continue baby aspirin, plavix , statin.   recommend walking therapy for weightloss and revascularization.   Resume other home medications.   DC Home .

## 2023-11-06 LAB
GLUCOSE BLDC GLUCOMTR-MCNC: 138 MG/DL — HIGH (ref 70–99)
GLUCOSE BLDC GLUCOMTR-MCNC: 138 MG/DL — HIGH (ref 70–99)

## 2023-11-08 NOTE — ASU PATIENT PROFILE, ADULT - HISTORY OF COVID-19 VACCINATION
Subjective     Colin is here for ear and throat pain.    Sore throat since yesterday.  Headache, ears ringing, low grade fever.    Objective     Temp 37.8 °C (100 °F)   Wt 72.7 kg       General:  Well-appearing  Well-hydrated  No acute distress   Eyes:  Lids:  normal  Conjunctivae:  normal   ENT:  Ears:  RTM: purulent, red, bulging           LTM:  purulent effusion, red, bulging  Nose:  nares clear  Mouth:  mucosa moist; no visible lesions  Throat:  OP moist and clear; uvula midline  Neck:  supple   Respiratory:  Respiratory rate:  normal  Air exchange:  normal   Adventitious breath sounds:  none  Accessory muscle use:  none   Heart:  Rate and rhythm:  regular  Murmur:  none    GI: Deferred   Skin:  Warm and well-perfused  No rashes apparent   Lymphatic: No nodes larger than 1 cm palpated  No firm or fixed nodes palpated           Assessment/Plan       Colin is well-appearing, well-hydrated, in no acute distress, and afebrile at today's visit.    His history of illness, clinical presentation, and examination indicates the diagnosis of bilateral AOM    Supportive care measures and expected course of illness reviewed.    Follow up promptly for worsening or prolonged illness.     Yes

## 2023-12-27 NOTE — H&P PST ADULT - NEGATIVE NEUROLOGICAL SYMPTOMS
Physical Therapy  [facility]    Date: 23  Patient Name: Elaina Champagne       Room: 5/2095-01  MRN: [id number]   Account: [id number]   : 1950  (73 y.o.) Gender: female     Referring Practitioner: David Yang MD  Diagnosis: Hyperkalemia  Past Medical History:  has a past medical history of Bell's palsy, Cellulitis, and Hypertension.   Past Surgical History:   has a past surgical history that includes Hysterectomy, total abdominal (); Cataract removal (Bilateral, ); incision and drainage (Left, 2017); Total knee arthroplasty (Left, 2017); Knee Arthroplasty (Right, 2018); ventral hernia repair (2019); and IR NONTUNNELED VASCULAR CATHETER > 5 YEARS (12/15/2023).  Additional Pertinent Hx: The patient is a 73 y.o.  Non- / non  female who presents with Leg Pain (Left/) and Fatigue   and she is admitted to the hospital for the management of wound check  Patient has past medical is a multiple medical problem including morbid obesity, hypertension, lymphedema, patient has wound in both legs and back of her legs, symptoms are going on for last 1 month  Patient follows with wound care  She was evaluated by Dr. Meredith vascular surgery yesterday, dressing was applied on left leg, she was having severe pain that made her come to the hospital  Patient, had arterial scan done earlier suggestive of PHILL of 0.68 on left side, on right side PHILL was okay  Previous wound culture was growing Klebsiella and staph, MSSA  In the emergency room, patient had lab work done suggestive elevated creatinine of 3.8  CK was normal, Nephrology consulted for MICHELLE- started on Hemodialysis.    Overall Orientation Status: Within Functional Limits  Restrictions/Precautions  Restrictions/Precautions: Fall Risk;General Precautions;Up as Tolerated  Required Braces or Orthoses?: No  Implants present? : Metal implants (Bilat TKA)  Position Activity Restriction  Other  28.52  Mobility Inpatient CMS 0-100% Score: 86.62  Mobility Inpatient CMS G-Code Modifier : CM     Goals  Short Term Goals  Time Frame for Short Term Goals: 10 visits  Short Term Goal 1: Supine <>sit mod A x 2  Short Term Goal 2: Sit<>stand from chair/bed , mod a x 2,  Short Term Goal 3: Tolerate standing with Rolling walekr for 2 to 3 minutes, performing pre-gait activity  Short Term Goal 4: Pt able to take 4 to 5 steps with RW to transfers to recliner/BSC with 2 person assist and rolling walker.  Short Term Goal 5: Pt able to ambulate with rolling walekr distance fo 10 ft x 2 bismark x 2 in straight path  Short Term Goal 6: Tolerate ROM  B LE to improve flexion at all joint and improve mobility       12/27/23 1001   PT Individual Minutes   Time In 0848   Time Out 0917   Minutes 29         Electronically signed by Maribel Dean PTA on 12/27/23 at 10:01 AM EST    no weakness/no generalized seizures/no focal seizures/no paresthesias/no transient paralysis/no syncope/no tremors/no vertigo

## 2024-02-29 NOTE — PATIENT PROFILE ADULT - NSPROPTRIGHTSUPPORTPERSON_GEN_A_NUR
Patient roomed to [facility] accompanied by mother.  Mother states symptoms starting last night.  Supplies and instructions provided for clean catch urine sample.  Patient given gown and call light in reach.  Patient and guardian aware of child friendly channels.  Patient and guardian aware of whiteboard.  No other needs or questions at this time.  Chart up for ERP.   same name as above

## 2024-04-29 NOTE — BRIEF OPERATIVE NOTE - NSEVIDENCEINFORABS_GEN_ALL_CORE
[facility]      MR#: [id number]    
No
Yes

## 2024-08-20 RX ORDER — CHLORHEXIDINE GLUCONATE 40 MG/ML
1 SOLUTION TOPICAL ONCE
Refills: 0 | Status: COMPLETED | OUTPATIENT
Start: 2024-08-21 | End: 2024-08-21

## 2024-08-20 NOTE — H&P PST ADULT - OTHER CARE PROVIDERS
DR Julio RAMIRES (CARDIOLOGY), Marcos Rowland (Renal) Dr. Julio Blanco (Cardiology), Dr. Marcos Aranda (Renal)

## 2024-08-20 NOTE — H&P PST ADULT - ASSESSMENT
Indication: 68 M pmHx DVT, CHF, CAD w/AICD, CKD, DM2 on insulin, CABGX3, (LIMA TO LAD, SVG TO OM, AND RPDA in 2014, and s/p PCi of LAD in 2020, severe ischemic CM, S/P AICD, with subsequent normalization of EF, moderate left carotid stenosis, and significant PVD, S/P DEUCE Angioplasty, and stenting (2020) left SFA Angioplasty and stenting, and CRI, IKE (not using CPAP due to intolerance), was seen at cardiology office on 09/14'/23 for follow up.   patient with c/o claudications on lower extremities which has progressed, although not quite life style limiting, Now presents for Peripheral angiography    Risk Stratification:  ASA:  Mallampati:  Bleeding Risk:  Creatinine:  GFR:    Plan/Recommendations:   -plan for Peripheral angiography and possible angioplasty of RLE  -patient seen and examined  -confirmed appropriate NPO duration  -ECG and Labs reviewed  -Aspirin 81mg po pre-cath***  -NS 250mL IV bolus pre-cath***  -procedure discussed with patient; risks and benefits explained, questions answered  -consent obtained by attending IC      Risks, benefits, and alternatives reviewed.  Risks including but not limited to MI, death, stroke, bleeding, infection, vessel injury, hematoma, renal failure, allergic reaction, urgent open heart surgery, restenosis and stent thrombosis were reviewed.  All questions answered.  Patient is agreeable to proceed.      Indication: 68 M pmHx DVT, CHF, CAD w/AICD, CKD, DM2 on insulin, CABGX3, (LIMA TO LAD, SVG TO OM, AND RPDA in 2014, and s/p PCi of LAD in 2020, severe ischemic CM, S/P AICD, with subsequent normalization of EF, moderate left carotid stenosis, and significant PVD, S/P DEUCE Angioplasty, and stenting (2020) left SFA Angioplasty and stenting, and CRI, IKE (not using CPAP due to intolerance), was seen at cardiology office on 09/14'/23 for follow up.   patient with c/o claudications on lower extremities which has progressed, although not quite life style limiting, Now presents for Peripheral angiography.    Risk Stratification:  ASA: 3  Mallampati:  Bleeding Risk:  Creatinine:   GFR:    Plan/Recommendations:   -plan for Peripheral angiography and possible angioplasty of RLE  -patient seen and examined  -confirmed appropriate NPO duration  -Tele and Labs reviewed  -Aspirin 81mg and Plavix 75mg po pre-cath***  -NS 250mL IV bolus pre-cath***  -procedure discussed with patient; risks and benefits explained, questions answered  -consent obtained by attending IC    Risks, benefits, and alternatives reviewed.  Risks including but not limited to MI, death, stroke, bleeding, infection, vessel injury, hematoma, renal failure, allergic reaction, urgent open heart surgery, restenosis and stent thrombosis were reviewed.  All questions answered.  Patient is agreeable to proceed.      Indication: 68 M pmHx DVT, CHF, CAD w/AICD, CKD, DM2 on insulin, CABGX3, (LIMA TO LAD, SVG TO OM, AND RPDA in 2014, and s/p PCi of LAD in 2020, severe ischemic CM, S/P AICD, with subsequent normalization of EF, moderate left carotid stenosis, and significant PVD, S/P DEUCE Angioplasty, and stenting (2020) left SFA Angioplasty and stenting, and CRI, IKE (not using CPAP due to intolerance), was seen at cardiology office on 09/14'/23 for follow up.   patient with c/o claudications on lower extremities which has progressed, although not quite life style limiting, Now presents for RLE Peripheral angiography.    Risk Stratification:  ASA: 3  Mallampati: 2  Bleeding Risk:  Creatinine:   GFR:    Plan/Recommendations:   -plan for Peripheral angiography and possible angioplasty of RLE  -patient seen and examined  -confirmed appropriate NPO duration  -Tele and Labs reviewed  -Aspirin 81mg and Plavix 75mg po pre-cath***  -NS 250mL IV bolus pre-cath***  -procedure discussed with patient; risks and benefits explained, questions answered  -consent obtained by attending IC    Risks, benefits, and alternatives reviewed.  Risks including but not limited to MI, death, stroke, bleeding, infection, vessel injury, hematoma, renal failure, allergic reaction, urgent open heart surgery, restenosis and stent thrombosis were reviewed.  All questions answered.  Patient is agreeable to proceed.      Indication: 68 M pmHx DVT, CHF, CAD w/AICD, CKD, DM2 on insulin, CABGX3, (LIMA TO LAD, SVG TO OM, AND RPDA in 2014, and s/p PCi of LAD in 2020, severe ischemic CM, S/P AICD, with subsequent normalization of EF, moderate left carotid stenosis, and significant PVD, S/P DEUCE Angioplasty, and stenting (2020) left SFA Angioplasty and stenting, and CRI, IKE (not using CPAP due to intolerance), was seen at cardiology office on 09/14'/23 for follow up.   patient with c/o claudications on lower extremities which has progressed, although not quite life style limiting, Now presents for RLE Peripheral angiogram with possible stenting.    Risk Stratification:  ASA: 3  Mallampati: 2  Bleeding Risk: 1.2%  Creatinine: 1.93  GFR: 37    Plan/Recommendations:   -plan for Peripheral angiography and possible angioplasty of RLE  -patient seen and examined  -confirmed appropriate NPO duration  -Tele and Labs reviewed  -Aspirin 81mg and Plavix 75mg po pre-cath***  -NS 250mL IV bolus pre-cath***  -procedure discussed with patient; risks and benefits explained, questions answered  -consent obtained by attending IC    Risks, benefits, and alternatives reviewed.  Risks including but not limited to MI, death, stroke, bleeding, infection, vessel injury, hematoma, renal failure, allergic reaction, urgent open heart surgery, restenosis and stent thrombosis were reviewed.  All questions answered.  Patient is agreeable to proceed.  Indication: 68 M pmHx DVT, CHF, CAD w/AICD, CKD, DM2 on insulin, CABGX3, (LIMA TO LAD, SVG TO OM, AND RPDA in 2014, and s/p PCi of LAD in 2020, severe ischemic CM, S/P AICD, with subsequent normalization of EF, moderate left carotid stenosis, and significant PVD, S/P DEUCE Angioplasty, and stenting (2020) left SFA Angioplasty and stenting, and CRI, IKE (not using CPAP due to intolerance), was seen at cardiology office on 09/14'/23 for follow up.   patient with c/o claudications on lower extremities which has progressed, although not quite life style limiting, Now presents for RLE Peripheral angiogram with possible stenting.    Leukocytosis, Dr. Blanco made aware, no intervention at this time.  Hyperkalemic (specimen not hemolyzed), Dr. Blanco made aware, no intervention at this time.  SCr elevated but stable from prior (1.97, 10/23)    Risk Stratification:  ASA: 3  Mallampati: 2  Bleeding Risk: 1.2%  Creatinine: 1.93  GFR: 37    Plan/Recommendations:   -plan for Peripheral angiography and possible angioplasty of RLE  -patient seen and examined  -confirmed appropriate NPO duration  -Tele and Labs reviewed  -Aspirin 81mg and Plavix 75mg po pre-cath***  -NS 250mL IV bolus pre-cath***  -procedure discussed with patient; risks and benefits explained, questions answered  -consent obtained by attending IC    Risks, benefits, and alternatives reviewed.  Risks including but not limited to MI, death, stroke, bleeding, infection, vessel injury, hematoma, renal failure, allergic reaction, urgent open heart surgery, restenosis and stent thrombosis were reviewed.  All questions answered.  Patient is agreeable to proceed.

## 2024-08-20 NOTE — H&P PST ADULT - HISTORY OF PRESENT ILLNESS
Narrative:     68 M pmHx DVT, CHF, CAD w/AICD, CKD, DM2 on insulin, CABGX3, (LIMA TO LAD, SVG TO OM, AND RPDA in 2014, and s/p PCi of LAD in 2020, severe ischemic CM, S/P AICD, with subsequent normalization of EF, moderate left carotid stenosis, and significant PVD, S/P DEUCE Angioplasty, and stenting (2020) left SFA Angioplasty and stenting, and CRI, IKE (not using CPAP due to intolerance), was seen at cardiology office on 09/14'/23 for follow up.   patient with c/o claudications on lower extremities which has progressed, although not quite life style limiting,   Arterial duplex on 3/22 suggests < 50% stenosis involving the prox left SFA > 70% stenosis involving the left prox popliteal artery, occlusion in right distal SFA with collateral visualized dampened monophasic, flow throughout low extremity distally  . Peripheral angio on 2020 with prior stent in right iliac artery patent with severe left iliac disease  treated  with LANDON.   Arterial duplex on 10/24/23 revealed  left external iliac artery, left SFA, AND LEFT POPLITEAL ARTERY stents are patent. greater than 50% stenosis noted in left proximal SFA, greater than 75% stenosis noted in left SFA and BERRY with heavily calcified plaque.  Patient endorses pain on walking less than half a block, no pain at rest. Denies dizziness, SOB, CP, extremity weakness  Patient now presents to [facility] for LE peripheral angio of LLE  in setting of his severe PAD and symptoms         Shy Classification:   Class I: Asymptomatic   Class II a: Mild claudication  Walking > 200 meters (2.5 blocks)   Class IIb: Moderate to Severe claudication Walking < 200 meters (2.5 blocks)   Class III: Rest Pain   Classd IV: Ulceration or gangrene    Associated Risk Factors:     Age: 68    DM: Yes    Smoking history: N/A    Hyperlipidemia: Yes    Family history of PAD: No    Known Athlerosclerotic disease(coronary, carotid, subclavian, renal, mesenteric, AAA): HLD, CAD, PAD    Antiplatelets/Anticoagulants:        Plavix:  No       Cilostazol: No       pentoxifylinne: No       NOAC: No    Vascular testing:   LE physiologic study: right ankle bradchai index of 0.51 / Left: 0.60  Constent with modearte PAD         Arterial Dopplers:  Arterial duplex on 3/22 suggests < 50% stenosis involving the prox left SFA > 70% stenosis involving the left prox popliteal artery, occlusion in right distal SFA with collateral visulaizedm, dampened monophasic, flow throuhout low extremity distally.   10/24/23: The left external iliac artery, left SFA, AND LEFT POPLITEAL ARTERY stents are patent. greater than 50% stenosis noted in left proximal SFA, greater than 75% stenosis noted in left SFA and BERRY with heavily calcified plaque  8/8/2024: Left: greater than 50% stenosis noted at proximal superficial femoral artery and at the origin of profunda femoral artery. Popliteal artery stent is patent with elevated velocities but no significant stenosis. Posterior tibial artery is occluded. Right proximal to mid superficial femoral artery is patent with extensive calcified plaque and very dampened flow, distal superficial femoral artery is occluded. Dampened flow noted in popliteal and posterior tibial artery.  3/14/2024: Ankle brachial index of 0.47 indicates severe evidence of peripheral arterial disease right lower extremity.    Peripheral angio on 2020 with prior stent in right iliac aretry patent with severe left iliac disease  treated  with LANDON.   < from: Invasive Peripheral Vascular Reporting (10.27.23 @ 13:41) >  68 years old male. History of hypertension, dyslipidemiaand  peripheral arterial disease.    Conclusions:   Prior stent in right iliacs patent.  Severe left SFH disease treated  with "shock wave" and LOVE.    Recommendations:     ASA abd Plavix     < end of copied text >      Echo: 08/28/23 LV: Normal, no WMA, Hypokinesis of anteroseptal and inferoseptal myocardium copnsistent with post op changes EF: 50-55%Trace MR  Trace TR  Otherwise grossly normal TTE      Social History:        Marital:        Tobacco: Former smoker        ETOH: Occassionally        Caffeine: Yes            Peripheral Angiogram PA Note:    Narrative: 68 M pmHx DVT, CHF, CAD w/AICD, CKD, DM2 on insulin, CABGX3, (LIMA TO LAD, SVG TO OM, AND RPDA in 2014, and s/p PCi of LAD in 2020, severe ischemic CM, S/P AICD, with subsequent normalization of EF, moderate left carotid stenosis, and significant PVD, S/P DEUCE Angioplasty, and stenting (2020) left SFA Angioplasty and stenting, and CRI, IKE (not using CPAP due to intolerance), was seen at cardiology office on 09/14'/23 for follow up.   patient with c/o claudications on lower extremities which has progressed, although not quite life style limiting,   Arterial duplex on 3/22 suggests < 50% stenosis involving the prox left SFA > 70% stenosis involving the left prox popliteal artery, occlusion in right distal SFA with collateral visualized dampened monophasic, flow throughout low extremity distally.   Peripheral angio in 2020 with prior stent in right iliac artery patent with severe left iliac disease  treated  with LANDON.   Arterial duplex on 10/24/23 revealed  left external iliac artery, left SFA, AND LEFT POPLITEAL ARTERY stents are patent. greater than 50% stenosis noted in left proximal SFA, greater than 75% stenosis noted in left SFA and BERRY with heavily calcified plaque.  Patient endorses pain on walking less than half a block, no pain at rest. Denies dizziness, SOB, CP, extremity weakness  Patient now presents to [facility] for LE peripheral angio of LLE  in setting of his severe PAD and symptoms     ROS negative unless mentioned in HPI    Shy Classification:   Class I: Asymptomatic   Class II a: Mild claudication  Walking > 200 meters (2.5 blocks)   Class IIb: Moderate to Severe claudication Walking < 200 meters (2.5 blocks)   Class III: Rest Pain   Class IV: Ulceration or gangrene    Associated Risk Factors:     Age: 68    DM: Yes    Smoking history: N/A    Hyperlipidemia: Yes    Family history of PAD: No    Known Atherosclerotic disease(coronary, carotid, subclavian, renal, mesenteric, AAA): HLD, CAD, PAD    Antiplatelets/Anticoagulants:        Plavix:  No       Cilostazol: No       pentoxifylinne: No       NOAC: No    PHYSICAL EXAM:  Constitutional: Comfortable . No acute distress.   HEENT: Atraumatic and normocephalic , neck is supple  CNS: A&Ox3. No focal deficits.   Respiratory: CTAB, unlabored   Cardiovascular: RRR normal s1 s2. No murmur. No rubs or gallop.  Gastrointestinal: Soft, non-tender. +Bowel sounds.   Extremities: 2+ Peripheral Pulses, No clubbing, cyanosis, or edema  Psychiatric: Calm . no agitation.   Skin: Warm and dry, no ulcers on extremities     Vascular testing:   LE physiologic study: right ankle brachial index of 0.51 / Left: 0.60 -- Consistent with moderate PAD      Arterial Dopplers:  Arterial duplex on 3/22 suggests < 50% stenosis involving the prox left SFA > 70% stenosis involving the left prox popliteal artery, occlusion in right distal SFA with collateral visulaizedm, dampened monophasic, flow throuhout low extremity distally.   10/24/23: The left external iliac artery, left SFA, AND LEFT POPLITEAL ARTERY stents are patent. greater than 50% stenosis noted in left proximal SFA, greater than 75% stenosis noted in left SFA and BERRY with heavily calcified plaque  8/8/2024: Left: greater than 50% stenosis noted at proximal superficial femoral artery and at the origin of profunda femoral artery. Popliteal artery stent is patent with elevated velocities but no significant stenosis. Posterior tibial artery is occluded.   Right: Proximal to mid superficial femoral artery is patent with extensive calcified plaque and very dampened flow, distal superficial femoral artery is occluded. Dampened flow noted in popliteal and posterior tibial artery.  3/14/2024: Ankle brachial index of 0.47 indicates severe evidence of peripheral arterial disease right lower extremity.    Peripheral angio on 2020 with prior stent in right iliac artery patent with severe left iliac disease treated  with LANDON.   < from: Invasive Peripheral Vascular Reporting (10.27.23 @ 13:41) >  68 years old male. History of hypertension, dyslipidemiaand  peripheral arterial disease.    Conclusions:   Prior stent in right iliacs patent.  Severe left SFH disease treated  with "shock wave" and LOVE.    Recommendations:   ASA and Plavix     < end of copied text >    Echo: 08/28/23 LV: Normal, no WMA, Hypokinesis of anteroseptal and inferoseptal myocardium consistent with post op changes EF: 50-55% Trace MR, Trace TR. Otherwise grossly normal TTE    Social History:        Marital:        Tobacco: Former smoker        ETOH: Occassionally        Caffeine: Yes  Peripheral Angiogram PA Note:    Narrative: 68 M pmHx DVT, CHF, CAD w/AICD, CKD, DM2 on insulin, CABGX3, (LIMA TO LAD, SVG TO OM, AND RPDA in 2014, and s/p PCi of LAD in 2020, severe ischemic CM, S/P AICD, with subsequent normalization of EF, moderate left carotid stenosis, and significant PVD, S/P DEUCE Angioplasty, and stenting (2020) left SFA Angioplasty and stenting, and CRI, IKE (not using CPAP due to intolerance), was seen at cardiology office on 09/14'/23 for follow up.   patient with c/o claudications on lower extremities which has progressed, although not quite life style limiting,   Arterial duplex on 3/22 suggests < 50% stenosis involving the prox left SFA > 70% stenosis involving the left prox popliteal artery, occlusion in right distal SFA with collateral visualized dampened monophasic, flow throughout low extremity distally.   Peripheral angio in 2020 with prior stent in right iliac artery patent with severe left iliac disease  treated  with LANDON.   Arterial duplex on 10/24/23 revealed  left external iliac artery, left SFA, AND LEFT POPLITEAL ARTERY stents are patent. greater than 50% stenosis noted in left proximal SFA, greater than 75% stenosis noted in left SFA and BERRY with heavily calcified plaque.  Patient endorses pain on walking less than half a block, no pain at rest. Denies dizziness, SOB, CP, extremity weakness  Patient now presents to [facility] for LE peripheral angio of LLE  in setting of his severe PAD and symptoms     ROS negative unless mentioned in HPI    Shy Classification:   Class I: Asymptomatic   Class II a: Mild claudication  Walking > 200 meters (2.5 blocks)   Class IIb: Moderate to Severe claudication Walking < 200 meters (2.5 blocks)   Class III: Rest Pain   Class IV: Ulceration or gangrene    Associated Risk Factors:     Age: 68    DM: Yes    Smoking history: N/A    Hyperlipidemia: Yes    Family history of PAD: No    Known Atherosclerotic disease(coronary, carotid, subclavian, renal, mesenteric, AAA): HLD, CAD, PAD    Antiplatelets/Anticoagulants:        Plavix:  Yes       Cilostazol: No       pentoxifylinne: No       NOAC: No    PHYSICAL EXAM:  Constitutional: Comfortable . No acute distress.   HEENT: Atraumatic and normocephalic , neck is supple  CNS: A&Ox3. No focal deficits.   Respiratory: CTAB, unlabored   Cardiovascular: RRR normal s1 s2. No murmur. No rubs or gallop.  Gastrointestinal: Soft, non-tender. +Bowel sounds.   Extremities: 2+ Peripheral Pulses, No clubbing, cyanosis, or edema  Psychiatric: Calm . no agitation.   Skin: Warm and dry, no ulcers on extremities     Vascular testing:   LE physiologic study: right ankle brachial index of 0.51 / Left: 0.60 -- Consistent with moderate PAD      Arterial Dopplers:  Arterial duplex on 3/22 suggests < 50% stenosis involving the prox left SFA > 70% stenosis involving the left prox popliteal artery, occlusion in right distal SFA with collateral visulaizedm, dampened monophasic, flow throuhout low extremity distally.   10/24/23: The left external iliac artery, left SFA, AND LEFT POPLITEAL ARTERY stents are patent. greater than 50% stenosis noted in left proximal SFA, greater than 75% stenosis noted in left SFA and BERRY with heavily calcified plaque  8/8/2024: Left: greater than 50% stenosis noted at proximal superficial femoral artery and at the origin of profunda femoral artery. Popliteal artery stent is patent with elevated velocities but no significant stenosis. Posterior tibial artery is occluded.   Right: Proximal to mid superficial femoral artery is patent with extensive calcified plaque and very dampened flow, distal superficial femoral artery is occluded. Dampened flow noted in popliteal and posterior tibial artery.  3/14/2024: Ankle brachial index of 0.47 indicates severe evidence of peripheral arterial disease right lower extremity.    Peripheral angio on 2020 with prior stent in right iliac artery patent with severe left iliac disease treated  with LANDON.   < from: Invasive Peripheral Vascular Reporting (10.27.23 @ 13:41) >  68 years old male. History of hypertension, dyslipidemiaand  peripheral arterial disease.    Conclusions:   Prior stent in right iliacs patent.  Severe left SFH disease treated  with "shock wave" and LOVE.    Recommendations:   ASA and Plavix     < end of copied text >    Echo: 08/28/23 LV: Normal, no WMA, Hypokinesis of anteroseptal and inferoseptal myocardium consistent with post op changes EF: 50-55% Trace MR, Trace TR. Otherwise grossly normal TTE    Social History:        Marital:        Tobacco: Former smoker        ETOH: Occasionally        Caffeine: Yes  Peripheral Angiogram PA Note:    Narrative: 68 M PMH DVT, CHF, CAD w/AICD, CKD, DM2 on insulin, CABGX3, (LIMA TO LAD, SVG TO OM, AND RPDA in 2014, and s/p PCi of LAD in 2020, severe ischemic CM, S/P AICD, with subsequent normalization of EF, moderate left carotid stenosis, and significant PVD, S/P DEUCE Angioplasty, and stenting (2020) left SFA Angioplasty and stenting, and CRI, IKE (not using CPAP due to intolerance), was seen at cardiology office on 09/14'/23 for follow up.   patient with c/o claudications on lower extremities which has progressed, although not quite life style limiting,   Arterial duplex on 3/22 suggests < 50% stenosis involving the prox left SFA > 70% stenosis involving the left prox popliteal artery, occlusion in right distal SFA with collateral visualized dampened monophasic, flow throughout low extremity distally.   Peripheral angio in 2020 with prior stent in right iliac artery patent with severe left iliac disease  treated  with LANDON.   Arterial duplex on 10/24/23 revealed  left external iliac artery, left SFA, AND LEFT POPLITEAL ARTERY stents are patent. greater than 50% stenosis noted in left proximal SFA, greater than 75% stenosis noted in left SFA and BERRY with heavily calcified plaque.  Patient endorses pain on walking less than half a block, no pain at rest. Denies dizziness, SOB, CP, extremity weakness  Patient now presents to [facility] for LE peripheral angio of LLE  in setting of his severe PAD and symptoms     ROS negative unless mentioned in HPI    Shy Classification:   Class I: Asymptomatic   Class II a: Mild claudication  Walking > 200 meters (2.5 blocks)   Class IIb: Moderate to Severe claudication Walking < 200 meters (2.5 blocks)   Class III: Rest Pain   Class IV: Ulceration or gangrene    Associated Risk Factors:     Age: 68    DM: Yes    Smoking history: N/A    Hyperlipidemia: Yes    Family history of PAD: No    Known Atherosclerotic disease(coronary, carotid, subclavian, renal, mesenteric, AAA): HLD, CAD, PAD    Antiplatelets/Anticoagulants:        Plavix:  Yes       Cilostazol: No       pentoxifylinne: No       NOAC: No    PHYSICAL EXAM:  Constitutional: Comfortable . No acute distress.   HEENT: Atraumatic and normocephalic , neck is supple  CNS: A&Ox3. No focal deficits.   Respiratory: CTAB, unlabored   Cardiovascular: RRR normal s1 s2. No murmur. No rubs or gallop.  Gastrointestinal: Soft, non-tender. +Bowel sounds.   Extremities: 2+ Peripheral Pulses, No clubbing, cyanosis, or edema  Psychiatric: Calm . no agitation.   Skin: Warm and dry, no ulcers on extremities     Vascular testing:   LE physiologic study: right ankle brachial index of 0.51 / Left: 0.60 -- Consistent with moderate PAD      Arterial Dopplers:  Arterial duplex on 3/22 suggests < 50% stenosis involving the prox left SFA > 70% stenosis involving the left prox popliteal artery, occlusion in right distal SFA with collateral visulaizedm, dampened monophasic, flow throuhout low extremity distally.   10/24/23: The left external iliac artery, left SFA, AND LEFT POPLITEAL ARTERY stents are patent. greater than 50% stenosis noted in left proximal SFA, greater than 75% stenosis noted in left SFA and BERRY with heavily calcified plaque  8/8/2024: Left: greater than 50% stenosis noted at proximal superficial femoral artery and at the origin of profunda femoral artery. Popliteal artery stent is patent with elevated velocities but no significant stenosis. Posterior tibial artery is occluded.   Right: Proximal to mid superficial femoral artery is patent with extensive calcified plaque and very dampened flow, distal superficial femoral artery is occluded. Dampened flow noted in popliteal and posterior tibial artery.  3/14/2024: Ankle brachial index of 0.47 indicates severe evidence of peripheral arterial disease right lower extremity.    Peripheral angio on 2020 with prior stent in right iliac artery patent with severe left iliac disease treated  with LANDON.   < from: Invasive Peripheral Vascular Reporting (10.27.23 @ 13:41) >  68 years old male. History of hypertension, dyslipidemia and peripheral arterial disease.    Conclusions:   Prior stent in right iliacs patent.  Severe left SFH disease treated with "shock wave" and LOVE.    Recommendations:   ASA and Plavix     Echo: 08/28/23 LV: Normal, no WMA, Hypokinesis of anteroseptal and inferoseptal myocardium consistent with post op changes EF: 50-55% Trace MR, Trace TR. Otherwise grossly normal TTE    Social History:        Marital:        Tobacco: Former smoker        ETOH: Occasionally        Caffeine: Yes  Peripheral Angiogram PA Note:    Narrative: 68 M PMH DVT, CHF, CAD w/AICD, CKD, DM2 on insulin, CABGX3, (LIMA TO LAD, SVG TO OM, AND RPDA in , and s/p PCi of LAD in , severe ischemic CM, S/P AICD, with subsequent normalization of EF, moderate left carotid stenosis, and significant PVD, S/P DEUCE Angioplasty, and stenting () left SFA Angioplasty and stenting, and CRI, IKE (not using CPAP due to intolerance), was seen at cardiology office on  for follow up.   patient with c/o claudications on lower extremities which has progressed, although not quite life style limiting,   Arterial duplex on 3/22 suggests < 50% stenosis involving the prox left SFA > 70% stenosis involving the left prox popliteal artery, occlusion in right distal SFA with collateral visualized dampened monophasic, flow throughout low extremity distally.   Peripheral angio in  with prior stent in right iliac artery patent with severe left iliac disease  treated  with LANDON.   Arterial duplex on 10/24/23 revealed  left external iliac artery, left SFA, AND LEFT POPLITEAL ARTERY stents are patent. greater than 50% stenosis noted in left proximal SFA, greater than 75% stenosis noted in left SFA and BERRY with heavily calcified plaque.  Patient endorses pain on walking less than half a block, no pain at rest. Denies dizziness, SOB, CP, extremity weakness  Patient now presents to [facility] for LE peripheral angio of LLE  in setting of his severe PAD and symptoms     ROS negative unless mentioned in HPI    Shy Classification:   Class I: Asymptomatic   Class II a: Mild claudication  Walking > 200 meters (2.5 blocks)   Class IIb: Moderate to Severe claudication Walking < 200 meters (2.5 blocks)   Class III: Rest Pain   Class IV: Ulceration or gangrene    Associated Risk Factors:     Age: 68    DM: Yes    Smoking history: N/A    Hyperlipidemia: Yes    Family history of PAD: No    Known Atherosclerotic disease(coronary, carotid, subclavian, renal, mesenteric, AAA): HLD, CAD, PAD    Antiplatelets/Anticoagulants:        Plavix:  Yes       Cilostazol: No       pentoxifylinne: No       NOAC: No    PHYSICAL EXAM:  Constitutional: Comfortable . No acute distress.   HEENT: Atraumatic and normocephalic , neck is supple  CNS: A&Ox3. No focal deficits.   Respiratory: CTAB, unlabored   Cardiovascular: RRR normal s1 s2. No murmur. No rubs or gallop.  Gastrointestinal: Soft, non-tender. +Bowel sounds.   Extremities: 2+ Peripheral Pulses, No clubbing, cyanosis, or edema  Psychiatric: Calm . no agitation.   Skin: Warm and dry, no ulcers on extremities     Vascular testing:   LE physiologic study: right ankle brachial index of 0.51 / Left: 0.60 -- Consistent with moderate PAD      Arterial Dopplers:  Arterial duplex on 3/22 suggests < 50% stenosis involving the prox left SFA > 70% stenosis involving the left prox popliteal artery, occlusion in right distal SFA with collateral visulaizedm, dampened monophasic, flow throuhout low extremity distally.   10/24/23: The left external iliac artery, left SFA, AND LEFT POPLITEAL ARTERY stents are patent. greater than 50% stenosis noted in left proximal SFA, greater than 75% stenosis noted in left SFA and BERRY with heavily calcified plaque  2024: Left: greater than 50% stenosis noted at proximal superficial femoral artery and at the origin of profunda femoral artery. Popliteal artery stent is patent with elevated velocities but no significant stenosis. Posterior tibial artery is occluded.   Right: Proximal to mid superficial femoral artery is patent with extensive calcified plaque and very dampened flow, distal superficial femoral artery is occluded. Dampened flow noted in popliteal and posterior tibial artery.  3/14/2024: Ankle brachial index of 0.47 indicates severe evidence of peripheral arterial disease right lower extremity.    Peripheral angio on  with prior stent in right iliac artery patent with severe left iliac disease treated  with LANDON.   < from: Invasive Peripheral Vascular Reporting (10.27.23 @ 13:41) >  68 years old male. History of hypertension, dyslipidemia and peripheral arterial disease.    Conclusions:   Prior stent in right iliacs patent.  Severe left SFH disease treated with "shock wave" and LOVE.    Recommendations:   ASA and Plavix     EK24 SR 69bpm, flattened T waves leads I, III, aVL biphasic T wave in lead II, aVF    Echo: 23 LV: Normal, no WMA, Hypokinesis of anteroseptal and inferoseptal myocardium consistent with post op changes EF: 50-55% Trace MR, Trace TR. Otherwise grossly normal TTE Peripheral Angiogram PA Note:    Narrative: 68 M PMH DVT, CHF, CAD w/AICD, CKD, DM2 on insulin, CABGX3, (LIMA TO LAD, SVG TO OM, AND RPDA in , and s/p PCi of LAD in , severe ischemic CM, S/P AICD, with subsequent normalization of EF, moderate left carotid stenosis, and significant PVD, S/P DEUCE Angioplasty, and stenting () left SFA Angioplasty and stenting, and CRI, IKE (not using CPAP due to intolerance), was seen at cardiology office on  for follow up.   patient with c/o claudications on lower extremities which has progressed, although not quite life style limiting,   Arterial duplex on 3/22 suggests < 50% stenosis involving the prox left SFA > 70% stenosis involving the left prox popliteal artery, occlusion in right distal SFA with collateral visualized dampened monophasic, flow throughout low extremity distally.   Peripheral angio in  with prior stent in right iliac artery patent with severe left iliac disease  treated  with LANDON.   Arterial duplex on 10/24/23 revealed  left external iliac artery, left SFA, AND LEFT POPLITEAL ARTERY stents are patent. greater than 50% stenosis noted in left proximal SFA, greater than 75% stenosis noted in left SFA and BERRY with heavily calcified plaque.  Patient endorses pain on walking less than half a block, no pain at rest. Denies dizziness, SOB, CP, extremity weakness  Patient now presents to [facility] for LE peripheral angio of LLE  in setting of his severe PAD and symptoms     ROS negative unless mentioned in HPI    Shy Classification:   Class I: Asymptomatic   Class II a: Mild claudication  Walking > 200 meters (2.5 blocks)   Class IIb: Moderate to Severe claudication Walking < 200 meters (2.5 blocks)   Class III: Rest Pain   Class IV: Ulceration or gangrene    Associated Risk Factors:     Age: 68    DM: Yes    Smoking history: N/A    Hyperlipidemia: Yes    Family history of PAD: No    Known Atherosclerotic disease(coronary, carotid, subclavian, renal, mesenteric, AAA): HLD, CAD, PAD    Antiplatelets/Anticoagulants:        Plavix:  Yes       Cilostazol: No       pentoxifylinne: No       NOAC: No    PHYSICAL EXAM:  Constitutional: Comfortable . No acute distress.   HEENT: Atraumatic and normocephalic , neck is supple  CNS: A&Ox3. No focal deficits.   Respiratory: CTAB, unlabored   Cardiovascular: RRR normal s1 s2. No murmur. No rubs or gallop.  Gastrointestinal: Soft, non-tender. +Bowel sounds.   Extremities: 2+ Peripheral Pulses, No clubbing, cyanosis, or edema  Psychiatric: Calm . no agitation.   Skin: Warm and dry, no ulcers on extremities     Vascular testing:   LE physiologic study: right ankle brachial index of 0.51 / Left: 0.60 -- Consistent with moderate PAD      Arterial Dopplers:  Arterial duplex on 3/22 suggests < 50% stenosis involving the prox left SFA > 70% stenosis involving the left prox popliteal artery, occlusion in right distal SFA with collateral visulaizedm, dampened monophasic, flow throuhout low extremity distally.   10/24/23: The left external iliac artery, left SFA, AND LEFT POPLITEAL ARTERY stents are patent. greater than 50% stenosis noted in left proximal SFA, greater than 75% stenosis noted in left SFA and BERRY with heavily calcified plaque  2024: Left: greater than 50% stenosis noted at proximal superficial femoral artery and at the origin of profunda femoral artery. Popliteal artery stent is patent with elevated velocities but no significant stenosis. Posterior tibial artery is occluded.   Right: Proximal to mid superficial femoral artery is patent with extensive calcified plaque and very dampened flow, distal superficial femoral artery is occluded. Dampened flow noted in popliteal and posterior tibial artery.  3/14/2024: Ankle brachial index of 0.47 indicates severe evidence of peripheral arterial disease right lower extremity.    Peripheral angio on  with prior stent in right iliac artery patent with severe left iliac disease treated  with LANDON.   < from: Invasive Peripheral Vascular Reporting (10.27.23 @ 13:41) >  68 years old male. History of hypertension, dyslipidemia and peripheral arterial disease.    Conclusions:   Prior stent in right iliacs patent.  Severe left SFH disease treated with "shock wave" and LOVE.    Recommendations:   ASA and Plavix     EK24 SR 69bpm, flattened T waves leads I, III, aVL biphasic T wave in lead II, aVF    Echo: 23 LV: Normal, no WMA, Hypokinesis of anteroseptal and inferoseptal myocardium consistent with post op changes EF: 50-55% Trace MR, Trace TR. Otherwise grossly normal TTE    Labs:                        16.9   15.72 )-----------( 226      ( 21 Aug 2024 13:10 )             53.1     142  |  107  |  45.1<H>  ----------------------------<  69<L>  5.4<H>   |  21.0<L>  |  1.93<H>    Ca    9.1      21 Aug 2024 13:10 Peripheral Angiogram PA Note:    Narrative: 68 M PMH DVT, CHF s/p Mell Sci ICD , CKD, DM2 on insulin, CAD s/p CABGX3, (LIMA TO LAD, SVG TO OM, AND RPDA) in , and s/p PCIx3 LAD in 3/2020, severe ischemic CM, S/P AICD, with subsequent normalization of EF, moderate left carotid stenosis, and significant PVD, S/P R DEUCE stent 2014, L SFA stent 2014, L DEUCE Angioplasty, and stenting (2020) left SFA Angioplasty and stenting, 10/23 L DEUCE, L SFA, L pop stents, and CRI, IKE (not using CPAP due to intolerance), was seen at cardiology office on  for follow up.   patient with c/o claudications on lower extremities which has progressed, although not quite life style limiting,   Arterial duplex on 3/22 suggests < 50% stenosis involving the prox left SFA > 70% stenosis involving the left prox popliteal artery, occlusion in right distal SFA with collateral visualized dampened monophasic, flow throughout low extremity distally.   Peripheral angio in  with prior stent in right iliac artery patent with severe left iliac disease  treated  with LANDON.   Arterial duplex on 10/24/23 revealed  left external iliac artery, left SFA, AND LEFT POPLITEAL ARTERY stents are patent. greater than 50% stenosis noted in left proximal SFA, greater than 75% stenosis noted in left SFA and BERRY with heavily calcified plaque.  Patient endorses pain on walking less than half a block, no pain at rest. Denies dizziness, SOB, CP, extremity weakness  Patient now presents to [facility] CCL for RLE peripheral angio in setting of his severe PAD and symptoms     ROS negative unless mentioned in HPI    Shy Classification:   Class I: Asymptomatic   Class II a: Mild claudication  Walking > 200 meters (2.5 blocks)   Class IIb: Moderate to Severe claudication Walking < 200 meters (2.5 blocks)   Class III: Rest Pain   Class IV: Ulceration or gangrene    Associated Risk Factors:     Age: 68    DM: Yes    Smoking history: N/A    Hyperlipidemia: Yes    Family history of PAD: No    Known Atherosclerotic disease(coronary, carotid, subclavian, renal, mesenteric, AAA): HLD, CAD, PAD    Antiplatelets/Anticoagulants:        Plavix:  Yes       Cilostazol: No       pentoxifylinne: No       NOAC: No    PHYSICAL EXAM:  Constitutional: Comfortable . No acute distress.   HEENT: Atraumatic and normocephalic , neck is supple  CNS: A&Ox3. No focal deficits.   Respiratory: CTAB, unlabored   Cardiovascular: RRR normal s1 s2. No murmur. No rubs or gallop.  Gastrointestinal: Soft, non-tender. +Bowel sounds.   Extremities: 2+ Peripheral Pulses, No clubbing, cyanosis, or edema  Psychiatric: Calm . no agitation.   Skin: Warm and dry, no ulcers on extremities     Vascular testing:   LE physiologic study: right ankle brachial index of 0.51 / Left: 0.60 -- Consistent with moderate PAD      Arterial Dopplers:  Arterial duplex on 3/22 suggests < 50% stenosis involving the prox left SFA > 70% stenosis involving the left prox popliteal artery, occlusion in right distal SFA with collateral visulaizedm, dampened monophasic, flow throuhout low extremity distally.   10/24/23: The left external iliac artery, left SFA, AND LEFT POPLITEAL ARTERY stents are patent. greater than 50% stenosis noted in left proximal SFA, greater than 75% stenosis noted in left SFA and BERRY with heavily calcified plaque  2024: Left: greater than 50% stenosis noted at proximal superficial femoral artery and at the origin of profunda femoral artery. Popliteal artery stent is patent with elevated velocities but no significant stenosis. Posterior tibial artery is occluded.   Right: Proximal to mid superficial femoral artery is patent with extensive calcified plaque and very dampened flow, distal superficial femoral artery is occluded. Dampened flow noted in popliteal and posterior tibial artery.  3/14/2024: Ankle brachial index of 0.47 indicates severe evidence of peripheral arterial disease right lower extremity.    Peripheral angio on  with prior stent in right iliac artery patent with severe left iliac disease treated  with LANDON.   < from: Invasive Peripheral Vascular Reporting (10.27.23 @ 13:41) >  68 years old male. History of hypertension, dyslipidemia and peripheral arterial disease.    Conclusions:   Prior stent in right iliacs patent.  Severe left SFH disease treated with "shock wave" and LOVE.    Recommendations:   ASA and Plavix     EK24 SR 69bpm, flattened T waves leads I, III, aVL biphasic T wave in lead II, aVF    Echo: 23 LV: Normal, no WMA, Hypokinesis of anteroseptal and inferoseptal myocardium consistent with post op changes EF: 50-55% Trace MR, Trace TR. Otherwise grossly normal TTE    Labs:                        16.9   15.72 )-----------( 226      ( 21 Aug 2024 13:10 )             53.1     142  |  107  |  45.1<H>  ----------------------------<  69<L>  5.4<H>   |  21.0<L>  |  1.93<H>    Ca    9.1      21 Aug 2024 13:10

## 2024-08-20 NOTE — H&P PST ADULT - NSICDXPASTMEDICALHX_GEN_ALL_CORE_FT
Per Dr. Schaffer:  Decrease spironolactone: Take 0.5 tablet on Mondays, Wednesdays, Fridays AND take 1 tablet the other 4 days of the week.  No change to torsemide.   Decrease potassium foods.   B.b in 2 months.     Message left for patient to call back.    PAST MEDICAL HISTORY:  BPH (benign prostatic hyperplasia)     CAD (coronary artery disease)     CKD (chronic kidney disease)     Diabetes Peripheral vascular disease  CHF- EF-19% AICD ([facility])  MI  December 2013   HTN  Pulmonary edema  High Cholesterol  Chronic renal insufficency    DVT of leg (deep venous thrombosis), right     Dyslipidemia     Fatty liver     Hypertension     Insulin resistance

## 2024-08-20 NOTE — H&P PST ADULT - NSICDXPASTSURGICALHX_GEN_ALL_CORE_FT
PAST SURGICAL HISTORY:  AICD (automatic cardioverter/defibrillator) present     S/P angioplasty with stent right leg 08/14, L femoral 7/2014    S/P colonoscopy with polypectomy     S/P coronary artery bypass graft x 3 2014    AICD ([employer]) 2014    S/P thyroid biopsy

## 2024-08-21 ENCOUNTER — INPATIENT (INPATIENT)
Facility: HOSPITAL | Age: 69
LOS: 0 days | Discharge: HOME | DRG: 301 | End: 2024-08-22
Attending: INTERNAL MEDICINE | Admitting: INTERNAL MEDICINE
Payer: MEDICARE

## 2024-08-21 ENCOUNTER — TRANSCRIPTION ENCOUNTER (OUTPATIENT)
Age: 69
End: 2024-08-21

## 2024-08-21 VITALS
DIASTOLIC BLOOD PRESSURE: 85 MMHG | TEMPERATURE: 98 F | OXYGEN SATURATION: 100 % | RESPIRATION RATE: 17 BRPM | SYSTOLIC BLOOD PRESSURE: 141 MMHG | HEIGHT: 67 IN | WEIGHT: 214.95 LBS | HEART RATE: 70 BPM

## 2024-08-21 DIAGNOSIS — Z98.890 OTHER SPECIFIED POSTPROCEDURAL STATES: Chronic | ICD-10-CM

## 2024-08-21 DIAGNOSIS — Z95.810 PRESENCE OF AUTOMATIC (IMPLANTABLE) CARDIAC DEFIBRILLATOR: Chronic | ICD-10-CM

## 2024-08-21 DIAGNOSIS — Z98.89 OTHER SPECIFIED POSTPROCEDURAL STATES: Chronic | ICD-10-CM

## 2024-08-21 DIAGNOSIS — Z95.1 PRESENCE OF AORTOCORONARY BYPASS GRAFT: Chronic | ICD-10-CM

## 2024-08-21 DIAGNOSIS — I73.89 OTHER SPECIFIED PERIPHERAL VASCULAR DISEASES: ICD-10-CM

## 2024-08-21 LAB
ANION GAP SERPL CALC-SCNC: 12 MMOL/L — SIGNIFICANT CHANGE UP (ref 5–17)
ANION GAP SERPL CALC-SCNC: 14 MMOL/L — SIGNIFICANT CHANGE UP (ref 5–17)
BASOPHILS # BLD AUTO: 0.06 K/UL — SIGNIFICANT CHANGE UP (ref 0–0.2)
BASOPHILS NFR BLD AUTO: 0.4 % — SIGNIFICANT CHANGE UP (ref 0–2)
BUN SERPL-MCNC: 44 MG/DL — HIGH (ref 8–20)
BUN SERPL-MCNC: 45.1 MG/DL — HIGH (ref 8–20)
CALCIUM SERPL-MCNC: 8 MG/DL — LOW (ref 8.4–10.5)
CALCIUM SERPL-MCNC: 9.1 MG/DL — SIGNIFICANT CHANGE UP (ref 8.4–10.5)
CHLORIDE SERPL-SCNC: 105 MMOL/L — SIGNIFICANT CHANGE UP (ref 96–108)
CHLORIDE SERPL-SCNC: 107 MMOL/L — SIGNIFICANT CHANGE UP (ref 96–108)
CO2 SERPL-SCNC: 20 MMOL/L — LOW (ref 22–29)
CO2 SERPL-SCNC: 21 MMOL/L — LOW (ref 22–29)
CREAT SERPL-MCNC: 1.61 MG/DL — HIGH (ref 0.5–1.3)
CREAT SERPL-MCNC: 1.93 MG/DL — HIGH (ref 0.5–1.3)
EGFR: 37 ML/MIN/1.73M2 — LOW
EGFR: 47 ML/MIN/1.73M2 — LOW
EOSINOPHIL # BLD AUTO: 0.17 K/UL — SIGNIFICANT CHANGE UP (ref 0–0.5)
EOSINOPHIL NFR BLD AUTO: 1.1 % — SIGNIFICANT CHANGE UP (ref 0–6)
GLUCOSE BLDC GLUCOMTR-MCNC: 132 MG/DL — HIGH (ref 70–99)
GLUCOSE BLDC GLUCOMTR-MCNC: 197 MG/DL — HIGH (ref 70–99)
GLUCOSE BLDC GLUCOMTR-MCNC: 209 MG/DL — HIGH (ref 70–99)
GLUCOSE BLDC GLUCOMTR-MCNC: 73 MG/DL — SIGNIFICANT CHANGE UP (ref 70–99)
GLUCOSE BLDC GLUCOMTR-MCNC: 74 MG/DL — SIGNIFICANT CHANGE UP (ref 70–99)
GLUCOSE SERPL-MCNC: 244 MG/DL — HIGH (ref 70–99)
GLUCOSE SERPL-MCNC: 69 MG/DL — LOW (ref 70–99)
HCT VFR BLD CALC: 53.1 % — HIGH (ref 39–50)
HGB BLD-MCNC: 16.9 G/DL — SIGNIFICANT CHANGE UP (ref 13–17)
IMM GRANULOCYTES NFR BLD AUTO: 0.6 % — SIGNIFICANT CHANGE UP (ref 0–0.9)
LYMPHOCYTES # BLD AUTO: 1.42 K/UL — SIGNIFICANT CHANGE UP (ref 1–3.3)
LYMPHOCYTES # BLD AUTO: 9 % — LOW (ref 13–44)
MCHC RBC-ENTMCNC: 28.7 PG — SIGNIFICANT CHANGE UP (ref 27–34)
MCHC RBC-ENTMCNC: 31.8 GM/DL — LOW (ref 32–36)
MCV RBC AUTO: 90.3 FL — SIGNIFICANT CHANGE UP (ref 80–100)
MONOCYTES # BLD AUTO: 0.95 K/UL — HIGH (ref 0–0.9)
MONOCYTES NFR BLD AUTO: 6 % — SIGNIFICANT CHANGE UP (ref 2–14)
NEUTROPHILS # BLD AUTO: 13.02 K/UL — HIGH (ref 1.8–7.4)
NEUTROPHILS NFR BLD AUTO: 82.9 % — HIGH (ref 43–77)
PLATELET # BLD AUTO: 226 K/UL — SIGNIFICANT CHANGE UP (ref 150–400)
POTASSIUM SERPL-MCNC: 5 MMOL/L — SIGNIFICANT CHANGE UP (ref 3.5–5.3)
POTASSIUM SERPL-MCNC: 5.4 MMOL/L — HIGH (ref 3.5–5.3)
POTASSIUM SERPL-SCNC: 5 MMOL/L — SIGNIFICANT CHANGE UP (ref 3.5–5.3)
POTASSIUM SERPL-SCNC: 5.4 MMOL/L — HIGH (ref 3.5–5.3)
RBC # BLD: 5.88 M/UL — HIGH (ref 4.2–5.8)
RBC # FLD: 17.9 % — HIGH (ref 10.3–14.5)
SODIUM SERPL-SCNC: 137 MMOL/L — SIGNIFICANT CHANGE UP (ref 135–145)
SODIUM SERPL-SCNC: 142 MMOL/L — SIGNIFICANT CHANGE UP (ref 135–145)
WBC # BLD: 15.72 K/UL — HIGH (ref 3.8–10.5)
WBC # FLD AUTO: 15.72 K/UL — HIGH (ref 3.8–10.5)

## 2024-08-21 PROCEDURE — 85025 COMPLETE CBC W/AUTO DIFF WBC: CPT

## 2024-08-21 PROCEDURE — 36245 INS CATH ABD/L-EXT ART 1ST: CPT

## 2024-08-21 PROCEDURE — C1894: CPT

## 2024-08-21 PROCEDURE — C1887: CPT

## 2024-08-21 PROCEDURE — 75710 ARTERY X-RAYS ARM/LEG: CPT

## 2024-08-21 PROCEDURE — 36415 COLL VENOUS BLD VENIPUNCTURE: CPT

## 2024-08-21 PROCEDURE — 82962 GLUCOSE BLOOD TEST: CPT

## 2024-08-21 PROCEDURE — 93005 ELECTROCARDIOGRAM TRACING: CPT

## 2024-08-21 PROCEDURE — C1725: CPT

## 2024-08-21 PROCEDURE — C1769: CPT

## 2024-08-21 PROCEDURE — 80048 BASIC METABOLIC PNL TOTAL CA: CPT

## 2024-08-21 RX ORDER — DEXTROSE 15 G/33 G
15 GEL IN PACKET (GRAM) ORAL ONCE
Refills: 0 | Status: DISCONTINUED | OUTPATIENT
Start: 2024-08-21 | End: 2024-08-22

## 2024-08-21 RX ORDER — CARVEDILOL 6.25 MG/1
25 TABLET ORAL ONCE
Refills: 0 | Status: COMPLETED | OUTPATIENT
Start: 2024-08-21 | End: 2024-08-21

## 2024-08-21 RX ORDER — DEXTROSE 15 G/33 G
25 GEL IN PACKET (GRAM) ORAL ONCE
Refills: 0 | Status: DISCONTINUED | OUTPATIENT
Start: 2024-08-21 | End: 2024-08-22

## 2024-08-21 RX ORDER — SODIUM CHLORIDE 9 MG/ML
250 INJECTION INTRAMUSCULAR; INTRAVENOUS; SUBCUTANEOUS ONCE
Refills: 0 | Status: COMPLETED | OUTPATIENT
Start: 2024-08-21 | End: 2024-08-21

## 2024-08-21 RX ORDER — GLUCAGON INJECTION, SOLUTION 1 MG/.2ML
1 INJECTION, SOLUTION SUBCUTANEOUS ONCE
Refills: 0 | Status: DISCONTINUED | OUTPATIENT
Start: 2024-08-21 | End: 2024-08-22

## 2024-08-21 RX ORDER — DEXTROSE 15 G/33 G
12.5 GEL IN PACKET (GRAM) ORAL ONCE
Refills: 0 | Status: DISCONTINUED | OUTPATIENT
Start: 2024-08-21 | End: 2024-08-22

## 2024-08-21 RX ADMIN — Medication 75 MILLIGRAM(S): at 13:40

## 2024-08-21 RX ADMIN — CARVEDILOL 25 MILLIGRAM(S): 6.25 TABLET ORAL at 22:36

## 2024-08-21 RX ADMIN — SODIUM CHLORIDE 250 MILLILITER(S): 9 INJECTION INTRAMUSCULAR; INTRAVENOUS; SUBCUTANEOUS at 13:41

## 2024-08-21 RX ADMIN — SODIUM CHLORIDE 250 MILLILITER(S): 9 INJECTION INTRAMUSCULAR; INTRAVENOUS; SUBCUTANEOUS at 17:09

## 2024-08-21 RX ADMIN — CHLORHEXIDINE GLUCONATE 1 APPLICATION(S): 40 SOLUTION TOPICAL at 16:13

## 2024-08-21 NOTE — ASU PATIENT PROFILE, ADULT - WHEN WAS YOUR LAST VACCINATION? MONTH
The appeal for the Ubrelvy was overturned. Pt went to  the medication and even with the approval it is $241.30 for 15 tabs for 30 days. Pt would like to try something else.     She is allergic to Celecoxib, Depakote, Topamax, Topiramate and Sumatriptan. With Sumatriptan she had chest pain and dizziness.    May

## 2024-08-21 NOTE — PROGRESS NOTE ADULT - SUBJECTIVE AND OBJECTIVE BOX
Department of Cardiology                                                                  [facility] [address] [address] [facility]                                                            Telephone: [phone]. Fax:[phone]                                                    Post- Procedure Note: Left lower extremity peripheral angiogram      Narrative:   Patient  now s/p RLE Peripheral angio via LFA  approach (# 7 f  LFA sheath  in place with Dr. Blanco, , tolerated procedure well without complications. Arrived to recovery room NAD and hemodynamically stable, distal pulse +, neurovascular intact          PAST MEDICAL & SURGICAL HISTORY:  Diabetes  Peripheral vascular disease  CHF- EF-19% AICD ([employer])  MI  December 2013   HTN  Pulmonary edema  High Cholesterol  Chronic renal insufficency      DVT of leg (deep venous thrombosis), right      CAD (coronary artery disease)      Hypertension      Dyslipidemia      BPH (benign prostatic hyperplasia)      CKD (chronic kidney disease)      Fatty liver      Insulin resistance      S/P coronary artery bypass graft x 3  2014    AICD ([employer]) 2014      S/P angioplasty with stent  right leg 08/14, L femoral 7/2014      AICD (automatic cardioverter/defibrillator) present      S/P colonoscopy with polypectomy      S/P thyroid biopsy        Home Medications:  allopurinol 300 mg oral tablet: 1 tab(s) orally once a day (21 Aug 2024 13:32)  aspirin 81 mg oral tablet: 1 tab(s) orally once a day (21 Aug 2024 13:33)  atorvastatin 80 mg oral tablet: 1 tab(s) orally once a day (21 Aug 2024 13:33)  carvedilol 25 mg oral tablet: 1 tab(s) orally 2 times a day (21 Aug 2024 13:33)  Entresto 49 mg-51 mg oral tablet: 1 tab(s) orally 2 times a day (21 Aug 2024 13:33)  ergocalciferol 50,000 intl units (1.25 mg) oral capsule: 1 cap(s) orally once a week (Sundays) (21 Aug 2024 13:33)  ezetimibe 10 mg oral tablet: 1 tab(s) orally once a day (at bedtime) (21 Aug 2024 13:33)  Farxiga 10 mg oral tablet: 1 tab(s) orally once a day (21 Aug 2024 13:33)  finasteride 5 mg oral tablet: 1 tab(s) orally once a day (21 Aug 2024 13:33)  furosemide 20 mg oral tablet: 1 tab(s) orally once a day (21 Aug 2024 13:54)  gemfibrozil 600 mg oral tablet: 1 tab(s) orally 2 times a day (21 Aug 2024 13:33)  glimepiride 2 mg oral tablet: 1 tab(s) orally once a day (21 Aug 2024 13:33)  Insulin Glargine-yfgn Prefilled Pen 100 units/mL subcutaneous solution: 80 unit(s) subcutaneous 2 times a day (21 Aug 2024 13:31)  Plavix 75 mg oral tablet: 1 tab(s) orally once a day (21 Aug 2024 13:33)        niacin (Flushing; Rash)  clindamycin (Rash)      Objective:  Vital Signs Last 24 Hrs  T(C): 36.6 (21 Aug 2024 13:43), Max: 36.6 (21 Aug 2024 13:43)  T(F): 97.8 (21 Aug 2024 13:43), Max: 97.8 (21 Aug 2024 13:43)  HR: 86 (21 Aug 2024 17:30) (70 - 86)  BP: 142/77 (21 Aug 2024 17:30) (127/67 - 160/71)  BP(mean): --  RR: 16 (21 Aug 2024 17:30) (16 - 17)  SpO2: 96% (21 Aug 2024 17:30) (93% - 100%)    Parameters below as of 21 Aug 2024 18:00  Patient On (Oxygen Delivery Method): room air        GENERAL: Pt lying comfortably, NAD.  ENMT: PERRL, +EOMI.  NECK: soft, Supple, No JVD,   CHEST/LUNG: Clear to auscultatation bilaterally; No wheezing.  HEART: S1S2+, Regular rate and rhythm; No murmurs.  ABDOMEN: Soft, Nontender, Nondistended; Bowel sounds present.  MUSCULOSKELETAL: Normal range of motion.  SKIN: No rashes or lesions.  NEURO: AAOX3, no focal deficits, no motor r sensory loss.  PSYCH: normal mood.  Procedure site: LFA  no signs of bleeding or hematoma, neurovascular intact   VASCULAR:   Radial +2 R/+2 L  Femoral +2 R/+2 L  PT +2 R/+2 L  DP +2 R/+2 L                          16.9   15.72 )-----------( 226      ( 21 Aug 2024 13:10 )             53.1     08-21    142  |  107  |  45.1<H>  ----------------------------<  69<L>  5.4<H>   |  21.0<L>  |  1.93<H>    Ca    9.1      21 Aug 2024 13:10

## 2024-08-21 NOTE — DISCHARGE NOTE PROVIDER - PROVIDER TOKENS
PROVIDER:[TOKEN:[[id number]],FOLLOWUP:[2 weeks]],PROVIDER:[TOKEN:[[id number]],FOLLOWUP:[1 week]] PROVIDER:[TOKEN:[[id number]],FOLLOWUP:[2 weeks]],PROVIDER:[TOKEN:[[id number]],FOLLOWUP:[1 week]],PROVIDER:[TOKEN:[[id number]],FOLLOWUP:[1 week]]

## 2024-08-21 NOTE — ASU PATIENT PROFILE, ADULT - NSICDXPASTMEDICALHX_GEN_ALL_CORE_FT
PAST MEDICAL HISTORY:  BPH (benign prostatic hyperplasia)     CAD (coronary artery disease)     CKD (chronic kidney disease)     Diabetes Peripheral vascular disease  CHF- EF-19% AICD ([employer] [facility])  MI  December 2013   HTN  Pulmonary edema  High Cholesterol  Chronic renal insufficency    DVT of leg (deep venous thrombosis), right     Dyslipidemia     Fatty liver     Hypertension     Insulin resistance

## 2024-08-21 NOTE — DISCHARGE NOTE PROVIDER - NSDCCPCAREPLAN_GEN_ALL_CORE_FT
PRINCIPAL DISCHARGE DIAGNOSIS  Diagnosis: PAD (peripheral artery disease)  Assessment and Plan of Treatment: Peripheral artery disease is the buildup of plaque in the arteries that supply oxygen-rich blood to your body including your lower exteremties. Plaque causes a narrowing or blockage that could result in reduced blood flow to your body. Symptoms include cramping, burning, sharp pain or discomfort, numbness, tingling, pale or cool extremities,  that may occur with ambulation and could progress to resting pain. Sometimes these blockages require interventions such as balloooning or placement of a stent to open the blockage and restore blood flow.   Managing risk factors will help keep your circulation open and prevent future blockages, risk factors may include: high blood pressure, high cholesterol, obesity, sedentary life style and smoking.    Your diet should be low in fat, cholesterol, salt and carbohydrates, increase fruits (caution if diabetic), vegetables and whole grains/fiber rich foods.   Take all your cardiac  medications as prescribed.    Exercise is a very important factor in cardiovascular health. Once your post procedure restrictions have passed, you should engage in heart healthy, aerobic exercise. Be sure to have clearance.    Follow up with your vascular doctor within 1-2 weeks after your procedure.   Call your physician or our unit ([phone]) with any questions or concerns that may arise.

## 2024-08-21 NOTE — DISCHARGE NOTE NURSING/CASE MANAGEMENT/SOCIAL WORK - PATIENT PORTAL LINK FT
You can access the FollowMyHealth Patient Portal offered by [facility] by registering at the following website: [email or web address]. By joining [facility]’s FollowMyHealth portal, you will also be able to view your health information using other applications (apps) compatible with our system.

## 2024-08-21 NOTE — DISCHARGE NOTE PROVIDER - HOSPITAL COURSE
Assessment     68 M PMH DVT, CHF s/p Mell Sci ICD 2014, CKD, DM2 on insulin, CAD s/p CABGX3, (LIMA TO LAD, SVG TO OM, AND RPDA) in 2014, and s/p PCIx3 LAD in 3/2020, severe ischemic CM, S/P AICD, with subsequent normalization of EF, moderate left carotid stenosis, and significant PVD, S/P R DEUCE stent 7/2014, L SFA stent 8/2014, L DEUCE Angioplasty, and stenting (12/2020) left SFA Angioplasty and stenting, 10/23 L DEUCE, L SFA, L pop stents, and CRI, IKE (not using CPAP due to intolerance), was seen at cardiology office on 09/14'/23 for follow up.   patient with c/o claudications on lower extremities which has progressed, although not quite life style limiting,   Arterial duplex on 3/22 suggests < 50% stenosis involving the prox left SFA > 70% stenosis involving the left prox popliteal artery, occlusion in right distal SFA with collateral visualized dampened monophasic, flow throughout low extremity distally.   Peripheral angio in 2020 with prior stent in right iliac artery patent with severe left iliac disease  treated  with LANDON.   Arterial duplex on 10/24/23 revealed  left external iliac artery, left SFA, AND LEFT POPLITEAL ARTERY stents are patent. greater than 50% stenosis noted in left proximal SFA, greater than 75% stenosis noted in left SFA and BERRY with heavily calcified plaque.  Patient endorses pain on walking less than half a block, no pain at rest. Denies dizziness, SOB, CP, extremity weakness  Patient now presents to [facility] CCL for RLE peripheral angio in setting of his severe PAD and symptoms     Patient  now s/p left heart catheterization via LFA  approach (# 7 f  LFA sheath  in place with Dr. Ramires,  RONNIE warm perfusing, no bleeding or hematoma, distal pulses 2+  Patient is a 68y old  Male who presents with a chief complaint of PERIPHERAL ANGIO OF RLE (20 Aug 2024 16:34)      Intraprocedurally:    Patient received iv sedation fentanyl 100mcg iv and Versed 2mg ivp  IV Heaprin: 12,000 units  Visipaque: 106 ml     Received heparin reversal agent Protamine: 30mg ivp    Findings:     < from: Invasive Peripheral Vascular Reporting (08.21.24 @ 15:45) >    right lower angiogram showed occluded SFA with severe distal at the  popliteal and tibial trunk with two vessel  run off  Recommendations:     Vascular surgery eval     Acute complication:  No complications       < end of copied text >  < from: Invasive Peripheral Vascular Reporting (08.21.24 @ 15:45) >    right lower angiogram showed occluded SFA with severe distal at the  popliteal and tibial trunk with two vessle run off  Recommendations:     Vascular surgery eval     Acute complication:  No complications       # S/P Diagnostic RLE ANGIO/SEVERE PAD    -post cardiac cath MANAGEMENT PER PROTOCOL  -Left  groin precautions explained to patient   -NS 0.9% 250ml/hr x 1 bolus: post procedure JESÚS ppx   -vascular surgery consult with DR  in 1-2 weeks   -continue current medical therapy  -Dual anti platelet therapy with aspirin/ plavix, reinforced importance of strict adherence to DAPT   -C/W Statin, Entresto, Farxiga, Insulin and oral diabetic meds , Coreg  Recheck Cr in 2 days in setting of CKD and pt received IV contrast today/resuylts to be faxed to DR loiva, Lab requisition given to pt   -follow up outpt in 2 weeks with Cardiologist DR JAG RAMIRES AND RENAL DR OLIVA  -Lifestyle modifications discussed to reduce cardiovascular risk factors including weight reduction, smoking cessation, medication compliance, and routine follow up with Cardiologist to track your BMI, cholesterol, and glucose levels.  -Discharge patient after sheath removal protocol done/pt tolerates ambulation /vitals stable/tele with no events   -Discussed with DR Ramires              Assessment     68 M PMH DVT, CHF s/p Mell Sci ICD 2014, CKD, DM2 on insulin, CAD s/p CABGX3, (LIMA TO LAD, SVG TO OM, AND RPDA) in 2014, and s/p PCIx3 LAD in 3/2020, severe ischemic CM, S/P AICD, with subsequent normalization of EF, moderate left carotid stenosis, and significant PVD, S/P R DEUCE stent 7/2014, L SFA stent 8/2014, L DEUCE Angioplasty, and stenting (12/2020) left SFA Angioplasty and stenting, 10/23 L DEUCE, L SFA, L pop stents, and CRI, IKE (not using CPAP due to intolerance), was seen at cardiology office on 09/14'/23 for follow up.   patient with c/o claudications on lower extremities which has progressed, although not quite life style limiting,   Arterial duplex on 3/22 suggests < 50% stenosis involving the prox left SFA > 70% stenosis involving the left prox popliteal artery, occlusion in right distal SFA with collateral visualized dampened monophasic, flow throughout low extremity distally.   Peripheral angio in 2020 with prior stent in right iliac artery patent with severe left iliac disease  treated  with LANDON.   Arterial duplex on 10/24/23 revealed  left external iliac artery, left SFA, AND LEFT POPLITEAL ARTERY stents are patent. greater than 50% stenosis noted in left proximal SFA, greater than 75% stenosis noted in left SFA and BERRY with heavily calcified plaque.  Patient endorses pain on walking less than half a block, no pain at rest. Denies dizziness, SOB, CP, extremity weakness  Patient now presents to [facility] CCL for RLE peripheral angio in setting of his severe PAD and symptoms     Patient  now s/p left heart catheterization via LFA  approach (# 7 f  LFA sheath  in place with Dr. Blanco,  RONNIE warm perfusing, no bleeding or hematoma, distal pulses 2+  Patient is a 68y old  Male who presents with a chief complaint of PERIPHERAL ANGIO OF RLE (20 Aug 2024 16:34)      Intraprocedurally:    Patient received iv sedation fentanyl 100mcg iv and Versed 2mg ivp  IV Heaprin: 12,000 units  Visipaque: 106 ml     Received heparin reversal agent Protamine: 30mg ivp    Findings:     < from: Invasive Peripheral Vascular Reporting (08.21.24 @ 15:45) >    right lower angiogram showed occluded SFA with severe distal at the  popliteal and tibial trunk with two vessel  run off  Recommendations:     Vascular surgery eval     Acute complication:  No complications       < end of copied text >  < from: Invasive Peripheral Vascular Reporting (08.21.24 @ 15:45) >    right lower angiogram showed occluded SFA with severe distal at the  popliteal and tibial trunk with two vessle run off  Recommendations:     Vascular surgery eval     Acute complication:  No complications       # S/P Diagnostic RLE ANGIO/SEVERE PAD    -post cardiac cath MANAGEMENT PER PROTOCOL  -Left  groin precautions explained to patient   -NS 0.9% 250ml/hr x 1 bolus: post procedure JESÚS ppx   -vascular surgery consult with DR Corral in 1-2 weeks   -continue current medical therapy  -Dual anti platelet therapy with aspirin/ plavix, reinforced importance of strict adherence to DAPT   -C/W Statin, Entresto, Farxiga, Insulin and oral diabetic meds , Coreg  Recheck Cr in 2 days in setting of CKD and pt received IV contrast today/resuylts to be faxed to DR stacy, Lab requisition given to pt   -follow up outpt in 2 weeks with Cardiologist DR JAG BLANCO AND RENAL DR STACY  -Lifestyle modifications discussed to reduce cardiovascular risk factors including weight reduction, smoking cessation, medication compliance, and routine follow up with Cardiologist to track your BMI, cholesterol, and glucose levels.  -Discharge patient after sheath removal protocol done/pt tolerates ambulation /vitals stable/tele with no events   -Discussed with DR Blanco

## 2024-08-21 NOTE — ASU PATIENT PROFILE, ADULT - NSICDXPASTSURGICALHX_GEN_ALL_CORE_FT
PAST SURGICAL HISTORY:  AICD (automatic cardioverter/defibrillator) present     S/P angioplasty with stent right leg 08/14, L femoral 7/2014    S/P colonoscopy with polypectomy     S/P coronary artery bypass graft x 3 2014    AICD (Reasult scientific) 2014    S/P thyroid biopsy

## 2024-08-21 NOTE — ASU PATIENT PROFILE, ADULT - FALL HARM RISK - HARM RISK INTERVENTIONS
Assistance with ambulation/Assistance OOB with selected safe patient handling equipment/Communicate Risk of Fall with Harm to all staff/Monitor gait and stability/Reinforce activity limits and safety measures with patient and family/Sit up slowly, dangle for a short time, stand at bedside before walking/Tailored Fall Risk Interventions/Use of alarms - bed, chair and/or voice tab/Visual Cue: Yellow wristband and red socks/Bed in lowest position, wheels locked, appropriate side rails in place/Call bell, personal items and telephone in reach/Instruct patient to call for assistance before getting out of bed or chair/Non-slip footwear when patient is out of bed/[place] to call system/Physically safe environment - no spills, clutter or unnecessary equipment/Purposeful Proactive Rounding/Room/bathroom lighting operational, light cord in reach Keystone Flap Text: The defect edges were debeveled with a #15 scalpel blade.  Given the location of the defect, shape of the defect a keystone flap was deemed most appropriate.  Using a sterile surgical marker, an appropriate keystone flap was drawn incorporating the defect, outlining the appropriate donor tissue and placing the expected incisions within the relaxed skin tension lines where possible. The area thus outlined was incised deep to adipose tissue with a #15 scalpel blade.  The skin margins were undermined to an appropriate distance in all directions around the primary defect and laterally outward around the flap utilizing iris scissors.

## 2024-08-21 NOTE — PROGRESS NOTE ADULT - PROVIDER SPECIALTY LIST ADULT
Cardiology
[facility]
d/w dr cooper for admission, d/w pts family who states pts arm was shaking as well at home

## 2024-08-21 NOTE — DISCHARGE NOTE PROVIDER - CARE PROVIDERS DIRECT ADDRESSES
[email or web address]

## 2024-08-21 NOTE — DISCHARGE NOTE PROVIDER - CARE PROVIDER_API CALL
Julio Blanco-Miles  Interventional Cardiology  [address] [place] [address]  Phone: [phone]  Fax: [phone]  Follow Up Time: 2 weeks    Bennett Corral  Vascular Surgery  [address] [place] [address], Floor 1  [place] [address]  Phone: [phone]  Fax: [phone]  Follow Up Time: 1 week   Julio Blanco-Miles  Interventional Cardiology  [address] [place] [address]  Phone: [phone]  Fax: [phone]  Follow Up Time: 2 weeks    Bennett Corral  Vascular Surgery  [address] [facility], Floor 1  [place] [address]  Phone: [phone]  Fax: [phone]  Follow Up Time: 1 week    Marcos Pedro  Nephrology  [address] [facility], [place] [address]  Phone: [phone]  Fax: [phone]  Follow Up Time: 1 week

## 2024-08-21 NOTE — DISCHARGE NOTE NURSING/CASE MANAGEMENT/SOCIAL WORK - NSDCPEFALRISK_GEN_ALL_CORE
For information on Fall & Injury Prevention, visit: [email or web address] OR  [email or web address] OR  https://www.cdc.gov/steadi/patient.html

## 2024-08-21 NOTE — PROGRESS NOTE ADULT - ASSESSMENT
Narrative: 68 M PMH DVT, CHF s/p Mell Sci ICD 2014, CKD, DM2 on insulin, CAD s/p CABGX3, (LIMA TO LAD, SVG TO OM, AND RPDA) in 2014, and s/p PCIx3 LAD in 3/2020, severe ischemic CM, S/P AICD, with subsequent normalization of EF, moderate left carotid stenosis, and significant PVD, S/P R DEUCE stent 7/2014, L SFA stent 8/2014, L DEUCE Angioplasty, and stenting (12/2020) left SFA Angioplasty and stenting, 10/23 L DEUCE, L SFA, L pop stents, and CRI, IKE (not using CPAP due to intolerance), was seen at cardiology office on 09/14'/23 for follow up.   patient with c/o claudications on lower extremities which has progressed, although not quite life style limiting,   Arterial duplex on 3/22 suggests < 50% stenosis involving the prox left SFA > 70% stenosis involving the left prox popliteal artery, occlusion in right distal SFA with collateral visualized dampened monophasic, flow throughout low extremity distally.   Peripheral angio in 2020 with prior stent in right iliac artery patent with severe left iliac disease  treated  with LANDON.   Arterial duplex on 10/24/23 revealed  left external iliac artery, left SFA, AND LEFT POPLITEAL ARTERY stents are patent. greater than 50% stenosis noted in left proximal SFA, greater than 75% stenosis noted in left SFA and BERRY with heavily calcified plaque.  Patient endorses pain on walking less than half a block, no pain at rest. Denies dizziness, SOB, CP, extremity weakness  Patient now presents to [facility] for RLE peripheral angio in setting of his severe PAD and symptoms     Patient  now s/p left heart catheterization via LFA  approach (# 7 f  LFA sheath  in place with Dr. Ramires,  RONNIE warm perfusing, no bleeding or hematoma, distal pulses 2+  Patient is a 68y old  Male who presents with a chief complaint of PERIPHERAL ANGIO OF RLE (20 Aug 2024 16:34)      Intraprocedurally:    Patient received iv sedation fentanyl 100mcg iv and Versed 2mg ivp  IV Heaprin: 12,000 units  Visipaque: 106 ml       Findings:     < from: Invasive Peripheral Vascular Reporting (08.21.24 @ 15:45) >    right lower angiogram showed occluded SFA with severe distal at the  popliteal and tibial trunk with two vessel  run off  Recommendations:     Vascular surgery eval     Acute complication:  No complications       < end of copied text >  < from: Invasive Peripheral Vascular Reporting (08.21.24 @ 15:45) >    right lower angiogram showed occluded SFA with severe distal at the  popliteal and tibial trunk with two vessle run off  Recommendations:     Vascular surgery eval     Acute complication:  No complications       # S/P RLE ANGIO/SEVERE PAD    -post cardiac cath MANAGEMENT PER PROTOCOL  -Left  groin precautions  -bedrest now and 3 hours post LFA sheath removal  -NS 0.9% 250ml/hr x 1 bolus: post procedure JESÚS ppx   -vascular surgery consult with DR  in 1-2 weeks   -continue current medical therapy  -Dual anti platelet therapy with aspirin/ plavix, reinforced importance of strict adherence to DAPT   -C/W Statin, Entresto, Farxiga, Insulin and oral diabetic meds , Coreg  Recheck Cr in 2 days in setting of CKD and pt received IV contrast today/resuylts to be faxed to DR oliva, Lab requisition given to pt   -follow up outpt in 2 weeks with Cardiologist DR JAG RAMIRES AND RENAL DR OLIVA  -Lifestyle modifications discussed to reduce cardiovascular risk factors including weight reduction, smoking cessation, medication compliance, and routine follow up with Cardiologist to track your BMI, cholesterol, and glucose levels.  -Discharge patient after sheath removal protocol done/pt tolerates ambulation /vitals stable/tele with no events   -Discussed with DR Ramires    Narrative: 68 M PMH DVT, CHF s/p Emll Sci ICD 2014, CKD, DM2 on insulin, CAD s/p CABGX3, (LIMA TO LAD, SVG TO OM, AND RPDA) in 2014, and s/p PCIx3 LAD in 3/2020, severe ischemic CM, S/P AICD, with subsequent normalization of EF, moderate left carotid stenosis, and significant PVD, S/P R DEUCE stent 7/2014, L SFA stent 8/2014, L DEUCE Angioplasty, and stenting (12/2020) left SFA Angioplasty and stenting, 10/23 L DEUCE, L SFA, L pop stents, and CRI, IKE (not using CPAP due to intolerance), was seen at cardiology office on 09/14'/23 for follow up.   patient with c/o claudications on lower extremities which has progressed, although not quite life style limiting,   Arterial duplex on 3/22 suggests < 50% stenosis involving the prox left SFA > 70% stenosis involving the left prox popliteal artery, occlusion in right distal SFA with collateral visualized dampened monophasic, flow throughout low extremity distally.   Peripheral angio in 2020 with prior stent in right iliac artery patent with severe left iliac disease  treated  with LANDON.   Arterial duplex on 10/24/23 revealed  left external iliac artery, left SFA, AND LEFT POPLITEAL ARTERY stents are patent. greater than 50% stenosis noted in left proximal SFA, greater than 75% stenosis noted in left SFA and BERRY with heavily calcified plaque.  Patient endorses pain on walking less than half a block, no pain at rest. Denies dizziness, SOB, CP, extremity weakness  Patient now presents to [facility] for RLE peripheral angio in setting of his severe PAD and symptoms     Patient  now s/p left heart catheterization via LFA  approach (# 7 f  LFA sheath  in place with Dr. Ramires,  RONNIE warm perfusing, no bleeding or hematoma, distal pulses 2+  Patient is a 68y old  Male who presents with a chief complaint of PERIPHERAL ANGIO OF RLE (20 Aug 2024 16:34)      Intraprocedurally:    Patient received iv sedation fentanyl 100mcg iv and Versed 2mg ivp  IV Heaprin: 12,000 units  Visipaque: 106 ml       Findings:     < from: Invasive Peripheral Vascular Reporting (08.21.24 @ 15:45) >    right lower angiogram showed occluded SFA with severe distal at the  popliteal and tibial trunk with two vessel  run off  Recommendations:     Vascular surgery eval     Acute complication:  No complications       < end of copied text >  < from: Invasive Peripheral Vascular Reporting (08.21.24 @ 15:45) >    right lower angiogram showed occluded SFA with severe distal at the  popliteal and tibial trunk with two vessle run off  Recommendations:     Vascular surgery eval     Acute complication:  No complications       # S/P RLE ANGIO/SEVERE PAD    -post cardiac cath MANAGEMENT PER PROTOCOL  -Left  groin precautions  -bedrest now and 3 hours post LFA sheath removal  -remove LFA sheath once ACT < 180  -NS 0.9% 250ml/hr x 1 bolus: post procedure JESÚS ppx   -vascular surgery consult with DR  in 1-2 weeks   -continue current medical therapy  -Dual anti platelet therapy with aspirin/ plavix, reinforced importance of strict adherence to DAPT   -C/W Statin, Entresto, Farxiga, Insulin and oral diabetic meds , Coreg  Recheck Cr in 2 days in setting of CKD and pt received IV contrast today/resuylts to be faxed to DR oliva, Lab requisition given to pt   -follow up outpt in 2 weeks with Cardiologist DR JAG RAMIRES AND RENAL DR OLIVA  -Lifestyle modifications discussed to reduce cardiovascular risk factors including weight reduction, smoking cessation, medication compliance, and routine follow up with Cardiologist to track your BMI, cholesterol, and glucose levels.  -Discharge patient after sheath removal protocol done/pt tolerates ambulation /vitals stable/tele with no events   -Discussed with DR Ramires

## 2024-08-21 NOTE — PROGRESS NOTE ADULT - SUBJECTIVE AND OBJECTIVE BOX
I have seen and examined this patient. There is no change since the last H& P. Consent obtained. Agree with cardiac cath. Risks and benefits fully explained. All questions answered.    Julio Blanco MD

## 2024-08-21 NOTE — DISCHARGE NOTE PROVIDER - NSDCCPTREATMENT_GEN_ALL_CORE_FT
PRINCIPAL PROCEDURE  Procedure: Angio fluoro peripheral arteries  Findings and Treatment: No heavy lifting, driving, sex, tub baths, swimming, or any activity that submerges the lower half of the body in water for 48 hours.  Limited walking and stairs for 48 hours.    Change the bandaid after 24 hours and every 24 hours after that.  Keep the puncture site dry and covered with a bandaid until a scab forms.    Observe the site frequently.  If bleeding or a large lump (the size of a golf ball or bigger) occurs lie flat, apply continuous direct pressure just above the puncture site for at least 10 minutes, and notify your physician immediately.  If the bleeding cannot be controlled, call 911 immediately for assistance.  Notify your physician of pain, swelling or any drainage.    Notify your physician immediately if coldness, numbness, discoloration or pain in your foot occurs.

## 2024-08-21 NOTE — DISCHARGE NOTE PROVIDER - NSDCMRMEDTOKEN_GEN_ALL_CORE_FT
allopurinol 300 mg oral tablet: 1 tab(s) orally once a day  aspirin 81 mg oral tablet: 1 tab(s) orally once a day  atorvastatin 80 mg oral tablet: 1 tab(s) orally once a day  BMP: ROSA CHIANG ON 08/23/24 AND FAX THE RESULTS TO DR PJ valdezvedilol 25 mg oral tablet: 1 tab(s) orally 2 times a day  Entresto 49 mg-51 mg oral tablet: 1 tab(s) orally 2 times a day  ergocalciferol 50,000 intl units (1.25 mg) oral capsule: 1 cap(s) orally once a week (Sundays)  ezetimibe 10 mg oral tablet: 1 tab(s) orally once a day (at bedtime)  Farxiga 10 mg oral tablet: 1 tab(s) orally once a day  finasteride 5 mg oral tablet: 1 tab(s) orally once a day  furosemide 20 mg oral tablet: 1 tab(s) orally once a day  gemfibrozil 600 mg oral tablet: 1 tab(s) orally 2 times a day  glimepiride 2 mg oral tablet: 1 tab(s) orally once a day  Insulin Glargine-yfgn Prefilled Pen 100 units/mL subcutaneous solution: 80 unit(s) subcutaneous 2 times a day  Plavix 75 mg oral tablet: 1 tab(s) orally once a day

## 2024-08-22 VITALS
RESPIRATION RATE: 16 BRPM | DIASTOLIC BLOOD PRESSURE: 81 MMHG | HEART RATE: 85 BPM | OXYGEN SATURATION: 95 % | SYSTOLIC BLOOD PRESSURE: 130 MMHG

## 2024-08-22 RX ORDER — ALLOPURINOL 300 MG
1 TABLET ORAL
Refills: 0 | DISCHARGE

## 2024-08-22 RX ORDER — GLIMEPIRIDE 1 MG/1
1 TABLET ORAL
Refills: 0 | DISCHARGE

## 2024-08-22 RX ORDER — INSULIN GLARGINE 100 [IU]/ML
80 INJECTION, SOLUTION SUBCUTANEOUS
Refills: 0 | DISCHARGE

## 2024-08-22 RX ORDER — FUROSEMIDE 40 MG
1 TABLET ORAL
Refills: 0 | DISCHARGE

## 2024-08-22 NOTE — CHART NOTE - NSCHARTNOTEFT_GEN_A_CORE
Called by RN for site check, patient with bleeding post sheath removal.   CATH SITE: left groin benign s/p cath.  No bleeding, no ecchymosis, no hematoma. Extremity Warm to touch, with palpable distal pulses, and brisk capillary refill.  patient is stable for discharge, RN will give discharge instructions and paperwork but does not have ride until later this AM.

## 2024-09-04 ENCOUNTER — NON-APPOINTMENT (OUTPATIENT)
Age: 69
End: 2024-09-04

## 2024-09-10 ENCOUNTER — APPOINTMENT (OUTPATIENT)
Dept: VASCULAR SURGERY | Facility: CLINIC | Age: 69
End: 2024-09-10

## 2024-09-10 ENCOUNTER — APPOINTMENT (OUTPATIENT)
Dept: VASCULAR SURGERY | Facility: CLINIC | Age: 69
End: 2024-09-10
Payer: MEDICARE

## 2024-09-10 VITALS
HEIGHT: 65.5 IN | HEART RATE: 81 BPM | RESPIRATION RATE: 14 BRPM | DIASTOLIC BLOOD PRESSURE: 70 MMHG | TEMPERATURE: 97.7 F | OXYGEN SATURATION: 96 % | SYSTOLIC BLOOD PRESSURE: 137 MMHG | WEIGHT: 216 LBS | BODY MASS INDEX: 35.56 KG/M2

## 2024-09-10 DIAGNOSIS — I73.9 PERIPHERAL VASCULAR DISEASE, UNSPECIFIED: ICD-10-CM

## 2024-09-10 PROCEDURE — 93925 LOWER EXTREMITY STUDY: CPT

## 2024-09-10 PROCEDURE — 93923 UPR/LXTR ART STDY 3+ LVLS: CPT

## 2024-09-10 PROCEDURE — 99204 OFFICE O/P NEW MOD 45 MIN: CPT

## 2024-09-10 NOTE — ASSESSMENT
[FreeTextEntry1] : 68-year-old gentleman with a right SFA occlusion.  Arterial duplex does show reconstitution of the P1 segment with some mild disease.  He could be a candidate for right femoral to above-knee popliteal artery bypass.  That being said it does not make sense that he has equal symptoms in both legs to get his left leg is revascularized.  He has a palpable pulse on the left with patent SFA stents as well as normal KRYSTAL greater than 0.9 on the left with an abnormal KRYSTAL of 0.5 on the right.  I spoke with him and his wife.  I have asked him to pay attention to his symptoms when he ambulates to see if it is truly both legs or just the right leg.  I will have him follow-up in 3 months time.  Sooner if he develops a wound on his right foot or his symptoms worsen.  Prior to appointment and during encounter with patient extensive medical records were reviewed including but not limited to, Hospital records, out patient records, laboratory data and microbiology data In addition extensive time was also spent in reviewing diagnostic studies.  Total encounter total time 40 mins >50% of time spent counseling/coordinating care

## 2024-09-10 NOTE — HISTORY OF PRESENT ILLNESS
[FreeTextEntry1] : 68-year-old gentleman.  History of peripheral arterial occlusive disease significant coronary artery disease history of coronary artery bypass grafting with subsequent cardiac stents.  He is being followed by Dr. Blanco his cardiologist.  He has had left lower extremity interventions for bilateral claudication type symptoms.  Reports following his left lower extremity intervention his symptoms improved for short period of time.  Reports that he can walk about half a block before having to stop because of pain in his calfs.  The pain is equal bilateral.  He has undergone angiogram at  [facility] I reviewed the imaging that showed a flush SFA occlusion with reconstitution of the above-knee popliteal artery.  There is perhaps stenosis within the P2 segment with at least two-vessel runoff.  Again interestingly he reports continued pain in both legs equally.

## 2024-09-10 NOTE — PHYSICAL EXAM
[2+] : left 2+ [de-identified] : Appears well [FreeTextEntry1] : doppler dp pt signal right leg  [de-identified] : Bilateral lower extremities without any evidence of wounds

## 2025-02-04 ENCOUNTER — APPOINTMENT (OUTPATIENT)
Dept: VASCULAR SURGERY | Facility: CLINIC | Age: 70
End: 2025-02-04
Payer: MEDICARE

## 2025-02-04 VITALS
HEIGHT: 65.5 IN | HEART RATE: 82 BPM | TEMPERATURE: 98.3 F | OXYGEN SATURATION: 95 % | SYSTOLIC BLOOD PRESSURE: 145 MMHG | RESPIRATION RATE: 14 BRPM | WEIGHT: 216 LBS | BODY MASS INDEX: 35.56 KG/M2 | DIASTOLIC BLOOD PRESSURE: 71 MMHG

## 2025-02-04 DIAGNOSIS — I73.9 PERIPHERAL VASCULAR DISEASE, UNSPECIFIED: ICD-10-CM

## 2025-02-04 PROCEDURE — 99213 OFFICE O/P EST LOW 20 MIN: CPT

## 2025-02-04 RX ORDER — FINASTERIDE 5 MG/1
5 TABLET, FILM COATED ORAL
Refills: 0 | Status: ACTIVE | COMMUNITY

## 2025-02-04 RX ORDER — DULAGLUTIDE 0.75 MG/.5ML
0.75 INJECTION, SOLUTION SUBCUTANEOUS
Refills: 0 | Status: ACTIVE | COMMUNITY

## 2025-02-04 RX ORDER — EZETIMIBE 10 MG/1
10 TABLET ORAL
Refills: 0 | Status: ACTIVE | COMMUNITY

## 2025-02-04 RX ORDER — CHOLECALCIFEROL (VITAMIN D3) 125 MCG
TABLET ORAL
Refills: 0 | Status: ACTIVE | COMMUNITY

## 2025-02-04 RX ORDER — INSULIN GLARGINE 100 [IU]/ML
INJECTION, SOLUTION SUBCUTANEOUS
Refills: 0 | Status: ACTIVE | COMMUNITY

## 2025-02-04 RX ORDER — GLIMEPIRIDE 1 MG/1
1 TABLET ORAL
Refills: 0 | Status: ACTIVE | COMMUNITY

## 2025-02-04 RX ORDER — ALLOPURINOL 300 MG/1
300 TABLET ORAL
Refills: 0 | Status: ACTIVE | COMMUNITY

## 2025-02-04 RX ORDER — DAPAGLIFLOZIN 5 MG/1
5 TABLET, FILM COATED ORAL
Refills: 0 | Status: ACTIVE | COMMUNITY

## 2025-02-04 RX ORDER — CARVEDILOL 25 MG/1
25 TABLET, FILM COATED ORAL
Refills: 0 | Status: ACTIVE | COMMUNITY

## 2025-05-05 ENCOUNTER — NON-APPOINTMENT (OUTPATIENT)
Age: 70
End: 2025-05-05

## 2025-05-06 ENCOUNTER — APPOINTMENT (OUTPATIENT)
Dept: VASCULAR SURGERY | Facility: CLINIC | Age: 70
End: 2025-05-06
Payer: MEDICARE

## 2025-05-06 VITALS
RESPIRATION RATE: 16 BRPM | SYSTOLIC BLOOD PRESSURE: 120 MMHG | BODY MASS INDEX: 36.05 KG/M2 | DIASTOLIC BLOOD PRESSURE: 75 MMHG | TEMPERATURE: 98.5 F | WEIGHT: 219 LBS | HEART RATE: 93 BPM | OXYGEN SATURATION: 94 % | HEIGHT: 65.5 IN

## 2025-05-06 DIAGNOSIS — I73.9 PERIPHERAL VASCULAR DISEASE, UNSPECIFIED: ICD-10-CM

## 2025-05-06 PROCEDURE — 99213 OFFICE O/P EST LOW 20 MIN: CPT

## 2025-05-06 PROCEDURE — 93923 UPR/LXTR ART STDY 3+ LVLS: CPT

## 2025-06-06 NOTE — H&P PST ADULT - PROBLEM SELECTOR PLAN 2
Limited nutrition assessment at this time due to cognitive impairment. No specific diet hx noted. Baseline provision of energy/nutrient intake unclear. Pt confirms egg allergy; reports diarrhea when consumed. No indication of prior vitamins/supplement intake PTA.  Preop medical eval.